# Patient Record
Sex: FEMALE | Race: WHITE | Employment: OTHER | URBAN - METROPOLITAN AREA
[De-identification: names, ages, dates, MRNs, and addresses within clinical notes are randomized per-mention and may not be internally consistent; named-entity substitution may affect disease eponyms.]

---

## 2017-01-02 ENCOUNTER — OFFICE VISIT (OUTPATIENT)
Dept: OPHTHALMOLOGY | Facility: CLINIC | Age: 77
End: 2017-01-02
Payer: COMMERCIAL

## 2017-01-02 DIAGNOSIS — H18.9 KERATOPATHY: ICD-10-CM

## 2017-01-02 DIAGNOSIS — H52.4 PRESBYOPIA: ICD-10-CM

## 2017-01-02 DIAGNOSIS — Z96.1 PSEUDOPHAKIA: ICD-10-CM

## 2017-01-02 DIAGNOSIS — H40.1131 PRIMARY OPEN ANGLE GLAUCOMA OF BOTH EYES, MILD STAGE: ICD-10-CM

## 2017-01-02 DIAGNOSIS — H35.81 RETINAL EDEMA: ICD-10-CM

## 2017-01-02 DIAGNOSIS — Z01.01 ENCOUNTER FOR EXAMINATION OF EYES AND VISION WITH ABNORMAL FINDINGS: Primary | ICD-10-CM

## 2017-01-02 DIAGNOSIS — Z98.890 HISTORY OF PHOTOTHERAPEUTIC KERATECTOMY: ICD-10-CM

## 2017-01-02 DIAGNOSIS — E11.3299 DIABETES MELLITUS WITH BACKGROUND RETINOPATHY (H): ICD-10-CM

## 2017-01-02 DIAGNOSIS — H43.813 POSTERIOR VITREOUS DETACHMENT, BILATERAL: ICD-10-CM

## 2017-01-02 PROCEDURE — 92015 DETERMINE REFRACTIVE STATE: CPT | Performed by: OPHTHALMOLOGY

## 2017-01-02 PROCEDURE — 92014 COMPRE OPH EXAM EST PT 1/>: CPT | Performed by: OPHTHALMOLOGY

## 2017-01-02 RX ORDER — SODIUM CHLORIDE 5 %
OINTMENT (GRAM) OPHTHALMIC (EYE)
Qty: 1 TUBE | Refills: 11 | Status: SHIPPED | OUTPATIENT
Start: 2017-01-02 | End: 2017-03-31

## 2017-01-02 RX ORDER — ERYTHROMYCIN 5 MG/G
0.25 OINTMENT OPHTHALMIC AT BEDTIME
Qty: 3.5 G | Refills: 11 | Status: CANCELLED | OUTPATIENT
Start: 2017-01-02

## 2017-01-02 ASSESSMENT — CUP TO DISC RATIO
OD_RATIO: 0.7
OS_RATIO: 0.6

## 2017-01-02 ASSESSMENT — VISUAL ACUITY
OD_SC: CF @5'
METHOD: SNELLEN - LINEAR
OS_SC: 20/200

## 2017-01-02 ASSESSMENT — TONOMETRY
OS_IOP_MMHG: UA
OD_IOP_MMHG: 13
OS_IOP_MMHG: 10
OD_IOP_MMHG: 20
IOP_METHOD: APPLANATION
IOP_METHOD: ICARE

## 2017-01-02 ASSESSMENT — EXTERNAL EXAM - LEFT EYE: OS_EXAM: 2+ BROW PTOSIS

## 2017-01-02 ASSESSMENT — CONF VISUAL FIELD
OD_NORMAL: 1
OS_NORMAL: 1

## 2017-01-02 ASSESSMENT — SLIT LAMP EXAM - LIDS
COMMENTS: 2+ DERMATOCHALASIS - UPPER LID, 2+ BLEPHARITIS
COMMENTS: 2+ DERMATOCHALASIS - UPPER LID, 2+ BLEPHARITIS

## 2017-01-02 ASSESSMENT — REFRACTION_MANIFEST
OS_SPHERE: -0.50
OD_CYLINDER: SPHERE
OS_AXIS: 175
OD_SPHERE: -1.25
OS_CYLINDER: +0.50

## 2017-01-02 ASSESSMENT — EXTERNAL EXAM - RIGHT EYE: OD_EXAM: 2+ BROW PTOSIS

## 2017-01-02 NOTE — PATIENT INSTRUCTIONS
Continue Cosopt right eye twice a day,   Latanoprost both eyes every evening.  Grace ointment: One squirt to right eye 2-3 times a day. Could put a little in the left eye at bedtime.  Use artificial tears up to 4 times daily  Continue care with Yeni.  Will see if can arrange appt with Dr. Kennedy also for Friday; otherwise schedule with him at your convenience.  Return visit 6 months for an intraocular pressure check.  Abraham Byrd M.D.

## 2017-01-02 NOTE — MR AVS SNAPSHOT
After Visit Summary   1/2/2017    Ruth Rocha    MRN: [id number]           Patient Information     Date Of Birth          1940        Visit Information        Provider Department      1/2/2017 8:00 AM Abraham Byrd MD [facility]        Today's Diagnoses     Encounter for examination of eyes and vision with abnormal findings    -  1     Presbyopia         Myopia, bilateral         Diabetes mellitus with background retinopathy (H)         Primary open angle glaucoma of both eyes, mild stage         Infiltrate of cornea, os         Hx of corneal epithelial defect, left eye         Keratopathy,  ou         Pseudophakia, Yag Caps, ou          Posterior vitreous detachment, bilateral         Diabetic macular edema, hx focal laser ou           Care Instructions    Continue Cosopt right eye twice a day,   Latanoprost both eyes every evening.  Grace ointment: One squirt to right eye 2-3 times a day. Could put a little in the left eye at bedtime.  Use artificial tears up to 4 times daily  Continue care with Yeni.  Will see if can arrange appt with Dr. Knenedy also for Friday; otherwise schedule with him at your convenience.  Return visit 6 months for an intraocular pressure check.  Abraham Byrd M.D.              Follow-ups after your visit        Your next 10 appointments already scheduled     Jan 06, 2017  7:45 AM   RETURN RETINA with Bryanna Jaime MD   Eye Clinic ([facility])    [facility]  [address]  [address]  [facility] [address] [phone]              Who to contact     If you have questions or need follow up information about today's clinic visit or your schedule please contact [facility] directly at [phone].  Normal or non-critical lab and imaging results will be communicated to you by MyChart, letter or phone within 4 business days after the clinic has received the results. If you do not hear from  "us within 7 days, please contact the clinic through [employer] or phone. If you have a critical or abnormal lab result, we will notify you by phone as soon as possible.  Submit refill requests through [employer] or call your pharmacy and they will forward the refill request to us. Please allow 3 business days for your refill to be completed.          Additional Information About Your Visit        [employer] Information     [employer] lets you send messages to your doctor, view your test results, renew your prescriptions, schedule appointments and more. To sign up, go to [email or web address] . Click on \"Log in\" on the left side of the screen, which will take you to the Welcome page. Then click on \"Sign up Now\" on the right side of the page.     You will be asked to enter the access code listed below, as well as some personal information. Please follow the directions to create your username and password.     Your access code is: [email or web address]  Expires: 3/23/2017  9:00 AM     Your access code will  in 90 days. If you need help or a new code, please call your [place] clinic or [phone].         Blood Pressure from Last 3 Encounters:   07/15/16 120/61   16 134/72   06/08/15 116/60    Weight from Last 3 Encounters:   07/15/16 101.606 kg (224 lb)   16 100.472 kg (221 lb 8 oz)   16 101.606 kg (224 lb)              We Performed the Following     EYE EXAM (SIMPLE-NONBILLABLE)     REFRACTIVE STATUS          Today's Medication Changes          These changes are accurate as of: 17  9:07 AM.  If you have any questions, ask your nurse or doctor.               Start taking these medicines.        Dose/Directions    sodium chloride 5 % ophthalmic ointment   Commonly known as:  NATI 128   Used for:  Keratopathy   Started by:  Abraham Byrd MD        One squirt to right eye 2-3 times a day   Quantity:  1 Tube   Refills:  11            Where to get your medicines      These medications were sent to " [id number] - [place], [place] - [address], [facility] [address]     Phone:  [phone]    - sodium chloride 5 % ophthalmic ointment             Primary Care Provider Office Phone # Fax #    Raymon Hernández [phone]       [facility] [address] [facility] [address]        Thank you!     Thank you for choosing [facility]  for your care. Our goal is always to provide you with excellent care. Hearing back from our patients is one way we can continue to improve our services. Please take a few minutes to complete the written survey that you may receive in the mail after your visit with us. Thank you!             Your Updated Medication List - Protect others around you: Learn how to safely use, store and throw away your medicines at www.disposemymeds.org.          This list is accurate as of: 1/2/17  9:07 AM.  Always use your most recent med list.                   Brand Name Dispense Instructions for use    * ACE/ARB NOT PRESCRIBED (INTENTIONAL)      by Other route continuous prn.       aspirin 325 MG EC tablet      Take 325 mg by mouth daily.       blood glucose calibration NORMAL solution     1 Bottle    Use to calibrate blood glucose monitor as needed as directed.       blood glucose monitoring meter device kit     1 kit    Use to test blood sugar 3 times daily or as directed.       blood glucose test strip    ONEYDA BREEZE 2    3 Box    For testing 3x daily, patient on insulin       carvedilol 12.5 MG tablet    COREG    180 tablet    Take 1 tablet (12.5 mg) by mouth 2 times daily (with meals)       dorzolamide-timolol 2-0.5 % ophthalmic solution    COSOPT    10 mL    Place 1 drop into the right eye 2 times daily       erythromycin ophthalmic ointment    ROMYCIN    3.5 g    Place 0.25 inches Into the left eye At Bedtime       ferrous sulfate 325 (65 FE) MG tablet    IRON    180 tablet    Take 1 tablet (325  mg) by mouth 2 times daily       fluticasone 50 MCG/ACT spray    FLONASE    16 g    Spray 1-2 sprays into both nostrils daily       hydrochlorothiazide 25 MG tablet    HYDRODIURIL    90 tablet    Take 1 tablet (25 mg) by mouth daily       hydrocortisone 1 % cream    CORTAID    60 g    Apply sparingly to affected area three times daily as needed to affected areas       insulin glargine 100 UNIT/ML injection    LANTUS SOLOSTAR    3 Month    30 units at bedtime or as directed       insulin pen needle 31G X 8 MM     100 each    Use as directed with Lantus; patient often needs 2 shots daily, so  Please provide enough needles for bid dosing       latanoprost 0.005 % ophthalmic solution    XALATAN    1 Bottle    Place 1 drop into both eyes At Bedtime       metoclopramide 10 MG tablet    REGLAN    120 tablet    Take 1 tablet by mouth. Take 1 tablet by mouth 4 times a day before meals and nightly as needed       MICROLET LANCETS Misc     100 each    1 Device 3 times daily.       MULTIVITAMIN TABS   OR      1 TABLET DAILY       omeprazole 40 MG capsule    priLOSEC    90 capsule    Take 1 capsule (40 mg) by mouth daily       * order for DME      Equipment being ordered: CPAP       order for DME     1 each    Equipment being ordered: CPAP tubing, mask, and all needed equipment       oxybutynin 5 MG tablet    DITROPAN    270 tablet    Take 1 tablet (5 mg) by mouth 3 times daily as needed       potassium chloride 10 MEQ tablet    K-TAB,KLOR-CON    90 tablet    Take 1 tablet (10 mEq) by mouth daily       senna-docusate 8.6-50 MG per tablet    SENNA S    60 tablet    Take 1-4 tablets by mouth 2 times daily as needed constipation       simvastatin 10 MG tablet    ZOCOR    90 tablet    Take 1 tablet (10 mg) by mouth At Bedtime       sodium chloride 5 % ophthalmic ointment    NATI 128    1 Tube    One squirt to right eye 2-3 times a day       vitamin D 1000 UNITS capsule      Take 1 capsule by mouth daily.       * Notice:  This list  has 2 medication(s) that are the same as other medications prescribed for you. Read the directions carefully, and ask your doctor or other care provider to review them with you.

## 2017-01-02 NOTE — PROGRESS NOTES
" Current Eye Medications:  cosopt right eye twice a day, Latanoprost both eyes every evening.  Last drops:  Right eye: 6:30am, left eye: 10:30pm     Subjective:  Comprehensive Eye Exam.  Patient is here for a Diabetic Eye Exam.  Last A1C was:  6.1 in July.  \"My eyes are still as bad as always.\"  Right eye is sore today.   E-mycin did not seem to make any difference, so she discontinued it a couple months ago, when the tube ran out.  Glasses are no longer helping, so she rarely wears them.  She uses a magnifier for close work.  She sees the Retina Doctor next week, last appointment was 4 months ago.       Objective:  See Ophthalmology Exam.       Assessment: Corneal haze and irregularity worsening right eye.  Left eye remains relatively clear, quiet, and painless after PTK.  Stable posterior segment exam.      ICD-10-CM    1. Encounter for examination of eyes and vision with abnormal findings Z01.01 REFRACTIVE STATUS   2. Presbyopia H52.4 REFRACTIVE STATUS   3. Diabetes mellitus with background retinopathy (H) E11.319 EYE EXAM (SIMPLE-NONBILLABLE)   4. Diabetic macular edema, hx focal laser ou H35.81    5. Primary open angle glaucoma of both eyes, mild stage H40.1131    6. Keratopathy,  ou H18.9 sodium chloride (GRACE 128) 5 % ophthalmic ointment   7. Pseudophakia, Yag Caps, ou  Z96.1    8. History of phototherapeutic keratectomy, os Z98.890    9. Posterior vitreous detachment, bilateral H43.813         Plan: Continue Cosopt right eye twice a day,   Latanoprost both eyes every evening.  Grace ointment: One squirt to right eye 2-3 times a day. Could put a little in the left eye at bedtime.  Use artificial tears up to 4 times daily  Continue care with Yeni.  Will see if can arrange appt with Dr. Kennedy also for Friday; otherwise schedule with him at your convenience.  Return visit 6 months for an intraocular pressure check.  Abraham Byrd M.D.           "

## 2017-01-06 ENCOUNTER — OFFICE VISIT (OUTPATIENT)
Dept: OPHTHALMOLOGY | Facility: CLINIC | Age: 77
End: 2017-01-06
Attending: OPHTHALMOLOGY
Payer: COMMERCIAL

## 2017-01-06 DIAGNOSIS — H18.9 KERATOPATHY: Primary | ICD-10-CM

## 2017-01-06 DIAGNOSIS — E11.311: ICD-10-CM

## 2017-01-06 PROCEDURE — 99213 OFFICE O/P EST LOW 20 MIN: CPT | Mod: 25,ZF

## 2017-01-06 PROCEDURE — 40000269 ZZH STATISTIC NO CHARGE FACILITY FEE: Mod: ZF

## 2017-01-06 PROCEDURE — 92134 CPTRZ OPH DX IMG PST SGM RTA: CPT | Mod: ZF | Performed by: OPHTHALMOLOGY

## 2017-01-06 ASSESSMENT — TONOMETRY
OS_IOP_MMHG: 11
OD_IOP_MMHG: 12
IOP_METHOD: TONOPEN
OD_IOP_MMHG: 12
OS_IOP_MMHG: 11
IOP_METHOD: TONOPEN

## 2017-01-06 ASSESSMENT — SLIT LAMP EXAM - LIDS
COMMENTS: 2+ DERMATOCHALASIS - UPPER LID, 2+ BLEPHARITIS

## 2017-01-06 ASSESSMENT — VISUAL ACUITY
CORRECTION_TYPE: GLASSES
OD_PH_CC: 20/250
OD_PH_CC: 20/250
METHOD: SNELLEN - LINEAR
OD_CC: 20/300
OS_CC: 20/400
METHOD: SNELLEN - LINEAR
OD_CC: 20/300
CORRECTION_TYPE: GLASSES
OS_CC: 20/400

## 2017-01-06 ASSESSMENT — REFRACTION_WEARINGRX
OD_CYLINDER: +2.00
SPECS_TYPE: PAL
OS_SPHERE: -1.25
OS_ADD: +3.50
SPECS_TYPE: PAL
OS_AXIS: 062
OS_AXIS: 062
OS_CYLINDER: +1.00
OD_SPHERE: -1.00
OS_CYLINDER: +1.00
OD_AXIS: 015
OS_ADD: +3.50
OD_CYLINDER: +2.00
OD_ADD: +3.50
OD_SPHERE: -1.00
OD_AXIS: 015
OD_ADD: +3.50
OS_SPHERE: -1.25

## 2017-01-06 ASSESSMENT — CUP TO DISC RATIO
OS_RATIO: 0.6
OD_RATIO: 0.7

## 2017-01-06 ASSESSMENT — EXTERNAL EXAM - LEFT EYE
OS_EXAM: 2+ BROW PTOSIS
OS_EXAM: 2+ BROW PTOSIS

## 2017-01-06 ASSESSMENT — EXTERNAL EXAM - RIGHT EYE
OD_EXAM: 2+ BROW PTOSIS
OD_EXAM: 2+ BROW PTOSIS

## 2017-01-06 NOTE — NURSING NOTE
Chief Complaints and History of Present Illnesses   Patient presents with     Diabetic Retinopathy Follow Up     4 month follow up both eyes.     HPI    Affected eye(s):  Both   Symptoms:     No floaters   No flashes   No redness         Do you have eye pain now?:  No      Comments:  Pt states vision in RE appearing more Hazy for the past month. Pt seeing Dr. Kennedy today for corneal Haze RE today.  DMII BS: 128 this Morning.  A1C      6.1   7/15/2016  A1C      6.2   1/4/2016  A1C      6.5   12/12/2014  A1C      6.2   4/30/2014  A1C      6.5   9/18/2013    [facility] January 6, 2017 8:03 AM

## 2017-01-06 NOTE — NURSING NOTE
Chief Complaints and History of Present Illnesses   Patient presents with     Follow Up For     Corneal Haze Right Eye     HPI    Affected eye(s):  Both   Symptoms:     No floaters   No flashes   No redness   No Dryness         Do you have eye pain now?:  No      Comments:  Pt states vision in RE appearing more Hazy for the past month. Pt also seeing Dr. Jaime today.  DMII BS: 128 this Morning.  A1C      6.1   7/15/2016  A1C      6.2   1/4/2016  A1C      6.5   12/12/2014  A1C      6.2   4/30/2014  A1C      6.5   9/18/2013    [facility] January 6, 2017 8:03 AM

## 2017-01-06 NOTE — PROGRESS NOTES
CC -   NPDR and DME    HPI -   Patient  Feels VA in the right eye has been decreasing the past 2 months, left eye stable. Reports very stable blood sugars, typically low 100s. Last A1c 6.1% July 2016.    Ruth Rocha is a  76 year old year-old patient presenting for NPDR and DME OS > OD.  Sees [facility] normally for glaucoma (last visit 1/2/17).  Last A1c 6.1 (7/2016).  VA OS poor longstanding    PAST OCULAR HISTORY  CE/IOL OU  FML OU  Therapeutic PTK OS 8/2015    Retinal Imaging:  OCT   1-6-17  right eye- blurry/grainy image,  2+ Atrophy from FML scars, no CNVM, tr ERM, unable to fully assess for DME but no obvious new intraretinal fluid  left eye -  Grainy image, 3+ atrophy from FML scars, no CNVM, 2+  paracentral DME, CST - 221 -> 299 -> 298 -> 246-->248, stable    FA 6-14-16  Right eye - blurry image, 2-3+ WD from FML scars, 1+ increased NURA, 1 peripheral ischemia, no NV  Left eye - (transit) 2+ increased NURA, large WD in macula from FML scars, 1+ MAs/peripheral atrophy, no NV      ASSESSMENT & PLAN  1.  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 CSME OS   - s/p FML   - paracentral, given extensive FML scars & corneal scarring, unclear visual significance   - has moderate macular ischemia and scarring from FML scars (FA 6/2016)   - has anatomical improvement with monthly  injections (3 total, last 2/2016)  but no clear affect on VA or subjective vision   - VA has varied 20/125 - 20/500, typically close to 20/400,  unclear benefit of injections   - given OAG history and uncertain benefit, reluctant to use steroids     - OCT stable today, VA stable   - d/w patient, anti-VEGF vs observe, wishes to observe for now    3. DME OD   - OCT poor quality today but edema appears mild   -decreased vision mostly likely from corneal changes. observe    4.  PVD OU    5.  K- scarring OU   -prominent corneal haze and edema right eye today   -on Grace 128 ointment OD since Monday   -f/u with Dr Kennedy  today    6.  OAG OU   - sees Agusto - last visit 1/2/17- stable    RTC 4 months, OCT OU, sooner if new vision changes    Fady Aguiar MD  PGY3, Dept of Ophthalmology    ATTESTATION     Attending Physician Attestation:     Complete documentation of historical and exam elements from today's encounter can be found in the full encounter summary report (not redupilcated in this progress note).  I personally obtained the chief complaint(s) and hisotry of present illness., I have confirmed and edited as necessary the CC, HPI, PMH/PSH, Social history, FMH,  ROS, and exam/neuro findings as obtained by the technician or others. I have examined this patient myself.  and I personally viewed the image(s) and studies listed above and the documentation reflects my findings and interpretation.    Bryanna Jaime MD, PhD  , Vitreoretinal Surgery  Department of Ophthalmology  [facility]

## 2017-01-06 NOTE — PROGRESS NOTES
CC -   NPDR and DME    HPI -   Patient  Feels VA in the right eye has been decreasing the past 2 months, left eye stable. Reports very stable blood sugars, typically low 100s. Last A1c 6.1% July 2016.    Ruth Rocha is a  76 year old year-old patient presenting for NPDR and DME OS > OD.  Sees [facility] normally for glaucoma (last visit 1/2/17).  Last A1c 6.1 (7/2016).  VA OS poor longstanding    Eye drops:    Continue Cosopt right eye twice a day,    Latanoprost both eyes every evening.  Grace ointment: One squirt to right eye 2-3 times a day. Could put a little in the left eye at bedtime.  Use artificial tears up to 4 times daily    PAST OCULAR HISTORY  CE/IOL OU  FML OU  Therapeutic PTK OS 8/2015    ASSESSMENT & PLAN    1. Corneal scarring OU s/p PTK OS 8/2015.    2. Moderate NPDR OU and DME OS > OD, followed by Retina.    3. H/o POAG followed by Dr. Liz.    Rec SK, PTK, MMC right eye  Pachmetry 6/16 was 664    RTC post op.      ~~~~~~~~~~~~~~~~~~~~~~~~~~~~~~~~~~~~~~~~~~~~~~~~~~~~~~~~~~~~~~~~    I have personally examined the patient and agree with the assessment and plan as delineated by the resident / fellow.  I have confirmed the contents of the chief complaint, history of present illness, review of systems, and medical / surgical history sections and edited the note as needed.    Robb Kennedy MD, MA  Director, Cornea & Anterior Segment  [facility] Department of Ophthalmology & Visual Neuroscience

## 2017-01-06 NOTE — MR AVS SNAPSHOT
After Visit Summary   1/6/2017    Ruth Rocha    MRN: [id number]           Patient Information     Date Of Birth          1940        Visit Information        Provider Department      1/6/2017 7:45 AM Bryanna Jaime MD [facility]        Today's Diagnoses     Clinically significant macular edema associated with type 2 diabetes (H)            Follow-ups after your visit        Follow-up notes from your care team     Return in about 4 months (around 5/6/2017) for Exam + OCT, DME, OCT OU.      Your next 10 appointments already scheduled     Jan 19, 2017  2:00 PM   Post-Op with Robb Kennedy MD   Eye Clinic ([facility])    [facility] [address]  [address]  [address]  [facility] [address]   [phone]            Jan 27, 2017  8:15 AM   Post-Op with Robb Kennedy MD   Eye Clinic ([facility])    [facility] [address]  [address]  [address]  [facility] [address]   [phone]            May 05, 2017  7:45 AM   RETURN RETINA with Bryanna Jaime MD   Eye Clinic ([facility])    [facility] [address]  [address]  [address]  [facility] [address]   [phone]            Jun 19, 2017  8:15 AM   [facility]    [address] [address]   [phone]              Future tests that were ordered for you today     Open Future Orders        Priority Expected Expires Ordered    OCT Retina Spectralis OU (both eyes) Routine  7/10/2018 1/6/2017            Who to contact     Please call your clinic at [phone] to:    Ask questions about your health    Make or cancel appointments    Discuss your medicines    Learn about your test results    Speak to your doctor   If you have compliments or concerns about an experience at your clinic, or if you wish to file a complaint, please contact [facility]  Physicians Patient Relations at [phone] or email us at [email or web address]         Additional Information About Your Visit        [employer] Information     [employer] is an electronic gateway that provides easy, online access to your medical records. With [employer], you can request a clinic appointment, read your test results, renew a prescription or communicate with your care team.     To sign up for [employer] visit the website at [email or web address]   You will be asked to enter the access code listed below, as well as some personal information. Please follow the directions to create your username and password.     Your access code is: [email or web address]  Expires: 3/23/2017  9:00 AM     Your access code will  in 90 days. If you need help or a new code, please contact your [facility] or call [phone] for assistance.        Care EveryWhere ID     This is your Care EveryWhere ID. This could be used by other organizations to access your [facility] medical records  [id number]         Blood Pressure from Last 3 Encounters:   07/15/16 120/61   16 134/72   06/08/15 116/60    Weight from Last 3 Encounters:   07/15/16 101.606 kg (224 lb)   16 100.472 kg (221 lb 8 oz)   16 101.606 kg (224 lb)              We Performed the Following     OCT Retina Spectralis OU (both eyes)        Primary Care Provider Office Phone # Fax #    Raymon Hernández [phone]       [address] [address]        Thank you!     Thank you for choosing EYE CLINIC  for your care. Our goal is always to provide you with excellent care. Hearing back from our patients is one way we can continue to improve our services. Please take a few minutes to complete the written survey that you may receive in the mail after your visit with us. Thank you!             Your Updated Medication List - Protect others around you: Learn how  to safely use, store and throw away your medicines at www.disposemymeds.org.          This list is accurate as of: 1/6/17 10:48 AM.  Always use your most recent med list.                   Brand Name Dispense Instructions for use    * ACE/ARB NOT PRESCRIBED (INTENTIONAL)      by Other route continuous prn.       aspirin 325 MG EC tablet      Take 325 mg by mouth daily.       blood glucose calibration NORMAL solution     1 Bottle    Use to calibrate blood glucose monitor as needed as directed.       blood glucose monitoring meter device kit     1 kit    Use to test blood sugar 3 times daily or as directed.       blood glucose test strip    [place] Energy[employer]ZE 2    3 Box    For testing 3x daily, patient on insulin       carvedilol 12.5 MG tablet    COREG    180 tablet    Take 1 tablet (12.5 mg) by mouth 2 times daily (with meals)       dorzolamide-timolol 2-0.5 % ophthalmic solution    COSOPT    10 mL    Place 1 drop into the right eye 2 times daily       erythromycin ophthalmic ointment    ROMYCIN    3.5 g    Place 0.25 inches Into the left eye At Bedtime       ferrous sulfate 325 (65 FE) MG tablet    IRON    180 tablet    Take 1 tablet (325 mg) by mouth 2 times daily       fluticasone 50 MCG/ACT spray    FLONASE    16 g    Spray 1-2 sprays into both nostrils daily       hydrochlorothiazide 25 MG tablet    HYDRODIURIL    90 tablet    Take 1 tablet (25 mg) by mouth daily       hydrocortisone 1 % cream    CORTAID    60 g    Apply sparingly to affected area three times daily as needed to affected areas       insulin glargine 100 UNIT/ML injection    LANTUS SOLOSTAR    3 Month    30 units at bedtime or as directed       insulin pen needle 31G X 8 MM     100 each    Use as directed with Lantus; patient often needs 2 shots daily, so  Please provide enough needles for bid dosing       latanoprost 0.005 % ophthalmic solution    XALATAN    1 Bottle    Place 1 drop into both eyes At Bedtime       metoclopramide 10 MG tablet    REGLAN     120 tablet    Take 1 tablet by mouth. Take 1 tablet by mouth 4 times a day before meals and nightly as needed       MICROLET LANCETS Misc     100 each    1 Device 3 times daily.       MULTIVITAMIN TABS   OR      1 TABLET DAILY       omeprazole 40 MG capsule    priLOSEC    90 capsule    Take 1 capsule (40 mg) by mouth daily       * order for DME      Equipment being ordered: CPAP       order for DME     1 each    Equipment being ordered: CPAP tubing, mask, and all needed equipment       oxybutynin 5 MG tablet    DITROPAN    270 tablet    Take 1 tablet (5 mg) by mouth 3 times daily as needed       potassium chloride 10 MEQ tablet    K-TAB,KLOR-CON    90 tablet    Take 1 tablet (10 mEq) by mouth daily       senna-docusate 8.6-50 MG per tablet    SENNA S    60 tablet    Take 1-4 tablets by mouth 2 times daily as needed constipation       simvastatin 10 MG tablet    ZOCOR    90 tablet    Take 1 tablet (10 mg) by mouth At Bedtime       sodium chloride 5 % ophthalmic ointment    NATI 128    1 Tube    One squirt to right eye 2-3 times a day       vitamin D 1000 UNITS capsule      Take 1 capsule by mouth daily.       * Notice:  This list has 2 medication(s) that are the same as other medications prescribed for you. Read the directions carefully, and ask your doctor or other care provider to review them with you.

## 2017-01-17 ENCOUNTER — TELEPHONE (OUTPATIENT)
Dept: FAMILY MEDICINE | Facility: CLINIC | Age: 77
End: 2017-01-17

## 2017-01-17 DIAGNOSIS — Z79.4 TYPE 2 DIABETES MELLITUS WITH DIABETIC NEPHROPATHY, WITH LONG-TERM CURRENT USE OF INSULIN (H): Primary | ICD-10-CM

## 2017-01-17 DIAGNOSIS — E11.21 TYPE 2 DIABETES MELLITUS WITH DIABETIC NEPHROPATHY, WITH LONG-TERM CURRENT USE OF INSULIN (H): Primary | ICD-10-CM

## 2017-01-17 NOTE — TELEPHONE ENCOUNTER
Called pharmacy and they stated that they need a refill of patient's test strips. We have the Mingo Breeze 2 test strips in Epic but pharmacy is wanting us to send a Rx for no brand specified  test strips and they will figure out which one she has.     Routed to provider to send in new Rx for test strips.  Saadia Srivastava RN  [facility]

## 2017-01-18 ENCOUNTER — TRANSFERRED RECORDS (OUTPATIENT)
Dept: HEALTH INFORMATION MANAGEMENT | Facility: CLINIC | Age: 77
End: 2017-01-18

## 2017-01-19 ENCOUNTER — OFFICE VISIT (OUTPATIENT)
Dept: OPHTHALMOLOGY | Facility: CLINIC | Age: 77
End: 2017-01-19
Attending: OPHTHALMOLOGY
Payer: COMMERCIAL

## 2017-01-19 DIAGNOSIS — H17.9 CORNEA SCAR: Primary | ICD-10-CM

## 2017-01-19 PROCEDURE — 99212 OFFICE O/P EST SF 10 MIN: CPT | Mod: ZF

## 2017-01-19 RX ORDER — PREDNISOLONE ACETATE 10 MG/ML
1 SUSPENSION/ DROPS OPHTHALMIC 4 TIMES DAILY
COMMUNITY
End: 2017-03-31

## 2017-01-19 ASSESSMENT — VISUAL ACUITY
OD_SC: 20/400
OS_SC: 20/400
METHOD: SNELLEN - LINEAR
OS_PH_SC: 20/200-2

## 2017-01-19 ASSESSMENT — REFRACTION_WEARINGRX
OD_ADD: +3.50
OD_CYLINDER: +2.00
OS_AXIS: 062
OS_CYLINDER: +1.00
OD_AXIS: 015
OS_ADD: +3.50
SPECS_TYPE: PAL
OS_SPHERE: -1.25
OD_SPHERE: -1.00

## 2017-01-19 ASSESSMENT — TONOMETRY
OS_IOP_MMHG: 02
IOP_METHOD: ICARE
OD_IOP_MMHG: 10

## 2017-01-19 ASSESSMENT — EXTERNAL EXAM - RIGHT EYE: OD_EXAM: 2+ BROW PTOSIS

## 2017-01-19 ASSESSMENT — EXTERNAL EXAM - LEFT EYE: OS_EXAM: 2+ BROW PTOSIS

## 2017-01-19 NOTE — MR AVS SNAPSHOT
After Visit Summary   1/19/2017    Ruth Rocha    MRN: [id number]           Patient Information     Date Of Birth          1940        Visit Information        Provider Department      1/19/2017 2:00 PM Robb Kennedy MD [facility]         Follow-ups after your visit        Your next 10 appointments already scheduled     Jan 27, 2017  8:15 AM   Post-Op with Robb Kennedy MD   Eye Clinic ([facility])    [address]  [address]   [address]  [facility] [address]   [phone]            May 05, 2017  7:45 AM   RETURN RETINA with Bryanna Jaime MD   Eye Clinic ([facility])    [address]  [address]   [address]  [facility] [address]   [phone]            Jun 19, 2017  8:15 AM   Return Visit with Abraham Byrd MD   [facility] ([address] [address] [phone]              Who to contact     Please call your clinic at [phone] to:    Ask questions about your health    Make or cancel appointments    Discuss your medicines    Learn about your test results    Speak to your doctor   If you have compliments or concerns about an experience at your clinic, or if you wish to file a complaint, please contact [facility] Patient Relations at [phone] or email us at [email or web address]         Additional Information About Your Visit        MyChart Information     GleeMaster is an electronic gateway that provides easy, online access to your medical records. With GleeMaster, you can request a clinic appointment, read your test results, renew a prescription or communicate with your care team.     To sign up for GleeMaster visit the website at [email or web address]   You will be asked to enter the access code listed below, as well as some personal information. Please follow the directions to create your  username and password.     Your access code is: [email or web address]  Expires: 3/23/2017  9:00 AM     Your access code will  in 90 days. If you need help or a new code, please contact your [facility] or call [phone] for assistance.        Care EveryWhere ID     This is your Care EveryWhere ID. This could be used by other organizations to access your [place] medical records  [id number]         Blood Pressure from Last 3 Encounters:   07/15/16 120/61   16 134/72   06/08/15 116/60    Weight from Last 3 Encounters:   07/15/16 101.606 kg (224 lb)   16 100.472 kg (221 lb 8 oz)   16 101.606 kg (224 lb)              Today, you had the following     No orders found for display       Primary Care Provider Office Phone # Fax #    Raymon Hernández [phone]       [address] [address]        Thank you!     Thank you for choosing EYE CLINIC  for your care. Our goal is always to provide you with excellent care. Hearing back from our patients is one way we can continue to improve our services. Please take a few minutes to complete the written survey that you may receive in the mail after your visit with us. Thank you!             Your Updated Medication List - Protect others around you: Learn how to safely use, store and throw away your medicines at www.disposemymeds.org.          This list is accurate as of: 17  2:14 PM.  Always use your most recent med list.                   Brand Name Dispense Instructions for use    * ACE/ARB NOT PRESCRIBED (INTENTIONAL)      by Other route continuous prn.       aspirin 325 MG EC tablet      Take 325 mg by mouth daily.       blood glucose calibration NORMAL solution     1 Bottle    Use to calibrate blood glucose monitor as needed as directed.       blood glucose monitoring meter device kit     1 kit    Use to test blood sugar 3 times daily or as directed.        blood glucose monitoring test strip    no brand specified    100 strip    Use to test blood sugars 3 times daily or as directed       carvedilol 12.5 MG tablet    COREG    180 tablet    Take 1 tablet (12.5 mg) by mouth 2 times daily (with meals)       dorzolamide-timolol 2-0.5 % ophthalmic solution    COSOPT    10 mL    Place 1 drop into the right eye 2 times daily       erythromycin ophthalmic ointment    ROMYCIN    3.5 g    Place 0.25 inches Into the left eye At Bedtime       ferrous sulfate 325 (65 FE) MG tablet    IRON    180 tablet    Take 1 tablet (325 mg) by mouth 2 times daily       fluticasone 50 MCG/ACT spray    FLONASE    16 g    Spray 1-2 sprays into both nostrils daily       hydrochlorothiazide 25 MG tablet    HYDRODIURIL    90 tablet    Take 1 tablet (25 mg) by mouth daily       hydrocortisone 1 % cream    CORTAID    60 g    Apply sparingly to affected area three times daily as needed to affected areas       insulin glargine 100 UNIT/ML injection    LANTUS SOLOSTAR    3 Month    30 units at bedtime or as directed       insulin pen needle 31G X 8 MM     100 each    Use as directed with Lantus; patient often needs 2 shots daily, so  Please provide enough needles for bid dosing       latanoprost 0.005 % ophthalmic solution    XALATAN    1 Bottle    Place 1 drop into both eyes At Bedtime       metoclopramide 10 MG tablet    REGLAN    120 tablet    Take 1 tablet by mouth. Take 1 tablet by mouth 4 times a day before meals and nightly as needed       MICROLET LANCETS Misc     100 each    1 Device 3 times daily.       MULTIVITAMIN TABS   OR      1 TABLET DAILY       omeprazole 40 MG capsule    priLOSEC    90 capsule    Take 1 capsule (40 mg) by mouth daily       * order for DME      Equipment being ordered: CPAP       order for DME     1 each    Equipment being ordered: CPAP tubing, mask, and all needed equipment       oxybutynin 5 MG tablet    DITROPAN    270 tablet    Take 1 tablet (5 mg) by mouth 3 times  daily as needed       potassium chloride 10 MEQ tablet    K-TAB,KLOR-CON    90 tablet    Take 1 tablet (10 mEq) by mouth daily       prednisoLONE acetate 1 % ophthalmic susp    PRED FORTE     Place 1 drop into the right eye 4 times daily       senna-docusate 8.6-50 MG per tablet    SENNA S    60 tablet    Take 1-4 tablets by mouth 2 times daily as needed constipation       simvastatin 10 MG tablet    ZOCOR    90 tablet    Take 1 tablet (10 mg) by mouth At Bedtime       sodium chloride 5 % ophthalmic ointment    NATI 128    1 Tube    One squirt to right eye 2-3 times a day       vitamin D 1000 UNITS capsule      Take 1 capsule by mouth daily.       * Notice:  This list has 2 medication(s) that are the same as other medications prescribed for you. Read the directions carefully, and ask your doctor or other care provider to review them with you.

## 2017-01-19 NOTE — NURSING NOTE
Chief Complaints and History of Present Illnesses   Patient presents with     Post Op (Ophthalmology) Right Eye     PTK     HPI    Symptoms:     No tearing   No Dryness         Do you have eye pain now?:  No      Comments:  One day POP for right eye PTK.  The patient states she has no eye pain and she is doing well.   KEIRA Gee 1:46 PM 01/19/2017

## 2017-01-24 NOTE — PROGRESS NOTES
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

## 2017-01-27 ENCOUNTER — OFFICE VISIT (OUTPATIENT)
Dept: OPHTHALMOLOGY | Facility: CLINIC | Age: 77
End: 2017-01-27
Attending: OPHTHALMOLOGY
Payer: COMMERCIAL

## 2017-01-27 DIAGNOSIS — Z98.890 HISTORY OF PHOTOTHERAPEUTIC KERATECTOMY: Primary | ICD-10-CM

## 2017-01-27 PROCEDURE — 99212 OFFICE O/P EST SF 10 MIN: CPT | Mod: ZF

## 2017-01-27 RX ORDER — PREDNISOLONE ACETATE 10 MG/ML
SUSPENSION/ DROPS OPHTHALMIC
Qty: 1 BOTTLE | Refills: 1 | Status: SHIPPED | OUTPATIENT
Start: 2017-01-27 | End: 2017-03-31

## 2017-01-27 ASSESSMENT — TONOMETRY
OD_IOP_MMHG: 11
IOP_METHOD: ICARE
OS_IOP_MMHG: 09

## 2017-01-27 ASSESSMENT — VISUAL ACUITY
METHOD: SNELLEN - LINEAR
OD_PH_SC: 20/125
OS_SC: 20/400
OS_PH_SC: 20/150
OD_SC: 20/150

## 2017-01-27 ASSESSMENT — EXTERNAL EXAM - RIGHT EYE: OD_EXAM: 2+ BROW PTOSIS

## 2017-01-27 ASSESSMENT — EXTERNAL EXAM - LEFT EYE: OS_EXAM: 2+ BROW PTOSIS

## 2017-01-27 NOTE — MR AVS SNAPSHOT
After Visit Summary   1/27/2017    Ruth Rocha    MRN: [id number]           Patient Information     Date Of Birth          1940        Visit Information        Provider Department      1/27/2017 8:15 AM Robb Kennedy MD [facility]        Today's Diagnoses     History of phototherapeutic keratectomy    -  1        Follow-ups after your visit        Follow-up notes from your care team     Return in about 2 months (around 3/27/2017) for MRx .      Your next 10 appointments already scheduled     Mar 31, 2017  8:00 AM   Post-Op with Robb Kennedy MD   Eye Clinic ([facility])    [address]  [address]   [address]  [facility] [address]   [phone]            May 05, 2017  7:45 AM   RETURN RETINA with Bryanna Jaime MD   Eye Clinic ([facility])    [address]  [address]   [address]  [facility] [address]   [phone]            Jun 19, 2017  8:15 AM   Return Visit with Abraham Byrd MD   [facility] ([facility])    [address] [place] [address] [phone]              Who to contact     Please call your clinic at [phone] to:    Ask questions about your health    Make or cancel appointments    Discuss your medicines    Learn about your test results    Speak to your doctor   If you have compliments or concerns about an experience at your clinic, or if you wish to file a complaint, please contact [facility] Patient Relations at [phone] or email us at [email or web address]         Additional Information About Your Visit        ISI Technologyhart Information     TASCET is an electronic gateway that provides easy, online access to your medical records. With TASCET, you can request a clinic appointment, read your test results, renew a prescription or communicate with your care team.     To sign up for TASCET visit the website  at [email or web address]   You will be asked to enter the access code listed below, as well as some personal information. Please follow the directions to create your username and password.     Your access code is: [email or web address]  Expires: 3/23/2017  9:00 AM     Your access code will  in 90 days. If you need help or a new code, please contact your [facility] or call [phone] for assistance.        Care EveryWhere ID     This is your Care EveryWhere ID. This could be used by other organizations to access your [place] medical records  [id number]         Blood Pressure from Last 3 Encounters:   07/15/16 120/61   16 134/72   06/08/15 116/60    Weight from Last 3 Encounters:   07/15/16 101.606 kg (224 lb)   16 100.472 kg (221 lb 8 oz)   16 101.606 kg (224 lb)              Today, you had the following     No orders found for display         Today's Medication Changes          These changes are accurate as of: 17  8:41 AM.  If you have any questions, ask your nurse or doctor.               These medicines have changed or have updated prescriptions.        Dose/Directions    * prednisoLONE acetate 1 % ophthalmic susp   Commonly known as:  PRED FORTE   This may have changed:  Another medication with the same name was added. Make sure you understand how and when to take each.   Changed by:  Robb Kennedy MD        Dose:  1 drop   Place 1 drop into the right eye 4 times daily   Refills:  0       * prednisoLONE acetate 1 % ophthalmic susp   Commonly known as:  PRED FORTE   This may have changed:  You were already taking a medication with the same name, and this prescription was added. Make sure you understand how and when to take each.   Used for:  History of phototherapeutic keratectomy   Changed by:  Robb Kennedy MD        Three times a day for 2 weeks, twice a day for 2 weeks, once a day for two weeks   Quantity:  1 Bottle   Refills:  1        * Notice:  This list has 2 medication(s) that are the same as other medications prescribed for you. Read the directions carefully, and ask your doctor or other care provider to review them with you.         Where to get your medicines      These medications were sent to [id number] - [place], [place] - [address] [facility] [address]     Phone:  [phone]    - prednisoLONE acetate 1 % ophthalmic susp             Primary Care Provider Office Phone # Fax #    Raymon Hernández [phone]       [facility] [address] [facility] [address]        Thank you!     Thank you for choosing [facility]YE CLINIC  for your care. Our goal is always to provide you with excellent care. Hearing back from our patients is one way we can continue to improve our services. Please take a few minutes to complete the written survey that you may receive in the mail after your visit with us. Thank you!             Your Updated Medication List - Protect others around you: Learn how to safely use, store and throw away your medicines at www.disposemymeds.org.          This list is accurate as of: 1/27/17  8:41 AM.  Always use your most recent med list.                   Brand Name Dispense Instructions for use    * ACE/ARB NOT PRESCRIBED (INTENTIONAL)      by Other route continuous prn.       aspirin 325 MG EC tablet      Take 325 mg by mouth daily.       blood glucose calibration NORMAL solution     1 Bottle    Use to calibrate blood glucose monitor as needed as directed.       blood glucose monitoring meter device kit     1 kit    Use to test blood sugar 3 times daily or as directed.       blood glucose monitoring test strip    no brand specified    100 strip    Use to test blood sugars 3 times daily or as directed       carvedilol 12.5 MG tablet    COREG    180 tablet    Take 1 tablet (12.5 mg) by mouth 2 times daily (with meals)        dorzolamide-timolol 2-0.5 % ophthalmic solution    COSOPT    10 mL    Place 1 drop into the right eye 2 times daily       erythromycin ophthalmic ointment    ROMYCIN    3.5 g    Place 0.25 inches Into the left eye At Bedtime       ferrous sulfate 325 (65 FE) MG tablet    IRON    180 tablet    Take 1 tablet (325 mg) by mouth 2 times daily       fluticasone 50 MCG/ACT spray    FLONASE    16 g    Spray 1-2 sprays into both nostrils daily       hydrochlorothiazide 25 MG tablet    HYDRODIURIL    90 tablet    Take 1 tablet (25 mg) by mouth daily       hydrocortisone 1 % cream    CORTAID    60 g    Apply sparingly to affected area three times daily as needed to affected areas       insulin glargine 100 UNIT/ML injection    LANTUS SOLOSTAR    1 mL    30 units at bedtime or as directed       insulin pen needle 31G X 8 MM     100 each    Use as directed with Lantus; patient often needs 2 shots daily, so  Please provide enough needles for bid dosing       latanoprost 0.005 % ophthalmic solution    XALATAN    1 Bottle    Place 1 drop into both eyes At Bedtime       metoclopramide 10 MG tablet    REGLAN    120 tablet    Take 1 tablet by mouth. Take 1 tablet by mouth 4 times a day before meals and nightly as needed       MICROLET LANCETS Misc     100 each    1 Device 3 times daily.       MULTIVITAMIN TABS   OR      1 TABLET DAILY       omeprazole 40 MG capsule    priLOSEC    90 capsule    Take 1 capsule (40 mg) by mouth daily       * order for DME      Equipment being ordered: CPAP       order for DME     1 each    Equipment being ordered: CPAP tubing, mask, and all needed equipment       oxybutynin 5 MG tablet    DITROPAN    270 tablet    Take 1 tablet (5 mg) by mouth 3 times daily as needed       potassium chloride 10 MEQ tablet    K-TAB,KLOR-CON    90 tablet    Take 1 tablet (10 mEq) by mouth daily       * prednisoLONE acetate 1 % ophthalmic susp    PRED FORTE     Place 1 drop into the right eye 4 times daily       *  prednisoLONE acetate 1 % ophthalmic susp    PRED FORTE    1 Bottle    Three times a day for 2 weeks, twice a day for 2 weeks, once a day for two weeks       senna-docusate 8.6-50 MG per tablet    SENNA S    60 tablet    Take 1-4 tablets by mouth 2 times daily as needed constipation       simvastatin 10 MG tablet    ZOCOR    90 tablet    Take 1 tablet (10 mg) by mouth At Bedtime       sodium chloride 5 % ophthalmic ointment    NATI 128    1 Tube    One squirt to right eye 2-3 times a day       vitamin D 1000 UNITS capsule      Take 1 capsule by mouth daily.       * Notice:  This list has 4 medication(s) that are the same as other medications prescribed for you. Read the directions carefully, and ask your doctor or other care provider to review them with you.

## 2017-01-27 NOTE — NURSING NOTE
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 No discomfort. Drops as directed  Zoe CROCKETT January 27, 2017 8:16 AM

## 2017-01-27 NOTE — PROGRESS NOTES
1 week s/p Phototherapeutic keratectomy (PTK) right eye  ucva improved    bandage contact lens removed  Epi healed, mild haze    Discontinue oflox  Taper Predforte  Over 6 weeks  Resume glaucoma regimen    Return to clinic 2 mo , manifest refraction      Robb Kennedy MD, MA  Director, Cornea & Anterior Segment  [facility] Department of Ophthalmology & Visual Neuroscience

## 2017-02-07 DIAGNOSIS — I10 HYPERTENSION GOAL BP (BLOOD PRESSURE) < 140/90: Primary | ICD-10-CM

## 2017-02-07 NOTE — TELEPHONE ENCOUNTER
hydrochlorothiazide (HYDRODIURIL) 25 MG tablet      Last Written Prescription Date: 1/4/16  Last Fill Quantity: 90, # refills: 3  Last Office Visit with [facility], [facility] or [facility] prescribing provider: 7/15/16       POTASSIUM   Date Value Ref Range Status   07/15/2016 3.8 3.4 - 5.3 mmol/L Final     CREATININE   Date Value Ref Range Status   07/15/2016 1.44* 0.52 - 1.04 mg/dL Final     BP Readings from Last 3 Encounters:   07/15/16 120/61   01/04/16 134/72   06/08/15 116/60

## 2017-02-08 DIAGNOSIS — I10 HYPERTENSION GOAL BP (BLOOD PRESSURE) < 140/90: ICD-10-CM

## 2017-02-08 DIAGNOSIS — E87.6 HYPOKALEMIA: Primary | ICD-10-CM

## 2017-02-08 RX ORDER — HYDROCHLOROTHIAZIDE 25 MG/1
25 TABLET ORAL DAILY
Qty: 90 TABLET | Refills: 1 | Status: SHIPPED | OUTPATIENT
Start: 2017-02-08 | End: 2017-04-14

## 2017-02-08 NOTE — TELEPHONE ENCOUNTER
potassium chloride (K-DUR) 10 MEQ tablet      Last Written Prescription Date: 1-4-16  Last Fill Quantity: 90, # refills: 3  Last Office Visit with [facility], [facility] or [facility] prescribing provider: 7-15-16       POTASSIUM   Date Value Ref Range Status   07/15/2016 3.8 3.4 - 5.3 mmol/L Final     CREATININE   Date Value Ref Range Status   07/15/2016 1.44* 0.52 - 1.04 mg/dL Final     BP Readings from Last 3 Encounters:   07/15/16 120/61   01/04/16 134/72   06/08/15 116/60     carvedilol (COREG) 12.5 MG tablet      Last Written Prescription Date: 1-4-16  Last Fill Quantity: 180, # refills: 3    Last Office Visit with [facility], [facility] or [facility] prescribing provider:  7-15-16   Future Office Visit:        BP Readings from Last 3 Encounters:   07/15/16 120/61   01/04/16 134/72   06/08/15 116/60

## 2017-02-08 NOTE — TELEPHONE ENCOUNTER
Routing refill request to provider for review/approval because:  Labs out of range  Sofia Sanderson, [facility] Triage.

## 2017-02-10 RX ORDER — POTASSIUM CHLORIDE 750 MG/1
10 TABLET, EXTENDED RELEASE ORAL DAILY
Qty: 90 TABLET | Refills: 1 | Status: SHIPPED | OUTPATIENT
Start: 2017-02-10 | End: 2017-04-14

## 2017-02-10 RX ORDER — CARVEDILOL 12.5 MG/1
12.5 TABLET ORAL 2 TIMES DAILY WITH MEALS
Qty: 180 TABLET | Refills: 1 | Status: SHIPPED | OUTPATIENT
Start: 2017-02-10 | End: 2017-04-14

## 2017-02-24 DIAGNOSIS — R10.13 DYSPEPSIA: ICD-10-CM

## 2017-02-24 RX ORDER — METOCLOPRAMIDE 10 MG/1
TABLET ORAL
Qty: 360 TABLET | Refills: 1 | Status: SHIPPED | OUTPATIENT
Start: 2017-02-24 | End: 2017-04-14

## 2017-02-24 NOTE — TELEPHONE ENCOUNTER
Routed to provider, patient is requesting 90 day supply, cued up.  Deepali Srinivasan RN  [facility]

## 2017-02-24 NOTE — TELEPHONE ENCOUNTER
90 days supply request  metoclopramide (REGLAN) 10 MG tablet      Last Written Prescription Date: 7/15/16  Last Fill Quantity: 120,  # refills: 2   Last Office Visit with [facility], [facility] or [facility] prescribing provider: 7/15/16

## 2017-03-11 DIAGNOSIS — E78.5 HYPERLIPIDEMIA LDL GOAL <100: ICD-10-CM

## 2017-03-13 RX ORDER — SIMVASTATIN 10 MG
10 TABLET ORAL AT BEDTIME
Qty: 30 TABLET | Refills: 0 | Status: SHIPPED | OUTPATIENT
Start: 2017-03-13 | End: 2017-04-05

## 2017-03-13 NOTE — TELEPHONE ENCOUNTER
simvastatin (ZOCOR) 10 MG tablet     Last Written Prescription Date: 1/4/16  Last Fill Quantity: 90, # refills: 3  Last Office Visit with [facility] or [facility] prescribing provider: 7/15/16       Lab Results   Component Value Date    CHOL 126 03/11/2016     Lab Results   Component Value Date    HDL 46 03/11/2016     Lab Results   Component Value Date    LDL 57 03/11/2016     Lab Results   Component Value Date    TRIG 113 03/11/2016     Lab Results   Component Value Date    CHOLHDLRATIO 2.9 12/12/2014

## 2017-03-13 NOTE — TELEPHONE ENCOUNTER
Medication is being filled for 1 time refill only due to:  Patient needs to be seen because she is due for a diabetes and cholesterol visit.     Anabel James RN   March 13, 2017 1:19 PM  [facility] Triage   [phone]

## 2017-03-31 ENCOUNTER — OFFICE VISIT (OUTPATIENT)
Dept: OPHTHALMOLOGY | Facility: CLINIC | Age: 77
End: 2017-03-31
Attending: OPHTHALMOLOGY
Payer: COMMERCIAL

## 2017-03-31 DIAGNOSIS — H18.9 KERATOPATHY: ICD-10-CM

## 2017-03-31 DIAGNOSIS — Z98.890 HISTORY OF PHOTOTHERAPEUTIC KERATECTOMY: Primary | ICD-10-CM

## 2017-03-31 DIAGNOSIS — R09.81 NASAL CONGESTION: ICD-10-CM

## 2017-03-31 DIAGNOSIS — H17.9 CORNEA SCAR: ICD-10-CM

## 2017-03-31 PROCEDURE — 99214 OFFICE O/P EST MOD 30 MIN: CPT | Mod: ZF

## 2017-03-31 PROCEDURE — 92015 DETERMINE REFRACTIVE STATE: CPT | Mod: ZF

## 2017-03-31 RX ORDER — FLUTICASONE PROPIONATE 50 MCG
SPRAY, SUSPENSION (ML) NASAL
Qty: 16 ML | Refills: 2 | Status: SHIPPED | OUTPATIENT
Start: 2017-03-31 | End: 2017-04-14

## 2017-03-31 ASSESSMENT — TONOMETRY
IOP_METHOD: APPLANATION
OD_IOP_MMHG: 15
OS_IOP_MMHG: 11

## 2017-03-31 ASSESSMENT — REFRACTION_MANIFEST
OD_AXIS: 170
OD_SPHERE: +1.25
OD_CYLINDER: +1.50
OD_ADD: +3.25
OS_CYLINDER: SPHERE
OS_SPHERE: +4.00
OS_ADD: +3.25

## 2017-03-31 ASSESSMENT — VISUAL ACUITY
OD_SC+: +1
METHOD: SNELLEN - LINEAR
OS_PH_SC: 20/100
OD_SC: 20/150
OS_SC: 20/500
OD_PH_SC: 20/60

## 2017-03-31 ASSESSMENT — CONF VISUAL FIELD
OD_NORMAL: 1
OS_NORMAL: 1

## 2017-03-31 NOTE — MR AVS SNAPSHOT
After Visit Summary   3/31/2017    Ruth Rocha    MRN: [id number]           Patient Information     Date Of Birth          1940        Visit Information        Provider Department      3/31/2017 8:00 AM Robb Kennedy MD [facility]        Today's Diagnoses     History of phototherapeutic keratectomy    -  1    Cornea scar        Keratopathy,  ou           Follow-ups after your visit        Your next 10 appointments already scheduled     May 05, 2017  7:45 AM CDT   RETURN RETINA with Bryanna Jaime MD   Eye Clinic ([facility])    [address]  [address]  [address]  [facility] [address]   [phone]            Jun 19, 2017  8:15 AM CDT   [facility] Visit with Abraham Byrd MD   [facility]    [address] [id number] [phone]              Who to contact     Please call your clinic at [phone] to:    Ask questions about your health    Make or cancel appointments    Discuss your medicines    Learn about your test results    Speak to your doctor   If you have compliments or concerns about an experience at your clinic, or if you wish to file a complaint, please contact [facility] Patient Relations at [phone] or email us at [email or web address]         Additional Information About Your Visit        MyChart Information     [employer] is an electronic gateway that provides easy, online access to your medical records. With [employer], you can request a clinic appointment, read your test results, renew a prescription or communicate with your care team.     To sign up for [employer] visit the website at [email or web address]   You will be asked to enter the access code listed below, as well as some personal information. Please follow the directions to create your username and password.     Your access code is: [email or web address]  Expires: 7/3/2017  2:20  PM     Your access code will  in 90 days. If you need help or a new code, please contact your [facility] or call [phone] for assistance.        Care EveryWhere ID     This is your Care EveryWhere ID. This could be used by other organizations to access your [facility] medical records  [id number]         Blood Pressure from Last 3 Encounters:   07/15/16 120/61   16 134/72   06/08/15 116/60    Weight from Last 3 Encounters:   07/15/16 101.6 kg (224 lb)   16 100.5 kg (221 lb 8 oz)   16 101.6 kg (224 lb)              Today, you had the following     No orders found for display         Today's Medication Changes          These changes are accurate as of: 3/31/17 11:59 PM.  If you have any questions, ask your nurse or doctor.               These medicines have changed or have updated prescriptions.        Dose/Directions    fluticasone 50 MCG/ACT spray   Commonly known as:  FLONASE   This may have changed:  See the new instructions.   Used for:  Nasal congestion   Changed by:  Raymon Hernández MD        SPRAY 1-2 SPRAYS INTO BOTH NOSTRILS DAILY   Quantity:  16 mL   Refills:  2         Stop taking these medicines if you haven't already. Please contact your care team if you have questions.     erythromycin ophthalmic ointment   Commonly known as:  ROMYCIN   Stopped by:  Robb Kennedy MD           prednisoLONE acetate 1 % ophthalmic susp   Commonly known as:  PRED FORTE   Stopped by:  Robb Kennedy MD           sodium chloride 5 % ophthalmic ointment   Commonly known as:  NATI 128   Stopped by:  Robb Kennedy MD                Where to get your medicines      These medications were sent to [facility]/pharmacy #[id number] - [place], [place] - [address]  [address] [place] [address]     Phone:  [phone]     fluticasone 50 MCG/ACT spray                Primary Care Provider Office Phone # Fax #    Raymon Hernández MD  [phone]       [facility] [address] [facility] [address]        Thank you!     Thank you for choosing EYE CLINIC  for your care. Our goal is always to provide you with excellent care. Hearing back from our patients is one way we can continue to improve our services. Please take a few minutes to complete the written survey that you may receive in the mail after your visit with us. Thank you!             Your Updated Medication List - Protect others around you: Learn how to safely use, store and throw away your medicines at www.disposemymeds.org.          This list is accurate as of: 3/31/17 11:59 PM.  Always use your most recent med list.                   Brand Name Dispense Instructions for use    * ACE/ARB NOT PRESCRIBED (INTENTIONAL)      by Other route continuous prn.       aspirin 325 MG EC tablet      Take 325 mg by mouth daily.       blood glucose calibration NORMAL solution     1 Bottle    Use to calibrate blood glucose monitor as needed as directed.       blood glucose monitoring meter device kit     1 kit    Use to test blood sugar 3 times daily or as directed.       blood glucose monitoring test strip    no brand specified    100 strip    Use to test blood sugars 3 times daily or as directed       carvedilol 12.5 MG tablet    COREG    180 tablet    Take 1 tablet (12.5 mg) by mouth 2 times daily (with meals)       dorzolamide-timolol 2-0.5 % ophthalmic solution    COSOPT    10 mL    Place 1 drop into the right eye 2 times daily       ferrous sulfate 325 (65 FE) MG tablet    IRON    180 tablet    Take 1 tablet (325 mg) by mouth 2 times daily       fluticasone 50 MCG/ACT spray    FLONASE    16 mL    SPRAY 1-2 SPRAYS INTO BOTH NOSTRILS DAILY       hydrochlorothiazide 25 MG tablet    HYDRODIURIL    90 tablet    Take 1 tablet (25 mg) by mouth daily       hydrocortisone 1 % cream    CORTAID    60 g    Apply sparingly to affected area three times daily as needed to  affected areas       insulin glargine 100 UNIT/ML injection    LANTUS SOLOSTAR    1 mL    30 units at bedtime or as directed       insulin pen needle 31G X 8 MM     100 each    Use as directed with Lantus; patient often needs 2 shots daily, so  Please provide enough needles for bid dosing       metoclopramide 10 MG tablet    REGLAN    360 tablet    Take 1 tablet by mouth. Take 1 tablet by mouth 4 times a day before meals and nightly as needed       MICROLET LANCETS Misc     100 each    1 Device 3 times daily.       MULTIVITAMIN TABS   OR      1 TABLET DAILY       omeprazole 40 MG capsule    priLOSEC    90 capsule    Take 1 capsule (40 mg) by mouth daily       * order for DME      Equipment being ordered: CPAP       order for DME     1 each    Equipment being ordered: CPAP tubing, mask, and all needed equipment       oxybutynin 5 MG tablet    DITROPAN    270 tablet    Take 1 tablet (5 mg) by mouth 3 times daily as needed       potassium chloride 10 MEQ tablet    K-TAB,KLOR-CON    90 tablet    Take 1 tablet (10 mEq) by mouth daily       senna-docusate 8.6-50 MG per tablet    SENNA S    60 tablet    Take 1-4 tablets by mouth 2 times daily as needed constipation       simvastatin 10 MG tablet    ZOCOR    30 tablet    Take 1 tablet (10 mg) by mouth At Bedtime **Due for office visit for further refills**       vitamin D 1000 UNITS capsule      Take 1 capsule by mouth daily.       * Notice:  This list has 2 medication(s) that are the same as other medications prescribed for you. Read the directions carefully, and ask your doctor or other care provider to review them with you.

## 2017-03-31 NOTE — TELEPHONE ENCOUNTER
Routing refill request to provider for review/approval because:  Not on problem list.  Sofia Sanderson, RN [facility] Triage.

## 2017-03-31 NOTE — NURSING NOTE
Chief Complaints and History of Present Illnesses   Patient presents with     Post Op (Ophthalmology) Right Eye     10 weeks s/p PTK of RE     HPI    Affected eye(s):  Right   Symptoms:     Blurred vision   No redness   No foreign body sensation   Dryness         Do you have eye pain now?:  No      Comments:  Follow up for PTK of RE.  Cosopt BID to RE / LD @ 6:15 am today  Xalatan QHS to BE / LD @ 10:30 pm yest  States needs refills.    Ania LOOMIS 8:17 AM 03/31/2017

## 2017-03-31 NOTE — PROGRESS NOTES
3 months s/ps/p Phototherapeutic keratectomy (PTK) right eye  Refracts to 20/50 range today    glaucoma regimen as before    manifest refraction given  Return to clinic with Dr. Byrd, here as needed         Robb Kennedy MD, MA  Director, Cornea & Anterior Segment  [facility] Department of Ophthalmology & Visual Neuroscience

## 2017-03-31 NOTE — TELEPHONE ENCOUNTER
fluticasone (FLONASE) 50 MCG/ACT nasal spray      Last Written Prescription Date: 1/4/16  Last Fill Quantity: 16,  # refills: 11   Last Office Visit with [facility], [facility] or [facility] prescribing provider: 7/15/16                                         Next 5 appointments (look out 90 days)     Jun 19, 2017  8:15 AM CDT   [facility] with [facility]   [facility]    [address]  [place] [address] [phone]

## 2017-04-03 DIAGNOSIS — H40.1131 PRIMARY OPEN ANGLE GLAUCOMA OF BOTH EYES, MILD STAGE: Primary | ICD-10-CM

## 2017-04-03 RX ORDER — LATANOPROST 50 UG/ML
1 SOLUTION/ DROPS OPHTHALMIC AT BEDTIME
Qty: 1 BOTTLE | Refills: 11 | Status: SHIPPED | OUTPATIENT
Start: 2017-04-03 | End: 2018-04-27

## 2017-04-05 ENCOUNTER — TELEPHONE (OUTPATIENT)
Dept: FAMILY MEDICINE | Facility: CLINIC | Age: 77
End: 2017-04-05

## 2017-04-05 DIAGNOSIS — H40.1131 PRIMARY OPEN ANGLE GLAUCOMA OF BOTH EYES, MILD STAGE: Primary | ICD-10-CM

## 2017-04-05 DIAGNOSIS — E78.5 HYPERLIPIDEMIA LDL GOAL <100: ICD-10-CM

## 2017-04-05 DIAGNOSIS — N32.81 OVERACTIVE BLADDER: ICD-10-CM

## 2017-04-05 RX ORDER — DORZOLAMIDE HYDROCHLORIDE AND TIMOLOL MALEATE 20; 5 MG/ML; MG/ML
1 SOLUTION/ DROPS OPHTHALMIC 2 TIMES DAILY
Qty: 10 ML | Refills: 12 | Status: CANCELLED | OUTPATIENT
Start: 2017-04-05

## 2017-04-05 RX ORDER — DORZOLAMIDE HYDROCHLORIDE AND TIMOLOL MALEATE 20; 5 MG/ML; MG/ML
1 SOLUTION/ DROPS OPHTHALMIC 2 TIMES DAILY
Qty: 10 ML | Refills: 12 | Status: SHIPPED | OUTPATIENT
Start: 2017-04-05 | End: 2018-04-27

## 2017-04-05 NOTE — TELEPHONE ENCOUNTER
simvastatin (ZOCOR) 10 MG tablet     Last Written Prescription Date: 3/13/17  Last Fill Quantity: 30, # refills: 0  Last Office Visit with [facility] or [facility] prescribing provider: 7/15/16  Next 5 appointments (look out 90 days)     Jun 19, 2017  8:15 AM CDT   Return Visit with Abraham Byrd MD   [facility] ([facility])    [address]  [place] [address]   [phone]                   Lab Results   Component Value Date    CHOL 126 03/11/2016     Lab Results   Component Value Date    HDL 46 03/11/2016     Lab Results   Component Value Date    LDL 57 03/11/2016     Lab Results   Component Value Date    TRIG 113 03/11/2016     Lab Results   Component Value Date    CHOLHDLRATIO 2.9 12/12/2014     oxybutynin (DITROPAN) 5 MG tablet      Last Written Prescription Date: 7/15  Last Fill Quantity: 16,  # refills: 270   Last Office Visit with [facility], [facility] or [facility] prescribing provider: 17/15/16                                         Next 5 appointments (look out 90 days)     Jun 19, 2017  8:15 AM CDT   Return Visit with Abraham Byrd MD   [facility] ([facility])    [address] [place] [address] [phone]

## 2017-04-07 NOTE — TELEPHONE ENCOUNTER
Due for diabetes check and cholesterol labs. Team please call her to schedule.     Anabel James RN

## 2017-04-07 NOTE — TELEPHONE ENCOUNTER
Rosio period had already been given to patient. Called patient at [phone] (home) to schedule appointment for DM check and fasting labs.  Can patient get a refill of her Simvastatin and Ditropan as she will be coming in next Friday?    Routed to provider to advise.  Saadia Srivastava RN  [facility]

## 2017-04-09 RX ORDER — SIMVASTATIN 10 MG
TABLET ORAL
Qty: 30 TABLET | Refills: 0 | Status: SHIPPED | OUTPATIENT
Start: 2017-04-09 | End: 2017-04-14

## 2017-04-09 RX ORDER — OXYBUTYNIN CHLORIDE 5 MG/1
TABLET ORAL
Qty: 270 TABLET | Refills: 0 | Status: SHIPPED | OUTPATIENT
Start: 2017-04-09 | End: 2017-04-14

## 2017-04-14 ENCOUNTER — OFFICE VISIT (OUTPATIENT)
Dept: FAMILY MEDICINE | Facility: CLINIC | Age: 77
End: 2017-04-14
Payer: COMMERCIAL

## 2017-04-14 VITALS
DIASTOLIC BLOOD PRESSURE: 66 MMHG | BODY MASS INDEX: 38.93 KG/M2 | OXYGEN SATURATION: 99 % | HEART RATE: 73 BPM | WEIGHT: 228 LBS | SYSTOLIC BLOOD PRESSURE: 132 MMHG | HEIGHT: 64 IN | TEMPERATURE: 98.7 F

## 2017-04-14 DIAGNOSIS — R53.83 FATIGUE, UNSPECIFIED TYPE: ICD-10-CM

## 2017-04-14 DIAGNOSIS — E87.6 HYPOKALEMIA: ICD-10-CM

## 2017-04-14 DIAGNOSIS — K59.09 CHRONIC CONSTIPATION: ICD-10-CM

## 2017-04-14 DIAGNOSIS — N32.81 OVERACTIVE BLADDER: ICD-10-CM

## 2017-04-14 DIAGNOSIS — E11.3299 DIABETES MELLITUS WITH BACKGROUND RETINOPATHY (H): ICD-10-CM

## 2017-04-14 DIAGNOSIS — E11.21 TYPE 2 DIABETES MELLITUS WITH DIABETIC NEPHROPATHY, WITH LONG-TERM CURRENT USE OF INSULIN (H): Primary | ICD-10-CM

## 2017-04-14 DIAGNOSIS — E78.5 HYPERLIPIDEMIA LDL GOAL <100: ICD-10-CM

## 2017-04-14 DIAGNOSIS — K21.9 GASTROESOPHAGEAL REFLUX DISEASE, ESOPHAGITIS PRESENCE NOT SPECIFIED: ICD-10-CM

## 2017-04-14 DIAGNOSIS — Z79.4 TYPE 2 DIABETES MELLITUS WITH DIABETIC NEPHROPATHY, WITH LONG-TERM CURRENT USE OF INSULIN (H): Primary | ICD-10-CM

## 2017-04-14 DIAGNOSIS — D64.9 ANEMIA, UNSPECIFIED TYPE: ICD-10-CM

## 2017-04-14 DIAGNOSIS — I10 HYPERTENSION GOAL BP (BLOOD PRESSURE) < 140/90: ICD-10-CM

## 2017-04-14 DIAGNOSIS — R10.13 DYSPEPSIA: ICD-10-CM

## 2017-04-14 DIAGNOSIS — R09.81 NASAL CONGESTION: ICD-10-CM

## 2017-04-14 LAB
ALBUMIN SERPL-MCNC: 3 G/DL (ref 3.4–5)
ALP SERPL-CCNC: 159 U/L (ref 40–150)
ALT SERPL W P-5'-P-CCNC: 30 U/L (ref 0–50)
ANION GAP SERPL CALCULATED.3IONS-SCNC: 12 MMOL/L (ref 3–14)
AST SERPL W P-5'-P-CCNC: 22 U/L (ref 0–45)
BASOPHILS # BLD AUTO: 0 10E9/L (ref 0–0.2)
BASOPHILS NFR BLD AUTO: 0.3 %
BILIRUB SERPL-MCNC: 0.3 MG/DL (ref 0.2–1.3)
BUN SERPL-MCNC: 38 MG/DL (ref 7–30)
CALCIUM SERPL-MCNC: 8.9 MG/DL (ref 8.5–10.1)
CHLORIDE SERPL-SCNC: 104 MMOL/L (ref 94–109)
CHOLEST SERPL-MCNC: 137 MG/DL
CO2 SERPL-SCNC: 27 MMOL/L (ref 20–32)
CREAT SERPL-MCNC: 1.57 MG/DL (ref 0.52–1.04)
CREAT UR-MCNC: 123 MG/DL
DIFFERENTIAL METHOD BLD: ABNORMAL
EOSINOPHIL # BLD AUTO: 0.2 10E9/L (ref 0–0.7)
EOSINOPHIL NFR BLD AUTO: 2.1 %
ERYTHROCYTE [DISTWIDTH] IN BLOOD BY AUTOMATED COUNT: 12.7 % (ref 10–15)
GFR SERPL CREATININE-BSD FRML MDRD: 32 ML/MIN/1.7M2
GLUCOSE SERPL-MCNC: 131 MG/DL (ref 70–99)
HBA1C MFR BLD: 5.9 % (ref 4.3–6)
HCT VFR BLD AUTO: 35.5 % (ref 35–47)
HDLC SERPL-MCNC: 52 MG/DL
HGB BLD-MCNC: 11.4 G/DL (ref 11.7–15.7)
LDLC SERPL CALC-MCNC: 65 MG/DL
LYMPHOCYTES # BLD AUTO: 1.6 10E9/L (ref 0.8–5.3)
LYMPHOCYTES NFR BLD AUTO: 18.1 %
MCH RBC QN AUTO: 30.3 PG (ref 26.5–33)
MCHC RBC AUTO-ENTMCNC: 32.1 G/DL (ref 31.5–36.5)
MCV RBC AUTO: 94 FL (ref 78–100)
MICROALBUMIN UR-MCNC: 23 MG/L
MICROALBUMIN/CREAT UR: 18.62 MG/G CR (ref 0–25)
MONOCYTES # BLD AUTO: 0.6 10E9/L (ref 0–1.3)
MONOCYTES NFR BLD AUTO: 6.2 %
NEUTROPHILS # BLD AUTO: 6.6 10E9/L (ref 1.6–8.3)
NEUTROPHILS NFR BLD AUTO: 73.3 %
NONHDLC SERPL-MCNC: 85 MG/DL
PLATELET # BLD AUTO: 261 10E9/L (ref 150–450)
POTASSIUM SERPL-SCNC: 4.1 MMOL/L (ref 3.4–5.3)
PROT SERPL-MCNC: 6.9 G/DL (ref 6.8–8.8)
RBC # BLD AUTO: 3.76 10E12/L (ref 3.8–5.2)
SODIUM SERPL-SCNC: 143 MMOL/L (ref 133–144)
T4 FREE SERPL-MCNC: 0.93 NG/DL (ref 0.76–1.46)
TRIGL SERPL-MCNC: 99 MG/DL
TSH SERPL DL<=0.005 MIU/L-ACNC: 5.25 MU/L (ref 0.4–4)
WBC # BLD AUTO: 9 10E9/L (ref 4–11)

## 2017-04-14 PROCEDURE — 85025 COMPLETE CBC W/AUTO DIFF WBC: CPT | Performed by: FAMILY MEDICINE

## 2017-04-14 PROCEDURE — 36415 COLL VENOUS BLD VENIPUNCTURE: CPT | Performed by: FAMILY MEDICINE

## 2017-04-14 PROCEDURE — 80061 LIPID PANEL: CPT | Performed by: FAMILY MEDICINE

## 2017-04-14 PROCEDURE — 82043 UR ALBUMIN QUANTITATIVE: CPT | Performed by: FAMILY MEDICINE

## 2017-04-14 PROCEDURE — 84443 ASSAY THYROID STIM HORMONE: CPT | Performed by: FAMILY MEDICINE

## 2017-04-14 PROCEDURE — 99214 OFFICE O/P EST MOD 30 MIN: CPT | Performed by: FAMILY MEDICINE

## 2017-04-14 PROCEDURE — 83036 HEMOGLOBIN GLYCOSYLATED A1C: CPT | Performed by: FAMILY MEDICINE

## 2017-04-14 PROCEDURE — 84439 ASSAY OF FREE THYROXINE: CPT | Performed by: FAMILY MEDICINE

## 2017-04-14 PROCEDURE — 80053 COMPREHEN METABOLIC PANEL: CPT | Performed by: FAMILY MEDICINE

## 2017-04-14 PROCEDURE — 99207 C FOOT EXAM  NO CHARGE: CPT | Mod: 25 | Performed by: FAMILY MEDICINE

## 2017-04-14 RX ORDER — HYDROCHLOROTHIAZIDE 25 MG/1
25 TABLET ORAL DAILY
Qty: 90 TABLET | Refills: 3 | Status: SHIPPED | OUTPATIENT
Start: 2017-04-14 | End: 2018-05-12

## 2017-04-14 RX ORDER — POTASSIUM CHLORIDE 750 MG/1
10 TABLET, EXTENDED RELEASE ORAL DAILY
Qty: 90 TABLET | Refills: 3 | Status: SHIPPED | OUTPATIENT
Start: 2017-04-14 | End: 2018-06-03

## 2017-04-14 RX ORDER — OXYBUTYNIN CHLORIDE 5 MG/1
TABLET ORAL
Qty: 270 TABLET | Refills: 3 | Status: SHIPPED | OUTPATIENT
Start: 2017-04-14 | End: 2018-07-20

## 2017-04-14 RX ORDER — FLUTICASONE PROPIONATE 50 MCG
SPRAY, SUSPENSION (ML) NASAL
Qty: 16 ML | Refills: 11 | Status: SHIPPED | OUTPATIENT
Start: 2017-04-14 | End: 2018-09-06

## 2017-04-14 RX ORDER — CARVEDILOL 12.5 MG/1
12.5 TABLET ORAL 2 TIMES DAILY WITH MEALS
Qty: 180 TABLET | Refills: 3 | Status: SHIPPED | OUTPATIENT
Start: 2017-04-14 | End: 2017-08-09

## 2017-04-14 RX ORDER — FERROUS SULFATE 325(65) MG
1 TABLET ORAL 2 TIMES DAILY
Qty: 180 TABLET | Refills: 1 | Status: SHIPPED | OUTPATIENT
Start: 2017-04-14 | End: 2020-01-01

## 2017-04-14 RX ORDER — METOCLOPRAMIDE 10 MG/1
TABLET ORAL
Qty: 360 TABLET | Refills: 1 | Status: SHIPPED | OUTPATIENT
Start: 2017-04-14 | End: 2018-06-21

## 2017-04-14 RX ORDER — AMOXICILLIN 250 MG
1-4 CAPSULE ORAL 2 TIMES DAILY PRN
Qty: 60 TABLET | Refills: 5 | Status: SHIPPED | OUTPATIENT
Start: 2017-04-14 | End: 2017-12-04

## 2017-04-14 RX ORDER — SIMVASTATIN 10 MG
10 TABLET ORAL AT BEDTIME
Qty: 90 TABLET | Refills: 3 | Status: SHIPPED | OUTPATIENT
Start: 2017-04-14 | End: 2018-07-08

## 2017-04-14 RX ORDER — OMEPRAZOLE 40 MG/1
40 CAPSULE, DELAYED RELEASE ORAL DAILY
Qty: 90 CAPSULE | Refills: 3 | Status: SHIPPED | OUTPATIENT
Start: 2017-04-14 | End: 2018-05-27

## 2017-04-14 ASSESSMENT — PAIN SCALES - GENERAL: PAINLEVEL: NO PAIN (0)

## 2017-04-14 NOTE — LETTER
[facility]   [address] [place]  [address]  [phone]                                   April 17, 2017    Ruth Rocha  [address]  [address]  [place] [address]        Dear Ruth,    Your labs are all stable.      Stay on same medications.    Results for orders placed or performed in visit on 04/14/17   Albumin Random Urine Quantitative   Result Value Ref Range    Creatinine Urine 123 mg/dL    Albumin Urine mg/L 23 mg/L    Albumin Urine mg/g Cr 18.62 0 - 25 mg/g Cr   TSH with free T4 reflex   Result Value Ref Range    TSH 5.25 (H) 0.40 - 4.00 mU/L   Comprehensive metabolic panel   Result Value Ref Range    Sodium 143 133 - 144 mmol/L    Potassium 4.1 3.4 - 5.3 mmol/L    Chloride 104 94 - 109 mmol/L    Carbon Dioxide 27 20 - 32 mmol/L    Anion Gap 12 3 - 14 mmol/L    Glucose 131 (H) 70 - 99 mg/dL    Urea Nitrogen 38 (H) 7 - 30 mg/dL    Creatinine 1.57 (H) 0.52 - 1.04 mg/dL    GFR Estimate 32 (L) >60 mL/min/1.7m2    GFR Estimate If Black 39 (L) >60 mL/min/1.7m2    Calcium 8.9 8.5 - 10.1 mg/dL    Bilirubin Total 0.3 0.2 - 1.3 mg/dL    Albumin 3.0 (L) 3.4 - 5.0 g/dL    Protein Total 6.9 6.8 - 8.8 g/dL    Alkaline Phosphatase 159 (H) 40 - 150 U/L    ALT 30 0 - 50 U/L    AST 22 0 - 45 U/L   Hemoglobin A1c   Result Value Ref Range    Hemoglobin A1C 5.9 4.3 - 6.0 %   Lipid panel reflex to direct LDL   Result Value Ref Range    Cholesterol 137 <200 mg/dL    Triglycerides 99 <150 mg/dL    HDL Cholesterol 52 >49 mg/dL    LDL Cholesterol Calculated 65 <100 mg/dL    Non HDL Cholesterol 85 <130 mg/dL   CBC with platelets differential   Result Value Ref Range    WBC 9.0 4.0 - 11.0 10e9/L    RBC Count 3.76 (L) 3.8 - 5.2 10e12/L    Hemoglobin 11.4 (L) 11.7 - 15.7 g/dL    Hematocrit 35.5 35.0 - 47.0 %    MCV 94 78 - 100 fl    MCH 30.3 26.5 - 33.0 pg    MCHC 32.1 31.5 - 36.5 g/dL    RDW 12.7 10.0 - 15.0 %    Platelet Count 261 150 - 450 10e9/L    Diff Method  Automated Method     % Neutrophils 73.3 %    % Lymphocytes 18.1 %    % Monocytes 6.2 %    % Eosinophils 2.1 %    % Basophils 0.3 %    Absolute Neutrophil 6.6 1.6 - 8.3 10e9/L    Absolute Lymphocytes 1.6 0.8 - 5.3 10e9/L    Absolute Monocytes 0.6 0.0 - 1.3 10e9/L    Absolute Eosinophils 0.2 0.0 - 0.7 10e9/L    Absolute Basophils 0.0 0.0 - 0.2 10e9/L   T4 free   Result Value Ref Range    T4 Free 0.93 0.76 - 1.46 ng/dL       If you have any questions please call the clinic at [phone]    Sincerely,    Raymon Hernández MD  bmd

## 2017-04-14 NOTE — PROGRESS NOTES
SUBJECTIVE:                                                    Ruth Rocha is a 77 year old female who presents to clinic today for the following health issues:       Diabetes Follow-up    Patient is checking blood sugars: three times daily.   Blood sugar testing frequency justification: very labile sugars  Results are as follows:         am - 70's         lunchtime - 160's         bedtime - 180's    Diabetic concerns: None     Symptoms of hypoglycemia (low blood sugar): none     Paresthesias (numbness or burning in feet) or sores: Yes numbness     Date of last diabetic eye exam: 01/06/2017       Amount of exercise or physical activity: walks some    Problems taking medications regularly: No    Medication side effects: none    Diet: low salt and low fat/cholesterol      none    Problem list and histories reviewed & adjusted, as indicated.  Additional history: as documented         Reviewed and updated as needed this visit by clinical staff  Tobacco  Allergies  Meds  Med Hx  Surg Hx  Fam Hx  Soc Hx      Reviewed and updated as needed this visit by Provider       Lowest recently 60s         urinating okay; some dry mouth from med    Sees eye doctor regularly    Last time just 2 weeks ago, some diabetes changes in eyes    Usually one senna daily; also prune juice and benefiber    Uses walker when going out    Takes the zyrtec daily    Physical Exam   Constitutional: She is oriented to person, place, and time and well-developed, well-nourished, and in no distress. No distress.   HENT:   Head: Normocephalic and atraumatic.   Neck: Carotid bruit is not present.   Cardiovascular: Normal rate, regular rhythm, normal heart sounds and intact distal pulses.  Exam reveals no gallop and no friction rub.    No murmur heard.  Pulmonary/Chest: Effort normal and breath sounds normal.   Musculoskeletal: She exhibits edema (mild).   Neurological: She is alert and oriented to person, place, and time.   Skin: She is not  diaphoretic.   Psychiatric: Mood and affect normal.       Foot exam done.  Some mild decreased sensation on plantar aspect.  Skin okay except dry.     ASSESSMENT / PLAN:  (E11.21,  Z79.4) Type 2 diabetes mellitus with diabetic nephropathy, with long-term current use of insulin (H)  (primary encounter diagnosis)  Comment: recheck labs  Plan: DIABETES EDUCATOR REFERRAL, FOOT EXAM, Albumin         Random Urine Quantitative, Hemoglobin A1c             (R53.83) Fatigue, unspecified type  Comment: check labs   Plan: TSH with free T4 reflex, CBC with platelets         differential             (E78.5) Hyperlipidemia LDL goal <100  Comment: fasting today   Plan: Comprehensive metabolic panel, Lipid panel         reflex to direct LDL, simvastatin (ZOCOR) 10 MG        tablet             (N32.81) Overactive bladder  Comment: refill med, works well for patient   Plan: oxybutynin (DITROPAN) 5 MG tablet             (R09.81) Nasal congestion  Comment: takes this and also over the counter zyrtec on regular basis  Plan: fluticasone (FLONASE) 50 MCG/ACT spray             (R10.13) Dyspepsia  Comment: takes 2-3 x per day usually   Plan: metoclopramide (REGLAN) 10 MG tablet             (E87.6) Hypokalemia  Comment: needs refill   Plan: potassium chloride (K-TAB,KLOR-CON) 10 MEQ         tablet             (I10) Hypertension goal BP (blood pressure) < 140/90  Comment: at goal  Plan: carvedilol (COREG) 12.5 MG tablet,         hydrochlorothiazide (HYDRODIURIL) 25 MG tablet             (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  Comment: refill med  Plan: insulin glargine (LANTUS SOLOSTAR) 100 UNIT/ML         injection             (K21.9) Gastroesophageal reflux disease, esophagitis presence not specified  Comment: refill   Plan: omeprazole (PRILOSEC) 40 MG capsule             (K59.09) Chronic constipation  Comment: uses as needed   Plan: senna-docusate (SENNA S) 8.6-50 MG per tablet             (D64.9) Anemia, unspecified  type  Comment: needs refill   Plan: ferrous sulfate (IRON) 325 (65 FE) MG tablet               I reviewed the patient's medications, allergies, medical history, family history, and social history.    Raymon Hernández MD    3

## 2017-04-14 NOTE — NURSING NOTE
"Chief Complaint   Patient presents with     Diabetes     Health Maintenance       Initial /66 (BP Location: Right arm, Patient Position: Chair, Cuff Size: Adult Large)  Pulse 73  Temp 98.7  F (37.1  C) (Oral)  Ht 5' 3.5\" (1.613 m)  Wt 228 lb (103.4 kg)  SpO2 99%  Breastfeeding? No  BMI 39.75 kg/m2 Estimated body mass index is 39.75 kg/(m^2) as calculated from the following:    Height as of this encounter: 5' 3.5\" (1.613 m).    Weight as of this encounter: 228 lb (103.4 kg).  Medication Reconciliation: complete   Bonny Mejia CMA      "

## 2017-04-14 NOTE — PATIENT INSTRUCTIONS
Keep working on healthy diet/exercise and wt loss    We will send you lab results    Medications refilled

## 2017-04-14 NOTE — MR AVS SNAPSHOT
After Visit Summary   4/14/2017    Ruth Rocha    MRN: [id number]           Patient Information     Date Of Birth          1940        Visit Information        Provider Department      4/14/2017 7:20 AM Raymon Hernández MD [facility]        Today's Diagnoses     Type 2 diabetes mellitus with diabetic nephropathy, with long-term current use of insulin (H)    -  1    Fatigue, unspecified type        Hyperlipidemia LDL goal <100        Overactive bladder        Nasal congestion        Dyspepsia        Hypokalemia        Hypertension goal BP (blood pressure) < 140/90        Diabetes mellitus with background retinopathy        Gastroesophageal reflux disease, esophagitis presence not specified        Chronic constipation        Anemia, unspecified type          Care Instructions    Keep working on healthy diet/exercise and wt loss    We will send you lab results    Medications refilled            Follow-ups after your visit        Additional Services     DIABETES EDUCATOR REFERRAL       DIABETES SELF MANAGEMENT TRAINING (DSMT)      Your provider has referred you to Diabetes Education: FMG: Diabetes Education - All [facility] [phone]   [email or web address]    Type of training and number of hours: Previous Diagnosis: Follow-up DSMT - 2 hours.    Medicare covers: 10 hours of initial DSMT in 12 month period from the time of first visit, plus 2 hours of follow-up DSMT annually, and additional hours as requested for insulin training.    Diabetes Type: Type 2 - On Insulin             Diabetes Co-Morbidities: neuropathy               A1C Goal:  <8.0       A1C is: Lab Results       Component                Value               Date                       A1C                      6.1                 07/15/2016              If an urgent visit is needed or A1C is above 12, Care Team to call the Diabetes Education Team at [phone] or send an In Basket message to the Diabetes Education Pool (P DIAB ED-PATIENT CARE).    Diabetes Education Topics: Comprehensive Knowledge Assessment and Instruction    Special Educational Needs Requiring Individual DSMT: None       MEDICAL NUTRITION THERAPY (MNT) for Diabetes    Medical Nutrition Therapy with a Registered Dietitian can be provided in coordination with Diabetes Self-Management Training to assist in achieving optimal diabetes management.    MNT Type and Hours: Previous diagnosis: Annual follow-up MNT - 2 hours                       Medicare will cover: 3 hours initial MNT in 12 month period after first visit, plus 2 hours of follow-up MNT annually    Please be aware that coverage of these services is subject to the terms and limitations of your health insurance plan.  Call member services at your health plan to determine Diabetes Self-Management Training benefits and ask which blood glucose monitor brands are covered by your plan.      Please bring the following with you to your appointment:    (1)  List of current medications   (2)  List of Blood Glucose Monitor brands that are covered by your insurance plan  (3)  Blood Glucose Monitor and log book  (4)   Food records for the 3 days prior to your visit    The Certified Diabetes Educator may make diabetes medication adjustments per the CDE Protocol and Collaborative Practice Agreement.                  Your next 10 appointments already scheduled     May 05, 2017  7:45 AM CDT   RETURN RETINA with Bryanna Jaime MD   Eye Clinic ([facility])    [address]  [address]  [facility] [address]   [phone]            Jun 19, 2017  8:15 AM CDT   Return Visit with Abraham [facility]rd MD   [facility]    [address]  [place] [id number] [phone]              Who to contact     If you have questions or need follow up information about  "today's clinic visit or your schedule please contact [facility] directly at [phone].  Normal or non-critical lab and imaging results will be communicated to you by MyChart, letter or phone within 4 business days after the clinic has received the results. If you do not hear from us within 7 days, please contact the clinic through [employer] or phone. If you have a critical or abnormal lab result, we will notify you by phone as soon as possible.  Submit refill requests through [employer] or call your pharmacy and they will forward the refill request to us. Please allow 3 business days for your refill to be completed.          Additional Information About Your Visit        [employer]gen[employer] Information     [employer] lets you send messages to your doctor, view your test results, renew your prescriptions, schedule appointments and more. To sign up, go to [email or web address] . Click on \"Log in\" on the left side of the screen, which will take you to the Welcome page. Then click on \"Sign up Now\" on the right side of the page.     You will be asked to enter the access code listed below, as well as some personal information. Please follow the directions to create your username and password.     Your access code is: [email or web address]  Expires: 7/3/2017  2:20 PM     Your access code will  in 90 days. If you need help or a new code, please call your [place] clinic or [phone].        Care EveryWhere ID     This is your Care EveryWhere ID. This could be used by other organizations to access your [place] medical records  [id number]        Your Vitals Were     Pulse Temperature Height Pulse Oximetry Breastfeeding? BMI (Body Mass Index)    73 98.7  F (37.1  C) (Oral) 5' 3.5\" (1.613 m) 99% No 39.75 kg/m2       Blood Pressure from Last 3 Encounters:   17 132/66   07/15/16 120/61   16 134/72    Weight from Last 3 Encounters:   17 228 lb (103.4 kg)   07/15/16 224 lb (101.6 kg)   16 221 lb 8 " oz (100.5 kg)              We Performed the Following     Albumin Random Urine Quantitative     CBC with platelets differential     Comprehensive metabolic panel     DIABETES EDUCATOR REFERRAL     FOOT EXAM     Hemoglobin A1c     Lipid panel reflex to direct LDL     TSH with free T4 reflex          Today's Medication Changes          These changes are accurate as of: 4/14/17  7:46 AM.  If you have any questions, ask your nurse or doctor.               These medicines have changed or have updated prescriptions.        Dose/Directions    fluticasone 50 MCG/ACT spray   Commonly known as:  FLONASE   This may have changed:  See the new instructions.   Used for:  Nasal congestion   Changed by:  Raymon Hernández MD        SPRAY 1-2 SPRAYS INTO BOTH NOSTRILS DAILY   Quantity:  16 mL   Refills:  11       oxybutynin 5 MG tablet   Commonly known as:  DITROPAN   This may have changed:  See the new instructions.   Used for:  Overactive bladder   Changed by:  Raymon Hernández MD        TAKE 1 TABLET (5 MG) BY MOUTH 3 TIMES DAILY AS NEEDED   Quantity:  270 tablet   Refills:  3       simvastatin 10 MG tablet   Commonly known as:  ZOCOR   This may have changed:  See the new instructions.   Used for:  Hyperlipidemia LDL goal <100   Changed by:  Raymon Hernández MD        Dose:  10 mg   Take 1 tablet (10 mg) by mouth At Bedtime   Quantity:  90 tablet   Refills:  3         Stop taking these medicines if you haven't already. Please contact your care team if you have questions.     blood glucose calibration NORMAL solution   Stopped by:  Raymon Hernández MD           blood glucose monitoring meter device kit   Stopped by:  Raymon Hernández MD                Where to get your medicines      These medications were sent to [facility]pharmacy #[id number] [facility] [address] OF [address] [place] [id number]     Phone:  [phone]     carvedilol 12.5 MG tablet    fluticasone 50 MCG/ACT spray     hydrochlorothiazide 25 MG tablet    insulin glargine 100 UNIT/ML injection    metoclopramide 10 MG tablet    omeprazole 40 MG capsule    oxybutynin 5 MG tablet    potassium chloride 10 MEQ tablet    senna-docusate 8.6-50 MG per tablet    simvastatin 10 MG tablet         Some of these will need a paper prescription and others can be bought over the counter.  Ask your nurse if you have questions.     Bring a paper prescription for each of these medications     ferrous sulfate 325 (65 FE) MG tablet                Primary Care Provider Office Phone # Fax #    Raymon Hernández [phone]       [facility] [address]  [facility] [address]        Thank you!     Thank you for choosing [facility]  for your care. Our goal is always to provide you with excellent care. Hearing back from our patients is one way we can continue to improve our services. Please take a few minutes to complete the written survey that you may receive in the mail after your visit with us. Thank you!             Your Updated Medication List - Protect others around you: Learn how to safely use, store and throw away your medicines at www.disposemymeds.org.          This list is accurate as of: 4/14/17  7:46 AM.  Always use your most recent med list.                   Brand Name Dispense Instructions for use    * ACE/ARB NOT PRESCRIBED (INTENTIONAL)      by Other route continuous prn.       aspirin 325 MG EC tablet      Take 325 mg by mouth daily.       blood glucose monitoring test strip    no brand specified    100 strip    Use to test blood sugars 3 times daily or as directed       carvedilol 12.5 MG tablet    COREG    180 tablet    Take 1 tablet (12.5 mg) by mouth 2 times daily (with meals)       dorzolamide-timolol 2-0.5 % ophthalmic solution    COSOPT    10 mL    Place 1 drop into the right eye 2 times daily       ferrous sulfate 325 (65 FE) MG tablet    IRON    180 tablet    Take 1  tablet (325 mg) by mouth 2 times daily       fluticasone 50 MCG/ACT spray    FLONASE    16 mL    SPRAY 1-2 SPRAYS INTO BOTH NOSTRILS DAILY       hydrochlorothiazide 25 MG tablet    HYDRODIURIL    90 tablet    Take 1 tablet (25 mg) by mouth daily       hydrocortisone 1 % cream    CORTAID    60 g    Apply sparingly to affected area three times daily as needed to affected areas       insulin glargine 100 UNIT/ML injection    LANTUS SOLOSTAR    15 mL    30 units at bedtime or as directed       insulin pen needle 31G X 8 MM     100 each    Use as directed with Lantus; patient often needs 2 shots daily, so  Please provide enough needles for bid dosing       latanoprost 0.005 % ophthalmic solution    XALATAN    1 Bottle    Place 1 drop into both eyes At Bedtime       metoclopramide 10 MG tablet    REGLAN    360 tablet    Take 1 tablet by mouth. Take 1 tablet by mouth 4 times a day before meals and nightly as needed       MICROLET LANCETS Misc     100 each    1 Device 3 times daily.       MULTIVITAMIN TABS   OR      1 TABLET DAILY       omeprazole 40 MG capsule    priLOSEC    90 capsule    Take 1 capsule (40 mg) by mouth daily       * order for DME      Equipment being ordered: CPAP       order for DME     1 each    Equipment being ordered: CPAP tubing, mask, and all needed equipment       oxybutynin 5 MG tablet    DITROPAN    270 tablet    TAKE 1 TABLET (5 MG) BY MOUTH 3 TIMES DAILY AS NEEDED       potassium chloride 10 MEQ tablet    K-TAB,KLOR-CON    90 tablet    Take 1 tablet (10 mEq) by mouth daily       senna-docusate 8.6-50 MG per tablet    SENNA S    60 tablet    Take 1-4 tablets by mouth 2 times daily as needed constipation       simvastatin 10 MG tablet    ZOCOR    90 tablet    Take 1 tablet (10 mg) by mouth At Bedtime       vitamin D 1000 UNITS capsule      Take 1 capsule by mouth daily.       * Notice:  This list has 2 medication(s) that are the same as other medications prescribed for you. Read the directions  carefully, and ask your doctor or other care provider to review them with you.

## 2017-05-03 DIAGNOSIS — E83.52 HYPERCALCEMIA: ICD-10-CM

## 2017-05-03 DIAGNOSIS — N17.9 ACUTE KIDNEY FAILURE (H): ICD-10-CM

## 2017-05-03 DIAGNOSIS — E87.5 HYPERPOTASSEMIA: ICD-10-CM

## 2017-05-03 DIAGNOSIS — N18.30 CKD (CHRONIC KIDNEY DISEASE) STAGE 3, GFR 30-59 ML/MIN (H): ICD-10-CM

## 2017-05-03 DIAGNOSIS — E66.9 OBESITY, UNSPECIFIED OBESITY SEVERITY, UNSPECIFIED OBESITY TYPE: ICD-10-CM

## 2017-05-03 DIAGNOSIS — I95.1 ORTHOSTATIC HYPOTENSION: ICD-10-CM

## 2017-05-03 DIAGNOSIS — R80.9 PROTEINURIA: ICD-10-CM

## 2017-05-03 DIAGNOSIS — D64.9 ANEMIA, UNSPECIFIED: ICD-10-CM

## 2017-05-03 DIAGNOSIS — I10 HYPERTENSION GOAL BP (BLOOD PRESSURE) < 140/90: ICD-10-CM

## 2017-05-03 DIAGNOSIS — E11.21 TYPE 2 DIABETES MELLITUS WITH DIABETIC NEPHROPATHY, WITH LONG-TERM CURRENT USE OF INSULIN (H): Primary | ICD-10-CM

## 2017-05-03 DIAGNOSIS — E21.3 HYPERPARATHYROIDISM (H): ICD-10-CM

## 2017-05-03 DIAGNOSIS — Z79.4 TYPE 2 DIABETES MELLITUS WITH DIABETIC NEPHROPATHY, WITH LONG-TERM CURRENT USE OF INSULIN (H): Primary | ICD-10-CM

## 2017-05-03 DIAGNOSIS — E55.9 VITAMIN D DEFICIENCY: ICD-10-CM

## 2017-05-03 DIAGNOSIS — E87.20 ACIDOSIS: ICD-10-CM

## 2017-05-05 ENCOUNTER — ALLIED HEALTH/NURSE VISIT (OUTPATIENT)
Dept: EDUCATION SERVICES | Facility: CLINIC | Age: 77
End: 2017-05-05
Payer: COMMERCIAL

## 2017-05-05 ENCOUNTER — OFFICE VISIT (OUTPATIENT)
Dept: OPHTHALMOLOGY | Facility: CLINIC | Age: 77
End: 2017-05-05
Attending: OPHTHALMOLOGY
Payer: COMMERCIAL

## 2017-05-05 VITALS — BODY MASS INDEX: 39.75 KG/M2 | WEIGHT: 228 LBS

## 2017-05-05 DIAGNOSIS — Z79.4 TYPE 2 DIABETES MELLITUS WITH DIABETIC NEPHROPATHY, WITH LONG-TERM CURRENT USE OF INSULIN (H): Primary | ICD-10-CM

## 2017-05-05 DIAGNOSIS — H40.1131 PRIMARY OPEN ANGLE GLAUCOMA OF BOTH EYES, MILD STAGE: ICD-10-CM

## 2017-05-05 DIAGNOSIS — E11.21 TYPE 2 DIABETES MELLITUS WITH DIABETIC NEPHROPATHY, WITH LONG-TERM CURRENT USE OF INSULIN (H): Primary | ICD-10-CM

## 2017-05-05 DIAGNOSIS — H18.9 KERATOPATHY: Primary | ICD-10-CM

## 2017-05-05 DIAGNOSIS — E11.311: ICD-10-CM

## 2017-05-05 PROCEDURE — G0108 DIAB MANAGE TRN  PER INDIV: HCPCS

## 2017-05-05 PROCEDURE — 99212 OFFICE O/P EST SF 10 MIN: CPT | Mod: ZF

## 2017-05-05 PROCEDURE — 92134 CPTRZ OPH DX IMG PST SGM RTA: CPT | Mod: ZF | Performed by: OPHTHALMOLOGY

## 2017-05-05 ASSESSMENT — TONOMETRY
IOP_METHOD: TONOPEN
OS_IOP_MMHG: 10
OD_IOP_MMHG: 17

## 2017-05-05 ASSESSMENT — EXTERNAL EXAM - LEFT EYE: OS_EXAM: 2+ BROW PTOSIS

## 2017-05-05 ASSESSMENT — CONF VISUAL FIELD
OD_NORMAL: 1
OS_NORMAL: 1

## 2017-05-05 ASSESSMENT — CUP TO DISC RATIO
OD_RATIO: 0.7
OS_RATIO: 0.6

## 2017-05-05 ASSESSMENT — VISUAL ACUITY
OD_SC: 20/150
METHOD: SNELLEN - LINEAR
OD_PH_SC: 20/100
OD_SC+: -1
OS_SC: 20/400

## 2017-05-05 ASSESSMENT — EXTERNAL EXAM - RIGHT EYE: OD_EXAM: 2+ BROW PTOSIS

## 2017-05-05 NOTE — MR AVS SNAPSHOT
After Visit Summary   5/5/2017    Ruth Rocha    MRN: [id number]           Patient Information     Date Of Birth          1940        Visit Information        Provider Department      5/5/2017 3:00 PM CP DIABETIC ED RESOURCE [facility]        Care Instructions    Recommend to take your Lantus insulin dose daily before your supper meal.  If you can be active during the afternoon that would be a good time of the day to do so.  Recommend 2-3 servings of carbohydrate at each meal and 0-1 for a snack.  Recommend blood glucose g  Goal of  mg/dl.  Call if you have questions.        Follow-ups after your visit        Your next 10 appointments already scheduled     Jun 19, 2017  8:15 AM CDT   [facility] Abraham [facility]rd MD   [facility]    [address] [facility] [address]   [phone]            Nov 06, 2017  7:45 AM CST   RETURN RETINA with Bryanna Jaime MD   Eye Clinic ([facility])    [address]  [address] [facility]  [address] [address]  [facility] [address]   [phone]              Future tests that were ordered for you today     Open Future Orders        Priority Expected Expires Ordered    OCT Retina Spectralis OU (both eyes) Routine  11/6/2018 5/5/2017    OCT Retina Spectralis OU (both eyes) Routine  11/6/2018 5/5/2017            Who to contact     If you have questions or need follow up information about today's clinic visit or your schedule please contact [facility] directly at [phone].  Normal or non-critical lab and imaging results will be communicated to you by MyChart, letter or phone within 4 business days after the clinic has received the results. If you do not hear from us within 7 days, please contact the clinic through MyChart or phone. If you have a critical or abnormal lab result, we will notify you by phone as soon as possible.  Submit  "refill requests through [employer] or call your pharmacy and they will forward the refill request to us. Please allow 3 business days for your refill to be completed.          Additional Information About Your Visit        [employer] Information     [employer] lets you send messages to your doctor, view your test results, renew your prescriptions, schedule appointments and more. To sign up, go to [email or web address] . Click on \"Log in\" on the left side of the screen, which will take you to the Welcome page. Then click on \"Sign up Now\" on the right side of the page.     You will be asked to enter the access code listed below, as well as some personal information. Please follow the directions to create your username and password.     Your access code is: [email or web address]  Expires: 7/3/2017  2:20 PM     Your access code will  in 90 days. If you need help or a new code, please call your [place] clinic or [phone].        Care EveryWhere ID     This is your Care EveryWhere ID. This could be used by other organizations to access your [place] medical records  [id number]        Your Vitals Were     BMI (Body Mass Index)                   39.75 kg/m2            Blood Pressure from Last 3 Encounters:   17 132/66   07/15/16 120/61   16 134/72    Weight from Last 3 Encounters:   17 103.4 kg (228 lb)   17 103.4 kg (228 lb)   07/15/16 101.6 kg (224 lb)              Today, you had the following     No orders found for display       Primary Care Provider Office Phone # Fax #    Raymon Hernández [phone]       [facility] [address] [facility] [address]        Thank you!     Thank you for choosing [facility]  for your care. Our goal is always to provide you with excellent care. Hearing back from our patients is one way we can continue to improve our services. Please take a few minutes to complete the written survey that you may " receive in the mail after your visit with us. Thank you!             Your Updated Medication List - Protect others around you: Learn how to safely use, store and throw away your medicines at www.disposemymeds.org.          This list is accurate as of: 5/5/17  4:05 PM.  Always use your most recent med list.                   Brand Name Dispense Instructions for use    * ACE/ARB NOT PRESCRIBED (INTENTIONAL)      by Other route continuous prn Reported on 5/5/2017       aspirin 325 MG EC tablet      Take 325 mg by mouth daily.       blood glucose monitoring test strip    no brand specified    100 strip    Use to test blood sugars 3 times daily or as directed       carvedilol 12.5 MG tablet    COREG    180 tablet    Take 1 tablet (12.5 mg) by mouth 2 times daily (with meals)       dorzolamide-timolol 2-0.5 % ophthalmic solution    COSOPT    10 mL    Place 1 drop into the right eye 2 times daily       ferrous sulfate 325 (65 FE) MG tablet    IRON    180 tablet    Take 1 tablet (325 mg) by mouth 2 times daily       fluticasone 50 MCG/ACT spray    FLONASE    16 mL    SPRAY 1-2 SPRAYS INTO BOTH NOSTRILS DAILY       hydrochlorothiazide 25 MG tablet    HYDRODIURIL    90 tablet    Take 1 tablet (25 mg) by mouth daily       hydrocortisone 1 % cream    CORTAID    60 g    Apply sparingly to affected area three times daily as needed to affected areas       insulin glargine 100 UNIT/ML injection    LANTUS SOLOSTAR    15 mL    30 units at bedtime or as directed       insulin pen needle 31G X 8 MM     100 each    Use as directed with Lantus; patient often needs 2 shots daily, so  Please provide enough needles for bid dosing       latanoprost 0.005 % ophthalmic solution    XALATAN    1 Bottle    Place 1 drop into both eyes At Bedtime       metoclopramide 10 MG tablet    REGLAN    360 tablet    Take 1 tablet by mouth. Take 1 tablet by mouth 4 times a day before meals and nightly as needed       MICROLET LANCETS Misc     100 each    1  Device 3 times daily.       MULTIVITAMIN TABS   OR      1 TABLET DAILY       omeprazole 40 MG capsule    priLOSEC    90 capsule    Take 1 capsule (40 mg) by mouth daily       * order for DME      Equipment being ordered: CPAP       order for DME     1 each    Equipment being ordered: CPAP tubing, mask, and all needed equipment       oxybutynin 5 MG tablet    DITROPAN    270 tablet    TAKE 1 TABLET (5 MG) BY MOUTH 3 TIMES DAILY AS NEEDED       potassium chloride 10 MEQ tablet    K-TAB,KLOR-CON    90 tablet    Take 1 tablet (10 mEq) by mouth daily       senna-docusate 8.6-50 MG per tablet    SENNA S    60 tablet    Take 1-4 tablets by mouth 2 times daily as needed constipation       simvastatin 10 MG tablet    ZOCOR    90 tablet    Take 1 tablet (10 mg) by mouth At Bedtime       vitamin D 1000 UNITS capsule      Take 1 capsule by mouth daily.       * Notice:  This list has 2 medication(s) that are the same as other medications prescribed for you. Read the directions carefully, and ask your doctor or other care provider to review them with you.

## 2017-05-05 NOTE — NURSING NOTE
Chief Complaints and History of Present Illnesses   Patient presents with     Follow Up For     s/p macular edema associated with type 2 ...     HPI    Affected eye(s):  Both   Symptoms:     Blurred vision   No decreased vision   No distorted vision   Floaters   No flashes   Dryness      Duration:  4 months   Frequency:  Constant       Do you have eye pain now?:  No      Comments:  Pt states right eye vision has improved over the last 4 months.  Drops going good.  AT's 2-3 X daily                        A1C                      5.9                 04/14/2017                 A1C                      6.1                 07/15/2016                 A1C                      6.2                 01/04/2016                 A1C                      6.5                 12/12/2014                 A1C                      6.2                 04/30/2014            BS: 93 this morning  Sae Burns  7:46 AM May 5, 2017

## 2017-05-05 NOTE — PATIENT INSTRUCTIONS
Recommend to take your Lantus insulin dose daily before your supper meal.  If you can be active during the afternoon that would be a good time of the day to do so.  Recommend 2-3 servings of carbohydrate at each meal and 0-1 for a snack.  Recommend blood glucose goal of  mg/dl before meals.  Call if you have questions.

## 2017-05-05 NOTE — PROGRESS NOTES
CC -   NPDR and DME    INTERVAL HISTORY: Patient states right vision seems improved over last several moths.. Last A1c 5.9% April 2017.    HPI -   Urth HANSA Rocha is a  77 year old year-old patient presenting for NPDR and DME OS > OD.  Sees Jordan pollard for glaucoma (last visit 1/2/17).  Last A1c 6.1 (7/2016).  VA OS poor longstanding    PAST OCULAR HISTORY  CE/IOL OU  FML OU  Therapeutic PTK OS 8/2015    RETINAL IMAGING:  OCT   5/5/2017   right eye- blurry/grainy image,  2+ Atrophy from FML scars, no CNVM, tr ERM, ?trace  IRF, stable  left eye -  Grainy image, 3+ atrophy from FML scars, no CNVM, 2+  paracentral DME, CST -248 -> 244, stable    FA 6-14-16  Right eye - blurry image, 2-3+ WD from FML scars, 1+ increased NURA, 1 peripheral ischemia, no NV  Left eye - (transit) 2+ increased NURA, large WD in macula from FML scars, 1+ MAs/peripheral atrophy, no NV      ASSESSMENT & PLAN  1.  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 CSME OS   - s/p FML   - paracentral, given extensive FML scars & corneal scarring, unclear visual significance   - has moderate macular ischemia and scarring from FML scars (FA 6/2016)   - has anatomical improvement with monthly  injections (3 total, last 2/2016)  but no clear affect on VA or subjective vision   - VA has varied 20/125 - 20/500, typically close to 20/400,  unclear benefit of injections   - given OAG history and uncertain benefit, reluctant to use steroids     - OCT stable today, VA stable   - d/w patient, anti-VEGF vs observe, wishes to observe for now     - given stability recheck 4-6 months    3. DME OD   - OCT poor quality today but edema appears mild and stable   - VA stable   - decreased vision mostly likely from corneal changes.         4.  PVD OU    5.  K- scarring OU   -prominent corneal haze and edema right eye today   -follows with Dr. Kennedy     6.  OAG OU   - on cosopt OD and xalatan OU   - seemario alberto Liz - last visit 1/2/17- stable    RTC  4-6 months, OCT OU, sooner if new vision changes    Aly Sparks MD MPH   Ophthalmology Resident PGY-3      ATTESTATION     Attending Physician Attestation:      Complete documentation of historical and exam elements from today's encounter can be found in the full encounter summary report (not reduplicated in this progress note).  I personally obtained the chief complaint(s) and history of present illness.  I confirmed and edited as necessary the review of systems, past medical/surgical history, family history, social history, and examination findings as documented by others; and I examined the patient myself.  I personally reviewed the relevant tests, images, and reports as documented above.  I formulated and edited as necessary the assessment and plan and discussed the findings and management plan with the patient and family        Bryanna Jaime MD, PhD  , Vitreoretinal Surgery  Department of Ophthalmology  [facility]

## 2017-05-05 NOTE — NURSING NOTE
Diabetes Self Management Training: Individual Review Visit    Ruth Rocha presents today for education and evaluation of glucose control related to Type 2 diabetes.    She is accompanied by self    Patient's diabetes management related comments/concerns: no special questions today.    Patient's emotional response to diabetes: expresses readiness to learn    Patient would like this visit to be focused around the following diabetes-related behaviors and goals: updating diabetes education.    ASSESSMENT:  Patient Problem List and Family Medical History reviewed for relevant medical history, current medical status, and diabetes risk factors.  Patient's roommate counts CHO for patient and does most of the meal prep.  If patient's fasting blood glucose level is 160 mg/dl or above, she will take a her full dose of Lantus then and not take it before supper that day.  This helps her blood glucose levels thru that day but causes her before supper reading the following day to be over 200 mg/dl before she is taking her insulin again. Patient can benefit from taking her Lantus consistently before her evening meal.  Patient is able to see the dial on her insulin pen since she has had surgery on her right eye.  No longer needing to count the clicks on the dosing dial.  Patient brought her log book for review and 3 days of food records.    Patient blood glucose levels are lowest in the morning and highest before supper.  There is one episode of low BG recorded when fasting. Pt carries gummy candies in case of hypoglycemia which she can feel.  Her average blood glucose for the last 2 weeks is 148 mg/dl which does not match with her A1C of 5.9%. Perhaps related to lower red blood cell count.  Recommend blood glucose goal of  mg/dl pre meal.  Patient can benefit from being more active after her lunch and before supper.    Current Diabetes Management per Patient:  Taking diabetes medications?   yes:     Diabetes  Medication(s)     Insulin Sig    insulin glargine (LANTUS SOLOSTAR) 100 UNIT/ML injection 30 units at bedtime or as directed      Usually taking her Lantus before her evening meal.    *Abbreviated insulin dose documentation key: Insulin Trade Name (bjijzrgqb-rcpfv-qxvspz-bedtime) - i.e. Humalog 5-5-5-0 (Humalog 5 units at breakfast, 5 units at lunch, and 5 units at dinner).    Past Diabetes Education: Yes    Patient glucose self monitoring as follows: three times daily.   BG meter: Contour Next  meter  BG results: fasting glucose- 77, 126, 111, 199, 93, pre-supper glucose- 262, 130, 187, 187 and bedtime- 146, 190, 132, 148 mg/dl for the last 5 days.     BG values are: 56% in goal range of  mg/dl.  Patient's most recent   Lab Results   Component Value Date    A1C 5.9 04/14/2017    is meeting goal of <7.0    Nutrition:  Patient eats 3 meals per day    Breakfast - 8-8:30 am- banana or 1/2 Arabic and a banana with water  Lunch - noon- peanut butter sandwich or Greek yogurt with occasional fruit, water or milk  4 oz  Dinner - 6 pm- meat loaf  2 slices with mixed vegetables peas and carrots 1/2 cup,  1 very small potato,water   Snacks - 10 am- Embers 1/2 order of biscuits and gravy, 1/2 order of hash browns, 2 eggs, water- occasionally.    Beverages: water or diet soda    Cultural/Sabianist diet restrictions: not assessed     Biggest Challenge to Healthy Eating: not assessed    Physical Activity:    Patient walks with a walker and does not see very well currently as she is in the process of getting new glasses. This limits her ability to walk outside.    Diabetes Complications:  Acute Complications: At risk for hypoglycemia? yes  Symptoms of low blood sugar? Light headed and numb lips  Frequency of hypoglycemia:  Once in the last 4 weeks.  Patient carries a carbohydrate source with them regularly: Yes   Type of carbohydrate: candy    Vitals:  Wt 103.4 kg (228 lb)  BMI 39.75 kg/m2  Estimated body mass index is 39.75  "kg/(m^2) as calculated from the following:    Height as of 4/14/17: 1.613 m (5' 3.5\").    Weight as of 4/14/17: 103.4 kg (228 lb).   Last 3 BP:   BP Readings from Last 3 Encounters:   04/14/17 132/66   07/15/16 120/61   01/04/16 134/72       History   Smoking Status     Never Smoker   Smokeless Tobacco     Never Used       Labs:  Lab Results   Component Value Date    A1C 5.9 04/14/2017     Lab Results   Component Value Date     04/14/2017     Lab Results   Component Value Date    LDL 65 04/14/2017     HDL Cholesterol   Date Value Ref Range Status   04/14/2017 52 >49 mg/dL Final   ]  GFR Estimate   Date Value Ref Range Status   04/14/2017 32 (L) >60 mL/min/1.7m2 Final     Comment:     Non  GFR Calc     GFR Estimate If Black   Date Value Ref Range Status   04/14/2017 39 (L) >60 mL/min/1.7m2 Final     Comment:      GFR Calc     Lab Results   Component Value Date    CR 1.57 04/14/2017     No results found for: MICROALBUMIN    Socio/Economic Considerations:    Support system: room mate    Health Beliefs and Attitudes:   Patient Activation Measure Survey Score:  SHAHRIAR Score (Last Two) 2/23/2012   SHAHRIAR Raw Score 36   Activation Score 47.4   SHAHRIAR Level 2       Stage of Change: ACTION (Actively working towards change)      Diabetes knowledge and skills assessment:     Patient is knowledgeable in diabetes management concepts related to: Monitoring, Taking Medication and Problem Solving    Patient needs further education on the following diabetes management concepts: Healthy Eating, Being Active and Taking Medication    Barriers to Learning Assessment: No Barriers identified    Based on learning assessment above, most appropriate setting for further diabetes education would be: Individual setting.    INTERVENTION:   Education provided today on:  AADE Self-Care Behaviors:  Healthy Eating: consistency in amount, composition, and timing of food intake and foods that are CHO  Taking Medication: " action of prescribed medication and when to take medications    Opportunities for ongoing education and support in diabetes-self management were discussed.    Pt verbalized understanding of concepts discussed and recommendations provided today.       Education Materials Provided:  Carbohydrate Counting    PLAN:  See Patient Instructions for co-developed, patient-stated behavior change goals.  AVS printed and provided to patient today.    FOLLOW-UP:  Follow-up in 1 year or as needed.  Patient is not interested to see RD as she states she cannot keep that information straight.  Chart routed to referring provider.    Ongoing plan for education and support: Follow-up with primary care provider    Falguni Solorzano RN  BSN CDE    Time Spent: 60 minutes  Encounter Type: Individual    Any diabetes medication dose changes were made via the CDE Protocol and Collaborative Practice Agreement with the patient's referring provider. A copy of this encounter was shared with the provider.

## 2017-05-05 NOTE — MR AVS SNAPSHOT
After Visit Summary   5/5/2017    Ruth Rocha    MRN: [id number]           Patient Information     Date Of Birth          1940        Visit Information        Provider Department      5/5/2017 7:45 AM [facility]ime MD [facility]        Today's Diagnoses     Keratopathy,  ou    -  1    Clinically significant macular edema associated with type 2 diabetes (H)        Primary open angle glaucoma of both eyes, mild stage           Follow-ups after your visit        Follow-up notes from your care team     Return in 6 months (on 11/5/2017) for OCT OU.      Your next 10 appointments already scheduled     May 05, 2017  3:00 PM CDT   Diabetic Education with  DIABETIC ED RESOURCE   [facility]    [address] [facility] [address]   [phone]            Jun 19, 2017  8:15 AM CDT   [facility] with [facility]rd [facility]   [facility]    [address] [place] [address]   [phone]            Nov 06, 2017  7:45 AM CST   RETURN RETINA with Bryanna Jaime MD   Eye Clinic ([facility])    [address]  [address]  [address] [address]  [facility] [address] [phone]              Future tests that were ordered for you today     Open Future Orders        Priority Expected Expires Ordered    OCT Retina Spectralis OU (both eyes) Routine  11/6/2018 5/5/2017    OCT Retina Spectralis OU (both eyes) Routine  11/6/2018 5/5/2017            Who to contact     Please call your clinic at [phone] to:    Ask questions about your health    Make or cancel appointments    Discuss your medicines    Learn about your test results    Speak to your doctor   If you have compliments or concerns about an experience at your clinic, or if you wish to file a complaint, please contact [facility] Patient Relations at  [phone] or email us at [email or web address]         Additional Information About Your Visit        [employer] Information     [employer] is an electronic gateway that provides easy, online access to your medical records. With [employer], you can request a clinic appointment, read your test results, renew a prescription or communicate with your care team.     To sign up for [employer] visit the website at [email or web address]   You will be asked to enter the access code listed below, as well as some personal information. Please follow the directions to create your username and password.     Your access code is: [email or web address]  Expires: 7/3/2017  2:20 PM     Your access code will  in 90 days. If you need help or a new code, please contact your [facility] or call [phone] for assistance.        Care EveryWhere ID     This is your Care EveryWhere ID. This could be used by other organizations to access your [facility] medical records  [id number]         Blood Pressure from Last 3 Encounters:   17 132/66   07/15/16 120/61   16 134/72    Weight from Last 3 Encounters:   17 103.4 kg (228 lb)   07/15/16 101.6 kg (224 lb)   16 100.5 kg (221 lb 8 oz)              We Performed the Following     OCT Retina Spectralis OU (both eyes)        Primary Care Provider Office Phone # Fax #    Raymon Hernández [phone]       [address] [address]        Thank you!     Thank you for choosing [facility]  for your care. Our goal is always to provide you with excellent care. Hearing back from our patients is one way we can continue to improve our services. Please take a few minutes to complete the written survey that you may receive in the mail after your visit with us. Thank you!             Your Updated Medication List - Protect others around you: Learn how to safely use, store and throw away  your medicines at www.disposemymeds.org.          This list is accurate as of: 5/5/17  9:09 AM.  Always use your most recent med list.                   Brand Name Dispense Instructions for use    * ACE/ARB NOT PRESCRIBED (INTENTIONAL)      by Other route continuous prn.       aspirin 325 MG EC tablet      Take 325 mg by mouth daily.       blood glucose monitoring test strip    no brand specified    100 strip    Use to test blood sugars 3 times daily or as directed       carvedilol 12.5 MG tablet    COREG    180 tablet    Take 1 tablet (12.5 mg) by mouth 2 times daily (with meals)       dorzolamide-timolol 2-0.5 % ophthalmic solution    COSOPT    10 mL    Place 1 drop into the right eye 2 times daily       ferrous sulfate 325 (65 FE) MG tablet    IRON    180 tablet    Take 1 tablet (325 mg) by mouth 2 times daily       fluticasone 50 MCG/ACT spray    FLONASE    16 mL    SPRAY 1-2 SPRAYS INTO BOTH NOSTRILS DAILY       hydrochlorothiazide 25 MG tablet    HYDRODIURIL    90 tablet    Take 1 tablet (25 mg) by mouth daily       hydrocortisone 1 % cream    CORTAID    60 g    Apply sparingly to affected area three times daily as needed to affected areas       insulin glargine 100 UNIT/ML injection    LANTUS SOLOSTAR    15 mL    30 units at bedtime or as directed       insulin pen needle 31G X 8 MM     100 each    Use as directed with Lantus; patient often needs 2 shots daily, so  Please provide enough needles for bid dosing       latanoprost 0.005 % ophthalmic solution    XALATAN    1 Bottle    Place 1 drop into both eyes At Bedtime       metoclopramide 10 MG tablet    REGLAN    360 tablet    Take 1 tablet by mouth. Take 1 tablet by mouth 4 times a day before meals and nightly as needed       MICROLET LANCETS Misc     100 each    1 Device 3 times daily.       MULTIVITAMIN TABS   OR      1 TABLET DAILY       omeprazole 40 MG capsule    priLOSEC    90 capsule    Take 1 capsule (40 mg) by mouth daily       * order for DME       Equipment being ordered: CPAP       order for DME     1 each    Equipment being ordered: CPAP tubing, mask, and all needed equipment       oxybutynin 5 MG tablet    DITROPAN    270 tablet    TAKE 1 TABLET (5 MG) BY MOUTH 3 TIMES DAILY AS NEEDED       potassium chloride 10 MEQ tablet    K-TAB,KLOR-CON    90 tablet    Take 1 tablet (10 mEq) by mouth daily       senna-docusate 8.6-50 MG per tablet    SENNA S    60 tablet    Take 1-4 tablets by mouth 2 times daily as needed constipation       simvastatin 10 MG tablet    ZOCOR    90 tablet    Take 1 tablet (10 mg) by mouth At Bedtime       vitamin D 1000 UNITS capsule      Take 1 capsule by mouth daily.       * Notice:  This list has 2 medication(s) that are the same as other medications prescribed for you. Read the directions carefully, and ask your doctor or other care provider to review them with you.

## 2017-05-17 DIAGNOSIS — K59.09 CHRONIC CONSTIPATION: ICD-10-CM

## 2017-05-17 RX ORDER — DOCUSATE SODIUM 50MG AND SENNOSIDES 8.6MG 8.6; 5 MG/1; MG/1
TABLET, FILM COATED ORAL
Qty: 60 TABLET | Refills: 4 | Status: SHIPPED | OUTPATIENT
Start: 2017-05-17 | End: 2018-07-20

## 2017-05-17 NOTE — TELEPHONE ENCOUNTER
Prescription approved per [facility].    Lindsey De Dios RN  [facility]

## 2017-05-17 NOTE — TELEPHONE ENCOUNTER
senna-docusate (SENNA S) 8.6-50 MG per tablet      Last Written Prescription Date: 4-14-17  Last Fill Quantity: 60,  # refills: 5   Last Office Visit with [facility], P or [facility] prescribing provider: 4-14-17                                         Next 5 appointments (look out 90 days)     Jun 19, 2017  8:15 AM CDT   [facility]   [facility]    [address]  [place] [address] [phone]

## 2017-05-22 DIAGNOSIS — R80.9 PROTEINURIA: ICD-10-CM

## 2017-05-22 DIAGNOSIS — E83.52 HYPERCALCEMIA: ICD-10-CM

## 2017-05-22 DIAGNOSIS — N18.30 CKD (CHRONIC KIDNEY DISEASE) STAGE 3, GFR 30-59 ML/MIN (H): ICD-10-CM

## 2017-05-22 DIAGNOSIS — Z79.4 TYPE 2 DIABETES MELLITUS WITH DIABETIC NEPHROPATHY, WITH LONG-TERM CURRENT USE OF INSULIN (H): ICD-10-CM

## 2017-05-22 DIAGNOSIS — E87.5 HYPERPOTASSEMIA: ICD-10-CM

## 2017-05-22 DIAGNOSIS — D64.9 ANEMIA, UNSPECIFIED: ICD-10-CM

## 2017-05-22 DIAGNOSIS — E11.21 TYPE 2 DIABETES MELLITUS WITH DIABETIC NEPHROPATHY, WITH LONG-TERM CURRENT USE OF INSULIN (H): ICD-10-CM

## 2017-05-22 DIAGNOSIS — I10 HYPERTENSION GOAL BP (BLOOD PRESSURE) < 140/90: ICD-10-CM

## 2017-05-22 DIAGNOSIS — E21.3 HYPERPARATHYROIDISM (H): ICD-10-CM

## 2017-05-22 DIAGNOSIS — E55.9 VITAMIN D DEFICIENCY: ICD-10-CM

## 2017-05-22 DIAGNOSIS — I95.1 ORTHOSTATIC HYPOTENSION: ICD-10-CM

## 2017-05-22 DIAGNOSIS — E66.9 OBESITY, UNSPECIFIED OBESITY SEVERITY, UNSPECIFIED OBESITY TYPE: ICD-10-CM

## 2017-05-22 DIAGNOSIS — N17.9 ACUTE KIDNEY FAILURE (H): ICD-10-CM

## 2017-05-22 DIAGNOSIS — E87.20 ACIDOSIS: ICD-10-CM

## 2017-05-22 LAB
ALBUMIN SERPL-MCNC: 3.2 G/DL (ref 3.4–5)
ANION GAP SERPL CALCULATED.3IONS-SCNC: 8 MMOL/L (ref 3–14)
BUN SERPL-MCNC: 37 MG/DL (ref 7–30)
CALCIUM SERPL-MCNC: 8.9 MG/DL (ref 8.5–10.1)
CHLORIDE SERPL-SCNC: 107 MMOL/L (ref 94–109)
CO2 SERPL-SCNC: 29 MMOL/L (ref 20–32)
CREAT SERPL-MCNC: 1.64 MG/DL (ref 0.52–1.04)
CREAT UR-MCNC: 97 MG/DL
GFR SERPL CREATININE-BSD FRML MDRD: 31 ML/MIN/1.7M2
GLUCOSE SERPL-MCNC: 73 MG/DL (ref 70–99)
HGB BLD-MCNC: 11.4 G/DL (ref 11.7–15.7)
PHOSPHATE SERPL-MCNC: 3.3 MG/DL (ref 2.5–4.5)
POTASSIUM SERPL-SCNC: 4.7 MMOL/L (ref 3.4–5.3)
PROT UR-MCNC: 0.12 G/L
PROT/CREAT 24H UR: 0.12 G/G CR (ref 0–0.2)
PTH-INTACT SERPL-MCNC: 209 PG/ML (ref 12–72)
SODIUM SERPL-SCNC: 144 MMOL/L (ref 133–144)

## 2017-05-22 PROCEDURE — 84156 ASSAY OF PROTEIN URINE: CPT | Performed by: INTERNAL MEDICINE

## 2017-05-22 PROCEDURE — 85018 HEMOGLOBIN: CPT | Performed by: INTERNAL MEDICINE

## 2017-05-22 PROCEDURE — 36415 COLL VENOUS BLD VENIPUNCTURE: CPT | Performed by: INTERNAL MEDICINE

## 2017-05-22 PROCEDURE — 83970 ASSAY OF PARATHORMONE: CPT | Performed by: INTERNAL MEDICINE

## 2017-05-22 PROCEDURE — 80069 RENAL FUNCTION PANEL: CPT | Performed by: INTERNAL MEDICINE

## 2017-05-27 ENCOUNTER — MEDICAL CORRESPONDENCE (OUTPATIENT)
Dept: HEALTH INFORMATION MANAGEMENT | Facility: CLINIC | Age: 77
End: 2017-05-27

## 2017-06-02 ENCOUNTER — TRANSFERRED RECORDS (OUTPATIENT)
Dept: HEALTH INFORMATION MANAGEMENT | Facility: CLINIC | Age: 77
End: 2017-06-02

## 2017-06-19 ENCOUNTER — OFFICE VISIT (OUTPATIENT)
Dept: OPHTHALMOLOGY | Facility: CLINIC | Age: 77
End: 2017-06-19
Payer: COMMERCIAL

## 2017-06-19 DIAGNOSIS — H18.9 KERATOPATHY: Primary | ICD-10-CM

## 2017-06-19 DIAGNOSIS — H35.81 RETINAL EDEMA: ICD-10-CM

## 2017-06-19 DIAGNOSIS — Z96.1 PSEUDOPHAKIA: ICD-10-CM

## 2017-06-19 DIAGNOSIS — H40.1131 PRIMARY OPEN ANGLE GLAUCOMA OF BOTH EYES, MILD STAGE: ICD-10-CM

## 2017-06-19 PROCEDURE — 92012 INTRM OPH EXAM EST PATIENT: CPT | Performed by: OPHTHALMOLOGY

## 2017-06-19 ASSESSMENT — TONOMETRY
IOP_METHOD: APPLANATION
OD_IOP_MMHG: 14
OS_IOP_MMHG: 18

## 2017-06-19 ASSESSMENT — VISUAL ACUITY
METHOD: SNELLEN - LINEAR
CORRECTION_TYPE: GLASSES
OS_PH_CC: 20/200
OS_CC: 20/300+
OD_PH_CC: 20/80-1
OD_CC: 20/200-

## 2017-06-19 ASSESSMENT — EXTERNAL EXAM - RIGHT EYE: OD_EXAM: 2+ BROW PTOSIS

## 2017-06-19 ASSESSMENT — EXTERNAL EXAM - LEFT EYE: OS_EXAM: 2+ BROW PTOSIS

## 2017-06-19 NOTE — PROGRESS NOTES
Current Eye Medications:  Latanoprost every evening both eyes (10:30pm), CoSopt bid both eyes (7am)     Subjective:  No changes in vision noticed.  Got new glasses. They are working well.     Objective:  See Ophthalmology Exam.       Assessment:  Stable intraocular pressure and vision in patient with hx of diabetic macular edema and recent PTK right eye.      Plan: Continue Cosopt twice daily both eyes and Latanoprost at bedtime both eyes   Use artificial tears up to 4 times daily both eyes. (Refresh Tears or Systane Ultra/Balance)   Could try Refresh PM at bedtime both eyes.  Continue care with Dr. Jaime.  Call in September 2017 for an appointment in January 2018 for Complete Exam    Dr. Byrd [phone]

## 2017-06-19 NOTE — MR AVS SNAPSHOT
"              After Visit Summary   6/19/2017    Ruth Rocha    MRN: [id number]           Patient Information     Date Of Birth          1940        Visit Information        Provider Department      6/19/2017 8:15 AM Abraham Byrd MD [facility]        Care Instructions    Continue Cosopt twice daily both eyes and Latanoprost at bedtime both eyes   Use artificial tears up to 4 times daily both eyes. (Refresh Tears or Systane Ultra/Balance)   Could try Refresh PM at bedtime both eyes  Call in September 2017 for an appointment in January 2018 for Complete Exam    Dr. Byrd [phone]          Follow-ups after your visit        Your next 10 appointments already scheduled     Nov 06, 2017  7:45 AM CST   RETURN RETINA with Bryanna Jaime MD   Eye Clinic ([facility])    [address]  [address]  [address] [address] [facility] [address]  [facility] [address] [phone]              Who to contact     If you have questions or need follow up information about today's clinic visit or your schedule please contact [facility] directly at [phone].  Normal or non-critical lab and imaging results will be communicated to you by MyChart, letter or phone within 4 business days after the clinic has received the results. If you do not hear from us within 7 days, please contact the clinic through MyChart or phone. If you have a critical or abnormal lab result, we will notify you by phone as soon as possible.  Submit refill requests through [employer] or call your pharmacy and they will forward the refill request to us. Please allow 3 business days for your refill to be completed.          Additional Information About Your Visit        MyChart Information     [employer] lets you send messages to your doctor, view your test results, renew your prescriptions, schedule appointments and more. To sign up, go to [email or web address] . Click on \"Log in\" on the left side of the " "screen, which will take you to the Welcome page. Then click on \"Sign up Now\" on the right side of the page.     You will be asked to enter the access code listed below, as well as some personal information. Please follow the directions to create your username and password.     Your access code is: [email or web address]  Expires: 7/3/2017  2:20 PM     Your access code will  in 90 days. If you need help or a new code, please call your [place] clinic or [phone].        Care EveryWhere ID     This is your Care EveryWhere ID. This could be used by other organizations to access your [place] medical records  [id number]         Blood Pressure from Last 3 Encounters:   17 132/66   07/15/16 120/61   16 134/72    Weight from Last 3 Encounters:   17 103.4 kg (228 lb)   17 103.4 kg (228 lb)   07/15/16 101.6 kg (224 lb)              Today, you had the following     No orders found for display       Primary Care Provider Office Phone # Fax #    Raymon Hernández [phone]       [address] [address]        Thank you!     Thank you for choosing [facility]  for your care. Our goal is always to provide you with excellent care. Hearing back from our patients is one way we can continue to improve our services. Please take a few minutes to complete the written survey that you may receive in the mail after your visit with us. Thank you!             Your Updated Medication List - Protect others around you: Learn how to safely use, store and throw away your medicines at www.disposemymeds.org.          This list is accurate as of: 17  8:15 AM.  Always use your most recent med list.                   Brand Name Dispense Instructions for use    * ACE/ARB NOT PRESCRIBED (INTENTIONAL)      by Other route continuous prn Reported on 2017       aspirin 325 MG EC tablet      Take 325 mg by mouth daily.       blood glucose " monitoring test strip    no brand specified    100 strip    Use to test blood sugars 3 times daily or as directed       carvedilol 12.5 MG tablet    COREG    180 tablet    Take 1 tablet (12.5 mg) by mouth 2 times daily (with meals)       dorzolamide-timolol 2-0.5 % ophthalmic solution    COSOPT    10 mL    Place 1 drop into the right eye 2 times daily       ferrous sulfate 325 (65 FE) MG tablet    IRON    180 tablet    Take 1 tablet (325 mg) by mouth 2 times daily       fluticasone 50 MCG/ACT spray    FLONASE    16 mL    SPRAY 1-2 SPRAYS INTO BOTH NOSTRILS DAILY       hydrochlorothiazide 25 MG tablet    HYDRODIURIL    90 tablet    Take 1 tablet (25 mg) by mouth daily       hydrocortisone 1 % cream    CORTAID    60 g    Apply sparingly to affected area three times daily as needed to affected areas       insulin glargine 100 UNIT/ML injection    LANTUS SOLOSTAR    15 mL    30 units at bedtime or as directed       insulin pen needle 31G X 8 MM     100 each    Use as directed with Lantus; patient often needs 2 shots daily, so  Please provide enough needles for bid dosing       latanoprost 0.005 % ophthalmic solution    XALATAN    1 Bottle    Place 1 drop into both eyes At Bedtime       metoclopramide 10 MG tablet    REGLAN    360 tablet    Take 1 tablet by mouth. Take 1 tablet by mouth 4 times a day before meals and nightly as needed       MICROLET LANCETS Misc     100 each    1 Device 3 times daily.       MULTIVITAMIN TABS   OR      1 TABLET DAILY       omeprazole 40 MG capsule    priLOSEC    90 capsule    Take 1 capsule (40 mg) by mouth daily       * order for DME      Equipment being ordered: CPAP       order for DME     1 each    Equipment being ordered: CPAP tubing, mask, and all needed equipment       oxybutynin 5 MG tablet    DITROPAN    270 tablet    TAKE 1 TABLET (5 MG) BY MOUTH 3 TIMES DAILY AS NEEDED       potassium chloride 10 MEQ tablet    K-TAB,KLOR-CON    90 tablet    Take 1 tablet (10 mEq) by mouth daily        * senna-docusate 8.6-50 MG per tablet    SENNA S    60 tablet    Take 1-4 tablets by mouth 2 times daily as needed constipation       * SENEXON-S 8.6-50 MG per tablet   Generic drug:  senna-docusate     60 tablet    TAKE 1-4 TABLETS BY MOUTH 2 TIMES DAILY AS NEEDED CONSTIPATION       simvastatin 10 MG tablet    ZOCOR    90 tablet    Take 1 tablet (10 mg) by mouth At Bedtime       vitamin D 1000 UNITS capsule      Take 1 capsule by mouth daily.       * Notice:  This list has 4 medication(s) that are the same as other medications prescribed for you. Read the directions carefully, and ask your doctor or other care provider to review them with you.

## 2017-06-19 NOTE — PATIENT INSTRUCTIONS
Continue Cosopt twice daily both eyes and Latanoprost at bedtime both eyes   Use artificial tears up to 4 times daily both eyes. (Refresh Tears or Systane Ultra/Balance)   Could try Refresh PM at bedtime both eyes  Call in September 2017 for an appointment in January 2018 for Complete Exam    Dr. Byrd [phone]

## 2017-06-20 ENCOUNTER — TELEPHONE (OUTPATIENT)
Dept: FAMILY MEDICINE | Facility: CLINIC | Age: 77
End: 2017-06-20

## 2017-06-20 NOTE — TELEPHONE ENCOUNTER
Forms received from: [facility]   Phone number listed: [phone]   Fax listed: [phone]  Date received: 6-20-17  Form description: CPap supply  Once forms are completed, please return to [facility] via fax.  Is patient requesting to be contacted when forms are completed: n/a  Form placed: provider desk  Julia Love

## 2017-07-13 ENCOUNTER — TRANSFERRED RECORDS (OUTPATIENT)
Dept: HEALTH INFORMATION MANAGEMENT | Facility: CLINIC | Age: 77
End: 2017-07-13

## 2017-08-02 ENCOUNTER — RADIANT APPOINTMENT (OUTPATIENT)
Dept: MAMMOGRAPHY | Facility: CLINIC | Age: 77
End: 2017-08-02
Attending: FAMILY MEDICINE
Payer: COMMERCIAL

## 2017-08-02 ENCOUNTER — TELEPHONE (OUTPATIENT)
Dept: FAMILY MEDICINE | Facility: CLINIC | Age: 77
End: 2017-08-02

## 2017-08-02 DIAGNOSIS — Z12.31 VISIT FOR SCREENING MAMMOGRAM: ICD-10-CM

## 2017-08-02 PROCEDURE — G0202 SCR MAMMO BI INCL CAD: HCPCS | Mod: TC

## 2017-08-02 NOTE — TELEPHONE ENCOUNTER
Given these symptoms, advise that patient stop taking carvedilol and then see us in clinic in the next 1-2 weeks.    Please inform patient.    Be seen sooner if needed based on symptoms.    Raymon Hernández MD

## 2017-08-02 NOTE — TELEPHONE ENCOUNTER
Reason for call:  Patient reporting a symptom    Symptom or request: Blood pressure issue with dizziness    Additional comments: RN Jessica triaging patient.      Call taken on 8/2/2017 at 9:50 AM by Annabel Puentes

## 2017-08-02 NOTE — TELEPHONE ENCOUNTER
Ruth Rocha is a 77 year old female who calls with dizziness.    NURSING ASSESSMENT:  Description:  Ruth calls and states that she has been having difficulties with feeling dizzy when she stands up. Her BP when sitting down is 106/60 and as long as she is sitting she feels okay. This is a normal BP for her on medications.This happened 3-4x/week for the past several weeks. No changes in medication. She is not able to get a BP when she is standing. No sudden weakness in body, no difficulty speaking or walking, HR in the 60, no headache or change of vision. No bleeding, vomiting, or diarrhea and is hydrated. Informed Ruth that she should change positions slowly and to sit with head down on lap if feels very dizzy. Routing to PCP for additional recommendations.     Allergies:   Allergies   Allergen Reactions     Morphine      Filer Oil [Fish Oil]        MEDICATIONS:   Taking medication(s) as prescribed? Yes  Taking over the counter medication(s?) Yes  Any medication side effects? dizzy        NURSING PLAN: Routed to provider dr. Hernández    RECOMMENDED DISPOSITION:  Home care advice - Routing to Dr. Hernández if he has additional recommendations  Will comply with recommendation: Yes  If further questions/concerns or if symptoms do not improve, worsen or new symptoms develop, call your PCP or [facility] as soon as possible.      Guideline used:  Telephone Triage Protocols for Nurses, Fifth Edition, Khushi Mcmillan RN

## 2017-08-02 NOTE — TELEPHONE ENCOUNTER
Called patient - relayed below information - scheduled appointment for Wednesday August 9. Patient states understands and will stop taking the Carvedilol today. Will call if symptoms worsen.    Adelina Verdugo RN  [facility]

## 2017-08-09 ENCOUNTER — OFFICE VISIT (OUTPATIENT)
Dept: FAMILY MEDICINE | Facility: CLINIC | Age: 77
End: 2017-08-09
Payer: COMMERCIAL

## 2017-08-09 VITALS
BODY MASS INDEX: 38.6 KG/M2 | WEIGHT: 221.38 LBS | OXYGEN SATURATION: 99 % | DIASTOLIC BLOOD PRESSURE: 82 MMHG | TEMPERATURE: 98.6 F | SYSTOLIC BLOOD PRESSURE: 126 MMHG | HEART RATE: 90 BPM

## 2017-08-09 DIAGNOSIS — L85.3 XEROSIS CUTIS: ICD-10-CM

## 2017-08-09 DIAGNOSIS — Z79.4 TYPE 2 DIABETES MELLITUS WITH DIABETIC NEPHROPATHY, WITH LONG-TERM CURRENT USE OF INSULIN (H): Primary | ICD-10-CM

## 2017-08-09 DIAGNOSIS — E11.21 TYPE 2 DIABETES MELLITUS WITH DIABETIC NEPHROPATHY, WITH LONG-TERM CURRENT USE OF INSULIN (H): Primary | ICD-10-CM

## 2017-08-09 DIAGNOSIS — I10 HYPERTENSION GOAL BP (BLOOD PRESSURE) < 140/90: ICD-10-CM

## 2017-08-09 DIAGNOSIS — E21.3 HYPERPARATHYROIDISM (H): ICD-10-CM

## 2017-08-09 DIAGNOSIS — E55.9 VITAMIN D DEFICIENCY DISEASE: ICD-10-CM

## 2017-08-09 LAB
DEPRECATED CALCIDIOL+CALCIFEROL SERPL-MC: 53 UG/L (ref 20–75)
HBA1C MFR BLD: 5.8 % (ref 4.3–6)
PTH-INTACT SERPL-MCNC: 135 PG/ML (ref 12–72)

## 2017-08-09 PROCEDURE — 82306 VITAMIN D 25 HYDROXY: CPT | Performed by: FAMILY MEDICINE

## 2017-08-09 PROCEDURE — 36415 COLL VENOUS BLD VENIPUNCTURE: CPT | Performed by: FAMILY MEDICINE

## 2017-08-09 PROCEDURE — 83036 HEMOGLOBIN GLYCOSYLATED A1C: CPT | Performed by: FAMILY MEDICINE

## 2017-08-09 PROCEDURE — 99214 OFFICE O/P EST MOD 30 MIN: CPT | Performed by: FAMILY MEDICINE

## 2017-08-09 PROCEDURE — 83970 ASSAY OF PARATHORMONE: CPT | Performed by: FAMILY MEDICINE

## 2017-08-09 RX ORDER — CARVEDILOL 25 MG/1
25 TABLET ORAL 2 TIMES DAILY WITH MEALS
Qty: 60 TABLET | Refills: 1 | COMMUNITY
Start: 2017-08-09 | End: 2018-01-18

## 2017-08-09 RX ORDER — CALCITRIOL 0.5 UG/1
0.5 CAPSULE, LIQUID FILLED ORAL DAILY
Qty: 30 CAPSULE | Refills: 1 | COMMUNITY
Start: 2017-08-09 | End: 2018-01-18

## 2017-08-09 ASSESSMENT — PAIN SCALES - GENERAL: PAINLEVEL: NO PAIN (0)

## 2017-08-09 NOTE — MR AVS SNAPSHOT
After Visit Summary   8/9/2017    Ruth Rocha    MRN: [id number]           Patient Information     Date Of Birth          1940        Visit Information        Provider Department      8/9/2017 8:00 AM Raymon Hernández MD [facility]        Today's Diagnoses     Type 2 diabetes mellitus with diabetic nephropathy, with long-term current use of insulin (H)    -  1    Hyperparathyroidism (H)        Vitamin D deficiency disease          Care Instructions    Keep working on healthy diet/exercise and weight loss    We will send you lab results and send results to nephrology           Follow-ups after your visit        Additional Services     DIABETES EDUCATOR REFERRAL       DIABETES SELF MANAGEMENT TRAINING (DSMT)      Your provider has referred you to Diabetes Education: [facility]: Diabetes Education - [facility] [phone]   [email or web address]    Type of training and number of hours: Previous Diagnosis: Follow-up DSMT - 2 hours.    Medicare covers: 10 hours of initial DSMT in 12 month period from the time of first visit, plus 2 hours of follow-up DSMT annually, and additional hours as requested for insulin training.    Diabetes Type: Type 2 - On Insulin             Diabetes Co-Morbidities: none               A1C Goal:  <8.0       A1C is: Lab Results       Component                Value               Date                       A1C                      5.9                 04/14/2017              If an urgent visit is needed or A1C is above 12, Care Team to call the Diabetes Education Team at [phone] or send an In Basket message to the Diabetes Education Pool (P DIAB ED-PATIENT CARE).    Diabetes Education Topics: Comprehensive Knowledge Assessment and Instruction    Special Educational Needs Requiring Individual DSMT: None       MEDICAL NUTRITION THERAPY (MNT) for Diabetes    Medical Nutrition Therapy with a  Registered Dietitian can be provided in coordination with Diabetes Self-Management Training to assist in achieving optimal diabetes management.    MNT Type and Hours: Previous diagnosis: Annual follow-up MNT - 2 hours                       Medicare will cover: 3 hours initial MNT in 12 month period after first visit, plus 2 hours of follow-up MNT annually    Please be aware that coverage of these services is subject to the terms and limitations of your health insurance plan.  Call member services at your health plan to determine Diabetes Self-Management Training benefits and ask which blood glucose monitor brands are covered by your plan.      Please bring the following with you to your appointment:    (1)  List of current medications   (2)  List of Blood Glucose Monitor brands that are covered by your insurance plan  (3)  Blood Glucose Monitor and log book  (4)   Food records for the 3 days prior to your visit    The Certified Diabetes Educator may make diabetes medication adjustments per the CDE Protocol and Collaborative Practice Agreement.                  Your next 10 appointments already scheduled     Nov 27, 2017 10:15 AM CST   RETURN RETINA with Bryanna Jaime MD   Eye Clinic ([facility])    [address]  [address]  [place] [address]   [phone]            Jan 05, 2018  1:00 PM CST   New Visit with Abraham Byrd MD   [facility] ([facility]    [address] [address] [phone]              Who to contact     If you have questions or need follow up information about today's clinic visit or your schedule please contact [facility] directly at [phone].  Normal or non-critical lab and imaging results will be communicated to you by MyChart, letter or phone within 4 business days after the clinic has received the results. If you do not hear from us within 7 days, please  "contact the clinic through [employer] or phone. If you have a critical or abnormal lab result, we will notify you by phone as soon as possible.  Submit refill requests through [employer] or call your pharmacy and they will forward the refill request to us. Please allow 3 business days for your refill to be completed.          Additional Information About Your Visit        [employer] Information     [employer] lets you send messages to your doctor, view your test results, renew your prescriptions, schedule appointments and more. To sign up, go to [email or web address] . Click on \"Log in\" on the left side of the screen, which will take you to the Welcome page. Then click on \"Sign up Now\" on the right side of the page.     You will be asked to enter the access code listed below, as well as some personal information. Please follow the directions to create your username and password.     Your access code is: [email or web address]  Expires: 2017  8:22 AM     Your access code will  in 90 days. If you need help or a new code, please call your [facility] clinic or [phone].        Care EveryWhere ID     This is your Care EveryWhere ID. This could be used by other organizations to access your [facility] medical records  [id number]        Your Vitals Were     Pulse Temperature Pulse Oximetry Breastfeeding? BMI (Body Mass Index)       90 98.6  F (37  C) (Oral) 99% No 38.6 kg/m2        Blood Pressure from Last 3 Encounters:   17 126/82   17 132/66   07/15/16 120/61    Weight from Last 3 Encounters:   17 221 lb 6 oz (100.4 kg)   17 228 lb (103.4 kg)   17 228 lb (103.4 kg)              We Performed the Following     DIABETES EDUCATOR REFERRAL     Hemoglobin A1c     Parathyroid Hormone Intact     Vitamin D Deficiency          Today's Medication Changes          These changes are accurate as of: 17  8:22 AM.  If you have any questions, ask your nurse or doctor.               These medicines have changed " or have updated prescriptions.        Dose/Directions    carvedilol 25 MG tablet   Commonly known as:  COREG   This may have changed:  Another medication with the same name was removed. Continue taking this medication, and follow the directions you see here.   Changed by:  Raymon Hernández MD        Dose:  25 mg   Take 1 tablet (25 mg) by mouth 2 times daily (with meals)   Quantity:  60 tablet   Refills:  1                Primary Care Provider Office Phone # Fax #    Raymon Hernández [phone]       [address] [facility] [address]        Equal Access to Services     [facility]: Hadii aad ku hadasho Soomaali, waaxda luqadaha, qaybta kaalmada adeegyada, waxchloe perry . So [facility] [phone].    ATENCIÓN: Si habla español, tiene a becker disposición servicios gratuitos de asistencia lingüística. [facility] [phone].    We comply with applicable federal civil rights laws and [place] laws. We do not discriminate on the basis of race, color, national origin, age, disability sex, sexual orientation or gender identity.            Thank you!     Thank you for choosing [facility]  for your care. Our goal is always to provide you with excellent care. Hearing back from our patients is one way we can continue to improve our services. Please take a few minutes to complete the written survey that you may receive in the mail after your visit with us. Thank you!             Your Updated Medication List - Protect others around you: Learn how to safely use, store and throw away your medicines at www.disposemymeds.org.          This list is accurate as of: 8/9/17  8:22 AM.  Always use your most recent med list.                   Brand Name Dispense Instructions for use Diagnosis    * ACE/ARB NOT PRESCRIBED (INTENTIONAL)      by Other route continuous prn Reported on 5/5/2017    Type 2 diabetes, HbA1C goal < 8% (H)       aspirin 325 MG EC tablet       Take 325 mg by mouth daily.        blood glucose monitoring test strip    no brand specified    100 strip    Use to test blood sugars 3 times daily or as directed    Type 2 diabetes mellitus with diabetic nephropathy, with long-term current use of insulin (H)       calcitRIOL 0.5 MCG capsule    ROCALTROL    30 capsule    Take 1 capsule (0.5 mcg) by mouth daily        carvedilol 25 MG tablet    COREG    60 tablet    Take 1 tablet (25 mg) by mouth 2 times daily (with meals)        dorzolamide-timolol 2-0.5 % ophthalmic solution    COSOPT    10 mL    Place 1 drop into the right eye 2 times daily    Primary open angle glaucoma of both eyes, mild stage       ferrous sulfate 325 (65 FE) MG tablet    IRON    180 tablet    Take 1 tablet (325 mg) by mouth 2 times daily    Anemia, unspecified type       fluticasone 50 MCG/ACT spray    FLONASE    16 mL    SPRAY 1-2 SPRAYS INTO BOTH NOSTRILS DAILY    Nasal congestion       hydrochlorothiazide 25 MG tablet    HYDRODIURIL    90 tablet    Take 1 tablet (25 mg) by mouth daily    Hypertension goal BP (blood pressure) < 140/90       hydrocortisone 1 % cream    CORTAID    60 g    Apply sparingly to affected area three times daily as needed to affected areas    Dermatitis       insulin glargine 100 UNIT/ML injection    LANTUS SOLOSTAR    15 mL    30 units at bedtime or as directed    Type 2 diabetes mellitus with diabetic nephropathy, with long-term current use of insulin (H)       insulin pen needle 31G X 8 MM     100 each    Use as directed with Lantus; patient often needs 2 shots daily, so  Please provide enough needles for bid dosing    Diabetes mellitus with background retinopathy (H)       latanoprost 0.005 % ophthalmic solution    XALATAN    1 Bottle    Place 1 drop into both eyes At Bedtime    Primary open angle glaucoma of both eyes, mild stage       metoclopramide 10 MG tablet    REGLAN    360 tablet    Take 1 tablet by mouth. Take 1 tablet by mouth 4 times a day before meals  and nightly as needed    Dyspepsia       MICROLET LANCETS Misc     100 each    1 Device 3 times daily.    Type 2 diabetes mellitus with diabetic nephropathy, with long-term current use of insulin (H)       MULTIVITAMIN TABS   OR      1 TABLET DAILY        omeprazole 40 MG capsule    priLOSEC    90 capsule    Take 1 capsule (40 mg) by mouth daily    Gastroesophageal reflux disease, esophagitis presence not specified       * order for DME      Equipment being ordered: CPAP        order for DME     1 each    Equipment being ordered: CPAP tubing, mask, and all needed equipment    FLOR (obstructive sleep apnea)       oxybutynin 5 MG tablet    DITROPAN    270 tablet    TAKE 1 TABLET (5 MG) BY MOUTH 3 TIMES DAILY AS NEEDED    Overactive bladder       potassium chloride 10 MEQ tablet    K-TAB,KLOR-CON    90 tablet    Take 1 tablet (10 mEq) by mouth daily    Hypokalemia       * senna-docusate 8.6-50 MG per tablet    SENNA S    60 tablet    Take 1-4 tablets by mouth 2 times daily as needed constipation    Chronic constipation       * SENEXON-S 8.6-50 MG per tablet   Generic drug:  senna-docusate     60 tablet    TAKE 1-4 TABLETS BY MOUTH 2 TIMES DAILY AS NEEDED CONSTIPATION    Chronic constipation       simvastatin 10 MG tablet    ZOCOR    90 tablet    Take 1 tablet (10 mg) by mouth At Bedtime    Hyperlipidemia LDL goal <100       vitamin D 1000 UNITS capsule      Take 1 capsule by mouth daily.        * Notice:  This list has 4 medication(s) that are the same as other medications prescribed for you. Read the directions carefully, and ask your doctor or other care provider to review them with you.

## 2017-08-09 NOTE — LETTER
[facility]  [address] [place]  [address]  [phone]        August 11, 2017    Ruth Rocha  [address]  [address]  [place] [address]        Dear Ruth,    The parathyroid hormone level is still high, but better than previously.  Stay on the calcitriol.  We will fax results to nephrology.     Diabetes test ( hemoglobin a1c ) is excellent.  Vitamin D normal.     Results for orders placed or performed in visit on 08/09/17   Parathyroid Hormone Intact   Result Value Ref Range    Parathyroid Hormone Intact 135 (H) 12 - 72 pg/mL   Vitamin D Deficiency   Result Value Ref Range    Vitamin D Deficiency screening 53 20 - 75 ug/L   Hemoglobin A1c   Result Value Ref Range    Hemoglobin A1C 5.8 4.3 - 6.0 %       If you have any questions please call the clinic at [phone].    Sincerely,    Raymon PETTITL

## 2017-08-09 NOTE — NURSING NOTE
"Chief Complaint   Patient presents with     Dizziness     Health Maintenance       Initial Pulse 90  Temp 98.6  F (37  C) (Oral)  Wt 221 lb 6 oz (100.4 kg)  SpO2 99%  Breastfeeding? No  BMI 38.6 kg/m2 Estimated body mass index is 38.6 kg/(m^2) as calculated from the following:    Height as of 4/14/17: 5' 3.5\" (1.613 m).    Weight as of this encounter: 221 lb 6 oz (100.4 kg).  Medication Reconciliation: complete   Bonny Mejia CMA      "

## 2017-08-09 NOTE — PATIENT INSTRUCTIONS
Keep working on healthy diet/exercise and weight loss    We will send you lab results and send results to nephrology

## 2017-08-09 NOTE — PROGRESS NOTES
SUBJECTIVE:                                                    Ruth Rocha is a 77 year old female who presents to clinic today for the following health issues:       Hypertension Follow-up      Outpatient blood pressures are being checked at store.  Results are patient is unsure what the numbers are.  Close to normal?    Low Salt Diet: no added salt        Amount of exercise or physical activity: walks some    Problems taking medications regularly: No    Medication side effects: none  Diet: low salt and low fat/cholesterol      Dizziness off and on    Problem list and histories reviewed & adjusted, as indicated.  Additional history: as documented         Reviewed and updated as needed this visit by clinical staff     Reviewed and updated as needed this visit by Provider          sometimes gets shaky; checks sugars some      Lately sugars good 120ish.    Some in 200s    Max 303 after peanut butter parfait    Lowest 79, about a week ago    Some walking around the yard            Physical Exam   Constitutional: She is oriented to person, place, and time and well-developed, well-nourished, and in no distress. No distress.   HENT:   Head: Normocephalic and atraumatic.   Neck: Carotid bruit is not present.   Cardiovascular: Normal rate, regular rhythm, normal heart sounds and intact distal pulses.  Exam reveals no gallop and no friction rub.    No murmur heard.  Pulmonary/Chest: Effort normal and breath sounds normal.   Musculoskeletal: She exhibits edema (slight).   Neurological: She is alert and oriented to person, place, and time.   Skin: Skin is dry. She is not diaphoretic.   Psychiatric: Mood and affect normal.       ASSESSMENT / PLAN:  (E11.21,  Z79.4) Type 2 diabetes mellitus with diabetic nephropathy, with long-term current use of insulin (H)  (primary encounter diagnosis)  Comment: last time hemoglobin a1c 5.9; recheck today  Plan: DIABETES EDUCATOR REFERRAL, Hemoglobin A1c             (E21.3)  Hyperparathyroidism (H)  Comment: patient on calcitriol from nephrology.  They wanted the pth rechecked in 3 months.  Has been a little over 2 so patient and I thought prudent to just check while she was here  Plan: Parathyroid Hormone Intact             (E55.9) Vitamin D deficiency disease  Comment: patient on supplement; she could not remember the dose  Plan: Vitamin D Deficiency             (L85.3) Xerosis cutis  Comment: discussed  Plan: increase lotion usage    (I10) Hypertension goal BP (blood pressure) < 140/90  Comment: blood pressure okay here, tolerating the higher dose of carvedilol okay   Plan: no change          I reviewed the patient's medications, allergies, medical history, family history, and social history.    Raymon Hernández MD

## 2017-08-10 NOTE — PROGRESS NOTES
The parathyroid hormone level is still high, but better than previously.  Stay on the calcitriol.  We will fax results to nephrology.    Diabetes test ( hemoglobin a1c ) is excellent.  Vitamin D normal.    Raymon Gamezhanie- please fax results to [facility]  Thanks  Raymon Hernández MD

## 2017-09-10 ENCOUNTER — TRANSFERRED RECORDS (OUTPATIENT)
Dept: HEALTH INFORMATION MANAGEMENT | Facility: CLINIC | Age: 77
End: 2017-09-10

## 2017-09-27 ENCOUNTER — RECORDS - HEALTHEAST (OUTPATIENT)
Dept: LAB | Facility: CLINIC | Age: 77
End: 2017-09-27

## 2017-09-28 LAB — HBA1C MFR BLD: 6.3 % (ref 4.2–6.1)

## 2017-11-02 ENCOUNTER — TRANSFERRED RECORDS (OUTPATIENT)
Dept: HEALTH INFORMATION MANAGEMENT | Facility: CLINIC | Age: 77
End: 2017-11-02

## 2017-11-13 ENCOUNTER — TELEPHONE (OUTPATIENT)
Dept: FAMILY MEDICINE | Facility: CLINIC | Age: 77
End: 2017-11-13

## 2017-11-13 NOTE — TELEPHONE ENCOUNTER
Reason for Call:  Other call back    Detailed comments: Patient is calling to schedule lab work from the orders that her kidney specialist is faxing over. Please call patient to schedule labs once the orders are received.    Phone Number Patient can be reached at: Home number on file [phone] (home)    Best Time: anytime    Can we leave a detailed message on this number? YES    Call taken on 11/13/2017 at 3:43 PM by Ian Rodriguez

## 2017-11-15 DIAGNOSIS — E11.9 DIABETES MELLITUS (H): ICD-10-CM

## 2017-11-15 DIAGNOSIS — E83.52 HYPERCALCEMIA: ICD-10-CM

## 2017-11-15 DIAGNOSIS — D64.9 ABSOLUTE ANEMIA: ICD-10-CM

## 2017-11-15 DIAGNOSIS — I13.10 BENIGN HYPERTENSIVE HEART AND RENAL DISEASE: ICD-10-CM

## 2017-11-15 DIAGNOSIS — I95.1 ORTHOSTATIC HYPOTENSION: ICD-10-CM

## 2017-11-15 DIAGNOSIS — N17.9 ACUTE RENAL FAILURE SYNDROME (H): ICD-10-CM

## 2017-11-15 DIAGNOSIS — E87.20 ACIDOSIS: ICD-10-CM

## 2017-11-15 DIAGNOSIS — E55.9 AVITAMINOSIS D: ICD-10-CM

## 2017-11-15 DIAGNOSIS — E21.3 HYPERPARATHYROIDISM (H): ICD-10-CM

## 2017-11-15 DIAGNOSIS — I10 ESSENTIAL HYPERTENSION, MALIGNANT: ICD-10-CM

## 2017-11-15 DIAGNOSIS — N18.30 CHRONIC KIDNEY DISEASE, STAGE III (MODERATE) (H): ICD-10-CM

## 2017-11-15 DIAGNOSIS — E11.21 DIABETIC GLOMERULOPATHY (H): Primary | ICD-10-CM

## 2017-11-15 DIAGNOSIS — E87.5 HYPERPOTASSEMIA: ICD-10-CM

## 2017-11-15 DIAGNOSIS — R80.9 PROTEINURIA: ICD-10-CM

## 2017-11-15 DIAGNOSIS — E66.9 LIFELONG OBESITY: ICD-10-CM

## 2017-11-15 NOTE — TELEPHONE ENCOUNTER
Spoke with Chago in lab, confirmed receipt of orders.  Left detailed message, informing patient to call back to schedule lab appointment.

## 2017-11-17 ENCOUNTER — TELEPHONE (OUTPATIENT)
Dept: FAMILY MEDICINE | Facility: CLINIC | Age: 77
End: 2017-11-17

## 2017-11-17 DIAGNOSIS — E83.52 HYPERCALCEMIA: ICD-10-CM

## 2017-11-17 DIAGNOSIS — E11.9 DIABETES MELLITUS (H): ICD-10-CM

## 2017-11-17 DIAGNOSIS — E11.21 DIABETIC GLOMERULOPATHY (H): ICD-10-CM

## 2017-11-17 DIAGNOSIS — I95.1 ORTHOSTATIC HYPOTENSION: ICD-10-CM

## 2017-11-17 DIAGNOSIS — N18.30 CHRONIC KIDNEY DISEASE, STAGE III (MODERATE) (H): ICD-10-CM

## 2017-11-17 DIAGNOSIS — I10 ESSENTIAL HYPERTENSION, MALIGNANT: ICD-10-CM

## 2017-11-17 DIAGNOSIS — E87.20 ACIDOSIS: ICD-10-CM

## 2017-11-17 DIAGNOSIS — R80.9 PROTEINURIA: ICD-10-CM

## 2017-11-17 DIAGNOSIS — I13.10 BENIGN HYPERTENSIVE HEART AND RENAL DISEASE: ICD-10-CM

## 2017-11-17 DIAGNOSIS — D64.9 ABSOLUTE ANEMIA: ICD-10-CM

## 2017-11-17 DIAGNOSIS — E55.9 AVITAMINOSIS D: ICD-10-CM

## 2017-11-17 DIAGNOSIS — E66.9 LIFELONG OBESITY: ICD-10-CM

## 2017-11-17 DIAGNOSIS — E21.3 HYPERPARATHYROIDISM (H): ICD-10-CM

## 2017-11-17 DIAGNOSIS — E87.5 HYPERPOTASSEMIA: ICD-10-CM

## 2017-11-17 DIAGNOSIS — N17.9 ACUTE RENAL FAILURE SYNDROME (H): ICD-10-CM

## 2017-11-17 LAB
ALBUMIN SERPL-MCNC: 3.3 G/DL (ref 3.4–5)
ANION GAP SERPL CALCULATED.3IONS-SCNC: 13 MMOL/L (ref 3–14)
BUN SERPL-MCNC: 50 MG/DL (ref 7–30)
CALCIUM SERPL-MCNC: 9.7 MG/DL (ref 8.5–10.1)
CHLORIDE SERPL-SCNC: 103 MMOL/L (ref 94–109)
CO2 SERPL-SCNC: 22 MMOL/L (ref 20–32)
CREAT SERPL-MCNC: 1.89 MG/DL (ref 0.52–1.04)
GFR SERPL CREATININE-BSD FRML MDRD: 26 ML/MIN/1.7M2
GLUCOSE SERPL-MCNC: 36 MG/DL (ref 70–99)
PHOSPHATE SERPL-MCNC: 4.7 MG/DL (ref 2.5–4.5)
POTASSIUM SERPL-SCNC: 4.6 MMOL/L (ref 3.4–5.3)
PTH-INTACT SERPL-MCNC: 28 PG/ML (ref 12–72)
SODIUM SERPL-SCNC: 138 MMOL/L (ref 133–144)

## 2017-11-17 PROCEDURE — 83970 ASSAY OF PARATHORMONE: CPT | Performed by: INTERNAL MEDICINE

## 2017-11-17 PROCEDURE — 36415 COLL VENOUS BLD VENIPUNCTURE: CPT | Performed by: INTERNAL MEDICINE

## 2017-11-17 PROCEDURE — 80069 RENAL FUNCTION PANEL: CPT | Performed by: INTERNAL MEDICINE

## 2017-11-17 PROCEDURE — 82306 VITAMIN D 25 HYDROXY: CPT | Performed by: INTERNAL MEDICINE

## 2017-11-18 NOTE — TELEPHONE ENCOUNTER
Call from lab for critical glucose from this morning--36 mg%.    Call placed to the patient's home--appears that this patient is in a rehab facility and was noted to have hypoglycemia after returning to the facility. Appears that the hypoglycemia was addressed and apparently resolved throughout the day with oral intake.    Follow up due on 11/20/2017 according to the schedule.    No further action taken tonight.    Jimmie Yee MD

## 2017-11-20 ENCOUNTER — OFFICE VISIT (OUTPATIENT)
Dept: FAMILY MEDICINE | Facility: CLINIC | Age: 77
End: 2017-11-20
Payer: COMMERCIAL

## 2017-11-20 VITALS
DIASTOLIC BLOOD PRESSURE: 71 MMHG | SYSTOLIC BLOOD PRESSURE: 134 MMHG | WEIGHT: 242 LBS | OXYGEN SATURATION: 99 % | TEMPERATURE: 97.2 F | BODY MASS INDEX: 42.2 KG/M2 | HEART RATE: 74 BPM

## 2017-11-20 DIAGNOSIS — I50.33 ACUTE ON CHRONIC DIASTOLIC CONGESTIVE HEART FAILURE (H): Primary | ICD-10-CM

## 2017-11-20 LAB — DEPRECATED CALCIDIOL+CALCIFEROL SERPL-MC: 45 UG/L (ref 20–75)

## 2017-11-20 PROCEDURE — 99213 OFFICE O/P EST LOW 20 MIN: CPT | Performed by: FAMILY MEDICINE

## 2017-11-20 RX ORDER — FUROSEMIDE 20 MG
20 TABLET ORAL 2 TIMES DAILY
Qty: 10 TABLET | Refills: 0 | Status: SHIPPED | OUTPATIENT
Start: 2017-11-20 | End: 2017-12-04

## 2017-11-20 RX ORDER — FUROSEMIDE 40 MG
40 TABLET ORAL 2 TIMES DAILY
Qty: 10 TABLET | Refills: 0 | Status: SHIPPED | OUTPATIENT
Start: 2017-11-20 | End: 2017-11-20 | Stop reason: DRUGHIGH

## 2017-11-20 ASSESSMENT — PAIN SCALES - GENERAL: PAINLEVEL: NO PAIN (0)

## 2017-11-20 NOTE — PROGRESS NOTES
SUBJECTIVE:   Ruth Rocha is a 77 year old female who presents to clinic today for the following health issues:    Swelling of the legs      Duration: Ongoing for years, worse over the past few days    Description (location/character/radiation): left and right leg    Intensity:  moderate    Accompanying signs and symptoms: SOB    History (similar episodes/previous evaluation): Ongoing    Precipitating or alleviating factors: None    Therapies tried and outcome: Lasix, Roland stockings     Presents to discuss acute on chronic LE edema, hx of diastolic HF with preserved EF. Also has hx of CKD.     Currently in rehab unit after L ankle injury.     Problem list and histories reviewed & adjusted, as indicated.  Additional history: as documented    Patient Active Problem List   Diagnosis     Overactive bladder     GERD (gastroesophageal reflux disease)     HYPERLIPIDEMIA LDL GOAL <100     Type 2 diabetes, HbA1C goal < 8% (H)     Diabetes mellitus with background retinopathy (H)     Diabetic macular edema, hx focal laser ou     Advanced directives, counseling/discussion     Hypertension goal BP (blood pressure) < 140/90     CHF (congestive heart failure) (H)     Pseudophakia, Yag Caps, ou      CKD (chronic kidney disease) stage 3, GFR 30-59 ml/min     Hx of keratopathy - hx of PTK, ou (MP)     Hx of corneal epithelial defect, left eye     Gout     Type 2 diabetes mellitus with diabetic nephropathy (H)     Obesity, unspecified obesity severity, unspecified obesity type     FLOR (obstructive sleep apnea)     Primary open angle glaucoma of both eyes, mild stage     Posterior vitreous detachment, bilateral     History of phototherapeutic keratectomy, os     Type 2 diabetes mellitus with diabetic nephropathy, with long-term current use of insulin (H)     Past Surgical History:   Procedure Laterality Date     CATARACT IOL, RT/LT       focal laser for diabetic macular edema      both eyes     HC REMOVAL GALLBLADDER  10-5-3      LASER SELECTIVE TRABECULOPLASTY  7/2003    right eye, 360     ORTHOPEDIC SURGERY      left ankle surgery     PHACOEMULSIFICATION WITH STANDARD INTRAOCULAR LENS IMPLANT  11/1996; 3/1999    right eye; left eye     PHOTOTHERAPEUTIC KERACTECTOMY Right 01/18/2017     PHOTOTHERAPEUTIC KERACTECTOMY Left 08/2015     TONSILLECTOMY  1948       Social History   Substance Use Topics     Smoking status: Never Smoker     Smokeless tobacco: Never Used     Alcohol use No     Family History   Problem Relation Age of Onset     C.A.D. Mother      Glaucoma Mother      Hypertension Mother      C.A.D. Father      Glaucoma Father      Hypertension Father      DIABETES No family hx of      except dad's sister     CANCER No family hx of      Macular Degeneration No family hx of      Amblyopia No family hx of      Retinal detachment No family hx of              Reviewed and updated as needed this visit by clinical staffTobacco  Allergies  Meds  Med Hx  Surg Hx  Fam Hx  Soc Hx      Reviewed and updated as needed this visit by Provider         ROS:  Constitutional, HEENT, cardiovascular, pulmonary, gi and gu systems are negative, except as otherwise noted.      OBJECTIVE:   /71 (BP Location: Left arm, Patient Position: Chair, Cuff Size: Adult Large)  Pulse 74  Temp 97.2  F (36.2  C) (Oral)  Wt 242 lb (109.8 kg)  SpO2 99%  BMI 42.2 kg/m2  Body mass index is 42.2 kg/(m^2).  GENERAL: alert, no distress, obese and elderly  RESP: no rales , no rhonchi, no wheezes and prolonged expiratory phase  CV: regular rates and rhythm, no murmur, click or rub, 2+ bilateral lower extremity pitting edema to knees, no bruits heard in carotids and no JVD  SKIN: no suspicious lesions or rashes  PSYCH: mentation appears normal, affect normal/bright    Diagnostic Test Results:  none     ASSESSMENT/PLAN:       ICD-10-CM    1. Acute on chronic diastolic congestive heart failure (H) I50.33 furosemide (LASIX) 20 MG tablet     DISCONTINUED: furosemide  (LASIX) 40 MG tablet     Has LE edema but no other signs of fluid overload.   Will do 5 day course of furosemide 20mg BID and then recheck - careful with diuresis due to kidney disease.   Fluid restriction and daily weights, low salt as well.  ED precautions reviewed.     See Patient Instructions    Lauren A. Engelmann, MD  [facility]

## 2017-11-20 NOTE — MR AVS SNAPSHOT
After Visit Summary   11/20/2017    Ruth Rocha    MRN: [id number]           Patient Information     Date Of Birth          1940        Visit Information        Provider Department      11/20/2017 2:40 PM Engelmann, Lauren Anneliese, MD [facility]        Today's Diagnoses     Acute on chronic diastolic congestive heart failure (H)    -  1    Type 2 diabetes mellitus with diabetic nephropathy, with long-term current use of insulin (H)          Care Instructions    I will see you again on Friday.   1.5 liter fluid restriction and 1g salt per day.  Daily weights for 5 days.     Call if you aren't feeling better.           Follow-ups after your visit        Your next 10 appointments already scheduled     Nov 27, 2017 10:15 AM CST   RETURN RETINA with Bryanna Jaime MD   Eye Clinic ([facility])    [address]  [address]  [address] [address]  [facility] [address]   [phone]            Jan 05, 2018  1:00 PM CST   New Visit with Abraham Byrd MD   [facility] ([address] [id number] [phone]              Who to contact     If you have questions or need follow up information about today's clinic visit or your schedule please contact [facility] directly at [phone].  Normal or non-critical lab and imaging results will be communicated to you by MyChart, letter or phone within 4 business days after the clinic has received the results. If you do not hear from us within 7 days, please contact the clinic through MyChart or phone. If you have a critical or abnormal lab result, we will notify you by phone as soon as possible.  Submit refill requests through [employer] or call your pharmacy and they will forward the refill request to us. Please allow 3 business days for your refill to be completed.          Additional Information About Your Visit       "  MyChart Information     [employer] lets you send messages to your doctor, view your test results, renew your prescriptions, schedule appointments and more. To sign up, go to [email or web address] . Click on \"Log in\" on the left side of the screen, which will take you to the Welcome page. Then click on \"Sign up Now\" on the right side of the page.     You will be asked to enter the access code listed below, as well as some personal information. Please follow the directions to create your username and password.     Your access code is: [email or web address]  Expires: 2018  6:31 AM     Your access code will  in 90 days. If you need help or a new code, please call your [facility] clinic or [phone].        Care EveryWhere ID     This is your Care EveryWhere ID. This could be used by other organizations to access your [facility] medical records  [id number]        Your Vitals Were     Pulse Temperature Pulse Oximetry BMI (Body Mass Index)          74 97.2  F (36.2  C) (Oral) 99% 42.2 kg/m2         Blood Pressure from Last 3 Encounters:   17 134/71   17 126/82   17 132/66    Weight from Last 3 Encounters:   17 242 lb (109.8 kg)   17 221 lb 6 oz (100.4 kg)   17 228 lb (103.4 kg)              Today, you had the following     No orders found for display       Primary Care Provider Office Phone # Fax #    Raymon Hernández [phone]       [address] [facility] [address]        Equal Access to Services     [facility] : Hadii jennie Schulz, osvaldo santana, qabernabe trimble. So [facility] [phone].    ATENCIÓN: Si habla español, tiene a becker disposición servicios gratuitos de asistencia lingüística. Fallon al [phone].    We comply with applicable federal civil rights laws and [place] laws. We do not discriminate on the basis of race, color, national origin, age, disability, sex, sexual " orientation, or gender identity.            Thank you!     Thank you for choosing [facility]  for your care. Our goal is always to provide you with excellent care. Hearing back from our patients is one way we can continue to improve our services. Please take a few minutes to complete the written survey that you may receive in the mail after your visit with us. Thank you!             Your Updated Medication List - Protect others around you: Learn how to safely use, store and throw away your medicines at www.disposemymeds.org.          This list is accurate as of: 11/20/17  3:20 PM.  Always use your most recent med list.                   Brand Name Dispense Instructions for use Diagnosis    * ACE/ARB/ARNI NOT PRESCRIBED (INTENTIONAL)      by Other route continuous prn Reported on 5/5/2017    Type 2 diabetes, HbA1C goal < 8% (H)       ACETAMINOPHEN PO      Take 500 mg by mouth        aspirin 325 MG EC tablet      Take 325 mg by mouth daily.        blood glucose monitoring test strip    no brand specified    100 strip    Use to test blood sugars 3 times daily or as directed    Type 2 diabetes mellitus with diabetic nephropathy, with long-term current use of insulin (H)       calcitRIOL 0.5 MCG capsule    ROCALTROL    30 capsule    Take 1 capsule (0.5 mcg) by mouth daily        carvedilol 25 MG tablet    COREG    60 tablet    Take 1 tablet (25 mg) by mouth 2 times daily (with meals)        dorzolamide-timolol 2-0.5 % ophthalmic solution    COSOPT    10 mL    Place 1 drop into the right eye 2 times daily    Primary open angle glaucoma of both eyes, mild stage       ferrous sulfate 325 (65 FE) MG tablet    IRON    180 tablet    Take 1 tablet (325 mg) by mouth 2 times daily    Anemia, unspecified type       fluticasone 50 MCG/ACT spray    FLONASE    16 mL    SPRAY 1-2 SPRAYS INTO BOTH NOSTRILS DAILY    Nasal congestion       hydrochlorothiazide 25 MG tablet    HYDRODIURIL    90 tablet    Take 1  tablet (25 mg) by mouth daily    Hypertension goal BP (blood pressure) < 140/90       hydrocortisone 1 % cream    CORTAID    60 g    Apply sparingly to affected area three times daily as needed to affected areas    Dermatitis       insulin aspart 100 UNIT/ML injection    NovoLOG PEN          insulin glargine 100 UNIT/ML injection    LANTUS SOLOSTAR    15 mL    30 units at bedtime or as directed    Type 2 diabetes mellitus with diabetic nephropathy, with long-term current use of insulin (H)       insulin pen needle 31G X 8 MM     100 each    Use as directed with Lantus; patient often needs 2 shots daily, so  Please provide enough needles for bid dosing    Diabetes mellitus with background retinopathy (H)       latanoprost 0.005 % ophthalmic solution    XALATAN    1 Bottle    Place 1 drop into both eyes At Bedtime    Primary open angle glaucoma of both eyes, mild stage       metoclopramide 10 MG tablet    REGLAN    360 tablet    Take 1 tablet by mouth. Take 1 tablet by mouth 4 times a day before meals and nightly as needed    Dyspepsia       MICROLET LANCETS Misc     100 each    1 Device 3 times daily.    Type 2 diabetes mellitus with diabetic nephropathy, with long-term current use of insulin (H)       MULTIVITAMIN TABS   OR      1 TABLET DAILY        omeprazole 40 MG capsule    priLOSEC    90 capsule    Take 1 capsule (40 mg) by mouth daily    Gastroesophageal reflux disease, esophagitis presence not specified       * order for DME      Equipment being ordered: CPAP        order for DME     1 each    Equipment being ordered: CPAP tubing, mask, and all needed equipment    FLOR (obstructive sleep apnea)       oxybutynin 5 MG tablet    DITROPAN    270 tablet    TAKE 1 TABLET (5 MG) BY MOUTH 3 TIMES DAILY AS NEEDED    Overactive bladder       potassium chloride 10 MEQ tablet    K-TAB,KLOR-CON    90 tablet    Take 1 tablet (10 mEq) by mouth daily    Hypokalemia       * senna-docusate 8.6-50 MG per tablet    SENNA S    60  tablet    Take 1-4 tablets by mouth 2 times daily as needed constipation    Chronic constipation       * SENEXON-S 8.6-50 MG per tablet   Generic drug:  senna-docusate     60 tablet    TAKE 1-4 TABLETS BY MOUTH 2 TIMES DAILY AS NEEDED CONSTIPATION    Chronic constipation       simvastatin 10 MG tablet    ZOCOR    90 tablet    Take 1 tablet (10 mg) by mouth At Bedtime    Hyperlipidemia LDL goal <100       vitamin D 1000 UNITS capsule      Take 1 capsule by mouth daily.        * Notice:  This list has 4 medication(s) that are the same as other medications prescribed for you. Read the directions carefully, and ask your doctor or other care provider to review them with you.

## 2017-11-20 NOTE — NURSING NOTE
"Chief Complaint   Patient presents with     Leg Swelling     Shortness of Breath       Initial /71 (BP Location: Left arm, Patient Position: Chair, Cuff Size: Adult Large)  Pulse 74  Temp 97.2  F (36.2  C) (Oral)  Wt 242 lb (109.8 kg)  SpO2 99%  BMI 42.2 kg/m2 Estimated body mass index is 42.2 kg/(m^2) as calculated from the following:    Height as of 4/14/17: 5' 3.5\" (1.613 m).    Weight as of this encounter: 242 lb (109.8 kg).  Medication Reconciliation: complete    Eri Paz MA    "

## 2017-11-20 NOTE — PATIENT INSTRUCTIONS
I will see you again on Friday.   1.5 liter fluid restriction and 1g salt per day.  Daily weights for 5 days.     Call if you aren't feeling better.

## 2017-11-27 ENCOUNTER — OFFICE VISIT (OUTPATIENT)
Dept: OPHTHALMOLOGY | Facility: CLINIC | Age: 77
End: 2017-11-27
Attending: OPHTHALMOLOGY
Payer: COMMERCIAL

## 2017-11-27 ENCOUNTER — OFFICE VISIT (OUTPATIENT)
Dept: FAMILY MEDICINE | Facility: CLINIC | Age: 77
End: 2017-11-27
Payer: COMMERCIAL

## 2017-11-27 VITALS
SYSTOLIC BLOOD PRESSURE: 124 MMHG | HEART RATE: 71 BPM | TEMPERATURE: 97.7 F | BODY MASS INDEX: 40.98 KG/M2 | DIASTOLIC BLOOD PRESSURE: 73 MMHG | WEIGHT: 235 LBS | OXYGEN SATURATION: 99 %

## 2017-11-27 DIAGNOSIS — R22.40 LOCALIZED SWELLING OF LOWER LEG: ICD-10-CM

## 2017-11-27 DIAGNOSIS — E11.311: ICD-10-CM

## 2017-11-27 DIAGNOSIS — L03.032 CELLULITIS OF TOE OF LEFT FOOT: ICD-10-CM

## 2017-11-27 DIAGNOSIS — J06.9 VIRAL URI WITH COUGH: Primary | ICD-10-CM

## 2017-11-27 DIAGNOSIS — I50.9 CONGESTIVE HEART FAILURE, UNSPECIFIED CONGESTIVE HEART FAILURE CHRONICITY, UNSPECIFIED CONGESTIVE HEART FAILURE TYPE: ICD-10-CM

## 2017-11-27 PROCEDURE — 92134 CPTRZ OPH DX IMG PST SGM RTA: CPT | Mod: ZF | Performed by: OPHTHALMOLOGY

## 2017-11-27 PROCEDURE — 99214 OFFICE O/P EST MOD 30 MIN: CPT | Performed by: FAMILY MEDICINE

## 2017-11-27 RX ORDER — CEPHALEXIN 500 MG/1
500 CAPSULE ORAL 2 TIMES DAILY
Qty: 14 CAPSULE | Refills: 0 | Status: SHIPPED | OUTPATIENT
Start: 2017-11-27 | End: 2017-12-04

## 2017-11-27 ASSESSMENT — VISUAL ACUITY
METHOD: SNELLEN - LINEAR
OS_SC: 20/200
OD_SC: 20/100

## 2017-11-27 ASSESSMENT — CONF VISUAL FIELD
OD_NORMAL: 1
METHOD: COUNTING FINGERS
OS_NORMAL: 1

## 2017-11-27 ASSESSMENT — TONOMETRY
OS_IOP_MMHG: 14
IOP_METHOD: ICARE
OD_IOP_MMHG: 16

## 2017-11-27 ASSESSMENT — CUP TO DISC RATIO
OD_RATIO: 0.7
OS_RATIO: 0.6

## 2017-11-27 ASSESSMENT — EXTERNAL EXAM - RIGHT EYE: OD_EXAM: 2+ BROW PTOSIS

## 2017-11-27 ASSESSMENT — EXTERNAL EXAM - LEFT EYE: OS_EXAM: 2+ BROW PTOSIS

## 2017-11-27 NOTE — NURSING NOTE
"Chief Complaint   Patient presents with     RECHECK     L leg swelling,feeling better      URI     cough ,sneezing        Initial /73 (BP Location: Right arm, Patient Position: Sitting, Cuff Size: Adult Large)  Pulse 71  Temp 97.7  F (36.5  C) (Oral)  Wt 235 lb (106.6 kg)  SpO2 99%  BMI 40.98 kg/m2 Estimated body mass index is 40.98 kg/(m^2) as calculated from the following:    Height as of 4/14/17: 5' 3.5\" (1.613 m).    Weight as of this encounter: 235 lb (106.6 kg).  Medication Reconciliation: complete  Dian Green MA    "

## 2017-11-27 NOTE — PROGRESS NOTES
CC -   NPDR and DME    INTERVAL HISTORY: [facility] stable, fell and broke ankle since [facility] now in nursing home. . Last A1c 5.9% April 2017.    HPI -   Ruth HANSA Rocha is a  77 year old year-old patient presenting for NPDR and DME OS > OD.  H/o K-scars and poor vision OS Sees Jordan normally for glaucoma (last visit 1/2/17).    VA OS poor longstanding    PAST OCULAR SURGERY  CE/IOL OU  FML OU  Therapeutic PTK OS 8/2015    RETINAL IMAGING:  OCT   11-27-17   right eye- mod focal atrophy from FML scars, no CNVM, tr ERM, ?trace  IRF, stable  left eye -  Mod focal atrophy from FML scars, no CNVM, 1+  paracentral DME, CST -248 -> 244 -> 220, stable    FA 6-14-16  Right eye - blurry image, 2-3+ WD from FML scars, 1+ increased NURA, 1 peripheral ischemia, no NV  Left eye - (transit) 2+ increased NURA, large WD in macula from FML scars, 1+ MAs/peripheral atrophy, no NV      ASSESSMENT & PLAN  1.  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 DME OS   - s/p FML   - paracentral, mild   - VA varied 20/125-20/500, typically 20/400   - has moderate macular ischemia     - doubt vis sig given extensive FML scars and K-scarring   - doubt benefit to Tx   - has improved compared to past   - observe for now         3. DME OD   - very mild   - vision limited by cornea mostly         4.  PVD OU    5.  K- scarring OU   -s/p PTK OS   - sees Marcia   - most likely cause of vision subnormal OD       6.  OAG OU   - on cosopt OD and xalatan OU   - sees Agusto - last visit 1/2/17- stable    RTC 6 months, OCT OU, sooner if new vision changes    ATTESTATION     Attending Physician Attestation:      Complete documentation of historical and exam elements from today's encounter can be found in the full encounter summary report (not reduplicated in this progress note).  I personally obtained the chief complaint(s) and history of present illness.  I confirmed and edited as necessary the review of systems, past medical/surgical history,  family history, social history, and examination findings as documented by others; and I examined the patient myself.  I personally reviewed the relevant tests, images, and reports as documented above.  I formulated and edited as necessary the assessment and plan and discussed the findings and management plan with the patient and family    Bryanna Jaime MD, PhD  , Vitreoretinal Surgery  Department of Ophthalmology  [facility]

## 2017-11-27 NOTE — PROGRESS NOTES
SUBJECTIVE:   Ruth Rocha is a 77 year old female who presents to clinic today for the following health issues:    ENT Symptoms             Symptoms: cc Present Absent Comment   Fever/Chills   x    Fatigue  x     Muscle Aches  x     Eye Irritation  x     Sneezing  x     Nasal Shai/Drg  x     Sinus Pressure/Pain   x    Loss of smell   x    Dental pain   x    Sore Throat   x    Swollen Glands   x    Ear Pain/Fullness   x    Cough  x     Wheeze  x     Chest Pain   x    Shortness of breath  x  Sometimes    Rash       Other         Symptom duration:  4 days    Symptom severity:  moderate   Treatments tried:  cough syrup    Contacts:  none      # started coughing, sneezing, runny nose started on Friday- 3 days ago. She took allergy pill for couple of times and that did not help.   Little bit wheezing. No fever.     # L leg swelling follow up-feels better.   She has bad swelling on her left lower leg for last 2 weeks. She was on Lasix for 5 days. Took her last pill last Friday. Her leg swelling is better today. She wears TEDS socks, knee length.   She sees Dr. Hernández usually. She does not see Cardiologist for her chronic heart failure ( today she says, they were not sure if it was heart failure).   No pain in her leg.    Her great toe nail fell off 2 days ago. No pain. Area looks red around it.       Problem list and histories reviewed & adjusted, as indicated.  Additional history: as documented    Patient Active Problem List   Diagnosis     Overactive bladder     GERD (gastroesophageal reflux disease)     HYPERLIPIDEMIA LDL GOAL <100     Type 2 diabetes, HbA1C goal < 8% (H)     Diabetes mellitus with background retinopathy (H)     Diabetic macular edema, hx focal laser ou     Advanced directives, counseling/discussion     Hypertension goal BP (blood pressure) < 140/90     CHF (congestive heart failure) (H)     Pseudophakia, Yag Caps, ou      CKD (chronic kidney disease) stage 3, GFR 30-59 ml/min     Hx of keratopathy  - hx of PTK, ou (MP)     Hx of corneal epithelial defect, left eye     Gout     Type 2 diabetes mellitus with diabetic nephropathy (H)     Obesity, unspecified obesity severity, unspecified obesity type     FLOR (obstructive sleep apnea)     Primary open angle glaucoma of both eyes, mild stage     Posterior vitreous detachment, bilateral     History of phototherapeutic keratectomy, os     Type 2 diabetes mellitus with diabetic nephropathy, with long-term current use of insulin (H)     Past Surgical History:   Procedure Laterality Date     CATARACT IOL, RT/LT       focal laser for diabetic macular edema      both eyes     HC REMOVAL GALLBLADDER  10-5-3     LASER SELECTIVE TRABECULOPLASTY  7/2003    right eye, 360     ORTHOPEDIC SURGERY      left ankle surgery     PHACOEMULSIFICATION WITH STANDARD INTRAOCULAR LENS IMPLANT  11/1996; 3/1999    right eye; left eye     PHOTOTHERAPEUTIC KERACTECTOMY Right 01/18/2017     PHOTOTHERAPEUTIC KERACTECTOMY Left 08/2015     TONSILLECTOMY  1948       Social History   Substance Use Topics     Smoking status: Never Smoker     Smokeless tobacco: Never Used     Alcohol use No     Family History   Problem Relation Age of Onset     C.A.D. Mother      Glaucoma Mother      Hypertension Mother      C.A.D. Father      Glaucoma Father      Hypertension Father      DIABETES No family hx of      except dad's sister     CANCER No family hx of      Macular Degeneration No family hx of      Amblyopia No family hx of      Retinal detachment No family hx of          Current Outpatient Prescriptions   Medication Sig Dispense Refill     ACETAMINOPHEN PO Take 500 mg by mouth       insulin aspart (INSULIN ASPART) 100 UNIT/ML injection        carvedilol (COREG) 25 MG tablet Take 1 tablet (25 mg) by mouth 2 times daily (with meals) 60 tablet 1     calcitRIOL (ROCALTROL) 0.5 MCG capsule Take 1 capsule (0.5 mcg) by mouth daily 30 capsule 1     SENEXON-S 8.6-50 MG per tablet TAKE 1-4 TABLETS BY MOUTH 2 TIMES  DAILY AS NEEDED CONSTIPATION 60 tablet 4     oxybutynin (DITROPAN) 5 MG tablet TAKE 1 TABLET (5 MG) BY MOUTH 3 TIMES DAILY AS NEEDED 270 tablet 3     simvastatin (ZOCOR) 10 MG tablet Take 1 tablet (10 mg) by mouth At Bedtime 90 tablet 3     fluticasone (FLONASE) 50 MCG/ACT spray SPRAY 1-2 SPRAYS INTO BOTH NOSTRILS DAILY 16 mL 11     metoclopramide (REGLAN) 10 MG tablet Take 1 tablet by mouth. Take 1 tablet by mouth 4 times a day before meals and nightly as needed 360 tablet 1     potassium chloride (K-TAB,KLOR-CON) 10 MEQ tablet Take 1 tablet (10 mEq) by mouth daily 90 tablet 3     hydrochlorothiazide (HYDRODIURIL) 25 MG tablet Take 1 tablet (25 mg) by mouth daily 90 tablet 3     insulin glargine (LANTUS SOLOSTAR) 100 UNIT/ML injection 30 units at bedtime or as directed 15 mL 3     omeprazole (PRILOSEC) 40 MG capsule Take 1 capsule (40 mg) by mouth daily 90 capsule 3     senna-docusate (SENNA S) 8.6-50 MG per tablet Take 1-4 tablets by mouth 2 times daily as needed constipation 60 tablet 5     ferrous sulfate (IRON) 325 (65 FE) MG tablet Take 1 tablet (325 mg) by mouth 2 times daily 180 tablet 1     dorzolamide-timolol (COSOPT) 2-0.5 % ophthalmic solution Place 1 drop into the right eye 2 times daily 10 mL 12     latanoprost (XALATAN) 0.005 % ophthalmic solution Place 1 drop into both eyes At Bedtime 1 Bottle 11     blood glucose monitoring (NO BRAND SPECIFIED) test strip Use to test blood sugars 3 times daily or as directed 100 strip 11     MICROLET LANCETS MISC 1 Device 3 times daily. 100 each 11     insulin pen needle 31G X 8 MM Use as directed with Lantus; patient often needs 2 shots daily, so  Please provide enough needles for bid dosing 100 each 2     order for DME Equipment being ordered: CPAP tubing, mask, and all needed equipment 1 each 0     hydrocortisone (CORTAID) 1 % cream Apply sparingly to affected area three times daily as needed to affected areas 60 g 1     Cholecalciferol (VITAMIN D) 1000 UNITS  capsule Take 1 capsule by mouth daily.       ORDER FOR DME Equipment being ordered: CPAP       aspirin 325 MG EC tablet Take 325 mg by mouth daily.       ACE/ARB NOT PRESCRIBED, INTENTIONAL, by Other route continuous prn Reported on 5/5/2017       MULTIVITAMIN TABS   OR 1 TABLET DAILY       furosemide (LASIX) 20 MG tablet Take 1 tablet (20 mg) by mouth 2 times daily for 5 days With breakfast and at 3pm 10 tablet 0     BP Readings from Last 3 Encounters:   11/27/17 124/73   11/20/17 134/71   08/09/17 126/82    Wt Readings from Last 3 Encounters:   11/27/17 235 lb (106.6 kg)   11/20/17 242 lb (109.8 kg)   08/09/17 221 lb 6 oz (100.4 kg)                  Labs reviewed in EPIC        Reviewed and updated as needed this visit by clinical staffTobacco  Allergies  Med Hx  Surg Hx  Fam Hx  Soc Hx      Reviewed and updated as needed this visit by Provider         ROS:  Constitutional, HEENT, cardiovascular, pulmonary, gi and gu systems are negative, except as otherwise noted.      OBJECTIVE:   /73 (BP Location: Right arm, Patient Position: Sitting, Cuff Size: Adult Large)  Pulse 71  Temp 97.7  F (36.5  C) (Oral)  Wt 235 lb (106.6 kg)  SpO2 99%  BMI 40.98 kg/m2  Body mass index is 40.98 kg/(m^2).  GENERAL: healthy, alert and no distress  NECK: no adenopathy  ENT: clear nasal discharge otherwise normal.   SINUSES: non tender.   RESP: lungs clear to auscultation - no rales, rhonchi or wheezes  CV: regular rate and rhythm, normal S1 S2, no S3 or S4  Left lower extremity: examined when she took her TEDS off.   Left great toe: no toe nail. Underlying area looks erythematous, nontender.   Around nail base and skin around it has erythema and mild tenderness. There is minimal amount of yellowish discharge at the border of nail bed.   It seems like her bilateral lower legs are thick/ big. There was no pitting edema on palpation on the left lower leg. No calf muscle tenderness.     ASSESSMENT/PLAN:       ICD-10-CM    1.  Viral URI with cough J06.9 GuaiFENesin 200 MG/5ML LIQD    B97.89    2. Cellulitis of toe of left foot L03.032 cephALEXin (KEFLEX) 500 MG capsule   3. Localized swelling of lower leg R22.40    4. Congestive heart failure, unspecified congestive heart failure chronicity, unspecified congestive heart failure type (H) I50.9      As above. Continue wearing TEDS ,  knee length.   F/u if toe symptoms do not resolve with above.     - warm salt water gargles 4 times daily.   - tylenol/ibuprofen for pain and fever as needed.   - steam inhalation.   - follow up as needed. .      Bushra Rosado MD  [facility]

## 2017-11-27 NOTE — PATIENT INSTRUCTIONS
- warm salt water gargles 4 times daily.   - tylenol/ibuprofen for pain and fever as needed.   - steam inhalation.   - follow up as needed.

## 2017-11-27 NOTE — MR AVS SNAPSHOT
After Visit Summary   11/27/2017    Ruth Rocha    MRN: [id number]           Patient Information     Date Of Birth          1940        Visit Information        Provider Department      11/27/2017 1:20 PM Bushra Rosado MD [facility]        Today's Diagnoses     Viral URI with cough    -  1    Cellulitis of toe of left foot        Need for prophylactic vaccination and inoculation against influenza          Care Instructions    - warm salt water gargles 4 times daily.   - tylenol/ibuprofen for pain and fever as needed.   - steam inhalation.   - follow up as needed.             Follow-ups after your visit        Your next 10 appointments already scheduled     Dec 04, 2017  2:40 PM CST   SHORT with Raymon Hernández MD   [facility] ([facility])    [address] [facility] [address] [phone]            Jan 05, 2018  1:00 PM CST   New Visit with Abraham Byrd MD   [facility]    [address] [place] [address] [phone]            Jun 18, 2018  8:30 AM CDT   RETURN RETINA with Bryanna Jaime MD   [facility] ([facility])    [address]  [address] [facility]  9th [address] Clin [address]  [facility] [address] [phone]              Future tests that were ordered for you today     Open Future Orders        Priority Expected Expires Ordered    OCT Retina Spectralis OU (both eyes) Routine  5/31/2019 11/27/2017            Who to contact     If you have questions or need follow up information about today's clinic visit or your schedule please contact [facility] directly at [phone].  Normal or non-critical lab and imaging results will be communicated to you by MyChart, letter or phone within 4 business days after the clinic has received the results. If you do not hear from us within 7 days,  "please contact the clinic through [employer] or phone. If you have a critical or abnormal lab result, we will notify you by phone as soon as possible.  Submit refill requests through [employer] or call your pharmacy and they will forward the refill request to us. Please allow 3 business days for your refill to be completed.          Additional Information About Your Visit        [employer] Information     [employer] lets you send messages to your doctor, view your test results, renew your prescriptions, schedule appointments and more. To sign up, go to [email or web address] . Click on \"Log in\" on the left side of the screen, which will take you to the Welcome page. Then click on \"Sign up Now\" on the right side of the page.     You will be asked to enter the access code listed below, as well as some personal information. Please follow the directions to create your username and password.     Your access code is: [email or web address]  Expires: 2018  6:31 AM     Your access code will  in 90 days. If you need help or a new code, please call your [facility] clinic or [phone].        Care EveryWhere ID     This is your Care EveryWhere ID. This could be used by other organizations to access your [facility] medical records  [id number]        Your Vitals Were     Pulse Temperature Pulse Oximetry BMI (Body Mass Index)          71 97.7  F (36.5  C) (Oral) 99% 40.98 kg/m2         Blood Pressure from Last 3 Encounters:   17 124/73   17 134/71   17 126/82    Weight from Last 3 Encounters:   17 235 lb (106.6 kg)   17 242 lb (109.8 kg)   17 221 lb 6 oz (100.4 kg)              Today, you had the following     No orders found for display         Today's Medication Changes          These changes are accurate as of: 17  2:08 PM.  If you have any questions, ask your nurse or doctor.               Start taking these medicines.        Dose/Directions    cephALEXin 500 MG capsule   Commonly known as:  " KEFLEX   Used for:  Cellulitis of toe of left foot   Started by:  Bushra Rosado MD        Dose:  500 mg   Take 1 capsule (500 mg) by mouth 2 times daily for 7 days   Quantity:  14 capsule   Refills:  0       GuaiFENesin 200 MG/5ML Liqd   Used for:  Viral URI with cough   Started by:  Bushra Rosado MD        Dose:  5 mL   Take 5 mLs by mouth 2 times daily as needed   Quantity:  1 Bottle   Refills:  0            Where to get your medicines      These medications were sent to [facility]pharmacy #[id number] - [facility] [address]  [address] [facility] [address]     Phone:  [phone]     cephALEXin 500 MG capsule    GuaiFENesin 200 MG/5ML Liqd                Primary Care Provider Office Phone # Fax #    Raymon Hernández [phone] [address] [facility] [address]        Equal Access to Services     [facility] AH: Hadii jennie ku hadasho Soomaali, waaxda luqadaha, qaybta kaalmada adeegyada, waxay idiin hayradhan ifrah perry . So [facility] [phone].    ATENCIÓN: Si habla español, tiene a becker disposición servicios gratuitos de asistencia lingüística. Llame al [phone].    We comply with applicable federal civil rights laws and [place] laws. We do not discriminate on the basis of race, color, national origin, age, disability, sex, sexual orientation, or gender identity.            Thank you!     Thank you for choosing [facility]  for your care. Our goal is always to provide you with excellent care. Hearing back from our patients is one way we can continue to improve our services. Please take a few minutes to complete the written survey that you may receive in the mail after your visit with us. Thank you!             Your Updated Medication List - Protect others around you: Learn how to safely use, store and throw away your medicines at www.disposemymeds.org.          This list is accurate as of: 11/27/17   2:08 PM.  Always use your most recent med list.                   Brand Name Dispense Instructions for use Diagnosis    * ACE/ARB/ARNI NOT PRESCRIBED (INTENTIONAL)      by Other route continuous prn Reported on 5/5/2017    Type 2 diabetes, HbA1C goal < 8% (H)       ACETAMINOPHEN PO      Take 500 mg by mouth        aspirin 325 MG EC tablet      Take 325 mg by mouth daily.        blood glucose monitoring test strip    no brand specified    100 strip    Use to test blood sugars 3 times daily or as directed    Type 2 diabetes mellitus with diabetic nephropathy, with long-term current use of insulin (H)       calcitRIOL 0.5 MCG capsule    ROCALTROL    30 capsule    Take 1 capsule (0.5 mcg) by mouth daily        carvedilol 25 MG tablet    COREG    60 tablet    Take 1 tablet (25 mg) by mouth 2 times daily (with meals)        cephALEXin 500 MG capsule    KEFLEX    14 capsule    Take 1 capsule (500 mg) by mouth 2 times daily for 7 days    Cellulitis of toe of left foot       dorzolamide-timolol 2-0.5 % ophthalmic solution    COSOPT    10 mL    Place 1 drop into the right eye 2 times daily    Primary open angle glaucoma of both eyes, mild stage       ferrous sulfate 325 (65 FE) MG tablet    IRON    180 tablet    Take 1 tablet (325 mg) by mouth 2 times daily    Anemia, unspecified type       fluticasone 50 MCG/ACT spray    FLONASE    16 mL    SPRAY 1-2 SPRAYS INTO BOTH NOSTRILS DAILY    Nasal congestion       furosemide 20 MG tablet    LASIX    10 tablet    Take 1 tablet (20 mg) by mouth 2 times daily for 5 days With breakfast and at 3pm    Acute on chronic diastolic congestive heart failure (H)       GuaiFENesin 200 MG/5ML Liqd     1 Bottle    Take 5 mLs by mouth 2 times daily as needed    Viral URI with cough       hydrochlorothiazide 25 MG tablet    HYDRODIURIL    90 tablet    Take 1 tablet (25 mg) by mouth daily    Hypertension goal BP (blood pressure) < 140/90       hydrocortisone 1 % cream    CORTAID    60 g    Apply  sparingly to affected area three times daily as needed to affected areas    Dermatitis       insulin aspart 100 UNIT/ML injection    NovoLOG PEN          insulin glargine 100 UNIT/ML injection    LANTUS SOLOSTAR    15 mL    30 units at bedtime or as directed    Type 2 diabetes mellitus with diabetic nephropathy, with long-term current use of insulin (H)       insulin pen needle 31G X 8 MM     100 each    Use as directed with Lantus; patient often needs 2 shots daily, so  Please provide enough needles for bid dosing    Diabetes mellitus with background retinopathy (H)       latanoprost 0.005 % ophthalmic solution    XALATAN    1 Bottle    Place 1 drop into both eyes At Bedtime    Primary open angle glaucoma of both eyes, mild stage       metoclopramide 10 MG tablet    REGLAN    360 tablet    Take 1 tablet by mouth. Take 1 tablet by mouth 4 times a day before meals and nightly as needed    Dyspepsia       MICROLET LANCETS Misc     100 each    1 Device 3 times daily.    Type 2 diabetes mellitus with diabetic nephropathy, with long-term current use of insulin (H)       MULTIVITAMIN TABS   OR      1 TABLET DAILY        omeprazole 40 MG capsule    priLOSEC    90 capsule    Take 1 capsule (40 mg) by mouth daily    Gastroesophageal reflux disease, esophagitis presence not specified       * order for DME      Equipment being ordered: CPAP        order for DME     1 each    Equipment being ordered: CPAP tubing, mask, and all needed equipment    FLOR (obstructive sleep apnea)       oxybutynin 5 MG tablet    DITROPAN    270 tablet    TAKE 1 TABLET (5 MG) BY MOUTH 3 TIMES DAILY AS NEEDED    Overactive bladder       potassium chloride 10 MEQ tablet    K-TAB,KLOR-CON    90 tablet    Take 1 tablet (10 mEq) by mouth daily    Hypokalemia       * senna-docusate 8.6-50 MG per tablet    SENNA S    60 tablet    Take 1-4 tablets by mouth 2 times daily as needed constipation    Chronic constipation       * SENEXON-S 8.6-50 MG per tablet    Generic drug:  senna-docusate     60 tablet    TAKE 1-4 TABLETS BY MOUTH 2 TIMES DAILY AS NEEDED CONSTIPATION    Chronic constipation       simvastatin 10 MG tablet    ZOCOR    90 tablet    Take 1 tablet (10 mg) by mouth At Bedtime    Hyperlipidemia LDL goal <100       vitamin D 1000 UNITS capsule      Take 1 capsule by mouth daily.        * Notice:  This list has 4 medication(s) that are the same as other medications prescribed for you. Read the directions carefully, and ask your doctor or other care provider to review them with you.

## 2017-11-27 NOTE — MR AVS SNAPSHOT
After Visit Summary   11/27/2017    Ruth Rocha    MRN: [id number]           Patient Information     Date Of Birth          1940        Visit Information        Provider Department      11/27/2017 10:15 AM [facility] MD [facility]        Today's Diagnoses     Clinically significant macular edema associated with type 2 diabetes (H)           Follow-ups after your visit        Follow-up notes from your care team     Return in about 6 months (around 5/27/2018) for OCT OU.      Your next 10 appointments already scheduled     Nov 27, 2017  1:20 PM CST   SHORT with Bushra Rosado MD   [facility] ([facility]    [address] [facility] [address]   [phone]            Jan 05, 2018  1:00 PM CST   New Visit with Abraham Byrd MD   [facility] ([facility])    [address] [place] [address]   [phone]            Jun 18, 2018  8:30 AM CDT   RETURN RETINA with Bryanna Jaime MD   [facility] ([facility])    [address]  [address] [facility]  9th Fl Clin 9a  [facility] [id number] [phone]              Future tests that were ordered for you today     Open Future Orders        Priority Expected Expires Ordered    OCT Retina Spectralis OU (both eyes) Routine  5/31/2019 11/27/2017            Who to contact     Please call your clinic at [phone] to:    Ask questions about your health    Make or cancel appointments    Discuss your medicines    Learn about your test results    Speak to your doctor   If you have compliments or concerns about an experience at your clinic, or if you wish to file a complaint, please contact [facility] Patient Relations at [phone] or email us at [email or web address]         Additional Information About Your Visit        MyChart Information     [employer] is an  electronic gateway that provides easy, online access to your medical records. With [employer], you can request a clinic appointment, read your test results, renew a prescription or communicate with your care team.     To sign up for [employer] visit the website at [email or web address]   You will be asked to enter the access code listed below, as well as some personal information. Please follow the directions to create your username and password.     Your access code is: [email or web address]  Expires: 2018  6:31 AM     Your access code will  in 90 days. If you need help or a new code, please contact your [facility] or call [phone] for assistance.        Care EveryWhere ID     This is your Care EveryWhere ID. This could be used by other organizations to access your [facility] medical records  [id number]         Blood Pressure from Last 3 Encounters:   17 134/71   17 126/82   17 132/66    Weight from Last 3 Encounters:   17 109.8 kg (242 lb)   17 100.4 kg (221 lb 6 oz)   17 103.4 kg (228 lb)              We Performed the Following     OCT Retina Spectralis OU (both eyes)        Primary Care Provider Office Phone # Fax #    Raymon Hernández [phone]       [address] [facility] [address]        Equal Access to Services     [facility] : Hadii aad ku hadasho Soomaali, waaxda luqadaha, qaybta kaalmada adeegyada, bernabe escamilla hayjacinto perry . So [facility] [phone].    ATENCIÓN: Si habla español, tiene a becker disposición servicios gratuitos de asistencia lingüística. Llame al [phone].    We comply with applicable federal civil rights laws and [place] laws. We do not discriminate on the basis of race, color, national origin, age, disability, sex, sexual orientation, or gender identity.            Thank you!     Thank you for choosing [facility]  for your care. Our goal is always to provide you with  excellent care. Hearing back from our patients is one way we can continue to improve our services. Please take a few minutes to complete the written survey that you may receive in the mail after your visit with us. Thank you!             Your Updated Medication List - Protect others around you: Learn how to safely use, store and throw away your medicines at www.disposemymeds.org.          This list is accurate as of: 11/27/17 12:08 PM.  Always use your most recent med list.                   Brand Name Dispense Instructions for use Diagnosis    * ACE/ARB/ARNI NOT PRESCRIBED (INTENTIONAL)      by Other route continuous prn Reported on 5/5/2017    Type 2 diabetes, HbA1C goal < 8% (H)       ACETAMINOPHEN PO      Take 500 mg by mouth        aspirin 325 MG EC tablet      Take 325 mg by mouth daily.        blood glucose monitoring test strip    no brand specified    100 strip    Use to test blood sugars 3 times daily or as directed    Type 2 diabetes mellitus with diabetic nephropathy, with long-term current use of insulin (H)       calcitRIOL 0.5 MCG capsule    ROCALTROL    30 capsule    Take 1 capsule (0.5 mcg) by mouth daily        carvedilol 25 MG tablet    COREG    60 tablet    Take 1 tablet (25 mg) by mouth 2 times daily (with meals)        dorzolamide-timolol 2-0.5 % ophthalmic solution    COSOPT    10 mL    Place 1 drop into the right eye 2 times daily    Primary open angle glaucoma of both eyes, mild stage       ferrous sulfate 325 (65 FE) MG tablet    IRON    180 tablet    Take 1 tablet (325 mg) by mouth 2 times daily    Anemia, unspecified type       fluticasone 50 MCG/ACT spray    FLONASE    16 mL    SPRAY 1-2 SPRAYS INTO BOTH NOSTRILS DAILY    Nasal congestion       furosemide 20 MG tablet    LASIX    10 tablet    Take 1 tablet (20 mg) by mouth 2 times daily for 5 days With breakfast and at 3pm    Acute on chronic diastolic congestive heart failure (H)       hydrochlorothiazide 25 MG tablet    HYDRODIURIL     90 tablet    Take 1 tablet (25 mg) by mouth daily    Hypertension goal BP (blood pressure) < 140/90       hydrocortisone 1 % cream    CORTAID    60 g    Apply sparingly to affected area three times daily as needed to affected areas    Dermatitis       insulin aspart 100 UNIT/ML injection    NovoLOG PEN          insulin glargine 100 UNIT/ML injection    LANTUS SOLOSTAR    15 mL    30 units at bedtime or as directed    Type 2 diabetes mellitus with diabetic nephropathy, with long-term current use of insulin (H)       insulin pen needle 31G X 8 MM     100 each    Use as directed with Lantus; patient often needs 2 shots daily, so  Please provide enough needles for bid dosing    Diabetes mellitus with background retinopathy (H)       latanoprost 0.005 % ophthalmic solution    XALATAN    1 Bottle    Place 1 drop into both eyes At Bedtime    Primary open angle glaucoma of both eyes, mild stage       metoclopramide 10 MG tablet    REGLAN    360 tablet    Take 1 tablet by mouth. Take 1 tablet by mouth 4 times a day before meals and nightly as needed    Dyspepsia       MICROLET LANCETS Misc     100 each    1 Device 3 times daily.    Type 2 diabetes mellitus with diabetic nephropathy, with long-term current use of insulin (H)       MULTIVITAMIN TABS   OR      1 TABLET DAILY        omeprazole 40 MG capsule    priLOSEC    90 capsule    Take 1 capsule (40 mg) by mouth daily    Gastroesophageal reflux disease, esophagitis presence not specified       * order for DME      Equipment being ordered: CPAP        order for DME     1 each    Equipment being ordered: CPAP tubing, mask, and all needed equipment    FLOR (obstructive sleep apnea)       oxybutynin 5 MG tablet    DITROPAN    270 tablet    TAKE 1 TABLET (5 MG) BY MOUTH 3 TIMES DAILY AS NEEDED    Overactive bladder       potassium chloride 10 MEQ tablet    K-TAB,KLOR-CON    90 tablet    Take 1 tablet (10 mEq) by mouth daily    Hypokalemia       * senna-docusate 8.6-50 MG per  tablet    SENNA S    60 tablet    Take 1-4 tablets by mouth 2 times daily as needed constipation    Chronic constipation       * SENEXON-S 8.6-50 MG per tablet   Generic drug:  senna-docusate     60 tablet    TAKE 1-4 TABLETS BY MOUTH 2 TIMES DAILY AS NEEDED CONSTIPATION    Chronic constipation       simvastatin 10 MG tablet    ZOCOR    90 tablet    Take 1 tablet (10 mg) by mouth At Bedtime    Hyperlipidemia LDL goal <100       vitamin D 1000 UNITS capsule      Take 1 capsule by mouth daily.        * Notice:  This list has 4 medication(s) that are the same as other medications prescribed for you. Read the directions carefully, and ask your doctor or other care provider to review them with you.

## 2017-11-28 ENCOUNTER — MEDICAL CORRESPONDENCE (OUTPATIENT)
Dept: HEALTH INFORMATION MANAGEMENT | Facility: CLINIC | Age: 77
End: 2017-11-28

## 2017-11-28 ENCOUNTER — TELEPHONE (OUTPATIENT)
Dept: FAMILY MEDICINE | Facility: CLINIC | Age: 77
End: 2017-11-28

## 2017-11-28 NOTE — TELEPHONE ENCOUNTER
Reason for Call:  Home Health Care    Cass with [facility] called regarding (reason for call): orders    Orders are needed for this patient. All     PT: PT    OT: OT    Skilled Nursing: Skilled    Pt Provider: Dr. Hernández    Phone Number Homecare Nurse can be reached at: [phone]    Can we leave a detailed message on this number? YES    Phone number patient can be reached at: Other phone number:  N/A    Best Time: Any time    Thank you!  Beth GRAHAM  Patient Representative  [facility]      Call taken on 11/28/2017 at 3:31 PM by Beth Alegria

## 2017-11-29 ENCOUNTER — OFFICE VISIT (OUTPATIENT)
Dept: FAMILY MEDICINE | Facility: CLINIC | Age: 77
End: 2017-11-29
Payer: COMMERCIAL

## 2017-11-29 VITALS
SYSTOLIC BLOOD PRESSURE: 137 MMHG | HEART RATE: 68 BPM | BODY MASS INDEX: 40.8 KG/M2 | TEMPERATURE: 97.4 F | DIASTOLIC BLOOD PRESSURE: 64 MMHG | WEIGHT: 234 LBS | OXYGEN SATURATION: 98 %

## 2017-11-29 DIAGNOSIS — I10 HYPERTENSION GOAL BP (BLOOD PRESSURE) < 140/90: ICD-10-CM

## 2017-11-29 DIAGNOSIS — E11.21 TYPE 2 DIABETES MELLITUS WITH DIABETIC NEPHROPATHY, WITH LONG-TERM CURRENT USE OF INSULIN (H): ICD-10-CM

## 2017-11-29 DIAGNOSIS — L03.032 CELLULITIS OF TOE OF LEFT FOOT: ICD-10-CM

## 2017-11-29 DIAGNOSIS — J06.9 VIRAL URI WITH COUGH: Primary | ICD-10-CM

## 2017-11-29 DIAGNOSIS — G47.33 OSA (OBSTRUCTIVE SLEEP APNEA): ICD-10-CM

## 2017-11-29 DIAGNOSIS — Z79.4 TYPE 2 DIABETES MELLITUS WITH DIABETIC NEPHROPATHY, WITH LONG-TERM CURRENT USE OF INSULIN (H): ICD-10-CM

## 2017-11-29 PROCEDURE — 99214 OFFICE O/P EST MOD 30 MIN: CPT | Performed by: FAMILY MEDICINE

## 2017-11-29 NOTE — PROGRESS NOTES
SUBJECTIVE:   Ruth Rocha is a 77 year old female who presents to clinic today for the following health issues:      Acute Illness   Acute illness concerns: Cough  Onset: 6 days    Fever: no    Chills/Sweats: no    Headache (location?): no    Sinus Pressure:no    Conjunctivitis:  no    Ear Pain: no    Rhinorrhea: YES    Congestion: YES    Sore Throat: no     Cough: YES-productive of clear sputum    Wheeze: YES    Decreased Appetite: YES    Nausea: YES    Vomiting: no    Diarrhea:  YES    Dysuria/Freq.: no    Fatigue/Achiness: YES    Sick/Strep Exposure: YES- Was in a nursing home     Therapies Tried and outcome: OTC cough syrup      She was seen 3 days ago for these symptoms. She was felt to have a viral URI. Symptomatic treatment was recommended. She is still having ongoing URI symptoms as noted. No known fever. She is a nonsmoker, but is a diabetic. She has a history of CHF as well.  She is sleeping okay at night, despite the cough. She does have sleep apnea and uses CPAP at baseline.    She was prescribed cephalexin for a left great toe infection. She's not sure if that has gotten better or not. She has having home care now. She spent a couple of months in an extended care facility because of a left lower leg fracture.    Problem list and histories reviewed & adjusted, as indicated.  Additional history: as documented    Patient Active Problem List   Diagnosis     Overactive bladder     GERD (gastroesophageal reflux disease)     HYPERLIPIDEMIA LDL GOAL <100     Type 2 diabetes, HbA1C goal < 8% (H)     Diabetes mellitus with background retinopathy (H)     Diabetic macular edema, hx focal laser ou     Advanced directives, counseling/discussion     Hypertension goal BP (blood pressure) < 140/90     CHF (congestive heart failure) (H)     Pseudophakia, Yag Caps, ou      CKD (chronic kidney disease) stage 3, GFR 30-59 ml/min     Hx of keratopathy - hx of PTK, ou (MP)     Hx of corneal epithelial defect, left eye      Gout     Type 2 diabetes mellitus with diabetic nephropathy (H)     Obesity, unspecified obesity severity, unspecified obesity type     FLOR (obstructive sleep apnea)     Primary open angle glaucoma of both eyes, mild stage     Posterior vitreous detachment, bilateral     History of phototherapeutic keratectomy, os     Type 2 diabetes mellitus with diabetic nephropathy, with long-term current use of insulin (H)     Past Surgical History:   Procedure Laterality Date     CATARACT IOL, RT/LT       focal laser for diabetic macular edema      both eyes     HC REMOVAL GALLBLADDER  10-5-3     LASER SELECTIVE TRABECULOPLASTY  7/2003    right eye, 360     ORTHOPEDIC SURGERY      left ankle surgery     PHACOEMULSIFICATION WITH STANDARD INTRAOCULAR LENS IMPLANT  11/1996; 3/1999    right eye; left eye     PHOTOTHERAPEUTIC KERACTECTOMY Right 01/18/2017     PHOTOTHERAPEUTIC KERACTECTOMY Left 08/2015     TONSILLECTOMY  1948       Social History   Substance Use Topics     Smoking status: Never Smoker     Smokeless tobacco: Never Used     Alcohol use No     Family History   Problem Relation Age of Onset     C.A.D. Mother      Glaucoma Mother      Hypertension Mother      C.A.D. Father      Glaucoma Father      Hypertension Father      DIABETES No family hx of      except dad's sister     CANCER No family hx of      Macular Degeneration No family hx of      Amblyopia No family hx of      Retinal detachment No family hx of          Current Outpatient Prescriptions   Medication Sig Dispense Refill     cephALEXin (KEFLEX) 500 MG capsule Take 1 capsule (500 mg) by mouth 2 times daily for 7 days 14 capsule 0     GuaiFENesin 200 MG/5ML LIQD Take 5 mLs by mouth 2 times daily as needed 1 Bottle 0     ACETAMINOPHEN PO Take 500 mg by mouth       insulin aspart (INSULIN ASPART) 100 UNIT/ML injection        carvedilol (COREG) 25 MG tablet Take 1 tablet (25 mg) by mouth 2 times daily (with meals) 60 tablet 1     calcitRIOL (ROCALTROL) 0.5 MCG  capsule Take 1 capsule (0.5 mcg) by mouth daily 30 capsule 1     SENEXON-S 8.6-50 MG per tablet TAKE 1-4 TABLETS BY MOUTH 2 TIMES DAILY AS NEEDED CONSTIPATION 60 tablet 4     oxybutynin (DITROPAN) 5 MG tablet TAKE 1 TABLET (5 MG) BY MOUTH 3 TIMES DAILY AS NEEDED 270 tablet 3     simvastatin (ZOCOR) 10 MG tablet Take 1 tablet (10 mg) by mouth At Bedtime 90 tablet 3     fluticasone (FLONASE) 50 MCG/ACT spray SPRAY 1-2 SPRAYS INTO BOTH NOSTRILS DAILY 16 mL 11     metoclopramide (REGLAN) 10 MG tablet Take 1 tablet by mouth. Take 1 tablet by mouth 4 times a day before meals and nightly as needed 360 tablet 1     potassium chloride (K-TAB,KLOR-CON) 10 MEQ tablet Take 1 tablet (10 mEq) by mouth daily 90 tablet 3     hydrochlorothiazide (HYDRODIURIL) 25 MG tablet Take 1 tablet (25 mg) by mouth daily 90 tablet 3     insulin glargine (LANTUS SOLOSTAR) 100 UNIT/ML injection 30 units at bedtime or as directed 15 mL 3     omeprazole (PRILOSEC) 40 MG capsule Take 1 capsule (40 mg) by mouth daily 90 capsule 3     senna-docusate (SENNA S) 8.6-50 MG per tablet Take 1-4 tablets by mouth 2 times daily as needed constipation 60 tablet 5     ferrous sulfate (IRON) 325 (65 FE) MG tablet Take 1 tablet (325 mg) by mouth 2 times daily 180 tablet 1     dorzolamide-timolol (COSOPT) 2-0.5 % ophthalmic solution Place 1 drop into the right eye 2 times daily 10 mL 12     latanoprost (XALATAN) 0.005 % ophthalmic solution Place 1 drop into both eyes At Bedtime 1 Bottle 11     blood glucose monitoring (NO BRAND SPECIFIED) test strip Use to test blood sugars 3 times daily or as directed 100 strip 11     MICROLET LANCETS MISC 1 Device 3 times daily. 100 each 11     insulin pen needle 31G X 8 MM Use as directed with Lantus; patient often needs 2 shots daily, so  Please provide enough needles for bid dosing 100 each 2     order for DME Equipment being ordered: CPAP tubing, mask, and all needed equipment 1 each 0     hydrocortisone (CORTAID) 1 % cream  Apply sparingly to affected area three times daily as needed to affected areas 60 g 1     Cholecalciferol (VITAMIN D) 1000 UNITS capsule Take 1 capsule by mouth daily.       ORDER FOR DME Equipment being ordered: CPAP       aspirin 325 MG EC tablet Take 325 mg by mouth daily.       ACE/ARB NOT PRESCRIBED, INTENTIONAL, by Other route continuous prn Reported on 5/5/2017       MULTIVITAMIN TABS   OR 1 TABLET DAILY       furosemide (LASIX) 20 MG tablet Take 1 tablet (20 mg) by mouth 2 times daily for 5 days With breakfast and at 3pm 10 tablet 0     Allergies   Allergen Reactions     Morphine      [place] Oil [Fish Oil]          Reviewed and updated as needed this visit by clinical staffTobacco  Allergies  Meds  Med Hx  Surg Hx  Fam Hx  Soc Hx      Reviewed and updated as needed this visit by Provider         ROS:  Noncontributory except as above    OBJECTIVE:     /64 (BP Location: Right arm, Patient Position: Chair, Cuff Size: Adult Large)  Pulse 68  Temp 97.4  F (36.3  C) (Oral)  Wt 234 lb (106.1 kg)  SpO2 98%  BMI 40.8 kg/m2  Body mass index is 40.8 kg/(m^2).  GENERAL: alert, no distress, obese and elderly  EYES: Eyes grossly normal to inspection, PERRL and conjunctivae and sclerae normal  HENT: ear canals and TM's normal, nose and mouth without ulcers or lesions. Nares are mildly congested.  NECK: no adenopathy, no asymmetry, masses, or scars and thyroid normal to palpation  RESP: lungs clear to auscultation - no rales, rhonchi or wheezes  MS: She still has some mild erythema of the left great toe    Diagnostic Test Results:  none     ASSESSMENT/PLAN:       ICD-10-CM    1. Viral URI with cough J06.9     B97.89    2. Cellulitis of toe of left foot L03.032    3. Type 2 diabetes mellitus with diabetic nephropathy, with long-term current use of insulin (H) E11.21     Z79.4    4. FLOR (obstructive sleep apnea) G47.33    5. Hypertension goal BP (blood pressure) < 140/90 I10      I reassured her about the  viral URI  I also discussed that she is on cephalexin for the sake of her left great toe, but it could also help address any bacterial component of her URI and may help to prevent her from getting into a bacterial pneumonia or other complication from her cold  She was advised to continue her same baseline medications  Her blood pressure was mildly elevated when she first came in, but then was down by the end of the visit  She does have an appointment to see her PCP on Monday, in 4 days from now  Plan a follow-up at that time as scheduled for ongoing routine care as well as recheck of the above conditions    Jovani Newell MD  [facility]

## 2017-11-29 NOTE — TELEPHONE ENCOUNTER
Called Home Health Care, spoke with Pamela MAY and informed of okay for orders as below.    Lindsey De Dios RN  [facility]

## 2017-11-29 NOTE — MR AVS SNAPSHOT
After Visit Summary   11/29/2017    Ruth Rocha    MRN: [id number]           Patient Information     Date Of Birth          1940        Visit Information        Provider Department      11/29/2017 4:40 PM Jovani Newell MD [facility]        Today's Diagnoses     Viral URI with cough    -  1    Cellulitis of toe of left foot        Type 2 diabetes mellitus with diabetic nephropathy, with long-term current use of insulin (H)        FLOR (obstructive sleep apnea)        Hypertension goal BP (blood pressure) < 140/90           Follow-ups after your visit        Follow-up notes from your care team     Return if symptoms worsen or fail to improve.      Your next 10 appointments already scheduled     Dec 04, 2017  2:40 PM CST   SHORT with Raymon Hernández MD   [facility]    [address] [address] [phone]            Jan 05, 2018  1:00 PM CST   New Visit with Abraham Byrd MD   [facility] ([address] [address] [phone]            Jun 18, 2018  8:30 AM CDT   RETURN RETINA with Bryanna Jaime MD   [facility] ([facility])    [address]  [address]  [address]  [place] [address] [phone]              Who to contact     If you have questions or need follow up information about today's clinic visit or your schedule please contact [facility] directly at [phone].  Normal or non-critical lab and imaging results will be communicated to you by MyChart, letter or phone within 4 business days after the clinic has received the results. If you do not hear from us within 7 days, please contact the clinic through MyChart or phone. If you have a critical or abnormal lab result, we will notify you by phone as soon as possible.  Submit  "refill requests through [facility] or call your pharmacy and they will forward the refill request to us. Please allow 3 business days for your refill to be completed.          Additional Information About Your Visit        [employer] Information     [facility] lets you send messages to your doctor, view your test results, renew your prescriptions, schedule appointments and more. To sign up, go to [email or web address] . Click on \"Log in\" on the left side of the screen, which will take you to the Welcome page. Then click on \"Sign up Now\" on the right side of the page.     You will be asked to enter the access code listed below, as well as some personal information. Please follow the directions to create your username and password.     Your access code is: [email or web address]  Expires: 2018  6:31 AM     Your access code will  in 90 days. If you need help or a new code, please call your [facility] clinic or [phone].        Care EveryWhere ID     This is your Care EveryWhere ID. This could be used by other organizations to access your [facility] medical records  [id number]        Your Vitals Were     Pulse Temperature Pulse Oximetry BMI (Body Mass Index)          68 97.4  F (36.3  C) (Oral) 98% 40.8 kg/m2         Blood Pressure from Last 3 Encounters:   17 151/73   17 124/73   17 134/71    Weight from Last 3 Encounters:   17 234 lb (106.1 kg)   17 235 lb (106.6 kg)   17 242 lb (109.8 kg)              Today, you had the following     No orders found for display       Primary Care Provider Office Phone # Fax #    Raymon Hernández [phone]       [address] [address]        Equal Access to Services     [facility] JACK : Shamir Schulz, waálvaro santana, qaybta kimmy grewal, bernabe hernandez. So [facility] [phone].    ATENCIÓN: Si habla español, tiene a becker disposición servicios gratuitos de asistencia " lingüística. [place] al [phone].    We comply with applicable federal civil rights laws and [place] laws. We do not discriminate on the basis of race, color, national origin, age, disability, sex, sexual orientation, or gender identity.            Thank you!     Thank you for choosing [facility]  for your care. Our goal is always to provide you with excellent care. Hearing back from our patients is one way we can continue to improve our services. Please take a few minutes to complete the written survey that you may receive in the mail after your visit with us. Thank you!             Your Updated Medication List - Protect others around you: Learn how to safely use, store and throw away your medicines at www.disposemymeds.org.          This list is accurate as of: 11/29/17  5:02 PM.  Always use your most recent med list.                   Brand Name Dispense Instructions for use Diagnosis    * ACE/ARB/ARNI NOT PRESCRIBED (INTENTIONAL)      by Other route continuous prn Reported on 5/5/2017    Type 2 diabetes, HbA1C goal < 8% (H)       ACETAMINOPHEN PO      Take 500 mg by mouth        aspirin 325 MG EC tablet      Take 325 mg by mouth daily.        blood glucose monitoring test strip    no brand specified    100 strip    Use to test blood sugars 3 times daily or as directed    Type 2 diabetes mellitus with diabetic nephropathy, with long-term current use of insulin (H)       calcitRIOL 0.5 MCG capsule    ROCALTROL    30 capsule    Take 1 capsule (0.5 mcg) by mouth daily        carvedilol 25 MG tablet    COREG    60 tablet    Take 1 tablet (25 mg) by mouth 2 times daily (with meals)        cephALEXin 500 MG capsule    KEFLEX    14 capsule    Take 1 capsule (500 mg) by mouth 2 times daily for 7 days    Cellulitis of toe of left foot       dorzolamide-timolol 2-0.5 % ophthalmic solution    COSOPT    10 mL    Place 1 drop into the right eye 2 times daily    Primary open angle glaucoma of both  eyes, mild stage       ferrous sulfate 325 (65 FE) MG tablet    IRON    180 tablet    Take 1 tablet (325 mg) by mouth 2 times daily    Anemia, unspecified type       fluticasone 50 MCG/ACT spray    FLONASE    16 mL    SPRAY 1-2 SPRAYS INTO BOTH NOSTRILS DAILY    Nasal congestion       furosemide 20 MG tablet    LASIX    10 tablet    Take 1 tablet (20 mg) by mouth 2 times daily for 5 days With breakfast and at 3pm    Acute on chronic diastolic congestive heart failure (H)       GuaiFENesin 200 MG/5ML Liqd     1 Bottle    Take 5 mLs by mouth 2 times daily as needed    Viral URI with cough       hydrochlorothiazide 25 MG tablet    HYDRODIURIL    90 tablet    Take 1 tablet (25 mg) by mouth daily    Hypertension goal BP (blood pressure) < 140/90       hydrocortisone 1 % cream    CORTAID    60 g    Apply sparingly to affected area three times daily as needed to affected areas    Dermatitis       insulin aspart 100 UNIT/ML injection    NovoLOG PEN          insulin glargine 100 UNIT/ML injection    LANTUS SOLOSTAR    15 mL    30 units at bedtime or as directed    Type 2 diabetes mellitus with diabetic nephropathy, with long-term current use of insulin (H)       insulin pen needle 31G X 8 MM     100 each    Use as directed with Lantus; patient often needs 2 shots daily, so  Please provide enough needles for bid dosing    Diabetes mellitus with background retinopathy (H)       latanoprost 0.005 % ophthalmic solution    XALATAN    1 Bottle    Place 1 drop into both eyes At Bedtime    Primary open angle glaucoma of both eyes, mild stage       metoclopramide 10 MG tablet    REGLAN    360 tablet    Take 1 tablet by mouth. Take 1 tablet by mouth 4 times a day before meals and nightly as needed    Dyspepsia       MICROLET LANCETS Misc     100 each    1 Device 3 times daily.    Type 2 diabetes mellitus with diabetic nephropathy, with long-term current use of insulin (H)       MULTIVITAMIN TABS   OR      1 TABLET DAILY         omeprazole 40 MG capsule    priLOSEC    90 capsule    Take 1 capsule (40 mg) by mouth daily    Gastroesophageal reflux disease, esophagitis presence not specified       * order for DME      Equipment being ordered: CPAP        order for DME     1 each    Equipment being ordered: CPAP tubing, mask, and all needed equipment    FLOR (obstructive sleep apnea)       oxybutynin 5 MG tablet    DITROPAN    270 tablet    TAKE 1 TABLET (5 MG) BY MOUTH 3 TIMES DAILY AS NEEDED    Overactive bladder       potassium chloride 10 MEQ tablet    K-TAB,KLOR-CON    90 tablet    Take 1 tablet (10 mEq) by mouth daily    Hypokalemia       * senna-docusate 8.6-50 MG per tablet    SENNA S    60 tablet    Take 1-4 tablets by mouth 2 times daily as needed constipation    Chronic constipation       * SENEXON-S 8.6-50 MG per tablet   Generic drug:  senna-docusate     60 tablet    TAKE 1-4 TABLETS BY MOUTH 2 TIMES DAILY AS NEEDED CONSTIPATION    Chronic constipation       simvastatin 10 MG tablet    ZOCOR    90 tablet    Take 1 tablet (10 mg) by mouth At Bedtime    Hyperlipidemia LDL goal <100       vitamin D 1000 UNITS capsule      Take 1 capsule by mouth daily.        * Notice:  This list has 4 medication(s) that are the same as other medications prescribed for you. Read the directions carefully, and ask your doctor or other care provider to review them with you.

## 2017-11-29 NOTE — NURSING NOTE
"Chief Complaint   Patient presents with     RECHECK     Cough, seen 11/27/17. Is getting worse. Home health nurse said she heard something in her chest.       Initial /73 (BP Location: Right arm, Patient Position: Chair, Cuff Size: Adult Large)  Pulse 68  Temp 97.4  F (36.3  C) (Oral)  Wt 234 lb (106.1 kg)  SpO2 98%  BMI 40.8 kg/m2 Estimated body mass index is 40.8 kg/(m^2) as calculated from the following:    Height as of 4/14/17: 5' 3.5\" (1.613 m).    Weight as of this encounter: 234 lb (106.1 kg).  Medication Reconciliation: complete   Violette Baird CMA       "

## 2017-11-30 ENCOUNTER — TELEPHONE (OUTPATIENT)
Dept: FAMILY MEDICINE | Facility: CLINIC | Age: 77
End: 2017-11-30

## 2017-11-30 ENCOUNTER — TRANSFERRED RECORDS (OUTPATIENT)
Dept: HEALTH INFORMATION MANAGEMENT | Facility: CLINIC | Age: 77
End: 2017-11-30

## 2017-11-30 NOTE — TELEPHONE ENCOUNTER
Called [facility] at [phone] no answer - left message to call nurse line.     Adelina Verdugo RN  [facility]

## 2017-11-30 NOTE — TELEPHONE ENCOUNTER
Reason for Call:  Other     Detailed comments: Nurse would like home care and pt two times a week for three weeks for gate and transfer training . Balanced home exercise program    Phone Number Patient can be reached at: Other phone number:    [facility]  [phone]         Best Time:     Can we leave a detailed message on this number? YES    Call taken on 11/30/2017 at 12:40 PM by Jania Diop

## 2017-11-30 NOTE — TELEPHONE ENCOUNTER
Khushi from [facility] returned call relayed below orders - verbalizes understanding.    Adelina Verdugo RN  [facility]

## 2017-11-30 NOTE — TELEPHONE ENCOUNTER
Routing to PCP to review and advise.    Request for SN/PT orders.    Adelina Verdugo RN  [facility]

## 2017-12-04 ENCOUNTER — TELEPHONE (OUTPATIENT)
Dept: FAMILY MEDICINE | Facility: CLINIC | Age: 77
End: 2017-12-04

## 2017-12-04 ENCOUNTER — MEDICAL CORRESPONDENCE (OUTPATIENT)
Dept: HEALTH INFORMATION MANAGEMENT | Facility: CLINIC | Age: 77
End: 2017-12-04

## 2017-12-04 ENCOUNTER — OFFICE VISIT (OUTPATIENT)
Dept: FAMILY MEDICINE | Facility: CLINIC | Age: 77
End: 2017-12-04
Payer: COMMERCIAL

## 2017-12-04 VITALS
SYSTOLIC BLOOD PRESSURE: 120 MMHG | HEART RATE: 65 BPM | BODY MASS INDEX: 40.45 KG/M2 | TEMPERATURE: 98.7 F | WEIGHT: 232 LBS | DIASTOLIC BLOOD PRESSURE: 58 MMHG | OXYGEN SATURATION: 99 %

## 2017-12-04 DIAGNOSIS — Z79.4 TYPE 2 DIABETES MELLITUS WITH DIABETIC NEPHROPATHY, WITH LONG-TERM CURRENT USE OF INSULIN (H): Primary | ICD-10-CM

## 2017-12-04 DIAGNOSIS — E11.21 TYPE 2 DIABETES MELLITUS WITH DIABETIC NEPHROPATHY, WITH LONG-TERM CURRENT USE OF INSULIN (H): Primary | ICD-10-CM

## 2017-12-04 DIAGNOSIS — Z98.890 STATUS POST ORIF OF FRACTURE OF ANKLE: ICD-10-CM

## 2017-12-04 DIAGNOSIS — R53.83 FATIGUE, UNSPECIFIED TYPE: ICD-10-CM

## 2017-12-04 DIAGNOSIS — D64.9 ANEMIA, UNSPECIFIED TYPE: ICD-10-CM

## 2017-12-04 DIAGNOSIS — G47.33 OSA (OBSTRUCTIVE SLEEP APNEA): ICD-10-CM

## 2017-12-04 DIAGNOSIS — E66.01 MORBID OBESITY (H): ICD-10-CM

## 2017-12-04 DIAGNOSIS — Z87.81 STATUS POST ORIF OF FRACTURE OF ANKLE: ICD-10-CM

## 2017-12-04 PROBLEM — E66.9 OBESITY, UNSPECIFIED CLASSIFICATION, UNSPECIFIED OBESITY TYPE, UNSPECIFIED WHETHER SERIOUS COMORBIDITY PRESENT: Status: ACTIVE | Noted: 2017-12-04

## 2017-12-04 LAB
ALBUMIN SERPL-MCNC: 3.2 G/DL (ref 3.4–5)
ALP SERPL-CCNC: 171 U/L (ref 40–150)
ALT SERPL W P-5'-P-CCNC: 27 U/L (ref 0–50)
ANION GAP SERPL CALCULATED.3IONS-SCNC: 13 MMOL/L (ref 3–14)
AST SERPL W P-5'-P-CCNC: 22 U/L (ref 0–45)
BASOPHILS # BLD AUTO: 0 10E9/L (ref 0–0.2)
BASOPHILS NFR BLD AUTO: 0.3 %
BILIRUB SERPL-MCNC: 0.3 MG/DL (ref 0.2–1.3)
BUN SERPL-MCNC: 47 MG/DL (ref 7–30)
CALCIUM SERPL-MCNC: 9.2 MG/DL (ref 8.5–10.1)
CHLORIDE SERPL-SCNC: 101 MMOL/L (ref 94–109)
CO2 SERPL-SCNC: 24 MMOL/L (ref 20–32)
CREAT SERPL-MCNC: 1.93 MG/DL (ref 0.52–1.04)
DIFFERENTIAL METHOD BLD: ABNORMAL
EOSINOPHIL # BLD AUTO: 0.2 10E9/L (ref 0–0.7)
EOSINOPHIL NFR BLD AUTO: 1.5 %
ERYTHROCYTE [DISTWIDTH] IN BLOOD BY AUTOMATED COUNT: 12.3 % (ref 10–15)
FERRITIN SERPL-MCNC: 1714 NG/ML (ref 8–252)
FOLATE SERPL-MCNC: 65.8 NG/ML
GFR SERPL CREATININE-BSD FRML MDRD: 25 ML/MIN/1.7M2
GLUCOSE SERPL-MCNC: 109 MG/DL (ref 70–99)
HBA1C MFR BLD: 5.8 % (ref 4.3–6)
HCT VFR BLD AUTO: 31.1 % (ref 35–47)
HGB BLD-MCNC: 9.9 G/DL (ref 11.7–15.7)
LYMPHOCYTES # BLD AUTO: 1.6 10E9/L (ref 0.8–5.3)
LYMPHOCYTES NFR BLD AUTO: 11.3 %
MAGNESIUM SERPL-MCNC: 1.9 MG/DL (ref 1.6–2.3)
MCH RBC QN AUTO: 30.3 PG (ref 26.5–33)
MCHC RBC AUTO-ENTMCNC: 31.8 G/DL (ref 31.5–36.5)
MCV RBC AUTO: 95 FL (ref 78–100)
MONOCYTES # BLD AUTO: 1 10E9/L (ref 0–1.3)
MONOCYTES NFR BLD AUTO: 7.2 %
NEUTROPHILS # BLD AUTO: 11.2 10E9/L (ref 1.6–8.3)
NEUTROPHILS NFR BLD AUTO: 79.7 %
PLATELET # BLD AUTO: 318 10E9/L (ref 150–450)
POTASSIUM SERPL-SCNC: 4.1 MMOL/L (ref 3.4–5.3)
PROT SERPL-MCNC: 7.4 G/DL (ref 6.8–8.8)
RBC # BLD AUTO: 3.27 10E12/L (ref 3.8–5.2)
SODIUM SERPL-SCNC: 138 MMOL/L (ref 133–144)
TSH SERPL DL<=0.005 MIU/L-ACNC: 2.15 MU/L (ref 0.4–4)
VIT B12 SERPL-MCNC: 1227 PG/ML (ref 193–986)
WBC # BLD AUTO: 14 10E9/L (ref 4–11)

## 2017-12-04 PROCEDURE — 80050 GENERAL HEALTH PANEL: CPT | Performed by: FAMILY MEDICINE

## 2017-12-04 PROCEDURE — 82746 ASSAY OF FOLIC ACID SERUM: CPT | Performed by: FAMILY MEDICINE

## 2017-12-04 PROCEDURE — 83735 ASSAY OF MAGNESIUM: CPT | Performed by: FAMILY MEDICINE

## 2017-12-04 PROCEDURE — 36415 COLL VENOUS BLD VENIPUNCTURE: CPT | Performed by: FAMILY MEDICINE

## 2017-12-04 PROCEDURE — 99214 OFFICE O/P EST MOD 30 MIN: CPT | Performed by: FAMILY MEDICINE

## 2017-12-04 PROCEDURE — 83036 HEMOGLOBIN GLYCOSYLATED A1C: CPT | Performed by: FAMILY MEDICINE

## 2017-12-04 PROCEDURE — 82728 ASSAY OF FERRITIN: CPT | Performed by: FAMILY MEDICINE

## 2017-12-04 PROCEDURE — 82607 VITAMIN B-12: CPT | Performed by: FAMILY MEDICINE

## 2017-12-04 ASSESSMENT — PAIN SCALES - GENERAL: PAINLEVEL: NO PAIN (0)

## 2017-12-04 NOTE — LETTER
[facility]  [address] [place]  [address]  [phone]        December 7, 2017    Ruth Rocha  [address]  [address]  [place] [address]        Dear Ruth,    Here are the lab results I called you about.     Main concern is the low hemoglobin ( red blood count ) and the high creatinine ( kidney test ).     Please call to schedule an appointment with the kidney specialist.     Please take a copy of these results to that appointment.     Diabetes test ( hemoglobin a1c ) is fine.     Results for orders placed or performed in visit on 12/04/17   Hemoglobin A1c   Result Value Ref Range    Hemoglobin A1C 5.8 4.3 - 6.0 %   Comprehensive metabolic panel   Result Value Ref Range    Sodium 138 133 - 144 mmol/L    Potassium 4.1 3.4 - 5.3 mmol/L    Chloride 101 94 - 109 mmol/L    Carbon Dioxide 24 20 - 32 mmol/L    Anion Gap 13 3 - 14 mmol/L    Glucose 109 (H) 70 - 99 mg/dL    Urea Nitrogen 47 (H) 7 - 30 mg/dL    Creatinine 1.93 (H) 0.52 - 1.04 mg/dL    GFR Estimate 25 (L) >60 mL/min/1.7m2    GFR Estimate If Black 31 (L) >60 mL/min/1.7m2    Calcium 9.2 8.5 - 10.1 mg/dL    Bilirubin Total 0.3 0.2 - 1.3 mg/dL    Albumin 3.2 (L) 3.4 - 5.0 g/dL    Protein Total 7.4 6.8 - 8.8 g/dL    Alkaline Phosphatase 171 (H) 40 - 150 U/L    ALT 27 0 - 50 U/L    AST 22 0 - 45 U/L   CBC with platelets differential   Result Value Ref Range    WBC 14.0 (H) 4.0 - 11.0 10e9/L    RBC Count 3.27 (L) 3.8 - 5.2 10e12/L    Hemoglobin 9.9 (L) 11.7 - 15.7 g/dL    Hematocrit 31.1 (L) 35.0 - 47.0 %    MCV 95 78 - 100 fl    MCH 30.3 26.5 - 33.0 pg    MCHC 31.8 31.5 - 36.5 g/dL    RDW 12.3 10.0 - 15.0 %    Platelet Count 318 150 - 450 10e9/L    Diff Method Automated Method     % Neutrophils 79.7 %    % Lymphocytes 11.3 %    % Monocytes 7.2 %    % Eosinophils 1.5 %    % Basophils 0.3 %    Absolute Neutrophil 11.2 (H) 1.6 - 8.3 10e9/L    Absolute Lymphocytes 1.6 0.8 - 5.3 10e9/L    Absolute  Monocytes 1.0 0.0 - 1.3 10e9/L    Absolute Eosinophils 0.2 0.0 - 0.7 10e9/L    Absolute Basophils 0.0 0.0 - 0.2 10e9/L   Magnesium   Result Value Ref Range    Magnesium 1.9 1.6 - 2.3 mg/dL   TSH with free T4 reflex   Result Value Ref Range    TSH 2.15 0.40 - 4.00 mU/L   Vitamin B12   Result Value Ref Range    Vitamin B12 1227 (H) 193 - 986 pg/mL   Folate   Result Value Ref Range    Folate 65.8 >5.4 ng/mL   Ferritin   Result Value Ref Range    Ferritin 1714 (H) 8 - 252 ng/mL       If you have any questions please call the clinic at [phone].    Sincerely,    Raymon PETTITL

## 2017-12-04 NOTE — PROGRESS NOTES
SUBJECTIVE:   Ruth Rocha is a 77 year old female who presents to clinic today for the following health issues:       Follow up from transitional care    none    Problem list and histories reviewed & adjusted, as indicated.  Additional history: as documented         Reviewed and updated as needed this visit by clinical staff  Allergies  Meds       Reviewed and updated as needed this visit by Provider          in transitional care for about 2 1/2 months    Got out Nov 28    Home since then    Going okay    Fell twice since home    Some shortness of breath    No injuries from falls, able to get up on own    1st time sat on recliner arm instead of chair    Ankle fracture Sept 10, 2 nights at [facility] then to transitional care    Surgery Sept 17    Cellulitis of left great toe    25 units of lantus insulin    Sugar down to 78    To 50s in nursing home twice    Still using cpap    Lives in own house    One level, no stairs    Mobile home    Has home health care     Not a productive cough      Physical Exam   Constitutional: She is oriented to person, place, and time and well-developed, well-nourished, and in no distress. No distress.   HENT:   Head: Normocephalic and atraumatic.   Right Ear: Tympanic membrane, external ear and ear canal normal.   Left Ear: Tympanic membrane, external ear and ear canal normal.   Nose: Nose normal.   Mouth/Throat: Oropharynx is clear and moist.   No sinus/ submandib tenderness     Neck: Neck supple. Carotid bruit is not present.   Cardiovascular: Normal rate, regular rhythm, normal heart sounds and intact distal pulses.  Exam reveals no gallop and no friction rub.    No murmur heard.  Pulmonary/Chest: Effort normal and breath sounds normal.   Musculoskeletal: She exhibits edema (stable per pt.  has compression stockings on ).   Lymphadenopathy:     She has no cervical adenopathy.   Neurological: She is alert and oriented to person, place, and time.   Skin: She is not diaphoretic.    Psychiatric: Mood and affect normal.   on right great toe, mild redness but not warm.  No discharge.  Lost the main nail, new one growing in.     Patient can move left ankle okay.  Had surgery October    ASSESSMENT / PLAN:  (E11.21,  Z79.4) Type 2 diabetes mellitus with diabetic nephropathy, with long-term current use of insulin (H)  (primary encounter diagnosis)  Comment: check hemoglobin a1c.  Patient has been having some low sugars.  Plan: Hemoglobin A1c        Encouraged her to decrease/ adjust the insulin doses as able     (E66.01) Morbid obesity (H)  Comment: chronic   Plan: keep working on healthy diet/exercise and wt loss     (D64.9) Anemia, unspecified type  Comment: check labs   Plan: CBC with platelets differential, Vitamin B12,         Folate, Ferritin             (R53.83) Fatigue, unspecified type  Comment: check   Plan: Comprehensive metabolic panel, Magnesium, TSH         with free T4 reflex             (Z96.7,  Z87.81) Status post ORIF of fracture of ankle  Comment: healing well.  Needs to be more active   Plan: as above     (G47.33) FLOR (obstructive sleep apnea)  Comment: has her machine   Plan: continue use of this       I reviewed the patient's medications, allergies, medical history, family history, and social history.    Raymon Hernández MD

## 2017-12-04 NOTE — NURSING NOTE
"Chief Complaint   Patient presents with     Annual Comprehensive Nursing Home     Health Maintenance       Initial /67 (BP Location: Left arm, Patient Position: Chair, Cuff Size: Adult Large)  Pulse 65  Temp 98.7  F (37.1  C) (Oral)  Wt 232 lb (105.2 kg)  SpO2 99%  Breastfeeding? No  BMI 40.45 kg/m2 Estimated body mass index is 40.45 kg/(m^2) as calculated from the following:    Height as of 4/14/17: 5' 3.5\" (1.613 m).    Weight as of this encounter: 232 lb (105.2 kg).  Medication Reconciliation: complete   Bonny Mejia CMA      "

## 2017-12-04 NOTE — PATIENT INSTRUCTIONS
Monitor sugars. Okay to decrease insulin dose as needed due to low sugars    We will send you lab results    Increase walking/ activity as able

## 2017-12-04 NOTE — TELEPHONE ENCOUNTER
Forms received from: [employer]   Phone number listed: [phone]   Fax listed: [phone]  Date received: 12-4-17  Form description: PT/OT/SN  Once forms are completed, please return to [employer] via fax.  Is patient requesting to be contacted when forms are completed: n/a  Form placed: provider desk  Julia Love

## 2017-12-04 NOTE — MR AVS SNAPSHOT
After Visit Summary   12/4/2017    Ruth Rocha    MRN: [id number]           Patient Information     Date Of Birth          1940        Visit Information        Provider Department      12/4/2017 2:40 PM Raymon Hernández MD [facility]        Today's Diagnoses     Type 2 diabetes mellitus with diabetic nephropathy, with long-term current use of insulin (H)    -  1    Anemia, unspecified type        Fatigue, unspecified type          Care Instructions    Monitor sugars. Okay to decrease insulin dose as needed due to low sugars    We will send you lab results    Increase walking/ activity as able              Follow-ups after your visit        Your next 10 appointments already scheduled     Jan 05, 2018  1:00 PM CST   [facility]    [address] [facility] [id number] [phone]            Jun 18, 2018  8:30 AM CDT   RETURN RETINA with Bryanna Jaime MD   [facility] ([facility])    [address]  [address]  [facility] [address] [phone]              Who to contact     If you have questions or need follow up information about today's clinic visit or your schedule please contact [facility] directly at [phone].  Normal or non-critical lab and imaging results will be communicated to you by MyChart, letter or phone within 4 business days after the clinic has received the results. If you do not hear from us within 7 days, please contact the clinic through MyChart or phone. If you have a critical or abnormal lab result, we will notify you by phone as soon as possible.  Submit refill requests through [employer] or call your pharmacy and they will forward the refill request to us. Please allow 3 business days for your refill to be completed.          Additional Information About Your Visit        [facility]  "Information     [employer] lets you send messages to your doctor, view your test results, renew your prescriptions, schedule appointments and more. To sign up, go to [email or web address] . Click on \"Log in\" on the left side of the screen, which will take you to the Welcome page. Then click on \"Sign up Now\" on the right side of the page.     You will be asked to enter the access code listed below, as well as some personal information. Please follow the directions to create your username and password.     Your access code is: [email or web address]  Expires: 2018  6:31 AM     Your access code will  in 90 days. If you need help or a new code, please call your [place] clinic or [phone].        Care EveryWhere ID     This is your Care EveryWhere ID. This could be used by other organizations to access your [place] medical records  [id number]        Your Vitals Were     Pulse Temperature Pulse Oximetry Breastfeeding? BMI (Body Mass Index)       65 98.7  F (37.1  C) (Oral) 99% No 40.45 kg/m2        Blood Pressure from Last 3 Encounters:   17 120/58   17 137/64   17 124/73    Weight from Last 3 Encounters:   17 232 lb (105.2 kg)   17 234 lb (106.1 kg)   17 235 lb (106.6 kg)              We Performed the Following     CBC with platelets differential     Comprehensive metabolic panel     Ferritin     Folate     Hemoglobin A1c     Magnesium     TSH with free T4 reflex     Vitamin B12          Today's Medication Changes          These changes are accurate as of: 17  3:36 PM.  If you have any questions, ask your nurse or doctor.               These medicines have changed or have updated prescriptions.        Dose/Directions    ACE/ARB/ARNI NOT PRESCRIBED (INTENTIONAL)   This may have changed:  Another medication with the same name was removed. Continue taking this medication, and follow the directions you see here.   Used for:  Type 2 diabetes, HbA1C goal < 8% (H)   Changed by:  " Raymon Hernández MD        by Other route continuous prn Reported on 5/5/2017   Refills:  0       SENEXON-S 8.6-50 MG per tablet   This may have changed:  Another medication with the same name was removed. Continue taking this medication, and follow the directions you see here.   Used for:  Chronic constipation   Generic drug:  senna-docusate   Changed by:  Raymon Hernández MD        TAKE 1-4 TABLETS BY MOUTH 2 TIMES DAILY AS NEEDED CONSTIPATION   Quantity:  60 tablet   Refills:  4         Stop taking these medicines if you haven't already. Please contact your care team if you have questions.     furosemide 20 MG tablet   Commonly known as:  LASIX   Stopped by:  Raymon Hernández MD           insulin aspart 100 UNIT/ML injection   Commonly known as:  NovoLOG PEN   Stopped by:  Raymon Hernández MD                    Primary Care Provider Office Phone # Fax #    Raymon Hernández [phone]       [address] [facility] [address]        Equal Access to Services     [facility]: Hadii jennie ku hadasho Soomaali, waaxda luqadaha, qaybta kaalmada adeegyada, waxay idiin hayaan ifrah perry . So [facility] [phone].    ATENCIÓN: Si habla español, tiene a becker disposición servicios gratuitos de asistencia lingüística. Llame al [phone].    We comply with applicable federal civil rights laws and [place] laws. We do not discriminate on the basis of race, color, national origin, age, disability, sex, sexual orientation, or gender identity.            Thank you!     Thank you for choosing [facility]  for your care. Our goal is always to provide you with excellent care. Hearing back from our patients is one way we can continue to improve our services. Please take a few minutes to complete the written survey that you may receive in the mail after your visit with us. Thank you!             Your Updated Medication List - Protect others around you: Learn how to safely  use, store and throw away your medicines at www.disposemymeds.org.          This list is accurate as of: 12/4/17  3:36 PM.  Always use your most recent med list.                   Brand Name Dispense Instructions for use Diagnosis    ACE/ARB/ARNI NOT PRESCRIBED (INTENTIONAL)      by Other route continuous prn Reported on 5/5/2017    Type 2 diabetes, HbA1C goal < 8% (H)       ACETAMINOPHEN PO      Take 500 mg by mouth        aspirin 325 MG EC tablet      Take 325 mg by mouth daily.        blood glucose monitoring test strip    no brand specified    100 strip    Use to test blood sugars 3 times daily or as directed    Type 2 diabetes mellitus with diabetic nephropathy, with long-term current use of insulin (H)       calcitRIOL 0.5 MCG capsule    ROCALTROL    30 capsule    Take 1 capsule (0.5 mcg) by mouth daily        carvedilol 25 MG tablet    COREG    60 tablet    Take 1 tablet (25 mg) by mouth 2 times daily (with meals)        cephALEXin 500 MG capsule    KEFLEX    14 capsule    Take 1 capsule (500 mg) by mouth 2 times daily for 7 days    Cellulitis of toe of left foot       dorzolamide-timolol 2-0.5 % ophthalmic solution    COSOPT    10 mL    Place 1 drop into the right eye 2 times daily    Primary open angle glaucoma of both eyes, mild stage       ferrous sulfate 325 (65 FE) MG tablet    IRON    180 tablet    Take 1 tablet (325 mg) by mouth 2 times daily    Anemia, unspecified type       fluticasone 50 MCG/ACT spray    FLONASE    16 mL    SPRAY 1-2 SPRAYS INTO BOTH NOSTRILS DAILY    Nasal congestion       GuaiFENesin 200 MG/5ML Liqd     1 Bottle    Take 5 mLs by mouth 2 times daily as needed    Viral URI with cough       hydrochlorothiazide 25 MG tablet    HYDRODIURIL    90 tablet    Take 1 tablet (25 mg) by mouth daily    Hypertension goal BP (blood pressure) < 140/90       hydrocortisone 1 % cream    CORTAID    60 g    Apply sparingly to affected area three times daily as needed to affected areas    Dermatitis        insulin glargine 100 UNIT/ML injection    LANTUS SOLOSTAR    15 mL    30 units at bedtime or as directed    Type 2 diabetes mellitus with diabetic nephropathy, with long-term current use of insulin (H)       insulin pen needle 31G X 8 MM     100 each    Use as directed with Lantus; patient often needs 2 shots daily, so  Please provide enough needles for bid dosing    Diabetes mellitus with background retinopathy (H)       latanoprost 0.005 % ophthalmic solution    XALATAN    1 Bottle    Place 1 drop into both eyes At Bedtime    Primary open angle glaucoma of both eyes, mild stage       metoclopramide 10 MG tablet    REGLAN    360 tablet    Take 1 tablet by mouth. Take 1 tablet by mouth 4 times a day before meals and nightly as needed    Dyspepsia       MICROLET LANCETS Misc     100 each    1 Device 3 times daily.    Type 2 diabetes mellitus with diabetic nephropathy, with long-term current use of insulin (H)       MULTIVITAMIN TABS   OR      1 TABLET DAILY        omeprazole 40 MG capsule    priLOSEC    90 capsule    Take 1 capsule (40 mg) by mouth daily    Gastroesophageal reflux disease, esophagitis presence not specified       order for DME     1 each    Equipment being ordered: CPAP tubing, mask, and all needed equipment    FLOR (obstructive sleep apnea)       oxybutynin 5 MG tablet    DITROPAN    270 tablet    TAKE 1 TABLET (5 MG) BY MOUTH 3 TIMES DAILY AS NEEDED    Overactive bladder       potassium chloride 10 MEQ tablet    K-TAB,KLOR-CON    90 tablet    Take 1 tablet (10 mEq) by mouth daily    Hypokalemia       SENEXON-S 8.6-50 MG per tablet   Generic drug:  senna-docusate     60 tablet    TAKE 1-4 TABLETS BY MOUTH 2 TIMES DAILY AS NEEDED CONSTIPATION    Chronic constipation       simvastatin 10 MG tablet    ZOCOR    90 tablet    Take 1 tablet (10 mg) by mouth At Bedtime    Hyperlipidemia LDL goal <100       vitamin D 1000 UNITS capsule      Take 1 capsule by mouth daily.

## 2017-12-05 ENCOUNTER — MEDICAL CORRESPONDENCE (OUTPATIENT)
Dept: HEALTH INFORMATION MANAGEMENT | Facility: CLINIC | Age: 77
End: 2017-12-05

## 2017-12-05 ENCOUNTER — TELEPHONE (OUTPATIENT)
Dept: FAMILY MEDICINE | Facility: CLINIC | Age: 77
End: 2017-12-05

## 2017-12-05 NOTE — TELEPHONE ENCOUNTER
Forms received from: [facility]   Phone number listed: [phone]   Fax listed: [phone]  Date received: 12/05/2017  Form description: CMN and Prescription for Therapeutic Footwear  Once forms are completed, please return to [facility] via fax.  Is patient requesting to be contacted when forms are completed: NA    Form placed: In provider's basket  Phuong Rodrigues

## 2017-12-06 ENCOUNTER — TELEPHONE (OUTPATIENT)
Dept: FAMILY MEDICINE | Facility: CLINIC | Age: 77
End: 2017-12-06

## 2017-12-06 DIAGNOSIS — N18.4 CKD (CHRONIC KIDNEY DISEASE) STAGE 4, GFR 15-29 ML/MIN (H): ICD-10-CM

## 2017-12-06 DIAGNOSIS — B37.0 THRUSH: Primary | ICD-10-CM

## 2017-12-06 DIAGNOSIS — D64.9 ANEMIA, UNSPECIFIED TYPE: ICD-10-CM

## 2017-12-06 RX ORDER — NYSTATIN 100000/ML
500000 SUSPENSION, ORAL (FINAL DOSE FORM) ORAL 4 TIMES DAILY
Qty: 140 ML | Refills: 1 | Status: SHIPPED | OUTPATIENT
Start: 2017-12-06 | End: 2017-12-13

## 2017-12-06 NOTE — TELEPHONE ENCOUNTER
Attempt # 1    Was call answered?  No, left detailed message on confidential voice mail for  Radha: [phone]  Will hold TE open for questions.     Adelina Verdugo RN  [facility]

## 2017-12-06 NOTE — TELEPHONE ENCOUNTER
Reason for Call:  Other call back    Detailed comments: Danyell from [facility] called in. They have a few questions about her hemoglobin.     Phone Number Patient can be reached at: [phone] option # 3    Best Time:     Can we leave a detailed message on this number? YES    Call taken on 12/6/2017 at 2:36 PM by Jazmyn Rubio

## 2017-12-06 NOTE — PROGRESS NOTES
Here are the lab results I called you about.    Main concern is the low hemoglobin ( red blood count ) and the high creatinine ( kidney test ).    Please call to schedule an appointment with the kidney specialist.    Please take a copy of these results to that appointment.    Diabetes test ( hemoglobin a1c ) is fine.    Raymon Hernández MD

## 2017-12-06 NOTE — TELEPHONE ENCOUNTER
Worsening anemia with no other obvious cause.  This is main reason for seeing nephrology again.      Please call    Raymon Hernández MD

## 2017-12-06 NOTE — TELEPHONE ENCOUNTER
Reason for call:  Patient reporting a symptom    Symptom or request: Thrush    Duration (how long have symptoms been present): Unknown    Have you been treated for this before? No    Additional comments: Radha from [facility] called and stated she is with patient now. She stated patient has thrush and needs to get magic mouthwash prescribed. Please advise and call Radha back to discuss.    Phone Number patient can be reached at:  Radha: [phone]    Best Time:  Anytime    Can we leave a detailed message on this number:  YES    Call taken on 12/6/2017 at 1:46 PM by Connie Sherwood

## 2017-12-06 NOTE — TELEPHONE ENCOUNTER
Attempt # 1  Called patient at home number.  Was call answered?  Yes, Danyell is wondering why provider wants patient to schedule appointment with the kidney specialist? Danyell states the Cr is not much different from last test in November.       Adelina Verdugo RN  [facility]

## 2017-12-06 NOTE — TELEPHONE ENCOUNTER
I called patient regarding lab results.  Hemoglobin is down, creat up some.  Patient has seen Dr. Nunez of nephrology but not for 4-5 months she states.  I advised she call and get an appointment soon with nephrology.  She agreed.    Raymon Hernández MD

## 2017-12-06 NOTE — TELEPHONE ENCOUNTER
"Patient was seen 12/4/17 by Dr. Hernández, no referral in Epic, apparently patient has ongoing relationship with outside kidney specialist.    I see 12/4/17 hgb 9.9.  Was 11.4 on 5/22/17.    I called the number provided for Danyell at \"[facility]\".    On hold, started in phone cue as caller #2.    Call picked up at about 5 minutes.     Transferred to the nurse, I advised nurse of Dr. Hernández's explanation for why patient needs to be seen.    She states will let them know.    Deepali Srinivasan RN  [facility]      "

## 2017-12-06 NOTE — TELEPHONE ENCOUNTER
There are various different formulations of magic mouthwash.  It has multiple ingredients usually.    If thrush is the problem, we can treat with just the nystatin liquid.  I sent in prescription.    Please call.    Raymon Hernández MD

## 2017-12-07 ENCOUNTER — TELEPHONE (OUTPATIENT)
Dept: FAMILY MEDICINE | Facility: CLINIC | Age: 77
End: 2017-12-07

## 2017-12-07 PROBLEM — N18.4 CKD (CHRONIC KIDNEY DISEASE) STAGE 4, GFR 15-29 ML/MIN (H): Status: ACTIVE | Noted: 2017-12-07

## 2017-12-07 NOTE — TELEPHONE ENCOUNTER
Attempt # 1  Called patient at home number.  Was call answered?  Yes, spoke to Radha - relayed referral information - no questions - verbalizes understanding.     Adelina Verdugo RN  [facility]

## 2017-12-07 NOTE — TELEPHONE ENCOUNTER
Reason for Call:  Other Lab Results    Detailed comments: Kathie from [facility] is calling to request that we fax over patient's lab reports re: Dr. Hernández's referral.   Please fax them to [phone].    Phone Number Patient can be reached at: Other phone number:  [phone]    Best Time: anytime    Can we leave a detailed message on this number? YES    Call taken on 12/7/2017 at 3:54 PM by Yolanda Canales

## 2017-12-07 NOTE — TELEPHONE ENCOUNTER
Radha returned call to triage.    She needs specific directions on the nystatin also has questions for scheduling patient with a kidney specialist.    Sofia Sanderson RN [facility] Triage.

## 2017-12-07 NOTE — TELEPHONE ENCOUNTER
Called Radha - left detailed message with instructions for Nystatin on confidential voice mail.    Routing to PCP to review and advise.    Requesting referral to kidney specialist.    Adelina Verdugo RN  [facility]

## 2017-12-07 NOTE — TELEPHONE ENCOUNTER
I did referral for [facility], where she has been seen in past    Ramyon Hernández MD

## 2017-12-08 ENCOUNTER — TELEPHONE (OUTPATIENT)
Dept: FAMILY MEDICINE | Facility: CLINIC | Age: 77
End: 2017-12-08

## 2017-12-08 DIAGNOSIS — E11.3299 DIABETES MELLITUS WITH BACKGROUND RETINOPATHY (H): ICD-10-CM

## 2017-12-08 DIAGNOSIS — J20.9 ACUTE BRONCHITIS, UNSPECIFIED ORGANISM: Primary | ICD-10-CM

## 2017-12-08 RX ORDER — AZITHROMYCIN 250 MG/1
TABLET, FILM COATED ORAL
Qty: 6 TABLET | Refills: 1 | Status: SHIPPED | OUTPATIENT
Start: 2017-12-08 | End: 2018-07-20

## 2017-12-08 NOTE — TELEPHONE ENCOUNTER
Called Caren - relayed below orders, read back correctly, no questions.    Adelina Verdugo RN  [facility]

## 2017-12-08 NOTE — TELEPHONE ENCOUNTER
Reason for Call: Request for an order or referral:    Order or referral being requested: Caren is calling from [facility]/ saw patient today and she was recently discharged from Transitional Care Unit on 11/28/17/ Caren needs to clarify if patient should be taking 20mg or 40mg of omeprazole/ prior to hospitalization patient was taking 40mg.  Caren saw patient on Wednesday and she noticed she had an upper respiratory infection today she noticed some Left lung rubs and upper right rubs and lower right was very diminshed.  Does Dr Hernández want to do anything? Stopped Keflex on 12/3/17 but that was for Cellulitis.    Date needed: as soon as possible    Has the patient been seen by the PCP for this problem? YES    Additional comments:     Phone number Patient can be reached at:  Other phone number:  Caren/ [facility]/ [phone]    Best Time:  any    Can we leave a detailed message on this number?  YES    Call taken on 12/8/2017 at 9:04 AM by Bailey Garcia

## 2017-12-08 NOTE — TELEPHONE ENCOUNTER
Stay with the 40 mg omeprazole dose.    Given her overall health status, prudent to cover for bronchitis given these lung findings.  I send in prescription for azithromycin to her pharmacy.    Please call.    Raymon Hernández MD

## 2017-12-08 NOTE — TELEPHONE ENCOUNTER
Routing to PCP to review and advise.    See below message    Adelina Verdugo RN  [facility]

## 2017-12-11 RX ORDER — PEN NEEDLE, DIABETIC 31 GX5/16"
NEEDLE, DISPOSABLE MISCELLANEOUS
Qty: 100 EACH | Refills: 2 | Status: SHIPPED | OUTPATIENT
Start: 2017-12-11

## 2017-12-11 NOTE — TELEPHONE ENCOUNTER
Insulin pen needles      Last Written Prescription Date: 10/4/16  Last Fill Quantity: 100,  # refills: 2   Last Office Visit with [facility], [facility] or [facility] prescribing provider: 12/4/17    Requested Prescriptions   Pending Prescriptions Disp Refills     B-D U/F 31G X 8 MM insulin pen needle [Pharmacy Med Name: BD ULTRA-FINE PEN NDL [id number]]  2     Sig: USE AS DIRECTED - 2 DOSES PER DAY    Diabetic Supplies Protocol Passed    12/8/2017  9:31 AM       Passed - Patient is 18 years of age or older       Passed - Patient has had appt within past 6 mos    IV to PO - Antibiotics     None                    Prescription approved per [facility] Refill Protocol.  Deepali Srinivasan RN  [facility]

## 2017-12-22 ENCOUNTER — TRANSFERRED RECORDS (OUTPATIENT)
Dept: HEALTH INFORMATION MANAGEMENT | Facility: CLINIC | Age: 77
End: 2017-12-22

## 2017-12-27 ENCOUNTER — MEDICAL CORRESPONDENCE (OUTPATIENT)
Dept: HEALTH INFORMATION MANAGEMENT | Facility: CLINIC | Age: 77
End: 2017-12-27

## 2017-12-27 ENCOUNTER — TELEPHONE (OUTPATIENT)
Dept: FAMILY MEDICINE | Facility: CLINIC | Age: 77
End: 2017-12-27

## 2017-12-27 NOTE — TELEPHONE ENCOUNTER
Forms received from: [employer]   Phone number listed: [phone]   Fax listed: [phone]  Date received: 12-27-17  Form description: provider orders  Once forms are completed, please return to [facility] via fax.  Is patient requesting to be contacted when forms are completed: n/a  Form placed: provider desk  Julia Love

## 2018-01-04 DIAGNOSIS — Z79.4 TYPE 2 DIABETES MELLITUS WITH DIABETIC NEPHROPATHY, WITH LONG-TERM CURRENT USE OF INSULIN (H): ICD-10-CM

## 2018-01-04 DIAGNOSIS — E11.21 TYPE 2 DIABETES MELLITUS WITH DIABETIC NEPHROPATHY, WITH LONG-TERM CURRENT USE OF INSULIN (H): ICD-10-CM

## 2018-01-04 NOTE — TELEPHONE ENCOUNTER
.  Requested Prescriptions   Pending Prescriptions Disp Refills     blood glucose monitoring (ONEYDA MICROLET) lancets [Pharmacy Med Name: MICROLET LANCETS] 100 each 3    Last Written Prescription Date:  11/10/16  Last Fill Quantity: 100,  # refills: 11   Last Office Visit with [facility], P or [facility] prescribing provider:  12/4/17   Future Office Visit:      Sig: USE AS DIRECTED 3 TIMES A DAY    Diabetic Supplies Protocol Passed    1/4/2018  1:07 PM       Passed - Patient is 18 years of age or older       Passed - Patient has had appt within past 6 mos    IV to PO - Antibiotics     None

## 2018-01-04 NOTE — TELEPHONE ENCOUNTER
Prescription approved per [facility] Refill Protocol.  Sofia Sanderson, RN [facility] Triage.

## 2018-01-05 ENCOUNTER — OFFICE VISIT (OUTPATIENT)
Dept: OPHTHALMOLOGY | Facility: CLINIC | Age: 78
End: 2018-01-05
Payer: COMMERCIAL

## 2018-01-05 DIAGNOSIS — H18.30 CORNEAL EPITHELIAL DEFECT: ICD-10-CM

## 2018-01-05 DIAGNOSIS — E11.3299 DIABETES MELLITUS WITH BACKGROUND RETINOPATHY (H): Primary | ICD-10-CM

## 2018-01-05 DIAGNOSIS — Z96.1 PSEUDOPHAKIA: ICD-10-CM

## 2018-01-05 DIAGNOSIS — H18.9 KERATOPATHY: ICD-10-CM

## 2018-01-05 DIAGNOSIS — H52.4 PRESBYOPIA: ICD-10-CM

## 2018-01-05 DIAGNOSIS — H43.813 POSTERIOR VITREOUS DETACHMENT, BILATERAL: ICD-10-CM

## 2018-01-05 DIAGNOSIS — H40.1131 PRIMARY OPEN ANGLE GLAUCOMA OF BOTH EYES, MILD STAGE: ICD-10-CM

## 2018-01-05 PROCEDURE — 92015 DETERMINE REFRACTIVE STATE: CPT | Performed by: OPHTHALMOLOGY

## 2018-01-05 PROCEDURE — 92014 COMPRE OPH EXAM EST PT 1/>: CPT | Performed by: OPHTHALMOLOGY

## 2018-01-05 ASSESSMENT — REFRACTION_MANIFEST
OS_CYLINDER: SPHERE
OS_ADD: +3.50
OD_AXIS: 007
OD_SPHERE: PLANO
OD_ADD: +3.50
OS_SPHERE: +3.75
OD_CYLINDER: +1.00

## 2018-01-05 ASSESSMENT — VISUAL ACUITY
OS_CC: 20/400
OS_CC: 16-
CORRECTION_TYPE: GLASSES
METHOD: SNELLEN - LINEAR
OD_CC: 7
OD_CC: 20/200

## 2018-01-05 ASSESSMENT — REFRACTION_WEARINGRX
SPECS_TYPE: PAL
OD_CYLINDER: +2.50
OS_AXIS: 034
OS_CYLINDER: +0.75
OS_ADD: +3.25
OS_SPHERE: +4.25
OD_AXIS: 157
OD_ADD: +3.25
OD_SPHERE: +1.75

## 2018-01-05 ASSESSMENT — CUP TO DISC RATIO
OD_RATIO: 0.7
OS_RATIO: 0.6

## 2018-01-05 ASSESSMENT — EXTERNAL EXAM - LEFT EYE: OS_EXAM: 2+ BROW PTOSIS

## 2018-01-05 ASSESSMENT — TONOMETRY
IOP_METHOD: APPLANATION
OD_IOP_MMHG: 13
OS_IOP_MMHG: 11

## 2018-01-05 ASSESSMENT — CONF VISUAL FIELD
OD_NORMAL: 1
OS_NORMAL: 1

## 2018-01-05 ASSESSMENT — EXTERNAL EXAM - RIGHT EYE: OD_EXAM: 2+ BROW PTOSIS

## 2018-01-05 NOTE — MR AVS SNAPSHOT
After Visit Summary   1/5/2018    Ruth Rocha    MRN: [id number]           Patient Information     Date Of Birth          1940        Visit Information        Provider Department      1/5/2018 1:00 PM Abraham Byrd MD [facility]        Today's Diagnoses     Diabetes mellitus with background retinopathy (H)    -  1    Primary open angle glaucoma of both eyes, mild stage        Posterior vitreous detachment, bilateral        Hx of corneal epithelial defect, left eye        Hx of keratopathy - hx of PTK, ou (MP)        Pseudophakia, Yag Caps, ou         History of phototherapeutic keratectomy, os        Presbyopia          Care Instructions    Continue same medication  Continue same glasses   Return visit 6 months for intraocular pressure check, glaucoma OCT and Sarkar Visual Field.    Abraham Byrd M.D.    Diabetes weakens the blood vessels all over the body, including the eyes. Damage to the blood vessels in the eyes can cause swelling or bleeding into part of the eye (called the retina). This is called diabetic retinopathy (SHAMEKA-tin--pu-thee). If not treated, this disease can cause vision loss or blindness.   Symptoms may include blurred or distorted vision, but many people have no symptoms. It's important to see your eye doctor regularly to check for problems.   Early treatment and good control can help protect your vision. Here are the things you can do to help prevent vision loss:      1. Keep your blood sugar levels under tight control.      2. Bring high blood pressure under control.      3. No smoking.      4. Have yearly dilated eye exams.           Follow-ups after your visit        Your next 10 appointments already scheduled     Jun 18, 2018  8:30 AM CDT   RETURN RETINA with Bryanna Jaime MD   Eye Clinic ([facility])    [address]  [address]  [address]  [place] [address]   [phone]              Who to  "contact     If you have questions or need follow up information about today's clinic visit or your schedule please contact [facility] directly at [phone].  Normal or non-critical lab and imaging results will be communicated to you by MyChart, letter or phone within 4 business days after the clinic has received the results. If you do not hear from us within 7 days, please contact the clinic through MyChart or phone. If you have a critical or abnormal lab result, we will notify you by phone as soon as possible.  Submit refill requests through [employer] or call your pharmacy and they will forward the refill request to us. Please allow 3 business days for your refill to be completed.          Additional Information About Your Visit        [employer] Information     [employer] lets you send messages to your doctor, view your test results, renew your prescriptions, schedule appointments and more. To sign up, go to [email or web address] . Click on \"Log in\" on the left side of the screen, which will take you to the Welcome page. Then click on \"Sign up Now\" on the right side of the page.     You will be asked to enter the access code listed below, as well as some personal information. Please follow the directions to create your username and password.     Your access code is: [email or web address]  Expires: 2018  6:31 AM     Your access code will  in 90 days. If you need help or a new code, please call your [facility] clinic or [phone].        Care EveryWhere ID     This is your Care EveryWhere ID. This could be used by other organizations to access your [facility] medical records  [id number]         Blood Pressure from Last 3 Encounters:   17 120/58   17 137/64   17 124/73    Weight from Last 3 Encounters:   17 105.2 kg (232 lb)   17 106.1 kg (234 lb)   17 106.6 kg (235 lb)              We Performed the Following     EYE EXAM (SIMPLE-NONBILLABLE)     REFRACTIVE STATUS     "    Primary Care Provider Office Phone # Fax #    Raymon Hernández [phone]       [address] [facility] [address]        Equal Access to Services     RODOLFO JACK : Shamir jennie bernal basil Schulz, watoryda luqadaha, qaybta kadeonda carmina, bernabe hanesnmirella david. So [facility] [phone].    ATENCIÓN: Si habla español, tiene a becker disposición servicios gratuitos de asistencia lingüística. Llame al [phone].    We comply with applicable federal civil rights laws and [place] laws. We do not discriminate on the basis of race, color, national origin, age, disability, sex, sexual orientation, or gender identity.            Thank you!     Thank you for choosing [facility]  for your care. Our goal is always to provide you with excellent care. Hearing back from our patients is one way we can continue to improve our services. Please take a few minutes to complete the written survey that you may receive in the mail after your visit with us. Thank you!             Your Updated Medication List - Protect others around you: Learn how to safely use, store and throw away your medicines at www.disposemymeds.org.          This list is accurate as of: 1/5/18  2:13 PM.  Always use your most recent med list.                   Brand Name Dispense Instructions for use Diagnosis    ACE/ARB/ARNI NOT PRESCRIBED (INTENTIONAL)      by Other route continuous prn Reported on 5/5/2017    Type 2 diabetes, HbA1C goal < 8% (H)       ACETAMINOPHEN PO      Take 500 mg by mouth        aspirin 325 MG EC tablet      Take 325 mg by mouth daily.        azithromycin 250 MG tablet    ZITHROMAX    6 tablet    2 po daily x one day then 1 po daily x 4 days    Acute bronchitis, unspecified organism       B-D U/F 31G X 8 MM   Generic drug:  insulin pen needle     100 each    USE AS DIRECTED - 2 DOSES PER DAY    Diabetes mellitus with background retinopathy (H)       blood glucose monitoring test  strip    no brand specified    100 strip    Use to test blood sugars 3 times daily or as directed    Type 2 diabetes mellitus with diabetic nephropathy, with long-term current use of insulin (H)       calcitRIOL 0.5 MCG capsule    ROCALTROL    30 capsule    Take 1 capsule (0.5 mcg) by mouth daily        carvedilol 25 MG tablet    COREG    60 tablet    Take 1 tablet (25 mg) by mouth 2 times daily (with meals)        dorzolamide-timolol 2-0.5 % ophthalmic solution    COSOPT    10 mL    Place 1 drop into the right eye 2 times daily    Primary open angle glaucoma of both eyes, mild stage       ferrous sulfate 325 (65 FE) MG tablet    IRON    180 tablet    Take 1 tablet (325 mg) by mouth 2 times daily    Anemia, unspecified type       fluticasone 50 MCG/ACT spray    FLONASE    16 mL    SPRAY 1-2 SPRAYS INTO BOTH NOSTRILS DAILY    Nasal congestion       GuaiFENesin 200 MG/5ML Liqd     1 Bottle    Take 5 mLs by mouth 2 times daily as needed    Viral URI with cough       hydrochlorothiazide 25 MG tablet    HYDRODIURIL    90 tablet    Take 1 tablet (25 mg) by mouth daily    Hypertension goal BP (blood pressure) < 140/90       hydrocortisone 1 % cream    CORTAID    60 g    Apply sparingly to affected area three times daily as needed to affected areas    Dermatitis       insulin glargine 100 UNIT/ML injection    LANTUS SOLOSTAR    15 mL    30 units at bedtime or as directed    Type 2 diabetes mellitus with diabetic nephropathy, with long-term current use of insulin (H)       latanoprost 0.005 % ophthalmic solution    XALATAN    1 Bottle    Place 1 drop into both eyes At Bedtime    Primary open angle glaucoma of both eyes, mild stage       metoclopramide 10 MG tablet    REGLAN    360 tablet    Take 1 tablet by mouth. Take 1 tablet by mouth 4 times a day before meals and nightly as needed    Dyspepsia       * MICROLET LANCETS Misc     100 each    1 Device 3 times daily.    Type 2 diabetes mellitus with diabetic nephropathy, with  long-term current use of insulin (H)       * blood glucose monitoring lancets     100 each    USE AS DIRECTED 3 TIMES A DAY    Type 2 diabetes mellitus with diabetic nephropathy, with long-term current use of insulin (H)       MULTIVITAMIN TABS   OR      1 TABLET DAILY        omeprazole 40 MG capsule    priLOSEC    90 capsule    Take 1 capsule (40 mg) by mouth daily    Gastroesophageal reflux disease, esophagitis presence not specified       order for DME     1 each    Equipment being ordered: CPAP tubing, mask, and all needed equipment    FLOR (obstructive sleep apnea)       oxybutynin 5 MG tablet    DITROPAN    270 tablet    TAKE 1 TABLET (5 MG) BY MOUTH 3 TIMES DAILY AS NEEDED    Overactive bladder       potassium chloride 10 MEQ tablet    K-TAB,KLOR-CON    90 tablet    Take 1 tablet (10 mEq) by mouth daily    Hypokalemia       SENEXON-S 8.6-50 MG per tablet   Generic drug:  senna-docusate     60 tablet    TAKE 1-4 TABLETS BY MOUTH 2 TIMES DAILY AS NEEDED CONSTIPATION    Chronic constipation       simvastatin 10 MG tablet    ZOCOR    90 tablet    Take 1 tablet (10 mg) by mouth At Bedtime    Hyperlipidemia LDL goal <100       vitamin D 1000 UNITS capsule      Take 1 capsule by mouth daily.        * Notice:  This list has 2 medication(s) that are the same as other medications prescribed for you. Read the directions carefully, and ask your doctor or other care provider to review them with you.

## 2018-01-05 NOTE — PATIENT INSTRUCTIONS
Continue same medication  Continue same glasses   Return visit 6 months for intraocular pressure check, glaucoma OCT and Sarkar Visual Field.    Abraham Byrd M.D.    Diabetes weakens the blood vessels all over the body, including the eyes. Damage to the blood vessels in the eyes can cause swelling or bleeding into part of the eye (called the retina). This is called diabetic retinopathy (SHAMEKA-tin-AH-puh-thee). If not treated, this disease can cause vision loss or blindness.   Symptoms may include blurred or distorted vision, but many people have no symptoms. It's important to see your eye doctor regularly to check for problems.   Early treatment and good control can help protect your vision. Here are the things you can do to help prevent vision loss:      1. Keep your blood sugar levels under tight control.      2. Bring high blood pressure under control.      3. No smoking.      4. Have yearly dilated eye exams.

## 2018-01-07 PROBLEM — Z98.890: Status: RESOLVED | Noted: 2017-01-02 | Resolved: 2018-01-07

## 2018-01-08 DIAGNOSIS — E11.21 TYPE 2 DIABETES MELLITUS WITH DIABETIC NEPHROPATHY, WITH LONG-TERM CURRENT USE OF INSULIN (H): ICD-10-CM

## 2018-01-08 DIAGNOSIS — Z79.4 TYPE 2 DIABETES MELLITUS WITH DIABETIC NEPHROPATHY, WITH LONG-TERM CURRENT USE OF INSULIN (H): ICD-10-CM

## 2018-01-08 NOTE — TELEPHONE ENCOUNTER
Prescription approved per [facility] [place].  Sofia Sanderson, RN [facility] Triage.

## 2018-01-08 NOTE — TELEPHONE ENCOUNTER
Requested Prescriptions   Pending Prescriptions Disp Refills     blood glucose monitoring (ONEYDA MICROLET) lancets [Pharmacy Med Name: MICROLET LANCETS] 100 each 3    Last Written Prescription Date:  1-4-18  Last Fill Quantity: 100,  # refills: 3   Last Office Visit with G, P or [facility] prescribing provider:  12-4-17   Future Office Visit:      Sig: USE AS DIRECTED 3 TIMES A DAY    Diabetic Supplies Protocol Passed    1/8/2018  3:06 PM       Passed - Patient is 18 years of age or older       Passed - Patient has had appt within past 6 mos    IV to PO - Antibiotics     None

## 2018-01-15 ENCOUNTER — TRANSFERRED RECORDS (OUTPATIENT)
Dept: HEALTH INFORMATION MANAGEMENT | Facility: CLINIC | Age: 78
End: 2018-01-15

## 2018-01-17 ENCOUNTER — TELEPHONE (OUTPATIENT)
Dept: FAMILY MEDICINE | Facility: CLINIC | Age: 78
End: 2018-01-17
Payer: COMMERCIAL

## 2018-01-17 DIAGNOSIS — L03.032 CELLULITIS OF LEFT TOE: Primary | ICD-10-CM

## 2018-01-17 DIAGNOSIS — E11.21 TYPE 2 DIABETES MELLITUS WITH DIABETIC NEPHROPATHY, WITH LONG-TERM CURRENT USE OF INSULIN (H): ICD-10-CM

## 2018-01-17 DIAGNOSIS — Z79.4 TYPE 2 DIABETES MELLITUS WITH DIABETIC NEPHROPATHY, WITH LONG-TERM CURRENT USE OF INSULIN (H): ICD-10-CM

## 2018-01-17 NOTE — TELEPHONE ENCOUNTER
Forms received from: [place]   Phone number listed: [phone]   Fax listed: [phone]  Date received: 1-17-18  Form description: [facility]  Once forms are completed, please return to [facility] via fax.  Is patient requesting to be contacted when forms are completed: n/a  Form placed: RN folder  Julia Love    Form given to RN to review med list.  Order pended for provider to sign.

## 2018-01-18 DIAGNOSIS — Z53.9 DIAGNOSIS NOT YET DEFINED: Primary | ICD-10-CM

## 2018-01-18 PROCEDURE — G0180 MD CERTIFICATION HHA PATIENT: HCPCS | Performed by: FAMILY MEDICINE

## 2018-01-18 RX ORDER — CARVEDILOL 25 MG/1
12.5 TABLET ORAL 2 TIMES DAILY WITH MEALS
Qty: 60 TABLET | Refills: 1 | COMMUNITY
Start: 2018-01-18 | End: 2019-01-21 | Stop reason: DRUGHIGH

## 2018-01-18 RX ORDER — CALCITRIOL 0.25 UG/1
0.25 CAPSULE, LIQUID FILLED ORAL
COMMUNITY
Start: 2018-01-18

## 2018-01-18 RX ORDER — ACETAMINOPHEN 500 MG
1000 TABLET ORAL 3 TIMES DAILY PRN
COMMUNITY
Start: 2018-01-18

## 2018-01-18 RX ORDER — NYSTATIN 100000 [USP'U]/G
POWDER TOPICAL
COMMUNITY
Start: 2018-01-18 | End: 2018-12-17

## 2018-01-18 NOTE — TELEPHONE ENCOUNTER
Medications reconciled on [facility] form, changes noted on form.  Form to PCP for signature.    Adelina Verdugo RN  [facility]

## 2018-01-29 DIAGNOSIS — Z79.4 TYPE 2 DIABETES MELLITUS WITH DIABETIC NEPHROPATHY, WITH LONG-TERM CURRENT USE OF INSULIN (H): ICD-10-CM

## 2018-01-29 DIAGNOSIS — E11.21 TYPE 2 DIABETES MELLITUS WITH DIABETIC NEPHROPATHY, WITH LONG-TERM CURRENT USE OF INSULIN (H): ICD-10-CM

## 2018-01-29 NOTE — TELEPHONE ENCOUNTER
"Requested Prescriptions   Pending Prescriptions Disp Refills     LANTUS SOLOSTAR 100 UNIT/ML soln [Pharmacy Med Name: LANTUS SOLOSTAR 100 UNIT/ML]  3    Last Written Prescription Date:  18  Last Fill Quantity: 15ml,  # refills: 3   Last Office Visit with [facility] provider:  17   Future Office Visit:      Si UNITS AT BEDTIME OR AS DIRECTED    Long Acting Insulin Protocol Passed    2018 10:03 AM       Passed - Blood pressure less than 140/90 in past 6 months    BP Readings from Last 3 Encounters:   17 120/58   17 137/64   17 124/73                Passed - LDL on file in past 12 months    Recent Labs   Lab Test  17   0800   LDL  65            Passed - Microalbumin on file in past 12 months    Recent Labs   Lab Test  17   0807   MICROL  23   UMALCR  18.62            Passed - Serum creatinine on file in past 12 months    Recent Labs   Lab Test  17   1545   CR  1.93*            Passed - HgbA1C in past 3 or 6 months    Recent Labs   Lab Test  17   1545   A1C  5.8            Passed - Patient is age 18 or older       Passed - Recent visit with authorizing provider's specialty in past 6 months    Patient had office visit in the last 6 months or has a visit in the next 30 days with authorizing provider.  See \"Patient Info\" tab in inbasket, or \"Choose Columns\" in Meds & Orders section of the refill encounter.              "

## 2018-01-31 RX ORDER — INSULIN GLARGINE 100 [IU]/ML
INJECTION, SOLUTION SUBCUTANEOUS
Qty: 20 ML | Refills: 3 | Status: SHIPPED | OUTPATIENT
Start: 2018-01-31 | End: 2019-03-02

## 2018-01-31 NOTE — TELEPHONE ENCOUNTER
Patient said that pharmacy stated she needs an office visit and she is unclear of the reason for the visit. [phone]

## 2018-01-31 NOTE — TELEPHONE ENCOUNTER
Patient was seen 12/4/17 for diabetes.  No follow up plan for diabetes noted, patient was advised to see her kidney specialist.    Last insulin Rx in Epic is historical.    I called [facility], they have no notes indicating they advised patient she needs to be seen.  I called and spoke to Ruth, she is looking for refill on lantus, states she takes about 25 units nightly.   She had labs but will call to follow up with nephrology.   I advised her Dr. Hernández should be able to refill the lantus, we'll call if he has concerns.    Routed to Dr. Hernández to address insulin refill.    Routing refill request to provider for review/approval because:  Medication is reported/historical    Deepali Srinivasan RN  [facility]

## 2018-02-01 DIAGNOSIS — E11.21 TYPE 2 DIABETES MELLITUS WITH DIABETIC NEPHROPATHY, WITH LONG-TERM CURRENT USE OF INSULIN (H): ICD-10-CM

## 2018-02-01 DIAGNOSIS — Z79.4 TYPE 2 DIABETES MELLITUS WITH DIABETIC NEPHROPATHY, WITH LONG-TERM CURRENT USE OF INSULIN (H): ICD-10-CM

## 2018-02-01 NOTE — TELEPHONE ENCOUNTER
Requested Prescriptions   Pending Prescriptions Disp Refills     ONEYDA CONTOUR NEXT test strip [Pharmacy Med Name: CONTOUR NEXT STRIPS] 100 strip 9    Last Written Prescription Date:  1/8/18  Last Fill Quantity: 100,  # refills: 11   Last Office Visit with [facility] provider:  12/4/17   Future Office Visit:      Sig: USE TO TEST BLOOD SUGARS 3 TIMES DAILY OR AS DIRECTED    Diabetic Supplies Protocol Passed    2/1/2018 10:50 AM       Passed - Patient is 18 years of age or older       Passed - Patient has had appt within past 6 mos    IV to PO - Antibiotics     None

## 2018-03-19 ENCOUNTER — TRANSFERRED RECORDS (OUTPATIENT)
Dept: HEALTH INFORMATION MANAGEMENT | Facility: CLINIC | Age: 78
End: 2018-03-19

## 2018-03-21 ENCOUNTER — OFFICE VISIT (OUTPATIENT)
Dept: FAMILY MEDICINE | Facility: CLINIC | Age: 78
End: 2018-03-21
Payer: COMMERCIAL

## 2018-03-21 ENCOUNTER — RADIANT APPOINTMENT (OUTPATIENT)
Dept: GENERAL RADIOLOGY | Facility: CLINIC | Age: 78
End: 2018-03-21
Attending: FAMILY MEDICINE
Payer: COMMERCIAL

## 2018-03-21 VITALS
BODY MASS INDEX: 35.26 KG/M2 | WEIGHT: 206.5 LBS | HEIGHT: 64 IN | OXYGEN SATURATION: 99 % | TEMPERATURE: 97.1 F | HEART RATE: 73 BPM

## 2018-03-21 DIAGNOSIS — M54.2 NECK PAIN: ICD-10-CM

## 2018-03-21 DIAGNOSIS — Z79.4 TYPE 2 DIABETES MELLITUS WITH DIABETIC NEPHROPATHY, WITH LONG-TERM CURRENT USE OF INSULIN (H): ICD-10-CM

## 2018-03-21 DIAGNOSIS — E11.21 TYPE 2 DIABETES MELLITUS WITH DIABETIC NEPHROPATHY, WITH LONG-TERM CURRENT USE OF INSULIN (H): ICD-10-CM

## 2018-03-21 DIAGNOSIS — M54.2 NECK PAIN: Primary | ICD-10-CM

## 2018-03-21 PROCEDURE — 72040 X-RAY EXAM NECK SPINE 2-3 VW: CPT | Mod: FY

## 2018-03-21 PROCEDURE — 99213 OFFICE O/P EST LOW 20 MIN: CPT | Performed by: FAMILY MEDICINE

## 2018-03-21 RX ORDER — LIDOCAINE 50 MG/G
PATCH TOPICAL
COMMUNITY
Start: 2018-03-19 | End: 2018-12-17

## 2018-03-21 RX ORDER — TRAMADOL HYDROCHLORIDE 50 MG/1
50 TABLET ORAL EVERY 6 HOURS PRN
Qty: 20 TABLET | Refills: 0 | Status: SHIPPED | OUTPATIENT
Start: 2018-03-21 | End: 2018-12-17

## 2018-03-21 ASSESSMENT — PAIN SCALES - GENERAL: PAINLEVEL: NO PAIN (0)

## 2018-03-21 NOTE — MR AVS SNAPSHOT
After Visit Summary   3/21/2018    Ruth Rocha    MRN: [id number]           Patient Information     Date Of Birth          1940        Visit Information        Provider Department      3/21/2018 10:00 AM Raymon Hernández MD [facility]        Today's Diagnoses     Neck pain    -  1      Care Instructions    Continue tylenol as needed      Due to kidneys, avoid ibuprofen type meds    Use the prescription tramadol for more severe pain    See physical therapy    Follow up as needed based on symptoms           Follow-ups after your visit        Additional Services     [facility] PT, HAND, AND CHIROPRACTIC REFERRAL       **This order will print in the [facility] Scheduling Office**    Physical Therapy, Hand Therapy and Chiropractic Care are available through:    *[facility]  *[facility]  *[facility]    Call one number to schedule at any of the above locations: [phone].    Your provider has referred you to: Physical Therapy at [facility] or [facility]    Indication/Reason for Referral: neck pain    Onset of Illness: about 1 1/2 weeks ago   Therapy Orders: Evaluate and Treat  Special Programs: None  Special Request: None    Yomi Celaya      Additional Comments for the Therapist or Chiropractor: needs to work on range of motion; basically muscular pain.  Went to emergency room once.    Please be aware that coverage of these services is subject to the terms and limitations of your health insurance plan.  Call member services at your health plan with any benefit or coverage questions.      Please bring the following to your appointment:    *Your personal calendar for scheduling future appointments  *Comfortable clothing                  Your next 10 appointments already scheduled     Jun 18, 2018  8:30 AM CDT   RETURN RETINA with Bryanna Jaime MD   Eye Clinic ([facility])    [place] [address]  " [address]  [facility] [address]   [phone]            2018  1:15 PM CDT   Return Visit with Abraham Byrd MD   [facility]    [address] [facility] [address] [facility]  [place] [address] [phone]              Who to contact     If you have questions or need follow up information about today's clinic visit or your schedule please contact [facility] directly at [phone].  Normal or non-critical lab and imaging results will be communicated to you by [employer], letter or phone within 4 business days after the clinic has received the results. If you do not hear from us within 7 days, please contact the clinic through [employer] or phone. If you have a critical or abnormal lab result, we will notify you by phone as soon as possible.  Submit refill requests through [employer] or call your pharmacy and they will forward the refill request to us. Please allow 3 business days for your refill to be completed.          Additional Information About Your Visit        [employer] Information     [employer] lets you send messages to your doctor, view your test results, renew your prescriptions, schedule appointments and more. To sign up, go to [email or web address] . Click on \"Log in\" on the left side of the screen, which will take you to the Welcome page. Then click on \"Sign up Now\" on the right side of the page.     You will be asked to enter the access code listed below, as well as some personal information. Please follow the directions to create your username and password.     Your access code is: [email or web address]  Expires: 2018 10:48 AM     Your access code will  in 90 days. If you need help or a new code, please call your [facility] or [phone].        Care EveryWhere ID     This is your Care EveryWhere ID. This could be used by other organizations to access your [facility] medical records  [id number]        Your Vitals Were     Pulse " "Temperature Height Pulse Oximetry Breastfeeding? BMI (Body Mass Index)    73 97.1  F (36.2  C) (Oral) 5' 3.5\" (1.613 m) 99% No 36.01 kg/m2       Blood Pressure from Last 3 Encounters:   12/04/17 120/58   11/29/17 137/64   11/27/17 124/73    Weight from Last 3 Encounters:   03/21/18 206 lb 8 oz (93.7 kg)   12/04/17 232 lb (105.2 kg)   11/29/17 234 lb (106.1 kg)              We Performed the Following     [facility] PT, HAND, AND CHIROPRACTIC REFERRAL          Today's Medication Changes          These changes are accurate as of 3/21/18 10:48 AM.  If you have any questions, ask your nurse or doctor.               Start taking these medicines.        Dose/Directions    traMADol 50 MG tablet   Commonly known as:  ULTRAM   Used for:  Neck pain   Started by:  Raymon Hernández MD        Dose:  50 mg   Take 1 tablet (50 mg) by mouth every 6 hours as needed for severe pain   Quantity:  20 tablet   Refills:  0            Where to get your medicines      Some of these will need a paper prescription and others can be bought over the counter.  Ask your nurse if you have questions.     Bring a paper prescription for each of these medications     traMADol 50 MG tablet                Primary Care Provider Office Phone # Fax #    Raymon Hernández [phone]       [address] [facility] [address]        Equal Access to Services     [facility]: Hadii jennie mendozao Soreji, waaxda luqadaha, qaybta kaalmada adetriciayada, bernabe perry . So [facility] [phone].    ATENCIÓN: Si habla español, tiene a becker disposición servicios gratuitos de asistencia lingüística. Llame al [phone].    We comply with applicable federal civil rights laws and [place] laws. We do not discriminate on the basis of race, color, national origin, age, disability, sex, sexual orientation, or gender identity.            Thank you!     Thank you for choosing [facility]  for your care. Our " goal is always to provide you with excellent care. Hearing back from our patients is one way we can continue to improve our services. Please take a few minutes to complete the written survey that you may receive in the mail after your visit with us. Thank you!             Your Updated Medication List - Protect others around you: Learn how to safely use, store and throw away your medicines at www.disposemymeds.org.          This list is accurate as of 3/21/18 10:48 AM.  Always use your most recent med list.                   Brand Name Dispense Instructions for use Diagnosis    ACE/ARB/ARNI NOT PRESCRIBED (INTENTIONAL)      by Other route continuous prn Reported on 5/5/2017    Type 2 diabetes, HbA1C goal < 8% (H)       acetaminophen 500 MG tablet    TYLENOL     Take 2 tablets (1,000 mg) by mouth 3 times daily as needed        aspirin 325 MG EC tablet      Take 325 mg by mouth daily.        azithromycin 250 MG tablet    ZITHROMAX    6 tablet    2 po daily x one day then 1 po daily x 4 days    Acute bronchitis, unspecified organism       B-D U/F 31G X 8 MM   Generic drug:  insulin pen needle     100 each    USE AS DIRECTED - 2 DOSES PER DAY    Diabetes mellitus with background retinopathy (H)       blood glucose monitoring test strip    ONEYDA CONTOUR NEXT    100 strip    1 strip by In Vitro route 3 times daily Use to test blood sugar 3 times daily    Type 2 diabetes mellitus with diabetic nephropathy, with long-term current use of insulin (H)       calcitRIOL 0.25 MCG capsule    ROCALTROL     Take 1 capsule (0.25 mcg) by mouth Every Mon, Wed, Fri Morning        carvedilol 25 MG tablet    COREG    60 tablet    Take 0.5 tablets (12.5 mg) by mouth 2 times daily (with meals)        dorzolamide-timolol 2-0.5 % ophthalmic solution    COSOPT    10 mL    Place 1 drop into the right eye 2 times daily    Primary open angle glaucoma of both eyes, mild stage       ferrous sulfate 325 (65 FE) MG tablet    IRON    180 tablet    Take  1 tablet (325 mg) by mouth 2 times daily    Anemia, unspecified type       fluticasone 50 MCG/ACT spray    FLONASE    16 mL    SPRAY 1-2 SPRAYS INTO BOTH NOSTRILS DAILY    Nasal congestion       GuaiFENesin 200 MG/5ML Liqd     1 Bottle    Take 5 mLs by mouth 2 times daily as needed    Viral URI with cough       hydrochlorothiazide 25 MG tablet    HYDRODIURIL    90 tablet    Take 1 tablet (25 mg) by mouth daily    Hypertension goal BP (blood pressure) < 140/90       hydrocortisone 1 % cream    CORTAID    60 g    Apply sparingly to affected area three times daily as needed to affected areas    Dermatitis       * LANTUS SOLOSTAR 100 UNIT/ML injection   Generic drug:  insulin glargine     15 mL    30 units at bedtime or as directed Taking 25 units at HS    Type 2 diabetes mellitus with diabetic nephropathy, with long-term current use of insulin (H)       * LANTUS SOLOSTAR 100 UNIT/ML injection   Generic drug:  insulin glargine     20 mL    30 UNITS AT BEDTIME OR AS DIRECTED    Type 2 diabetes mellitus with diabetic nephropathy, with long-term current use of insulin (H)       latanoprost 0.005 % ophthalmic solution    XALATAN    1 Bottle    Place 1 drop into both eyes At Bedtime    Primary open angle glaucoma of both eyes, mild stage       lidocaine 5 % Patch    LIDODERM     Apply 1 patch to painful area of skin for up to 12 hours within a 24-hour period.        metoclopramide 10 MG tablet    REGLAN    360 tablet    Take 1 tablet by mouth. Take 1 tablet by mouth 4 times a day before meals and nightly as needed    Dyspepsia       * MICROLET LANCETS Misc     100 each    1 Device 3 times daily.    Type 2 diabetes mellitus with diabetic nephropathy, with long-term current use of insulin (H)       * blood glucose monitoring lancets     100 each    USE AS DIRECTED 3 TIMES A DAY    Type 2 diabetes mellitus with diabetic nephropathy, with long-term current use of insulin (H)       MULTIVITAMIN TABS   OR      1 TABLET DAILY         NovoLOG VIAL 100 UNITS/ML injection   Generic drug:  insulin aspart      Inject Subcutaneous daily TAKE 5 UNITS PLUS SLIDING SCALE, <150=0,  VERY LOW INSULIN RESISTANCE DOSING  For BG <150 give 0 units For  - 200 give 1 unit. For  - 250 give 2 units. For  - 300 give 3 units. For  - 350 give 4 units. For  - 1000 give 6 units        nystatin 885754 UNIT/GM Powd    MYCOSTATIN     Apply under breasts 2 times per day        omeprazole 40 MG capsule    priLOSEC    90 capsule    Take 1 capsule (40 mg) by mouth daily    Gastroesophageal reflux disease, esophagitis presence not specified       order for DME     1 each    Equipment being ordered: CPAP tubing, mask, and all needed equipment    FLOR (obstructive sleep apnea)       oxybutynin 5 MG tablet    DITROPAN    270 tablet    TAKE 1 TABLET (5 MG) BY MOUTH 3 TIMES DAILY AS NEEDED    Overactive bladder       potassium chloride 10 MEQ tablet    K-TAB,KLOR-CON    90 tablet    Take 1 tablet (10 mEq) by mouth daily    Hypokalemia       SENEXON-S 8.6-50 MG per tablet   Generic drug:  senna-docusate     60 tablet    TAKE 1-4 TABLETS BY MOUTH 2 TIMES DAILY AS NEEDED CONSTIPATION    Chronic constipation       simvastatin 10 MG tablet    ZOCOR    90 tablet    Take 1 tablet (10 mg) by mouth At Bedtime    Hyperlipidemia LDL goal <100       traMADol 50 MG tablet    ULTRAM    20 tablet    Take 1 tablet (50 mg) by mouth every 6 hours as needed for severe pain    Neck pain       vitamin D 1000 UNITS capsule      Take 1 capsule by mouth daily.        * Notice:  This list has 4 medication(s) that are the same as other medications prescribed for you. Read the directions carefully, and ask your doctor or other care provider to review them with you.

## 2018-03-21 NOTE — PATIENT INSTRUCTIONS
Continue tylenol as needed      Due to kidneys, avoid ibuprofen type meds    Use the prescription tramadol for more severe pain    See physical therapy    Follow up as needed based on symptoms

## 2018-03-21 NOTE — PROGRESS NOTES
SUBJECTIVE:   Ruth Rocha is a 78 year old female who presents to clinic today for the following health issues:       ED/UC Followup:    Facility:  [facility]  Date of visit: 03/19/2018  Reason for visit: neck injury  Current Status: stable but still having pain. Has been using the lidocaine patches         none    Problem list and histories reviewed & adjusted, as indicated.  Additional history: as documented         Reviewed and updated as needed this visit by clinical staff  Tobacco  Allergies  Meds  Med Hx  Surg Hx  Fam Hx  Soc Hx      Reviewed and updated as needed this visit by Provider          2 days ago to [place]    Now  Has had symptoms for about 1 1/2 weeks    Didn't sleep on it funny  Last two nights had to sleep on back, with 3 pillows    Used cool pack    Using some tylenol    Took 7 pills yesterday 500mg          Was sitting in car and twisted neck to side to get out of car    Pain actually worse since going to emergency room    Patch helps a bit    Hurts when riding in car and it hits a bumps    Twisting neck painful    No visual / hearing changes    Full physical not done     Mentation and affect are fine    No tremor of speech or extremity    Patient has limited range of motion of neck especially twisting head right and left    Flexion/ extension a little better but not quite normal    Same with bending head to right and left     No radiculopathy    Good sensation and strength in both distal upper extremities    Radial pulses okay    No pain on palpation over neck spine, or anywhere over back    Some soreness right side of neck    No swelling present  She has the pain patches on the neck, back and right side    Minimal tenderness over scalp    Xray cspine nonworrisome.  Typical degenerative changes    ASSESSMENT / PLAN:  (M54.2) Neck pain  (primary encounter diagnosis)  Comment: likely muscular.  Discussed in detail.  Prudent to work on range of motion exercises.  See physical therapy.   Tramadol prn severe pain.  No history of seizures.  Plan: XR Cervical Spine 2/3 Views, traMADol (ULTRAM)         50 MG tablet, [facility] PT, HAND, AND CHIROPRACTIC         REFERRAL           Follow up prn symptom.  If worsening, be seen promptly.      (E11.21,  Z79.4) Type 2 diabetes mellitus with diabetic nephropathy, with long-term current use of insulin (H)  Comment: last hemoglobin a1c excellent  Plan: no change in meds       I reviewed the patient's medications, allergies, medical history, family history, and social history.    Raymon Hernández MD

## 2018-03-28 ENCOUNTER — THERAPY VISIT (OUTPATIENT)
Dept: PHYSICAL THERAPY | Facility: CLINIC | Age: 78
End: 2018-03-28
Payer: MEDICARE

## 2018-03-28 DIAGNOSIS — M54.2 NECK PAIN: Primary | ICD-10-CM

## 2018-03-28 PROCEDURE — G8979 MOBILITY GOAL STATUS: HCPCS | Mod: GP | Performed by: PHYSICAL THERAPIST

## 2018-03-28 PROCEDURE — 97161 PT EVAL LOW COMPLEX 20 MIN: CPT | Mod: GP | Performed by: PHYSICAL THERAPIST

## 2018-03-28 PROCEDURE — 97110 THERAPEUTIC EXERCISES: CPT | Mod: GP | Performed by: PHYSICAL THERAPIST

## 2018-03-28 PROCEDURE — G8978 MOBILITY CURRENT STATUS: HCPCS | Mod: GP | Performed by: PHYSICAL THERAPIST

## 2018-03-28 NOTE — PROGRESS NOTES
[facility] Initial Evaluation  Subjective:  Patient is a 78 year old female presenting with rehab cervical spine hpi. The history is provided by the patient.   Ruth Rocha is a 78 year old female with a cervical spine condition.  Condition occurred with:  Degenerative joint disease.  Condition occurred: at home.  This is a new condition  Pt reports she sprained her neck when she was getting out of a car 2/15/18. DOI.  She describes Rt sided neck pain w/o radiating pain.  .    Patient reports pain:  Cervical right side.    Pain is described as sharp and is intermittent and reported as 8/10.  Associated symptoms:  Loss of motion/stiffness. Pain is the same all the time.  Symptoms are exacerbated by rotating head, looking up or down and certain positions and relieved by rest.  Since onset symptoms are unchanged.        General health as reported by patient is fair.  Pertinent medical history includes:  Kidney disease, anemia, sleep disorder/apnea, high blood pressure, heart problems, osteoarthritis, osteoporosis, history of fractures, diabetes and overweight.    Other surgeries include:  Orthopedic surgery, other and cancer surgery (ankle ).  Current medications:  Cardiac medication, pain medication and high blood pressure medication.  Current occupation is Retired .        Barriers include:  None as reported by the patient.    Red flags:  None as reported by the patient.                        Objective:  System              Cervical/Thoracic Evaluation    AROM:  AROM Cervical:    Flexion:          ++ 50% loss   Extension:       Retxns 50% loss   Rotation:         Left: 50% loss      Right: 50% loss   Side Bend:      Left:     Right:         Cervical Myotomes:  not assessed                  DTR's:  not assessed          Cervical Dermatomes:  not assessed                    Cervical Palpation:      Tenderness present at Right:    Upper Trap and Erector Spinae      Spinal Segmental Conclusions:     Level:  Hypo at T2, T1, C7, C6 and C5      Cord Sign:  not assessed                                            General     ROS    Assessment/Plan:    Patient is a 78 year old female with cervical complaints.    Patient has the following significant findings with corresponding treatment plan.                Diagnosis 1:  Neck pain   Pain -  hot/cold therapy, manual therapy, self management and home program  Decreased ROM/flexibility - manual therapy, therapeutic exercise and home program  Decreased joint mobility - manual therapy and therapeutic exercise  Decreased proprioception - neuro re-education and therapeutic activities  Impaired muscle performance - neuro re-education  Decreased function - therapeutic activities  Impaired posture - neuro re-education    Therapy Evaluation Codes:   1) History comprised of:   Personal factors that impact the plan of care:      Age, Coping style, Overall behavior pattern and Past/current experiences.    Comorbidity factors that impact the plan of care are:      Diabetes, Heart problems, High blood pressure, Menopausal, Osteoarthritis, Overweight and kidney dis., anemia .     Medications impacting care: Cardiac, High blood pressure and Pain.  2) Examination of Body Systems comprised of:   Body structures and functions that impact the plan of care:      Cervical spine.   Activity limitations that impact the plan of care are:      Cooking, Reading/Computer work and Sleeping.  3) Clinical presentation characteristics are:   Stable/Uncomplicated.  4) Decision-Making    Low complexity using standardized patient assessment instrument and/or measureable assessment of functional outcome.  Cumulative Therapy Evaluation is: Low complexity.    Previous and current functional limitations:  (See Goal Flow Sheet for this information)    Short term and Long term goals: (See Goal Flow Sheet for this information)     Communication ability:  Patient appears to be able to clearly communicate and  understand verbal and written communication and follow directions correctly.  Treatment Explanation - The following has been discussed with the patient:   RX ordered/plan of care  Anticipated outcomes  Possible risks and side effects  This patient would benefit from PT intervention to resume normal activities.   Rehab potential is good.    Frequency:  1 X week, once daily  Duration:  for 6 weeks  Discharge Plan:  Achieve all LTG.  Independent in home treatment program.  Reach maximal therapeutic benefit.    Please refer to the daily flowsheet for treatment today, total treatment time and time spent performing 1:1 timed codes.

## 2018-03-28 NOTE — MR AVS SNAPSHOT
After Visit Summary   3/28/2018    Ruth Rocha    MRN: [id number]           Patient Information     Date Of Birth          1940        Visit Information        Provider Department      3/28/2018 9:30 AM Ryan Izquierdo, PT [facility]        Today's Diagnoses     Neck pain    -  1       Follow-ups after your visit        Your next 10 appointments already scheduled     Apr 11, 2018 12:50 PM CDT   LEONOR Extremity with Charles Leon PT   [facility] ([facility]    [address] [facility] [address]   [phone]            Jun 18, 2018  8:30 AM CDT   RETURN RETINA with Bryanna Jaime MD   [facility] ([facility])    [address]  [facility] [address]   [phone]            Jul 06, 2018  1:15 PM CDT   Return Visit with Abraham Byrd MD   [facility]    [address] [facility] [address]   [phone]              Who to contact     If you have questions or need follow up information about today's clinic visit or your schedule please contact [facility] directly at [phone].  Normal or non-critical lab and imaging results will be communicated to you by Apalyahart, letter or phone within 4 business days after the clinic has received the results. If you do not hear from us within 7 days, please contact the clinic through Apalyahart or phone. If you have a critical or abnormal lab result, we will notify you by phone as soon as possible.  Submit refill requests through Absio or call your pharmacy and they will forward the refill request to us. Please allow 3 business days for your refill to be completed.          Additional Information About Your Visit        [employer] Information     Absio lets you send messages to your doctor,  "view your test results, renew your prescriptions, schedule appointments and more. To sign up, go to [email or web address] . Click on \"Log in\" on the left side of the screen, which will take you to the Welcome page. Then click on \"Sign up Now\" on the right side of the page.     You will be asked to enter the access code listed below, as well as some personal information. Please follow the directions to create your username and password.     Your access code is: [email or web address]  Expires: 2018 10:48 AM     Your access code will  in 90 days. If you need help or a new code, please call your [place] clinic or [phone].        Care EveryWhere ID     This is your Care EveryWhere ID. This could be used by other organizations to access your [place] medical records  [id number]         Blood Pressure from Last 3 Encounters:   17 120/58   17 137/64   17 124/73    Weight from Last 3 Encounters:   18 93.7 kg (206 lb 8 oz)   17 105.2 kg (232 lb)   17 106.1 kg (234 lb)              We Performed the Following     HC PT EVAL, LOW COMPLEXITY     LEONOR CERT REPORT     THERAPEUTIC EXERCISES        Primary Care Provider Office Phone # Fax #    Raymon Hernández [phone]       [address] [facility] [address]        Equal Access to Services     RODOLFO SANTIAGO AH: Hadii aad ku hadasho Soreji, waaxda luqadaha, qaybta kaalmada adeegyada, bernabe perry . So [facility] [phone].    ATENCIÓN: Si habla español, tiene a becker disposición servicios gratuitos de asistencia lingüística. Lldeborah al [phone].    We comply with applicable federal civil rights laws and [place] laws. We do not discriminate on the basis of race, color, national origin, age, disability, sex, sexual orientation, or gender identity.            Thank you!     Thank you for choosing [facility]  for your care. Our goal is always to " provide you with excellent care. Hearing back from our patients is one way we can continue to improve our services. Please take a few minutes to complete the written survey that you may receive in the mail after your visit with us. Thank you!             Your Updated Medication List - Protect others around you: Learn how to safely use, store and throw away your medicines at www.disposemymeds.org.          This list is accurate as of 3/28/18  3:28 PM.  Always use your most recent med list.                   Brand Name Dispense Instructions for use Diagnosis    ACE/ARB/ARNI NOT PRESCRIBED (INTENTIONAL)      by Other route continuous prn Reported on 5/5/2017    Type 2 diabetes, HbA1C goal < 8% (H)       acetaminophen 500 MG tablet    TYLENOL     Take 2 tablets (1,000 mg) by mouth 3 times daily as needed        aspirin 325 MG EC tablet      Take 325 mg by mouth daily.        azithromycin 250 MG tablet    ZITHROMAX    6 tablet    2 po daily x one day then 1 po daily x 4 days    Acute bronchitis, unspecified organism       B-D U/F 31G X 8 MM   Generic drug:  insulin pen needle     100 each    USE AS DIRECTED - 2 DOSES PER DAY    Diabetes mellitus with background retinopathy (H)       blood glucose monitoring test strip    ONEYDA CONTOUR NEXT    100 strip    1 strip by In Vitro route 3 times daily Use to test blood sugar 3 times daily    Type 2 diabetes mellitus with diabetic nephropathy, with long-term current use of insulin (H)       calcitRIOL 0.25 MCG capsule    ROCALTROL     Take 1 capsule (0.25 mcg) by mouth Every Mon, Wed, Fri Morning        carvedilol 25 MG tablet    COREG    60 tablet    Take 0.5 tablets (12.5 mg) by mouth 2 times daily (with meals)        dorzolamide-timolol 2-0.5 % ophthalmic solution    COSOPT    10 mL    Place 1 drop into the right eye 2 times daily    Primary open angle glaucoma of both eyes, mild stage       ferrous sulfate 325 (65 FE) MG tablet    IRON    180 tablet    Take 1 tablet (325 mg)  by mouth 2 times daily    Anemia, unspecified type       fluticasone 50 MCG/ACT spray    FLONASE    16 mL    SPRAY 1-2 SPRAYS INTO BOTH NOSTRILS DAILY    Nasal congestion       GuaiFENesin 200 MG/5ML Liqd     1 Bottle    Take 5 mLs by mouth 2 times daily as needed    Viral URI with cough       hydrochlorothiazide 25 MG tablet    HYDRODIURIL    90 tablet    Take 1 tablet (25 mg) by mouth daily    Hypertension goal BP (blood pressure) < 140/90       hydrocortisone 1 % cream    CORTAID    60 g    Apply sparingly to affected area three times daily as needed to affected areas    Dermatitis       * LANTUS SOLOSTAR 100 UNIT/ML injection   Generic drug:  insulin glargine     15 mL    30 units at bedtime or as directed Taking 25 units at HS    Type 2 diabetes mellitus with diabetic nephropathy, with long-term current use of insulin (H)       * LANTUS SOLOSTAR 100 UNIT/ML injection   Generic drug:  insulin glargine     20 mL    30 UNITS AT BEDTIME OR AS DIRECTED    Type 2 diabetes mellitus with diabetic nephropathy, with long-term current use of insulin (H)       latanoprost 0.005 % ophthalmic solution    XALATAN    1 Bottle    Place 1 drop into both eyes At Bedtime    Primary open angle glaucoma of both eyes, mild stage       lidocaine 5 % Patch    LIDODERM     Apply 1 patch to painful area of skin for up to 12 hours within a 24-hour period.        metoclopramide 10 MG tablet    REGLAN    360 tablet    Take 1 tablet by mouth. Take 1 tablet by mouth 4 times a day before meals and nightly as needed    Dyspepsia       * MICROLET LANCETS Misc     100 each    1 Device 3 times daily.    Type 2 diabetes mellitus with diabetic nephropathy, with long-term current use of insulin (H)       * blood glucose monitoring lancets     100 each    USE AS DIRECTED 3 TIMES A DAY    Type 2 diabetes mellitus with diabetic nephropathy, with long-term current use of insulin (H)       MULTIVITAMIN TABS   OR      1 TABLET DAILY        NovoLOG VIAL 100  UNITS/ML injection   Generic drug:  insulin aspart      Inject Subcutaneous daily TAKE 5 UNITS PLUS SLIDING SCALE, <150=0,  VERY LOW INSULIN RESISTANCE DOSING  For BG <150 give 0 units For  - 200 give 1 unit. For  - 250 give 2 units. For  - 300 give 3 units. For  - 350 give 4 units. For  - 1000 give 6 units        nystatin 353026 UNIT/GM Powd    MYCOSTATIN     Apply under breasts 2 times per day        omeprazole 40 MG capsule    priLOSEC    90 capsule    Take 1 capsule (40 mg) by mouth daily    Gastroesophageal reflux disease, esophagitis presence not specified       order for DME     1 each    Equipment being ordered: CPAP tubing, mask, and all needed equipment    FLOR (obstructive sleep apnea)       oxybutynin 5 MG tablet    DITROPAN    270 tablet    TAKE 1 TABLET (5 MG) BY MOUTH 3 TIMES DAILY AS NEEDED    Overactive bladder       potassium chloride 10 MEQ tablet    K-TAB,KLOR-CON    90 tablet    Take 1 tablet (10 mEq) by mouth daily    Hypokalemia       SENEXON-S 8.6-50 MG per tablet   Generic drug:  senna-docusate     60 tablet    TAKE 1-4 TABLETS BY MOUTH 2 TIMES DAILY AS NEEDED CONSTIPATION    Chronic constipation       simvastatin 10 MG tablet    ZOCOR    90 tablet    Take 1 tablet (10 mg) by mouth At Bedtime    Hyperlipidemia LDL goal <100       traMADol 50 MG tablet    ULTRAM    20 tablet    Take 1 tablet (50 mg) by mouth every 6 hours as needed for severe pain    Neck pain       vitamin D 1000 UNITS capsule      Take 1 capsule by mouth daily.        * Notice:  This list has 4 medication(s) that are the same as other medications prescribed for you. Read the directions carefully, and ask your doctor or other care provider to review them with you.

## 2018-03-28 NOTE — LETTER
DEPARTMENT OF HEALTH AND HUMAN SERVICES  CENTERS FOR MEDICARE & MEDICAID SERVICES    PLAN/UPDATED PLAN OF PROGRESS FOR OUTPATIENT REHABILITATION    PATIENTS NAME:  Ruth Rocha   : 1940  PROVIDER NUMBER:    [id number]  [id number]  PROVIDER NAME: [employer] [facility]  MEDICAL RECORD NUMBER: [id number]   START OF CARE DATE:  SOC Date: 18   TYPE:  PT  PRIMARY/TREATMENT DIAGNOSIS: (Pertinent Medical Diagnosis)  Neck pain  VISITS FROM START OF CARE: 1 Rxs Used: 1   [place] [facility] Initial Evaluation  Subjective:  Patient is a 78 year old female presenting with rehab cervical spine hpi. The history is provided by the patient.   Ruth Rocha is a 78 year old female with a cervical spine condition.  Condition occurred with:  Degenerative joint disease.  Condition occurred: at home.  This is a new condition  Pt reports she sprained her neck when she was getting out of a car 2/15/18. DOI.  She describes Rt sided neck pain w/o radiating pain.  .    Patient reports pain:  Cervical right side.    Pain is described as sharp and is intermittent and reported as 8/10.  Associated symptoms:  Loss of motion/stiffness. Pain is the same all the time.  Symptoms are exacerbated by rotating head, looking up or down and certain positions and relieved by rest.  Since onset symptoms are unchanged.        General health as reported by patient is fair.  Pertinent medical history includes:  Kidney disease, anemia, sleep disorder/apnea, high blood pressure, heart problems, osteoarthritis, osteoporosis, history of fractures, diabetes and overweight.    Other surgeries include:  Orthopedic surgery, other and cancer surgery (ankle ).  Current medications:  Cardiac medication, pain medication and high blood pressure medication.  Current occupation is Retired .      Barriers include:  None as reported by the patient.  Red flags:  None as reported by the  patient.  Objective:  System     Cervical/Thoracic Evaluation  AROM:  AROM Cervical:  Flexion:          ++ 50% loss   Extension:       Retxns 50% loss   Rotation:         Left: 50% loss      Right: 50% loss   Side Bend:      Left:     Right:   Cervical Myotomes:  not assessed  DTR's:  not assessed  Cervical Dermatomes:  not assessed  Cervical Palpation:    Tenderness present at Right:    Upper Trap and Erector Spinae  Spinal Segmental Conclusions:    Level:  Hypo at T2, T1, C7, C6 and C5  Cord Sign:  not assessed         PATIENTS NAME:  Ruth Rocha   : 1940    General   ROS  Assessment/Plan:    Patient is a 78 year old female with cervical complaints.    Patient has the following significant findings with corresponding treatment plan.                Diagnosis 1:  Neck pain   Pain -  hot/cold therapy, manual therapy, self management and home program  Decreased ROM/flexibility - manual therapy, therapeutic exercise and home program  Decreased joint mobility - manual therapy and therapeutic exercise  Decreased proprioception - neuro re-education and therapeutic activities  Impaired muscle performance - neuro re-education  Decreased function - therapeutic activities  Impaired posture - neuro re-education  Therapy Evaluation Codes:   1) History comprised of:   Personal factors that impact the plan of care:      Age, Coping style, Overall behavior pattern and Past/current experiences.    Comorbidity factors that impact the plan of care are:      Diabetes, Heart problems, High blood pressure, Menopausal, Osteoarthritis, Overweight and kidney dis., anemia .     Medications impacting care: Cardiac, High blood pressure and Pain.  2) Examination of Body Systems comprised of:   Body structures and functions that impact the plan of care:      Cervical spine.   Activity limitations that impact the plan of care are:      Cooking, Reading/Computer work and Sleeping.  3) Clinical presentation characteristics  "are:   Stable/Uncomplicated.  4) Decision-Making    Low complexity using standardized patient assessment instrument and/or measureable assessment of functional outcome.  Cumulative Therapy Evaluation is: Low complexity.  Previous and current functional limitations:  (See Goal Flow Sheet for this information)    Short term and Long term goals: (See Goal Flow Sheet for this information)   Communication ability:  Patient appears to be able to clearly communicate and understand verbal and written communication and follow directions correctly.  Treatment Explanation - The following has been discussed with the patient:   RX ordered/plan of care  Anticipated outcomes  Possible risks and side effects  This patient would benefit from PT intervention to resume normal activities.   Rehab potential is good.  Frequency:  1 X week, once daily  Duration:  for 6 weeks  Discharge Plan:  Achieve all LTG.  Independent in home treatment program.  Reach maximal therapeutic benefit.          PATIENTS NAME:  Ruth Rocha   :           Caregiver Signature/Credentials _____________________________ Date ________       Treating Provider: Patsy Izquierdo PT    I have reviewed and certified the need for these services and plan of treatment while under my care.        PHYSICIAN'S SIGNATURE:   _________________________________________  Date___________   Raymon Hernández MD    Certification period:  Beginning of Cert date period: 18 to  End of Cert period date: 18     Functional Level Progress Report: Please see attached \"Goal Flow sheet for Functional level.\"    ____X____ Continue Services or       ________ DC Services                Service dates: From  SOC Date: 18 date to present                         "

## 2018-04-11 ENCOUNTER — THERAPY VISIT (OUTPATIENT)
Dept: PHYSICAL THERAPY | Facility: CLINIC | Age: 78
End: 2018-04-11
Payer: MEDICARE

## 2018-04-11 DIAGNOSIS — M54.2 NECK PAIN: ICD-10-CM

## 2018-04-11 PROCEDURE — 97110 THERAPEUTIC EXERCISES: CPT | Mod: GP | Performed by: PHYSICAL THERAPIST

## 2018-04-11 PROCEDURE — 97140 MANUAL THERAPY 1/> REGIONS: CPT | Mod: GP | Performed by: PHYSICAL THERAPIST

## 2018-04-12 ENCOUNTER — TRANSFERRED RECORDS (OUTPATIENT)
Dept: HEALTH INFORMATION MANAGEMENT | Facility: CLINIC | Age: 78
End: 2018-04-12

## 2018-04-25 ENCOUNTER — THERAPY VISIT (OUTPATIENT)
Dept: PHYSICAL THERAPY | Facility: CLINIC | Age: 78
End: 2018-04-25
Payer: MEDICARE

## 2018-04-25 DIAGNOSIS — M54.2 NECK PAIN: ICD-10-CM

## 2018-04-25 PROCEDURE — G8979 MOBILITY GOAL STATUS: HCPCS | Mod: GP | Performed by: PHYSICAL THERAPIST

## 2018-04-25 PROCEDURE — 97140 MANUAL THERAPY 1/> REGIONS: CPT | Mod: GP | Performed by: PHYSICAL THERAPIST

## 2018-04-25 PROCEDURE — 97110 THERAPEUTIC EXERCISES: CPT | Mod: GP | Performed by: PHYSICAL THERAPIST

## 2018-04-25 PROCEDURE — G8980 MOBILITY D/C STATUS: HCPCS | Mod: GP | Performed by: PHYSICAL THERAPIST

## 2018-04-25 PROCEDURE — 97112 NEUROMUSCULAR REEDUCATION: CPT | Mod: GP | Performed by: PHYSICAL THERAPIST

## 2018-04-25 NOTE — MR AVS SNAPSHOT
"              After Visit Summary   4/25/2018    Ruth Rocha    MRN: [id number]           Patient Information     Date Of Birth          1940        Visit Information        Provider Department      4/25/2018 2:10 PM Charles Leon, PT [facility]        Today's Diagnoses     Neck pain           Follow-ups after your visit        Your next 10 appointments already scheduled     Jun 18, 2018  8:30 AM CDT   RETURN RETINA with Bryanna Jaime MD   Eye Clinic ([facility])    [address]  [facility] [address]   [phone]            Jul 06, 2018  1:15 PM CDT   Return Visit with Abraham Byrd MD   [facility] ([place] [id number] [facility] [id number] [phone]              Who to contact     If you have questions or need follow up information about today's clinic visit or your schedule please contact [facility] directly at [phone].  Normal or non-critical lab and imaging results will be communicated to you by CloudMedxhart, letter or phone within 4 business days after the clinic has received the results. If you do not hear from us within 7 days, please contact the clinic through CloudMedxhart or phone. If you have a critical or abnormal lab result, we will notify you by phone as soon as possible.  Submit refill requests through Storypanda or call your pharmacy and they will forward the refill request to us. Please allow 3 business days for your refill to be completed.          Additional Information About Your Visit        CloudMedxharJobConvo Information     Storypanda lets you send messages to your doctor, view your test results, renew your prescriptions, schedule appointments and more. To sign up, go to [email or web address] . Click on \"Log in\" on the left side of the screen, which will take you to the Welcome page. Then click " "on \"Sign up Now\" on the right side of the page.     You will be asked to enter the access code listed below, as well as some personal information. Please follow the directions to create your username and password.     Your access code is: [email or web address]  Expires: 2018 10:48 AM     Your access code will  in 90 days. If you need help or a new code, please call your [place] clinic or [phone].        Care EveryWhere ID     This is your Care EveryWhere ID. This could be used by other organizations to access your [place] medical records  [id number]         Blood Pressure from Last 3 Encounters:   17 120/58   17 137/64   17 124/73    Weight from Last 3 Encounters:   18 93.7 kg (206 lb 8 oz)   17 105.2 kg (232 lb)   17 106.1 kg (234 lb)              We Performed the Following     LEONOR PROGRESS NOTES REPORT     MANUAL THER TECH,1+REGIONS,EA 15 MIN     NEUROMUSCULAR RE-EDUCATION     THERAPEUTIC EXERCISES        Primary Care Provider Office Phone # Fax #    Raymon Hernández [phone]       [address] [facility] [address]        Equal Access to Services     RODOLFO SANTIAGO : Hadii aad ku hadasho Soomaali, waaxda luqadaha, qaybta kaalmada adeegyada, waxay idiin hayaan ifrah parker la'jacinto . So [facility] [phone].    ATENCIÓN: Si habla español, tiene a becker disposición servicios gratuitos de asistencia lingüística. Llame al [phone].    We comply with applicable federal civil rights laws and [place] laws. We do not discriminate on the basis of race, color, national origin, age, disability, sex, sexual orientation, or gender identity.            Thank you!     Thank you for choosing [facility]  for your care. Our goal is always to provide you with excellent care. Hearing back from our patients is one way we can continue to improve our services. Please take a few minutes to complete the written survey that " you may receive in the mail after your visit with us. Thank you!             Your Updated Medication List - Protect others around you: Learn how to safely use, store and throw away your medicines at www.disposemymeds.org.          This list is accurate as of 4/25/18  2:45 PM.  Always use your most recent med list.                   Brand Name Dispense Instructions for use Diagnosis    ACE/ARB/ARNI NOT PRESCRIBED (INTENTIONAL)      by Other route continuous prn Reported on 5/5/2017    Type 2 diabetes, HbA1C goal < 8% (H)       acetaminophen 500 MG tablet    TYLENOL     Take 2 tablets (1,000 mg) by mouth 3 times daily as needed        aspirin 325 MG EC tablet      Take 325 mg by mouth daily.        azithromycin 250 MG tablet    ZITHROMAX    6 tablet    2 po daily x one day then 1 po daily x 4 days    Acute bronchitis, unspecified organism       B-D U/F 31G X 8 MM   Generic drug:  insulin pen needle     100 each    USE AS DIRECTED - 2 DOSES PER DAY    Diabetes mellitus with background retinopathy (H)       blood glucose monitoring test strip    ONEYDA CONTOUR NEXT    100 strip    1 strip by In Vitro route 3 times daily Use to test blood sugar 3 times daily    Type 2 diabetes mellitus with diabetic nephropathy, with long-term current use of insulin (H)       calcitRIOL 0.25 MCG capsule    ROCALTROL     Take 1 capsule (0.25 mcg) by mouth Every Mon, Wed, Fri Morning        carvedilol 25 MG tablet    COREG    60 tablet    Take 0.5 tablets (12.5 mg) by mouth 2 times daily (with meals)        dorzolamide-timolol 2-0.5 % ophthalmic solution    COSOPT    10 mL    Place 1 drop into the right eye 2 times daily    Primary open angle glaucoma of both eyes, mild stage       ferrous sulfate 325 (65 Fe) MG tablet    IRON    180 tablet    Take 1 tablet (325 mg) by mouth 2 times daily    Anemia, unspecified type       fluticasone 50 MCG/ACT spray    FLONASE    16 mL    SPRAY 1-2 SPRAYS INTO BOTH NOSTRILS DAILY    Nasal congestion        GuaiFENesin 200 MG/5ML Liqd     1 Bottle    Take 5 mLs by mouth 2 times daily as needed    Viral URI with cough       hydrochlorothiazide 25 MG tablet    HYDRODIURIL    90 tablet    Take 1 tablet (25 mg) by mouth daily    Hypertension goal BP (blood pressure) < 140/90       hydrocortisone 1 % cream    CORTAID    60 g    Apply sparingly to affected area three times daily as needed to affected areas    Dermatitis       * LANTUS SOLOSTAR 100 UNIT/ML injection   Generic drug:  insulin glargine     15 mL    30 units at bedtime or as directed Taking 25 units at HS    Type 2 diabetes mellitus with diabetic nephropathy, with long-term current use of insulin (H)       * LANTUS SOLOSTAR 100 UNIT/ML injection   Generic drug:  insulin glargine     20 mL    30 UNITS AT BEDTIME OR AS DIRECTED    Type 2 diabetes mellitus with diabetic nephropathy, with long-term current use of insulin (H)       latanoprost 0.005 % ophthalmic solution    XALATAN    1 Bottle    Place 1 drop into both eyes At Bedtime    Primary open angle glaucoma of both eyes, mild stage       lidocaine 5 % Patch    LIDODERM     Apply 1 patch to painful area of skin for up to 12 hours within a 24-hour period.        metoclopramide 10 MG tablet    REGLAN    360 tablet    Take 1 tablet by mouth. Take 1 tablet by mouth 4 times a day before meals and nightly as needed    Dyspepsia       * MICROLET LANCETS Misc     100 each    1 Device 3 times daily.    Type 2 diabetes mellitus with diabetic nephropathy, with long-term current use of insulin (H)       * blood glucose monitoring lancets     100 each    USE AS DIRECTED 3 TIMES A DAY    Type 2 diabetes mellitus with diabetic nephropathy, with long-term current use of insulin (H)       MULTIVITAMIN TABS   OR      1 TABLET DAILY        NovoLOG VIAL 100 UNITS/ML injection   Generic drug:  insulin aspart      Inject Subcutaneous daily TAKE 5 UNITS PLUS SLIDING SCALE, <150=0,  VERY LOW INSULIN RESISTANCE DOSING  For BG <150 give  0 units For  - 200 give 1 unit. For  - 250 give 2 units. For  - 300 give 3 units. For  - 350 give 4 units. For  - 1000 give 6 units        nystatin 358596 UNIT/GM Powd    MYCOSTATIN     Apply under breasts 2 times per day        omeprazole 40 MG capsule    priLOSEC    90 capsule    Take 1 capsule (40 mg) by mouth daily    Gastroesophageal reflux disease, esophagitis presence not specified       order for DME     1 each    Equipment being ordered: CPAP tubing, mask, and all needed equipment    FLOR (obstructive sleep apnea)       oxybutynin 5 MG tablet    DITROPAN    270 tablet    TAKE 1 TABLET (5 MG) BY MOUTH 3 TIMES DAILY AS NEEDED    Overactive bladder       potassium chloride 10 MEQ tablet    K-TAB,KLOR-CON    90 tablet    Take 1 tablet (10 mEq) by mouth daily    Hypokalemia       SENEXON-S 8.6-50 MG per tablet   Generic drug:  senna-docusate     60 tablet    TAKE 1-4 TABLETS BY MOUTH 2 TIMES DAILY AS NEEDED CONSTIPATION    Chronic constipation       simvastatin 10 MG tablet    ZOCOR    90 tablet    Take 1 tablet (10 mg) by mouth At Bedtime    Hyperlipidemia LDL goal <100       traMADol 50 MG tablet    ULTRAM    20 tablet    Take 1 tablet (50 mg) by mouth every 6 hours as needed for severe pain    Neck pain       vitamin D 1000 units capsule      Take 1 capsule by mouth daily.        * Notice:  This list has 4 medication(s) that are the same as other medications prescribed for you. Read the directions carefully, and ask your doctor or other care provider to review them with you.

## 2018-04-25 NOTE — PROGRESS NOTES
Subjective:  HPI                    Objective:  System    Physical Exam    General     ROS    Assessment/Plan:    DISCHARGE REPORT    Progress reporting period is from 3/28/2018 to 4/25/2018.       SUBJECTIVE  Subjective changes noted by patient:   Improved.  Minimal pain remains    Current pain level is : 1/10.     Previous pain level was  : 6/10.   Changes in function:  Yes (See Goal flowsheet attached for changes in current functional level)  Adverse reaction to treatment or activity: None    OBJECTIVE  Objective: Correctly demo exercises.  Good motion     ASSESSMENT/PLAN  Updated problem list and treatment plan: Diagnosis 1:  Neck pain   Pain -  hot/cold therapy, manual therapy, self management and home program  Decreased ROM/flexibility - manual therapy, therapeutic exercise and home program  Decreased joint mobility - manual therapy and therapeutic exercise  Decreased proprioception - neuro re-education and therapeutic activities  Impaired muscle performance - neuro re-education  Decreased function - therapeutic activities  Impaired posture - neuro re-education  STG/LTGs have been met or progress has been made towards goals:  Yes (See Goal flow sheet completed today.)  Assessment of Progress: The patient's condition is improving.  The patient has met all of their long term goals.  Self Management Plans:  Patient has been instructed in a home treatment program.    Ruth continues to require the following intervention to meet STG and LTG's:  PT intervention is no longer required to meet STG/LTG.    Recommendations:  This patient is ready to be discharged from therapy and continue their home treatment program.    Please refer to the daily flowsheet for treatment today, total treatment time and time spent performing 1:1 timed codes.

## 2018-04-27 DIAGNOSIS — H40.1131 PRIMARY OPEN ANGLE GLAUCOMA OF BOTH EYES, MILD STAGE: ICD-10-CM

## 2018-04-27 RX ORDER — DORZOLAMIDE HYDROCHLORIDE AND TIMOLOL MALEATE 20; 5 MG/ML; MG/ML
1 SOLUTION/ DROPS OPHTHALMIC 2 TIMES DAILY
Qty: 10 ML | Refills: 12 | Status: SHIPPED | OUTPATIENT
Start: 2018-04-27 | End: 2018-04-30

## 2018-04-27 RX ORDER — LATANOPROST 50 UG/ML
1 SOLUTION/ DROPS OPHTHALMIC AT BEDTIME
Qty: 1 BOTTLE | Refills: 11 | Status: SHIPPED | OUTPATIENT
Start: 2018-04-27 | End: 2018-04-30

## 2018-04-30 DIAGNOSIS — H40.1131 PRIMARY OPEN ANGLE GLAUCOMA OF BOTH EYES, MILD STAGE: ICD-10-CM

## 2018-04-30 RX ORDER — LATANOPROST 50 UG/ML
1 SOLUTION/ DROPS OPHTHALMIC AT BEDTIME
Qty: 1 BOTTLE | Refills: 11 | Status: SHIPPED | OUTPATIENT
Start: 2018-04-30 | End: 2019-04-19

## 2018-04-30 RX ORDER — DORZOLAMIDE HYDROCHLORIDE AND TIMOLOL MALEATE 20; 5 MG/ML; MG/ML
1 SOLUTION/ DROPS OPHTHALMIC 2 TIMES DAILY
Qty: 10 ML | Refills: 12 | Status: SHIPPED | OUTPATIENT
Start: 2018-04-30 | End: 2019-04-25

## 2018-05-12 DIAGNOSIS — I10 HYPERTENSION GOAL BP (BLOOD PRESSURE) < 140/90: ICD-10-CM

## 2018-05-14 RX ORDER — HYDROCHLOROTHIAZIDE 25 MG/1
TABLET ORAL
Qty: 90 TABLET | Refills: 0 | Status: SHIPPED | OUTPATIENT
Start: 2018-05-14 | End: 2018-08-10

## 2018-05-14 NOTE — TELEPHONE ENCOUNTER
Okayed refill but see us in the next couple months    Please inform patient    Raymon Hernández MD

## 2018-05-14 NOTE — TELEPHONE ENCOUNTER
Routing refill request to provider for review/approval because:  Labs out of range.  Sofia Sanderson, YADIRA [facility] Triage.

## 2018-05-14 NOTE — TELEPHONE ENCOUNTER
"Requested Prescriptions   Pending Prescriptions Disp Refills     hydrochlorothiazide (HYDRODIURIL) 25 MG tablet [Pharmacy Med Name: HYDROCHLOROTHIAZIDE 25 MG TAB] 90 tablet 1    Last Written Prescription Date:  4/14/17  Last Fill Quantity: 90,  # refills: 3   Last office visit: 3/21/2018 with prescribing provider:     Future Office Visit:   Next 5 appointments (look out 90 days)     Jul 06, 2018  1:15 PM CDT   Return Visit with Abraham Byrd MD   [facility] ([facility]    [address] [facility] [address]   [phone]                  Sig: TAKE 1 TABLET (25 MG) BY MOUTH DAILY    Diuretics (Including Combos) Protocol Failed    5/12/2018  9:40 AM       Failed - Normal serum creatinine on file in past 12 months    Recent Labs   Lab Test  12/04/17   1545   CR  1.93*             Passed - Blood pressure under 140/90 in past 12 months    BP Readings from Last 3 Encounters:   12/04/17 120/58   11/29/17 137/64   11/27/17 124/73                Passed - Recent (12 mo) or future (30 days) visit within the authorizing provider's specialty    Patient had office visit in the last 12 months or has a visit in the next 30 days with authorizing provider or within the authorizing provider's specialty.  See \"Patient Info\" tab in inbasket, or \"Choose Columns\" in Meds & Orders section of the refill encounter.           Passed - Patient is age 18 or older       Passed - No active pregancy on record       Passed - Normal serum potassium on file in past 12 months    Recent Labs   Lab Test  12/04/17   1545   POTASSIUM  4.1                   Passed - Normal serum sodium on file in past 12 months    Recent Labs   Lab Test  12/04/17   1545   NA  138             Passed - No positive pregnancy test in past 12 months          "

## 2018-05-27 DIAGNOSIS — K21.9 GASTROESOPHAGEAL REFLUX DISEASE, ESOPHAGITIS PRESENCE NOT SPECIFIED: ICD-10-CM

## 2018-05-29 NOTE — TELEPHONE ENCOUNTER
"Requested Prescriptions   Pending Prescriptions Disp Refills     omeprazole (PRILOSEC) 40 MG capsule [Pharmacy Med Name: OMEPRAZOLE DR 40 MG CAPSULE] 90 capsule 2    Last Written Prescription Date:  11/28/17  Last Fill Quantity: 90,  # refills: 1   Last office visit: 3/21/2018 with prescribing provider:     Future Office Visit:   Next 5 appointments (look out 90 days)     Jul 06, 2018  1:15 PM CDT   Return Visit with Abraham Byrd MD   [facility] ([address] [address]   [phone]            Jul 20, 2018  7:20 AM CDT   [facility]    [address] [place] [address] [phone]                  Sig: TAKE 1 CAPSULE (40 MG) BY MOUTH DAILY    PPI Protocol Passed    5/27/2018  1:17 AM       Passed - Not on Clopidogrel (unless Pantoprazole ordered)       Passed - No diagnosis of osteoporosis on record       Passed - Recent (12 mo) or future (30 days) visit within the authorizing provider's specialty    Patient had office visit in the last 12 months or has a visit in the next 30 days with authorizing provider or within the authorizing provider's specialty.  See \"Patient Info\" tab in inbasket, or \"Choose Columns\" in Meds & Orders section of the refill encounter.           Passed - Patient is age 18 or older       Passed - No active pregnacy on record       Passed - No positive pregnancy test in past 12 months          "

## 2018-05-30 RX ORDER — OMEPRAZOLE 40 MG/1
CAPSULE, DELAYED RELEASE ORAL
Qty: 90 CAPSULE | Refills: 0 | Status: SHIPPED | OUTPATIENT
Start: 2018-05-30 | End: 2019-01-21

## 2018-06-03 DIAGNOSIS — E87.6 HYPOKALEMIA: ICD-10-CM

## 2018-06-04 NOTE — TELEPHONE ENCOUNTER
"Requested Prescriptions   Pending Prescriptions Disp Refills     KLOR-CON 10 MEQ tablet [Pharmacy Med Name: KLOR-CON 10 MEQ TABLET] 90 tablet 2    Last Written Prescription Date:  4-14-17  Last Fill Quantity: 90,  # refills: 3   Last office visit: 3/21/2018 with prescribing provider:     Future Office Visit:   Next 5 appointments (look out 90 days)     Jul 06, 2018  1:15 PM CDT   Return Visit with Abraham Byrd MD   [facility] ([address] [address]   [phone]            Jul 20, 2018  7:20 AM CDT   Office Visit with Raymon Hernández MD   [facility]    [address] [place] [id number] [phone]                  Sig: TAKE 1 TABLET (10 MEQ) BY MOUTH DAILY    Potassium Supplements Protocol Passed    6/3/2018  6:05 PM       Passed - Recent (12 mo) or future (30 days) visit within the authorizing provider's specialty    Patient had office visit in the last 12 months or has a visit in the next 30 days with authorizing provider or within the authorizing provider's specialty.  See \"Patient Info\" tab in inbasket, or \"Choose Columns\" in Meds & Orders section of the refill encounter.           Passed - Patient is age 18 or older       Passed - Normal serum potassium in past 12 months    Recent Labs   Lab Test  12/04/17   1545   POTASSIUM  4.1                      "

## 2018-06-05 RX ORDER — POTASSIUM CHLORIDE 750 MG/1
TABLET, FILM COATED, EXTENDED RELEASE ORAL
Qty: 90 TABLET | Refills: 1 | Status: SHIPPED | OUTPATIENT
Start: 2018-06-05 | End: 2018-11-17

## 2018-06-05 NOTE — TELEPHONE ENCOUNTER
Prescription approved per [facility].  Deepali Srinivasan RN  [facility]

## 2018-06-15 DIAGNOSIS — E11.21 TYPE 2 DIABETES MELLITUS WITH DIABETIC NEPHROPATHY, WITH LONG-TERM CURRENT USE OF INSULIN (H): ICD-10-CM

## 2018-06-15 DIAGNOSIS — Z79.4 TYPE 2 DIABETES MELLITUS WITH DIABETIC NEPHROPATHY, WITH LONG-TERM CURRENT USE OF INSULIN (H): ICD-10-CM

## 2018-06-18 ENCOUNTER — OFFICE VISIT (OUTPATIENT)
Dept: OPHTHALMOLOGY | Facility: CLINIC | Age: 78
End: 2018-06-18
Attending: OPHTHALMOLOGY
Payer: MEDICARE

## 2018-06-18 DIAGNOSIS — E11.3313 MODERATE NONPROLIFERATIVE DIABETIC RETINOPATHY OF BOTH EYES WITH MACULAR EDEMA ASSOCIATED WITH TYPE 2 DIABETES MELLITUS (H): Primary | ICD-10-CM

## 2018-06-18 PROCEDURE — G0463 HOSPITAL OUTPT CLINIC VISIT: HCPCS | Mod: ZF

## 2018-06-18 PROCEDURE — 92134 CPTRZ OPH DX IMG PST SGM RTA: CPT | Mod: ZF | Performed by: OPHTHALMOLOGY

## 2018-06-18 ASSESSMENT — CUP TO DISC RATIO
OS_RATIO: 0.6
OD_RATIO: 0.7

## 2018-06-18 ASSESSMENT — TONOMETRY
OD_IOP_MMHG: 13
OS_IOP_MMHG: 18
IOP_METHOD: TONOPEN

## 2018-06-18 ASSESSMENT — VISUAL ACUITY
OS_CC: 20/200
CORRECTION_TYPE: GLASSES
OD_CC: 20/200
METHOD: SNELLEN - LINEAR

## 2018-06-18 ASSESSMENT — REFRACTION_WEARINGRX
OD_AXIS: 157
OD_CYLINDER: +2.50
OS_ADD: +3.25
OS_SPHERE: +4.25
SPECS_TYPE: PAL
OD_SPHERE: +1.75
OS_AXIS: 034
OS_CYLINDER: +0.75
OD_ADD: +3.25

## 2018-06-18 ASSESSMENT — EXTERNAL EXAM - RIGHT EYE: OD_EXAM: 2+ BROW PTOSIS

## 2018-06-18 ASSESSMENT — EXTERNAL EXAM - LEFT EYE: OS_EXAM: 2+ BROW PTOSIS

## 2018-06-18 ASSESSMENT — CONF VISUAL FIELD
OS_NORMAL: 1
OD_NORMAL: 1

## 2018-06-18 NOTE — MR AVS SNAPSHOT
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      Your next 10 appointments already scheduled     Jul 06, 2018  1:15 PM CDT   Return Visit with Abraham Byrd MD   [facility] ([address] [id number] [phone]            Jul 20, 2018  7:20 AM CDT   Office Visit with Raymon Hernández MD   [facility] ([facility])    [address] [place] [address] [phone]           Bring a current list of meds and any records pertaining to this visit. For Physicals, please bring immunization records and any forms needing to be filled out. Please arrive 10 minutes early to complete paperwork.            Dec 17, 2018  8:30 AM CST   RETURN RETINA with Bryanna Jaime MD   [facility] ([facility])    [address]  [facility] [id number] [phone]              Who to contact     Please call your clinic at [phone] to:    Ask questions about your health    Make or cancel appointments    Discuss your medicines    Learn about your test results    Speak to your doctor            Additional Information About Your Visit        [employer] Information     [employer] is an electronic gateway that provides easy, online access to your medical records. With [employer], you can request a clinic appointment, read your  test results, renew a prescription or communicate with your care team.     To sign up for [employer] visit the website at [email or web address]   You will be asked to enter the access code listed below, as well as some personal information. Please follow the directions to create your username and password.     Your access code is: [email or web address]  Expires: 2018  8:16 AM     Your access code will  in 90 days. If you need help or a new code, please contact your [facility] or call [phone] for assistance.        Care EveryWhere ID     This is your Care EveryWhere ID. This could be used by other organizations to access your [facility] medical records  [id number]         Blood Pressure from Last 3 Encounters:   17 120/58   17 137/64   17 124/73    Weight from Last 3 Encounters:   18 93.7 kg (206 lb 8 oz)   17 105.2 kg (232 lb)   17 106.1 kg (234 lb)              We Performed the Following     OCT Retina Spectralis OU (both eyes)        Primary Care Provider Office Phone # Fax #    Raymon Hernández [phone]       [address] [address]        Equal Access to Services     RODOLFO SANTIAGO : Hadii aad ku hadasho Soomaali, waaxda luqadaha, qaybta kaalmada adeegyada, waxay idiin hayaan ifrah perry . So [facility] [phone].    ATENCIÓN: Si habla español, tiene a becker disposición servicios gratuitos de asistencia lingüística. Llame al [phone].    We comply with applicable federal civil rights laws and [place] laws. We do not discriminate on the basis of race, color, national origin, age, disability, sex, sexual orientation, or gender identity.            Thank you!     Thank you for choosing [facility]  for your care. Our goal is always to provide you with excellent care. Hearing back from our patients is one way we can continue to improve our services. Please take a few minutes to complete the  written survey that you may receive in the mail after your visit with us. Thank you!             Your Updated Medication List - Protect others around you: Learn how to safely use, store and throw away your medicines at www.disposemymeds.org.          This list is accurate as of 6/18/18 10:00 AM.  Always use your most recent med list.                   Brand Name Dispense Instructions for use Diagnosis    ACE/ARB/ARNI NOT PRESCRIBED (INTENTIONAL)      by Other route continuous prn Reported on 5/5/2017    Type 2 diabetes, HbA1C goal < 8% (H)       acetaminophen 500 MG tablet    TYLENOL     Take 2 tablets (1,000 mg) by mouth 3 times daily as needed        aspirin 325 MG EC tablet      Take 325 mg by mouth daily.        azithromycin 250 MG tablet    ZITHROMAX    6 tablet    2 po daily x one day then 1 po daily x 4 days    Acute bronchitis, unspecified organism       B-D U/F 31G X 8 MM   Generic drug:  insulin pen needle     100 each    USE AS DIRECTED - 2 DOSES PER DAY    Diabetes mellitus with background retinopathy (H)       blood glucose monitoring test strip    ONEYDA CONTOUR NEXT    100 strip    1 strip by In Vitro route 3 times daily Use to test blood sugar 3 times daily    Type 2 diabetes mellitus with diabetic nephropathy, with long-term current use of insulin (H)       calcitRIOL 0.25 MCG capsule    ROCALTROL     Take 1 capsule (0.25 mcg) by mouth Every Mon, Wed, Fri Morning        carvedilol 25 MG tablet    COREG    60 tablet    Take 0.5 tablets (12.5 mg) by mouth 2 times daily (with meals)        dorzolamide-timolol 2-0.5 % ophthalmic solution    COSOPT    10 mL    Place 1 drop into the right eye 2 times daily    Primary open angle glaucoma of both eyes, mild stage       ferrous sulfate 325 (65 Fe) MG tablet    IRON    180 tablet    Take 1 tablet (325 mg) by mouth 2 times daily    Anemia, unspecified type       fluticasone 50 MCG/ACT spray    FLONASE    16 mL    SPRAY 1-2 SPRAYS INTO BOTH NOSTRILS DAILY     Nasal congestion       GuaiFENesin 200 MG/5ML Liqd     1 Bottle    Take 5 mLs by mouth 2 times daily as needed    Viral URI with cough       hydrochlorothiazide 25 MG tablet    HYDRODIURIL    90 tablet    TAKE 1 TABLET (25 MG) BY MOUTH DAILY    Hypertension goal BP (blood pressure) < 140/90       hydrocortisone 1 % cream    CORTAID    60 g    Apply sparingly to affected area three times daily as needed to affected areas    Dermatitis       KLOR-CON 10 MEQ tablet   Generic drug:  potassium chloride     90 tablet    TAKE 1 TABLET (10 MEQ) BY MOUTH DAILY    Hypokalemia       * LANTUS SOLOSTAR 100 UNIT/ML injection   Generic drug:  insulin glargine     15 mL    30 units at bedtime or as directed Taking 25 units at HS    Type 2 diabetes mellitus with diabetic nephropathy, with long-term current use of insulin (H)       * LANTUS SOLOSTAR 100 UNIT/ML injection   Generic drug:  insulin glargine     20 mL    30 UNITS AT BEDTIME OR AS DIRECTED    Type 2 diabetes mellitus with diabetic nephropathy, with long-term current use of insulin (H)       latanoprost 0.005 % ophthalmic solution    XALATAN    1 Bottle    Place 1 drop into both eyes At Bedtime    Primary open angle glaucoma of both eyes, mild stage       lidocaine 5 % Patch    LIDODERM     Apply 1 patch to painful area of skin for up to 12 hours within a 24-hour period.        metoclopramide 10 MG tablet    REGLAN    360 tablet    Take 1 tablet by mouth. Take 1 tablet by mouth 4 times a day before meals and nightly as needed    Dyspepsia       * MICROLET LANCETS Misc     100 each    1 Device 3 times daily.    Type 2 diabetes mellitus with diabetic nephropathy, with long-term current use of insulin (H)       * blood glucose monitoring lancets     100 each    USE AS DIRECTED 3 TIMES A DAY    Type 2 diabetes mellitus with diabetic nephropathy, with long-term current use of insulin (H)       MULTIVITAMIN TABS   OR      1 TABLET DAILY        NovoLOG VIAL 100 UNITS/ML injection    Generic drug:  insulin aspart      Inject Subcutaneous daily TAKE 5 UNITS PLUS SLIDING SCALE, <150=0,  VERY LOW INSULIN RESISTANCE DOSING  For BG <150 give 0 units For  - 200 give 1 unit. For  - 250 give 2 units. For  - 300 give 3 units. For  - 350 give 4 units. For  - 1000 give 6 units        nystatin 847024 UNIT/GM Powd    MYCOSTATIN     Apply under breasts 2 times per day        omeprazole 40 MG capsule    priLOSEC    90 capsule    TAKE 1 CAPSULE (40 MG) BY MOUTH DAILY    Gastroesophageal reflux disease, esophagitis presence not specified       order for DME     1 each    Equipment being ordered: CPAP tubing, mask, and all needed equipment    FLOR (obstructive sleep apnea)       oxybutynin 5 MG tablet    DITROPAN    270 tablet    TAKE 1 TABLET (5 MG) BY MOUTH 3 TIMES DAILY AS NEEDED    Overactive bladder       SENEXON-S 8.6-50 MG per tablet   Generic drug:  senna-docusate     60 tablet    TAKE 1-4 TABLETS BY MOUTH 2 TIMES DAILY AS NEEDED CONSTIPATION    Chronic constipation       simvastatin 10 MG tablet    ZOCOR    90 tablet    Take 1 tablet (10 mg) by mouth At Bedtime    Hyperlipidemia LDL goal <100       traMADol 50 MG tablet    ULTRAM    20 tablet    Take 1 tablet (50 mg) by mouth every 6 hours as needed for severe pain    Neck pain       vitamin D 1000 units capsule      Take 1 capsule by mouth daily.        * Notice:  This list has 4 medication(s) that are the same as other medications prescribed for you. Read the directions carefully, and ask your doctor or other care provider to review them with you.

## 2018-06-18 NOTE — PROGRESS NOTES
CC -   NPDR and DME    INTERVAL HISTORY: VA stable. . Last A1c 5.8% on 12/ 2017.    HPI -   Ruth HANSA Rocha is a  78 year old year-old patient presenting for NPDR and DME OS > OD.  H/o K-scars and poor vision OS Sees Jordan pollard for glaucoma (last visit 1/2/17).    VA OS poor longstanding    PAST OCULAR SURGERY  CE/IOL OU  FML OU  Therapeutic PTK OS 8/2015    RETINAL IMAGING:  OCT   6-18-18  right eye- mod focal atrophy from FML scars, no CNVM, tr ERM, ?trace  IRF, stable  left eye -  Mod focal atrophy from FML scars, no CNVM, 1+  paracentral DME, CST - 220 -> 211, stable    FA 6-14-16  Right eye - blurry image, 2-3+ WD from FML scars, 1+ increased NURA, 1 peripheral ischemia, no NV  Left eye - (transit) 2+ increased NURA, large WD in macula from FML scars, 1+ MAs/peripheral atrophy, no NV      ASSESSMENT & PLAN  1.  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 DME OS   - s/p FML   - paracentral, mild   - VA varied 20/125-20/500, typically 20/400   - has moderate macular ischemia     - doubt vis sig given extensive FML scars and K-scarring   - doubt benefit to Tx   - has improved compared to past   - observe for now         3. DME OD   - very mild   - vision limited by cornea mostly         4.  PVD OU    5.  K- scarring OU   -s/p PTK OS   - sees Marcia   - most likely cause of vision subnormal OD       6.  OAG OU   - on cosopt OD and xalatan OU   - sees Agusto - last visit 1/2/17- stable    RTC 6 months, OCT OU, sooner if new vision changes    ATTESTATION     Attending Physician Attestation:      Complete documentation of historical and exam elements from today's encounter can be found in the full encounter summary report (not reduplicated in this progress note).  I personally obtained the chief complaint(s) and history of present illness.  I confirmed and edited as necessary the review of systems, past medical/surgical history, family history, social history, and examination findings as  documented by others; and I examined the patient myself.  I personally reviewed the relevant tests, images, and reports as documented above.  I formulated and edited as necessary the assessment and plan and discussed the findings and management plan with the patient and family    Bryanna Jaime MD, PhD  , Vitreoretinal Surgery  Department of Ophthalmology  [facility]

## 2018-06-18 NOTE — NURSING NOTE
Chief Complaints and History of Present Illnesses   Patient presents with     Follow Up For     Clinically significant macular edema associated with type 2 ...     HPI    Affected eye(s):  Both   Symptoms:     No blurred vision   No decreased vision   No floaters   No flashes   Dryness   Photophobia      Frequency:  Constant       Do you have eye pain now?:  No      Comments:  States va is the same since last visit.  Xalatan 1 X daily OU  cosopt  2 x daily TAWANA Burns  8:55 AM June 18, 2018

## 2018-06-21 DIAGNOSIS — R10.13 DYSPEPSIA: ICD-10-CM

## 2018-06-21 NOTE — TELEPHONE ENCOUNTER
"Requested Prescriptions   Pending Prescriptions Disp Refills     metoclopramide (REGLAN) 10 MG tablet [Pharmacy Med Name: METOCLOPRAMIDE 10 MG TABLET] 360 tablet 0    Last Written Prescription Date:  4/14/17  Last Fill Quantity: 360,  # refills: 1   Last office visit: 3/21/2018 with prescribing provider:     Future Office Visit:   Next 5 appointments (look out 90 days)     Jul 06, 2018  1:15 PM CDT   Return Visit with Abraham Byrd MD   [facility] ([address] [address]   [phone]            Jul 20, 2018  7:20 AM CDT   Office Visit with Raymon Hernández MD   [facility] ([facility])    [address] [place] [id number] [phone]                  Sig: TAKE 1 TABLET BY MOUTH 4 TIMES A DAY BEFORE MEALS AND NIGHTLY AS NEEDED     Antivertigo/Antiemetic Agents Passed    6/21/2018  4:15 PM       Passed - Recent (12 mo) or future (30 days) visit within the authorizing provider's specialty    Patient had office visit in the last 12 months or has a visit in the next 30 days with authorizing provider or within the authorizing provider's specialty.  See \"Patient Info\" tab in inbasket, or \"Choose Columns\" in Meds & Orders section of the refill encounter.           Passed - Patient is 18 years of age or older          "

## 2018-06-22 RX ORDER — METOCLOPRAMIDE 10 MG/1
TABLET ORAL
Qty: 360 TABLET | Refills: 0 | Status: SHIPPED | OUTPATIENT
Start: 2018-06-22 | End: 2018-12-07

## 2018-06-22 RX ORDER — METOCLOPRAMIDE 10 MG/1
TABLET ORAL
Qty: 360 TABLET | Refills: 0 | Status: SHIPPED | OUTPATIENT
Start: 2018-06-22 | End: 2018-06-22

## 2018-06-22 NOTE — TELEPHONE ENCOUNTER
"Other RN approved Rx but \"transmission to pharmacy failed\" message received in Epic.   Rx re-sent now.    Deepali Srinivasan RN  [facility]            "

## 2018-06-22 NOTE — TELEPHONE ENCOUNTER
Prescription approved per [facility] Refill Protocol.    Ian Jarquin RN

## 2018-07-20 ENCOUNTER — OFFICE VISIT (OUTPATIENT)
Dept: FAMILY MEDICINE | Facility: CLINIC | Age: 78
End: 2018-07-20
Payer: COMMERCIAL

## 2018-07-20 VITALS
BODY MASS INDEX: 36.44 KG/M2 | SYSTOLIC BLOOD PRESSURE: 124 MMHG | HEART RATE: 66 BPM | DIASTOLIC BLOOD PRESSURE: 60 MMHG | WEIGHT: 209 LBS | TEMPERATURE: 97.8 F | OXYGEN SATURATION: 99 %

## 2018-07-20 DIAGNOSIS — E78.5 HYPERLIPIDEMIA LDL GOAL <100: ICD-10-CM

## 2018-07-20 DIAGNOSIS — K21.9 GASTROESOPHAGEAL REFLUX DISEASE, ESOPHAGITIS PRESENCE NOT SPECIFIED: ICD-10-CM

## 2018-07-20 DIAGNOSIS — N32.81 OVERACTIVE BLADDER: ICD-10-CM

## 2018-07-20 DIAGNOSIS — Z79.4 TYPE 2 DIABETES MELLITUS WITH DIABETIC NEPHROPATHY, WITH LONG-TERM CURRENT USE OF INSULIN (H): Primary | ICD-10-CM

## 2018-07-20 DIAGNOSIS — K59.09 CHRONIC CONSTIPATION: ICD-10-CM

## 2018-07-20 DIAGNOSIS — N18.30 CKD (CHRONIC KIDNEY DISEASE) STAGE 3, GFR 30-59 ML/MIN (H): ICD-10-CM

## 2018-07-20 DIAGNOSIS — E11.21 TYPE 2 DIABETES MELLITUS WITH DIABETIC NEPHROPATHY, WITH LONG-TERM CURRENT USE OF INSULIN (H): Primary | ICD-10-CM

## 2018-07-20 LAB
ALBUMIN SERPL-MCNC: 3.3 G/DL (ref 3.4–5)
ALP SERPL-CCNC: 133 U/L (ref 40–150)
ALT SERPL W P-5'-P-CCNC: 26 U/L (ref 0–50)
ANION GAP SERPL CALCULATED.3IONS-SCNC: 4 MMOL/L (ref 3–14)
AST SERPL W P-5'-P-CCNC: 26 U/L (ref 0–45)
BASOPHILS # BLD AUTO: 0 10E9/L (ref 0–0.2)
BASOPHILS NFR BLD AUTO: 0.5 %
BILIRUB SERPL-MCNC: 0.3 MG/DL (ref 0.2–1.3)
BUN SERPL-MCNC: 28 MG/DL (ref 7–30)
CALCIUM SERPL-MCNC: 9.1 MG/DL (ref 8.5–10.1)
CHLORIDE SERPL-SCNC: 106 MMOL/L (ref 94–109)
CHOLEST SERPL-MCNC: 118 MG/DL
CO2 SERPL-SCNC: 31 MMOL/L (ref 20–32)
CREAT SERPL-MCNC: 1.39 MG/DL (ref 0.52–1.04)
CREAT UR-MCNC: 28 MG/DL
DIFFERENTIAL METHOD BLD: NORMAL
EOSINOPHIL # BLD AUTO: 0.4 10E9/L (ref 0–0.7)
EOSINOPHIL NFR BLD AUTO: 4.4 %
ERYTHROCYTE [DISTWIDTH] IN BLOOD BY AUTOMATED COUNT: 12.4 % (ref 10–15)
GFR SERPL CREATININE-BSD FRML MDRD: 37 ML/MIN/1.7M2
GLUCOSE SERPL-MCNC: 87 MG/DL (ref 70–99)
HBA1C MFR BLD: 6.4 % (ref 0–5.6)
HCT VFR BLD AUTO: 37.6 % (ref 35–47)
HDLC SERPL-MCNC: 48 MG/DL
HGB BLD-MCNC: 12 G/DL (ref 11.7–15.7)
LDLC SERPL CALC-MCNC: 45 MG/DL
LYMPHOCYTES # BLD AUTO: 1.7 10E9/L (ref 0.8–5.3)
LYMPHOCYTES NFR BLD AUTO: 20 %
MCH RBC QN AUTO: 29.7 PG (ref 26.5–33)
MCHC RBC AUTO-ENTMCNC: 31.9 G/DL (ref 31.5–36.5)
MCV RBC AUTO: 93 FL (ref 78–100)
MICROALBUMIN UR-MCNC: 15 MG/L
MICROALBUMIN/CREAT UR: 54.09 MG/G CR (ref 0–25)
MONOCYTES # BLD AUTO: 0.6 10E9/L (ref 0–1.3)
MONOCYTES NFR BLD AUTO: 7.6 %
NEUTROPHILS # BLD AUTO: 5.6 10E9/L (ref 1.6–8.3)
NEUTROPHILS NFR BLD AUTO: 67.5 %
NONHDLC SERPL-MCNC: 70 MG/DL
PLATELET # BLD AUTO: 280 10E9/L (ref 150–450)
POTASSIUM SERPL-SCNC: 4.8 MMOL/L (ref 3.4–5.3)
PROT SERPL-MCNC: 7.2 G/DL (ref 6.8–8.8)
RBC # BLD AUTO: 4.04 10E12/L (ref 3.8–5.2)
SODIUM SERPL-SCNC: 141 MMOL/L (ref 133–144)
TRIGL SERPL-MCNC: 125 MG/DL
WBC # BLD AUTO: 8.3 10E9/L (ref 4–11)

## 2018-07-20 PROCEDURE — 80061 LIPID PANEL: CPT | Performed by: FAMILY MEDICINE

## 2018-07-20 PROCEDURE — 80053 COMPREHEN METABOLIC PANEL: CPT | Performed by: FAMILY MEDICINE

## 2018-07-20 PROCEDURE — 82043 UR ALBUMIN QUANTITATIVE: CPT | Performed by: FAMILY MEDICINE

## 2018-07-20 PROCEDURE — 85025 COMPLETE CBC W/AUTO DIFF WBC: CPT | Performed by: FAMILY MEDICINE

## 2018-07-20 PROCEDURE — 36415 COLL VENOUS BLD VENIPUNCTURE: CPT | Performed by: FAMILY MEDICINE

## 2018-07-20 PROCEDURE — 83036 HEMOGLOBIN GLYCOSYLATED A1C: CPT | Performed by: FAMILY MEDICINE

## 2018-07-20 PROCEDURE — 99214 OFFICE O/P EST MOD 30 MIN: CPT | Performed by: FAMILY MEDICINE

## 2018-07-20 RX ORDER — AMOXICILLIN 250 MG
CAPSULE ORAL
Qty: 60 TABLET | Refills: 4 | Status: SHIPPED | OUTPATIENT
Start: 2018-07-20 | End: 2019-08-07

## 2018-07-20 RX ORDER — OXYBUTYNIN CHLORIDE 5 MG/1
TABLET ORAL
Qty: 270 TABLET | Refills: 3 | Status: SHIPPED | OUTPATIENT
Start: 2018-07-20 | End: 2019-01-21

## 2018-07-20 ASSESSMENT — PAIN SCALES - GENERAL: PAINLEVEL: NO PAIN (0)

## 2018-07-20 NOTE — MR AVS SNAPSHOT
After Visit Summary   7/20/2018    Ruth Rocha    MRN: [id number]           Patient Information     Date Of Birth          1940        Visit Information        Provider Department      7/20/2018 7:20 AM Raymon Hernández MD [facility]        Today's Diagnoses     Type 2 diabetes mellitus with diabetic nephropathy, with long-term current use of insulin (H)    -  1    Hyperlipidemia LDL goal <100        CKD (chronic kidney disease) stage 3, GFR 30-59 ml/min        Chronic constipation        Overactive bladder        Gastroesophageal reflux disease, esophagitis presence not specified          Care Instructions    See if rantidine ( zantac ) is covered.  Or how much it would be if not covered    Could consider taking ranitidine instead of the omeprazole    Keep working on healthy diet/exercise     We will send you lab results              Follow-ups after your visit        Additional Services     DIABETES EDUCATOR REFERRAL       DIABETES SELF MANAGEMENT TRAINING (DSMT)      Your provider has referred you to Diabetes Education: FMG: Diabetes Education - [facility] [phone]   [email or web address]     If an urgent visit is needed or A1C is above 12, Care Team to call the Diabetes  Education Team at [phone] or send an In Basket message to the Diabetes Education Pool (P DIAB ED-PATIENT CARE).    A  will call you to make your appointment. If it has been more than 3 business days since your referral was placed, please call the above phone number to schedule.    Type of training and number of hours: Previous Diagnosis: Follow-up DSMT - 2 hours.    Diabetes Type: Type 2 - On Insulin   Medicare covers: 10 hours of initial DSMT in 12 month period from the time of first visit, plus 2 hours of follow-up DSMT annually, and additional hours as requested for insulin training.         Diabetes Co-Morbidities:  kidney disease               A1C Goal:  <8.0       A1C is: Lab Results       Component                Value               Date                       A1C                      5.8                 12/04/2017              MEDICAL NUTRITION THERAPY (MNT) for Diabetes    Medical Nutrition Therapy with a Registered Dietitian can be provided in coordination with Diabetes Self-Management Training to assist in achieving optimal diabetes management.    MNT Type and Hours: Previous diagnosis: Annual follow-up MNT - 2 hours                       Medicare will cover: 3 hours initial MNT in 12 month period after first visit, plus 2 hours of follow-up MNT annually        Diabetes Education Topics: Comprehensive Knowledge Assessment and Instruction    Special Educational Needs Requiring Individual DSMT: None      Please be aware that coverage of these services is subject to the terms and limitations of your health insurance plan.  Call member services at your health plan to determine Diabetes Self-Management Training (Codes  and ) and Medical Nutrition Therapy (Codes 18412 and 64801) benefits and ask which blood glucose monitor brands are covered by your plan.  Please bring the following with you to your appointment:    (1)  List of current medications   (2)  List of Blood Glucose Monitor brands that are covered by your insurance plan  (3)  Blood Glucose Monitor and log book  (4)   Food records for the 3 days prior to your visit    The Certified Diabetes Educator may make diabetes medication adjustments per the CDE Protocol and Collaborative Practice Agreement.                  Your next 10 appointments already scheduled     Jul 26, 2018  3:15 PM CDT   Return Visit with Abraham Byrd MD   [facility] ([facility])    [address] [facility] [address]   [phone]            Dec 17, 2018  8:30 AM CST   RETURN RETINA with Bryanna Jaime MD   [facility] ([facility])     [address]  [address] [address]  [facility] [id number] [phone]              Who to contact     If you have questions or need follow up information about today's clinic visit or your schedule please contact [facility] directly at [phone].  Normal or non-critical lab and imaging results will be communicated to you by MyChart, letter or phone within 4 business days after the clinic has received the results. If you do not hear from us within 7 days, please contact the clinic through [employer] or phone. If you have a critical or abnormal lab result, we will notify you by phone as soon as possible.  Submit refill requests through [employer] or call your pharmacy and they will forward the refill request to us. Please allow 3 business days for your refill to be completed.          Additional Information About Your Visit        MyChart Information     [employer] gives you secure access to your electronic health record. If you see a primary care provider, you can also send messages to your care team and make appointments. If you have questions, please call your primary care clinic.  If you do not have a primary care provider, please call [phone] and they will assist you.        Care EveryWhere ID     This is your Care EveryWhere ID. This could be used by other organizations to access your [place] medical records  [id number]        Your Vitals Were     Pulse Temperature Pulse Oximetry Breastfeeding? BMI (Body Mass Index)       66 97.8  F (36.6  C) (Oral) 99% No 36.44 kg/m2        Blood Pressure from Last 3 Encounters:   07/20/18 124/60   12/04/17 120/58   11/29/17 137/64    Weight from Last 3 Encounters:   07/20/18 209 lb (94.8 kg)   03/21/18 206 lb 8 oz (93.7 kg)   12/04/17 232 lb (105.2 kg)              We Performed the Following     Albumin Random Urine Quantitative with Creat Ratio     CBC with platelets differential     Comprehensive metabolic panel      DIABETES EDUCATOR REFERRAL     Hemoglobin A1c     Lipid panel reflex to direct LDL Fasting          Today's Medication Changes          These changes are accurate as of 7/20/18  7:45 AM.  If you have any questions, ask your nurse or doctor.               Start taking these medicines.        Dose/Directions    ranitidine 150 MG tablet   Commonly known as:  ZANTAC   Used for:  Gastroesophageal reflux disease, esophagitis presence not specified   Started by:  Raymon Hernández MD        Dose:  150 mg   Take 1 tablet (150 mg) by mouth 2 times daily   Quantity:  60 tablet   Refills:  11         These medicines have changed or have updated prescriptions.        Dose/Directions    LANTUS SOLOSTAR 100 UNIT/ML injection   This may have changed:  Another medication with the same name was removed. Continue taking this medication, and follow the directions you see here.   Used for:  Type 2 diabetes mellitus with diabetic nephropathy, with long-term current use of insulin (H)   Generic drug:  insulin glargine   Changed by:  Raymon Hernández MD        30 UNITS AT BEDTIME OR AS DIRECTED   Quantity:  20 mL   Refills:  3         Stop taking these medicines if you haven't already. Please contact your care team if you have questions.     azithromycin 250 MG tablet   Commonly known as:  ZITHROMAX   Stopped by:  Raymon Hernández MD                Where to get your medicines      These medications were sent to [facility]/pharmacy #[id number] [address] AT CORNER OF [address] [place] [address]     Phone:  [phone]     oxybutynin 5 MG tablet    senna-docusate 8.6-50 MG per tablet         Some of these will need a paper prescription and others can be bought over the counter.  Ask your nurse if you have questions.     Bring a paper prescription for each of these medications     ranitidine 150 MG tablet                Primary Care Provider Office Phone # Fax #    Raymon Hernández [phone]  [phone]       [address] [facility] [address]        Equal Access to Services     RODOLFO SANTIAGO : Hadii aad ku hadangelitostephy Soreji, waaxda luqadaha, qaybta kaalmada ifrahmiguel, bernabe escamilla zitamirella garciatricia cliveelliottracie hernandez. So [facility] [phone].    ATENCIÓN: Si habla español, tiene a becker disposición servicios gratuitos de asistencia lingüística. Llame al [phone].    We comply with applicable federal civil rights laws and [place] laws. We do not discriminate on the basis of race, color, national origin, age, disability, sex, sexual orientation, or gender identity.            Thank you!     Thank you for choosing [facility]  for your care. Our goal is always to provide you with excellent care. Hearing back from our patients is one way we can continue to improve our services. Please take a few minutes to complete the written survey that you may receive in the mail after your visit with us. Thank you!             Your Updated Medication List - Protect others around you: Learn how to safely use, store and throw away your medicines at www.disposemymeds.org.          This list is accurate as of 7/20/18  7:45 AM.  Always use your most recent med list.                   Brand Name Dispense Instructions for use Diagnosis    ACE/ARB/ARNI NOT PRESCRIBED (INTENTIONAL)      by Other route continuous prn Reported on 5/5/2017    Type 2 diabetes, HbA1C goal < 8% (H)       acetaminophen 500 MG tablet    TYLENOL     Take 2 tablets (1,000 mg) by mouth 3 times daily as needed        aspirin 325 MG EC tablet      Take 325 mg by mouth daily.        B-D U/F 31G X 8 MM   Generic drug:  insulin pen needle     100 each    USE AS DIRECTED - 2 DOSES PER DAY    Diabetes mellitus with background retinopathy (H)       blood glucose monitoring test strip    ONEYDA CONTOUR NEXT    100 strip    1 strip by In Vitro route 3 times daily Use to test blood sugar 3 times daily    Type 2 diabetes mellitus with diabetic  nephropathy, with long-term current use of insulin (H)       calcitRIOL 0.25 MCG capsule    ROCALTROL     Take 1 capsule (0.25 mcg) by mouth Every Mon, Wed, Fri Morning        carvedilol 25 MG tablet    COREG    60 tablet    Take 0.5 tablets (12.5 mg) by mouth 2 times daily (with meals)        dorzolamide-timolol 2-0.5 % ophthalmic solution    COSOPT    10 mL    Place 1 drop into the right eye 2 times daily    Primary open angle glaucoma of both eyes, mild stage       ferrous sulfate 325 (65 Fe) MG tablet    IRON    180 tablet    Take 1 tablet (325 mg) by mouth 2 times daily    Anemia, unspecified type       fluticasone 50 MCG/ACT spray    FLONASE    16 mL    SPRAY 1-2 SPRAYS INTO BOTH NOSTRILS DAILY    Nasal congestion       GuaiFENesin 200 MG/5ML Liqd     1 Bottle    Take 5 mLs by mouth 2 times daily as needed    Viral URI with cough       hydrochlorothiazide 25 MG tablet    HYDRODIURIL    90 tablet    TAKE 1 TABLET (25 MG) BY MOUTH DAILY    Hypertension goal BP (blood pressure) < 140/90       hydrocortisone 1 % cream    CORTAID    60 g    Apply sparingly to affected area three times daily as needed to affected areas    Dermatitis       KLOR-CON 10 MEQ tablet   Generic drug:  potassium chloride     90 tablet    TAKE 1 TABLET (10 MEQ) BY MOUTH DAILY    Hypokalemia       LANTUS SOLOSTAR 100 UNIT/ML injection   Generic drug:  insulin glargine     20 mL    30 UNITS AT BEDTIME OR AS DIRECTED    Type 2 diabetes mellitus with diabetic nephropathy, with long-term current use of insulin (H)       latanoprost 0.005 % ophthalmic solution    XALATAN    1 Bottle    Place 1 drop into both eyes At Bedtime    Primary open angle glaucoma of both eyes, mild stage       lidocaine 5 % Patch    LIDODERM     Apply 1 patch to painful area of skin for up to 12 hours within a 24-hour period.        metoclopramide 10 MG tablet    REGLAN    360 tablet    TAKE 1 TABLET BY MOUTH 4 TIMES A DAY BEFORE MEALS AND NIGHTLY AS NEEDED    Dyspepsia        * MICROLET LANCETS Misc     100 each    1 Device 3 times daily.    Type 2 diabetes mellitus with diabetic nephropathy, with long-term current use of insulin (H)       * blood glucose monitoring lancets     100 each    USE AS DIRECTED 3 TIMES A DAY    Type 2 diabetes mellitus with diabetic nephropathy, with long-term current use of insulin (H)       MULTIVITAMIN TABS   OR      1 TABLET DAILY        NovoLOG VIAL 100 UNITS/ML injection   Generic drug:  insulin aspart      Inject Subcutaneous daily TAKE 5 UNITS PLUS SLIDING SCALE, <150=0,  VERY LOW INSULIN RESISTANCE DOSING  For BG <150 give 0 units For  - 200 give 1 unit. For  - 250 give 2 units. For  - 300 give 3 units. For  - 350 give 4 units. For  - 1000 give 6 units        nystatin 179544 UNIT/GM Powd    MYCOSTATIN     Apply under breasts 2 times per day        omeprazole 40 MG capsule    priLOSEC    90 capsule    TAKE 1 CAPSULE (40 MG) BY MOUTH DAILY    Gastroesophageal reflux disease, esophagitis presence not specified       order for DME     1 each    Equipment being ordered: CPAP tubing, mask, and all needed equipment    FLOR (obstructive sleep apnea)       oxybutynin 5 MG tablet    DITROPAN    270 tablet    TAKE 1 TABLET (5 MG) BY MOUTH 3 TIMES DAILY AS NEEDED    Overactive bladder       ranitidine 150 MG tablet    ZANTAC    60 tablet    Take 1 tablet (150 mg) by mouth 2 times daily    Gastroesophageal reflux disease, esophagitis presence not specified       senna-docusate 8.6-50 MG per tablet    SENEXON-S    60 tablet    TAKE 1-4 TABLETS BY MOUTH 2 TIMES DAILY AS NEEDED CONSTIPATION    Chronic constipation       simvastatin 10 MG tablet    ZOCOR    90 tablet    TAKE 1 TABLET (10 MG) BY MOUTH AT BEDTIME    Hyperlipidemia LDL goal <100       traMADol 50 MG tablet    ULTRAM    20 tablet    Take 1 tablet (50 mg) by mouth every 6 hours as needed for severe pain    Neck pain       vitamin D 1000 units capsule      Take 1 capsule by  mouth daily.        * Notice:  This list has 2 medication(s) that are the same as other medications prescribed for you. Read the directions carefully, and ask your doctor or other care provider to review them with you.

## 2018-07-20 NOTE — PATIENT INSTRUCTIONS
See if rantidine ( zantac ) is covered.  Or how much it would be if not covered    Could consider taking ranitidine instead of the omeprazole    Keep working on healthy diet/exercise     We will send you lab results

## 2018-07-20 NOTE — PROGRESS NOTES
SUBJECTIVE:   Ruth Rocha is a 78 year old female who presents to clinic today for the following health issues:       Diabetes Follow-up    Patient is checking blood sugars: 3 times daily.    Blood sugar testing frequency justification: Uncontrolled diabetes  Results are as follows:         Averaging 110-150's    Diabetic concerns: None     Symptoms of hypoglycemia (low blood sugar): none     Paresthesias (numbness or burning in feet) or sores: Yes always numbness     Date of last diabetic eye exam: 06/2018    BP Readings from Last 2 Encounters:   12/04/17 120/58   11/29/17 137/64     Hemoglobin A1C (%)   Date Value   12/04/2017 5.8   08/09/2017 5.8     LDL Cholesterol Calculated (mg/dL)   Date Value   04/14/2017 65   03/11/2016 57       Diabetes Management Resources    Amount of exercise or physical activity: 2-3 days/week for an average of greater than 60 minutes    Problems taking medications regularly: No    Medication side effects: none    Diet: regular (no restrictions)        none    Problem list and histories reviewed & adjusted, as indicated.  Additional history: as documented     blood pressure 117/60 when she checked at home recently    Joined [employer]  Go in water  Ride stationary bike and hand cycle   Yoga class        Reviewed and updated as needed this visit by clinical staff  Allergies  Meds  Problems       Reviewed and updated as needed this visit by Provider       Carvedilol is per the nephrologist       20 units lantus recently    104 this am    155 last night    135 mid afternoon yest    No short acting insulin currently      Physical Exam   Constitutional: She is oriented to person, place, and time and well-developed, well-nourished, and in no distress. No distress.   HENT:   Head: Normocephalic and atraumatic.   Neck: Carotid bruit is not present.   Cardiovascular: Normal rate, regular rhythm, normal heart sounds and intact distal pulses.  Exam reveals no gallop and no  friction rub.    No murmur heard.  Pulmonary/Chest: Effort normal and breath sounds normal.   Musculoskeletal: She exhibits edema (trace).   Neurological: She is alert and oriented to person, place, and time.   Skin: Skin is dry. She is not diaphoretic.   Psychiatric: Mood and affect normal.       ASSESSMENT / PLAN:  (E11.21,  Z79.4) Type 2 diabetes mellitus with diabetic nephropathy, with long-term current use of insulin (H)  (primary encounter diagnosis)  Comment: check lab again.  If high, could consider diab ed.    Plan: DIABETES EDUCATOR REFERRAL, Albumin Random         Urine Quantitative with Creat Ratio, Hemoglobin        A1c             (E78.5) Hyperlipidemia LDL goal <100  Comment: fasting   Plan: Lipid panel reflex to direct LDL Fasting,         Comprehensive metabolic panel        Check labs     (N18.3) CKD (chronic kidney disease) stage 3, GFR 30-59 ml/min  Comment: patient followed by nephrology.  I reviewed last note from them.   Plan: CBC with platelets differential             (K59.09) Chronic constipation  Comment: refill this, use prn   Plan: senna-docusate (SENEXON-S) 8.6-50 MG per tablet             (N32.81) Overactive bladder  Comment: still on this, helping   Plan: oxybutynin (DITROPAN) 5 MG tablet             (K21.9) Gastroesophageal reflux disease, esophagitis presence not specified  Comment: patient on ppi chronically.  Discussed in detail.  Could do trial of h2 blocker instead  Plan: ranitidine (ZANTAC) 150 MG tablet               I reviewed the patient's medications, allergies, medical history, family history, and social history.    Raymon Hernández MD

## 2018-07-21 NOTE — PROGRESS NOTES
You do have some protein in the urine, but the blood test for kidney function ( creatinine ) is actually better.    Overall diabetes test ( hemoglobin a1c ) is fine.    Other labs are fine.    Raymon Hernández MD

## 2018-07-24 ENCOUNTER — TELEPHONE (OUTPATIENT)
Dept: EDUCATION SERVICES | Facility: CLINIC | Age: 78
End: 2018-07-24

## 2018-07-24 NOTE — TELEPHONE ENCOUNTER
Diabetes Education Scheduling Outreach #1:    Call to patient to schedule. Left message with phone number to call to schedule.    Plan for 2nd outreach attempt within 1 week.    Sandhya Royal  [facility]  Diabetes and Nutrition Scheduling

## 2018-07-26 ENCOUNTER — OFFICE VISIT (OUTPATIENT)
Dept: OPHTHALMOLOGY | Facility: CLINIC | Age: 78
End: 2018-07-26
Payer: COMMERCIAL

## 2018-07-26 DIAGNOSIS — H40.1131 PRIMARY OPEN ANGLE GLAUCOMA OF BOTH EYES, MILD STAGE: Primary | ICD-10-CM

## 2018-07-26 PROCEDURE — 92083 EXTENDED VISUAL FIELD XM: CPT | Performed by: OPHTHALMOLOGY

## 2018-07-26 PROCEDURE — 92012 INTRM OPH EXAM EST PATIENT: CPT | Performed by: OPHTHALMOLOGY

## 2018-07-26 PROCEDURE — 92133 CPTRZD OPH DX IMG PST SGM ON: CPT | Performed by: OPHTHALMOLOGY

## 2018-07-26 ASSESSMENT — VISUAL ACUITY
OS_PH_SC: 20/125
OD_PH_SC: 20/100
CORRECTION_TYPE: GLASSES
OD_SC: 20/150
METHOD: SNELLEN - LINEAR
OS_SC: 20/300

## 2018-07-26 ASSESSMENT — REFRACTION_WEARINGRX
OS_CYLINDER: +0.75
OD_SPHERE: +1.75
OD_ADD: +3.25
SPECS_TYPE: PAL
OD_CYLINDER: +2.50
OS_SPHERE: +4.25
OS_AXIS: 034
OS_ADD: +3.25
OD_AXIS: 157

## 2018-07-26 ASSESSMENT — TONOMETRY
IOP_METHOD: APPLANATION
OD_IOP_MMHG: 13
OS_IOP_MMHG: 12

## 2018-07-26 NOTE — LETTER
7/26/2018         RE: Ruth Rocha  [address] [place] [address] [address]  [facility] [address]        Dear Colleague,    Thank you for referring your patient, Ruth Rocha, to the [facility]. Please see a copy of my visit note below.     Current Eye Medications:  Latanoprost at bedtime both eyes, last took at 10:30pm. Dorz/timolol twice a day both eyes, last took 7am     Subjective:  6 month follow up for intraocular pressure check, glaucoma OCT and Sarkar Visual Field. Vision is blurred both eyes, unchanged. No eye pain or discomfort in either eye.      Objective:  See Ophthalmology Exam.       Assessment:  Stable intraocular pressure, glaucoma OCT, and Sarkar Visual Field both eyes in patient with open angle glaucoma.      Plan:  Continue same medications.  Use artificial tears up to 4 times daily both eyes.  Continue care at [facility].  Return visit 6 months for complete exam.  Abraham Byrd M.D.  [phone]              Again, thank you for allowing me to participate in the care of your patient.        Sincerely,        Abraham Byrd MD

## 2018-07-26 NOTE — PATIENT INSTRUCTIONS
Continue same medications.  Use artificial tears up to 4 times daily both eyes.  Continue care at [facility].  Return visit 6 months for complete exam.  Abraham Byrd M.D.  [phone]

## 2018-07-26 NOTE — PROGRESS NOTES
Current Eye Medications:  Latanoprost at bedtime both eyes, last took at 10:30pm. Dorz/timolol twice a day both eyes, last took 7am     Subjective:  6 month follow up for intraocular pressure check, glaucoma OCT and Sarkar Visual Field. Vision is blurred both eyes, unchanged. No eye pain or discomfort in either eye.      Objective:  See Ophthalmology Exam.       Assessment:  Stable intraocular pressure, glaucoma OCT, and Sarkar Visual Field both eyes in patient with open angle glaucoma.      Plan:  Continue same medications.  Use artificial tears up to 4 times daily both eyes.  Continue care at [facility].  Return visit 6 months for complete exam.  Abraham Byrd M.D.  [phone]

## 2018-07-26 NOTE — MR AVS SNAPSHOT
After Visit Summary   7/26/2018    Ruth Rocha    MRN: [id number]           Patient Information     Date Of Birth          1940        Visit Information        Provider Department      7/26/2018 3:15 PM Abraham Byrd MD [facility]        Today's Diagnoses     Primary open angle glaucoma of both eyes, mild stage    -  1      Care Instructions    Continue same medications.  Use artificial tears up to 4 times daily both eyes.  Continue care at [facility].  Return visit 6 months for complete exam.  Abraham Byrd M.D.  [phone]            Follow-ups after your visit        Your next 10 appointments already scheduled     Aug 03, 2018  9:30 AM CDT   MA SCREENING DIGITAL BILATERAL with FKMA1   [facility] ([facility])    [address] [place] [id number] [phone]           Do not use any powder, lotion or deodorant under your arms or on your breast. If you do, we will ask you to remove it before your exam.  Wear comfortable, two-piece clothing.  If you have any allergies, tell your care team.  Bring any previous mammograms from other facilities or have them mailed to the breast center.            Dec 17, 2018  8:30 AM CST   RETURN RETINA with Bryanna Jaime MD   [facility] ([facility])    [address] [id number] [phone]              Future tests that were ordered for you today     Open Future Orders        Priority Expected Expires Ordered    MA Screening Digital Bilateral Routine  7/25/2019 7/25/2018            Who to contact     If you have questions or need follow up information about today's clinic visit or your schedule please contact [facility] directly at [phone].  Normal or non-critical lab and imaging results will be communicated to you by MyChart, letter or phone within 4 business days after the clinic has  received the results. If you do not hear from us within 7 days, please contact the clinic through [employer] or phone. If you have a critical or abnormal lab result, we will notify you by phone as soon as possible.  Submit refill requests through [employer] or call your pharmacy and they will forward the refill request to us. Please allow 3 business days for your refill to be completed.          Additional Information About Your Visit        [id number] Information     [employer] gives you secure access to your electronic health record. If you see a primary care provider, you can also send messages to your care team and make appointments. If you have questions, please call your primary care clinic.  If you do not have a primary care provider, please call [phone] and they will assist you.        Care EveryWhere ID     This is your Care EveryWhere ID. This could be used by other organizations to access your [facility] medical records  [id number]         Blood Pressure from Last 3 Encounters:   07/20/18 124/60   12/04/17 120/58   11/29/17 137/64    Weight from Last 3 Encounters:   07/20/18 94.8 kg (209 lb)   03/21/18 93.7 kg (206 lb 8 oz)   12/04/17 105.2 kg (232 lb)              Today, you had the following     No orders found for display       Primary Care Provider Office Phone # Fax #    Raymon Hernández [phone]       [address] [facility] [address]        Equal Access to Services     [facility]: Hadii aad ku hadasho Soomaali, waaxda luqadaha, qaybta kaalmada adeegyada, bernabe perry . So [facility] [phone].    ATENCIÓN: Si habla español, tiene a becker disposición servicios gratuitos de asistencia lingüística. Llame al [phone].    We comply with applicable federal civil rights laws and [place] laws. We do not discriminate on the basis of race, color, national origin, age, disability, sex, sexual orientation, or gender identity.            Thank you!      [facility] you for [facility] [place]  [facility] your [facility]. Our goal is always to provide you with excellent care. Hearing back from our patients is one way we can continue to improve our services. Please take a few minutes to complete the written survey that you may receive in the mail after your visit with us. Thank you!             Your Updated Medication List - Protect others around you: Learn how to safely use, store and throw away your medicines at www.disposemymeds.org.          This list is accurate as of 7/26/18  4:44 PM.  Always use your most recent med list.                   Brand Name Dispense Instructions for use Diagnosis    ACE/ARB/ARNI NOT PRESCRIBED (INTENTIONAL)      by Other route continuous prn Reported on 5/5/2017    Type 2 diabetes, HbA1C goal < 8% (H)       acetaminophen 500 MG tablet    TYLENOL     Take 2 tablets (1,000 mg) by mouth 3 times daily as needed        aspirin 325 MG EC tablet      Take 325 mg by mouth daily.        B-D U/F 31G X 8 MM   Generic drug:  insulin pen needle     100 each    USE AS DIRECTED - 2 DOSES PER DAY    Diabetes mellitus with background retinopathy (H)       blood glucose monitoring test strip    ONEYDA CONTOUR NEXT    100 strip    1 strip by In Vitro route 3 times daily Use to test blood sugar 3 times daily    Type 2 diabetes mellitus with diabetic nephropathy, with long-term current use of insulin (H)       calcitRIOL 0.25 MCG capsule    ROCALTROL     Take 1 capsule (0.25 mcg) by mouth Every Mon, Wed, Fri Morning        carvedilol 25 MG tablet    COREG    60 tablet    Take 0.5 tablets (12.5 mg) by mouth 2 times daily (with meals)        dorzolamide-timolol 2-0.5 % ophthalmic solution    COSOPT    10 mL    Place 1 drop into the right eye 2 times daily    Primary open angle glaucoma of both eyes, mild stage       ferrous sulfate 325 (65 Fe) MG tablet    IRON    180 tablet    Take 1 tablet (325 mg) by mouth 2 times daily    Anemia, unspecified type        fluticasone 50 MCG/ACT spray    FLONASE    16 mL    SPRAY 1-2 SPRAYS INTO BOTH NOSTRILS DAILY    Nasal congestion       GuaiFENesin 200 MG/5ML Liqd     1 Bottle    Take 5 mLs by mouth 2 times daily as needed    Viral URI with cough       hydrochlorothiazide 25 MG tablet    HYDRODIURIL    90 tablet    TAKE 1 TABLET (25 MG) BY MOUTH DAILY    Hypertension goal BP (blood pressure) < 140/90       hydrocortisone 1 % cream    CORTAID    60 g    Apply sparingly to affected area three times daily as needed to affected areas    Dermatitis       KLOR-CON 10 MEQ tablet   Generic drug:  potassium chloride     90 tablet    TAKE 1 TABLET (10 MEQ) BY MOUTH DAILY    Hypokalemia       LANTUS SOLOSTAR 100 UNIT/ML injection   Generic drug:  insulin glargine     20 mL    30 UNITS AT BEDTIME OR AS DIRECTED    Type 2 diabetes mellitus with diabetic nephropathy, with long-term current use of insulin (H)       latanoprost 0.005 % ophthalmic solution    XALATAN    1 Bottle    Place 1 drop into both eyes At Bedtime    Primary open angle glaucoma of both eyes, mild stage       lidocaine 5 % Patch    LIDODERM     Apply 1 patch to painful area of skin for up to 12 hours within a 24-hour period.        metoclopramide 10 MG tablet    REGLAN    360 tablet    TAKE 1 TABLET BY MOUTH 4 TIMES A DAY BEFORE MEALS AND NIGHTLY AS NEEDED    Dyspepsia       * MICROLET LANCETS Misc     100 each    1 Device 3 times daily.    Type 2 diabetes mellitus with diabetic nephropathy, with long-term current use of insulin (H)       * blood glucose monitoring lancets     100 each    USE AS DIRECTED 3 TIMES A DAY    Type 2 diabetes mellitus with diabetic nephropathy, with long-term current use of insulin (H)       MULTIVITAMIN TABS   OR      1 TABLET DAILY        NovoLOG VIAL 100 UNITS/ML injection   Generic drug:  insulin aspart      Inject Subcutaneous daily TAKE 5 UNITS PLUS SLIDING SCALE, <150=0,  VERY LOW INSULIN RESISTANCE DOSING  For BG <150 give 0 units For BG  150 - 200 give 1 unit. For  - 250 give 2 units. For  - 300 give 3 units. For  - 350 give 4 units. For  - 1000 give 6 units        nystatin 110256 UNIT/GM Powd    MYCOSTATIN     Apply under breasts 2 times per day        omeprazole 40 MG capsule    priLOSEC    90 capsule    TAKE 1 CAPSULE (40 MG) BY MOUTH DAILY    Gastroesophageal reflux disease, esophagitis presence not specified       order for DME     1 each    Equipment being ordered: CPAP tubing, mask, and all needed equipment    FLOR (obstructive sleep apnea)       oxybutynin 5 MG tablet    DITROPAN    270 tablet    TAKE 1 TABLET (5 MG) BY MOUTH 3 TIMES DAILY AS NEEDED    Overactive bladder       ranitidine 150 MG tablet    ZANTAC    60 tablet    Take 1 tablet (150 mg) by mouth 2 times daily    Gastroesophageal reflux disease, esophagitis presence not specified       senna-docusate 8.6-50 MG per tablet    SENEXON-S    60 tablet    TAKE 1-4 TABLETS BY MOUTH 2 TIMES DAILY AS NEEDED CONSTIPATION    Chronic constipation       simvastatin 10 MG tablet    ZOCOR    90 tablet    TAKE 1 TABLET (10 MG) BY MOUTH AT BEDTIME    Hyperlipidemia LDL goal <100       traMADol 50 MG tablet    ULTRAM    20 tablet    Take 1 tablet (50 mg) by mouth every 6 hours as needed for severe pain    Neck pain       vitamin D 1000 units capsule      Take 1 capsule by mouth daily.        * Notice:  This list has 2 medication(s) that are the same as other medications prescribed for you. Read the directions carefully, and ask your doctor or other care provider to review them with you.

## 2018-07-29 ASSESSMENT — EXTERNAL EXAM - LEFT EYE: OS_EXAM: 2+ BROW PTOSIS

## 2018-07-29 ASSESSMENT — PACHYMETRY
OS_CT(UM): 556
OD_CT(UM): 664

## 2018-07-29 ASSESSMENT — EXTERNAL EXAM - RIGHT EYE: OD_EXAM: 2+ BROW PTOSIS

## 2018-08-03 ENCOUNTER — RADIANT APPOINTMENT (OUTPATIENT)
Dept: MAMMOGRAPHY | Facility: CLINIC | Age: 78
End: 2018-08-03
Attending: FAMILY MEDICINE
Payer: COMMERCIAL

## 2018-08-03 DIAGNOSIS — Z12.31 VISIT FOR SCREENING MAMMOGRAM: ICD-10-CM

## 2018-08-03 PROCEDURE — 77067 SCR MAMMO BI INCL CAD: CPT | Mod: TC

## 2018-08-10 DIAGNOSIS — I10 HYPERTENSION GOAL BP (BLOOD PRESSURE) < 140/90: ICD-10-CM

## 2018-08-10 RX ORDER — HYDROCHLOROTHIAZIDE 25 MG/1
TABLET ORAL
Qty: 90 TABLET | Refills: 3 | Status: SHIPPED | OUTPATIENT
Start: 2018-08-10 | End: 2019-01-10

## 2018-08-10 NOTE — TELEPHONE ENCOUNTER
Lab is actually improved. Prescription approved per [facility] Refill Protocol.    Patria Koehler RN

## 2018-08-10 NOTE — TELEPHONE ENCOUNTER
Diabetes Education Scheduling Outreach #2:    Call to patient to schedule. Left message with phone number to call to schedule.    Letter sent to patient requesting to call to schedule.    Cinthya Calderon OnCall  Diabetes and Nutrition Scheduling

## 2018-08-10 NOTE — TELEPHONE ENCOUNTER
"Requested Prescriptions   Pending Prescriptions Disp Refills     hydrochlorothiazide (HYDRODIURIL) 25 MG tablet [Pharmacy Med Name: HYDROCHLOROTHIAZIDE 25 MG TAB] 90 tablet 0    Last Written Prescription Date:  5/14/18  Last Fill Quantity: 90,  # refills: 0   Last office visit: 7/20/2018 with prescribing provider:     Future Office Visit:     Sig: TAKE 1 TABLET (25 MG) BY MOUTH DAILY    Diuretics (Including Combos) Protocol Failed    8/10/2018  2:27 PM       Failed - Normal serum creatinine on file in past 12 months    Recent Labs   Lab Test  07/20/18   0756   CR  1.39*             Passed - Blood pressure under 140/90 in past 12 months    BP Readings from Last 3 Encounters:   07/20/18 124/60   12/04/17 120/58   11/29/17 137/64                Passed - Recent (12 mo) or future (30 days) visit within the authorizing provider's specialty    Patient had office visit in the last 12 months or has a visit in the next 30 days with authorizing provider or within the authorizing provider's specialty.  See \"Patient Info\" tab in inbasket, or \"Choose Columns\" in Meds & Orders section of the refill encounter.           Passed - Patient is age 18 or older       Passed - No active pregancy on record       Passed - Normal serum potassium on file in past 12 months    Recent Labs   Lab Test  07/20/18   0756   POTASSIUM  4.8                   Passed - Normal serum sodium on file in past 12 months    Recent Labs   Lab Test  07/20/18   0756   NA  141             Passed - No positive pregnancy test in past 12 months          "

## 2018-08-21 DIAGNOSIS — N32.81 OVERACTIVE BLADDER: ICD-10-CM

## 2018-08-21 NOTE — TELEPHONE ENCOUNTER
"Requested Prescriptions   Pending Prescriptions Disp Refills     oxybutynin (DITROPAN) 5 MG tablet [Pharmacy Med Name: OXYBUTYNIN 5 MG TABLET] 270 tablet 1    Last Written Prescription Date:  7/20/18  Last Fill Quantity: 270,  # refills: 3   Last office visit: 7/20/2018 with prescribing provider:     Future Office Visit:     Sig: TAKE 1 TABLET (5 MG) BY MOUTH 3 TIMES DAILY AS NEEDED    Muscarinic Antagonists (Urinary Incontinence Agents) Failed    8/21/2018 10:26 AM       Failed - Patient does not have a diagnosis of glaucoma on the problem list    If glaucoma diagnosis is new, refer refill to physician.         Passed - Recent (12 mo) or future (30 days) visit within the authorizing provider's specialty    Patient had office visit in the last 12 months or has a visit in the next 30 days with authorizing provider or within the authorizing provider's specialty.  See \"Patient Info\" tab in inbasket, or \"Choose Columns\" in Meds & Orders section of the refill encounter.           Passed - Medication is Oxybutynin and patient is 5 years of age or older       Passed - Patient is 18 years of age or older          "

## 2018-08-23 RX ORDER — OXYBUTYNIN CHLORIDE 5 MG/1
TABLET ORAL
Qty: 270 TABLET | Refills: 1 | OUTPATIENT
Start: 2018-08-23

## 2018-09-06 DIAGNOSIS — R09.81 NASAL CONGESTION: ICD-10-CM

## 2018-09-06 NOTE — TELEPHONE ENCOUNTER
"Requested Prescriptions   Pending Prescriptions Disp Refills     fluticasone (FLONASE) 50 MCG/ACT spray [Pharmacy Med Name: FLUTICASONE PROP 50 MCG SPRAY] 16 mL 2    Last Written Prescription Date:  4/14/17  Last Fill Quantity: 16,  # refills: 11   Last office visit: 7/20/2018 with prescribing provider:     Future Office Visit:     Sig: SPRAY 1-2 SPRAYS INTO BOTH NOSTRILS DAILY    Inhaled Steroids Protocol Passed    9/6/2018 11:51 AM       Passed - Patient is age 12 or older       Passed - Recent (12 mo) or future (30 days) visit within the authorizing provider's specialty    Patient had office visit in the last 12 months or has a visit in the next 30 days with authorizing provider or within the authorizing provider's specialty.  See \"Patient Info\" tab in inbasket, or \"Choose Columns\" in Meds & Orders section of the refill encounter.              "

## 2018-09-10 RX ORDER — FLUTICASONE PROPIONATE 50 MCG
SPRAY, SUSPENSION (ML) NASAL
Qty: 16 ML | Refills: 11 | Status: SHIPPED | OUTPATIENT
Start: 2018-09-10 | End: 2020-01-01

## 2018-09-10 NOTE — TELEPHONE ENCOUNTER
Prescription approved per [facility] Refill Protocol.  Page Galicia RN

## 2018-10-16 ENCOUNTER — MEDICAL CORRESPONDENCE (OUTPATIENT)
Dept: HEALTH INFORMATION MANAGEMENT | Facility: CLINIC | Age: 78
End: 2018-10-16

## 2018-10-18 ENCOUNTER — TELEPHONE (OUTPATIENT)
Dept: FAMILY MEDICINE | Facility: CLINIC | Age: 78
End: 2018-10-18

## 2018-10-18 NOTE — TELEPHONE ENCOUNTER
Forms received from: [employer]   Phone number listed: [phone]   Fax listed: [phone]  Date received: 10/18/18  Form description: Cert of Medical Necessity  Once forms are completed, please return to [employer] via Fax.  Is patient requesting to be contacted when forms are completed: NA    Form placed: in providers  Tabby Adames

## 2018-10-31 ENCOUNTER — OFFICE VISIT (OUTPATIENT)
Dept: FAMILY MEDICINE | Facility: CLINIC | Age: 78
End: 2018-10-31
Payer: COMMERCIAL

## 2018-10-31 VITALS
BODY MASS INDEX: 34.35 KG/M2 | SYSTOLIC BLOOD PRESSURE: 114 MMHG | OXYGEN SATURATION: 100 % | DIASTOLIC BLOOD PRESSURE: 70 MMHG | WEIGHT: 197 LBS | HEART RATE: 70 BPM | TEMPERATURE: 97.1 F

## 2018-10-31 DIAGNOSIS — I10 HYPERTENSION GOAL BP (BLOOD PRESSURE) < 140/90: ICD-10-CM

## 2018-10-31 DIAGNOSIS — E11.21 TYPE 2 DIABETES MELLITUS WITH DIABETIC NEPHROPATHY, WITH LONG-TERM CURRENT USE OF INSULIN (H): ICD-10-CM

## 2018-10-31 DIAGNOSIS — Z79.4 TYPE 2 DIABETES MELLITUS WITH DIABETIC NEPHROPATHY, WITH LONG-TERM CURRENT USE OF INSULIN (H): ICD-10-CM

## 2018-10-31 DIAGNOSIS — R53.83 FATIGUE, UNSPECIFIED TYPE: ICD-10-CM

## 2018-10-31 DIAGNOSIS — H81.11 BENIGN PAROXYSMAL POSITIONAL VERTIGO OF RIGHT EAR: Primary | ICD-10-CM

## 2018-10-31 LAB
ALBUMIN SERPL-MCNC: 3.5 G/DL (ref 3.4–5)
ALP SERPL-CCNC: 195 U/L (ref 40–150)
ALT SERPL W P-5'-P-CCNC: 71 U/L (ref 0–50)
ANION GAP SERPL CALCULATED.3IONS-SCNC: 8 MMOL/L (ref 3–14)
AST SERPL W P-5'-P-CCNC: 53 U/L (ref 0–45)
BASOPHILS # BLD AUTO: 0 10E9/L (ref 0–0.2)
BASOPHILS NFR BLD AUTO: 0.2 %
BILIRUB SERPL-MCNC: 0.3 MG/DL (ref 0.2–1.3)
BUN SERPL-MCNC: 45 MG/DL (ref 7–30)
CALCIUM SERPL-MCNC: 10 MG/DL (ref 8.5–10.1)
CHLORIDE SERPL-SCNC: 100 MMOL/L (ref 94–109)
CO2 SERPL-SCNC: 30 MMOL/L (ref 20–32)
CREAT SERPL-MCNC: 1.53 MG/DL (ref 0.52–1.04)
DIFFERENTIAL METHOD BLD: NORMAL
EOSINOPHIL # BLD AUTO: 0.2 10E9/L (ref 0–0.7)
EOSINOPHIL NFR BLD AUTO: 2.2 %
ERYTHROCYTE [DISTWIDTH] IN BLOOD BY AUTOMATED COUNT: 13.3 % (ref 10–15)
GFR SERPL CREATININE-BSD FRML MDRD: 33 ML/MIN/1.7M2
GLUCOSE SERPL-MCNC: 187 MG/DL (ref 70–99)
HCT VFR BLD AUTO: 39 % (ref 35–47)
HGB BLD-MCNC: 12.7 G/DL (ref 11.7–15.7)
LYMPHOCYTES # BLD AUTO: 1.6 10E9/L (ref 0.8–5.3)
LYMPHOCYTES NFR BLD AUTO: 19.5 %
MCH RBC QN AUTO: 29.8 PG (ref 26.5–33)
MCHC RBC AUTO-ENTMCNC: 32.6 G/DL (ref 31.5–36.5)
MCV RBC AUTO: 92 FL (ref 78–100)
MONOCYTES # BLD AUTO: 0.5 10E9/L (ref 0–1.3)
MONOCYTES NFR BLD AUTO: 5.9 %
NEUTROPHILS # BLD AUTO: 5.8 10E9/L (ref 1.6–8.3)
NEUTROPHILS NFR BLD AUTO: 72.2 %
PLATELET # BLD AUTO: 278 10E9/L (ref 150–450)
POTASSIUM SERPL-SCNC: 5.1 MMOL/L (ref 3.4–5.3)
PROT SERPL-MCNC: 7.6 G/DL (ref 6.8–8.8)
RBC # BLD AUTO: 4.26 10E12/L (ref 3.8–5.2)
SODIUM SERPL-SCNC: 138 MMOL/L (ref 133–144)
TSH SERPL DL<=0.005 MIU/L-ACNC: 3.47 MU/L (ref 0.4–4)
WBC # BLD AUTO: 8 10E9/L (ref 4–11)

## 2018-10-31 PROCEDURE — 85025 COMPLETE CBC W/AUTO DIFF WBC: CPT | Performed by: FAMILY MEDICINE

## 2018-10-31 PROCEDURE — 80053 COMPREHEN METABOLIC PANEL: CPT | Performed by: FAMILY MEDICINE

## 2018-10-31 PROCEDURE — 99214 OFFICE O/P EST MOD 30 MIN: CPT | Performed by: FAMILY MEDICINE

## 2018-10-31 PROCEDURE — 36415 COLL VENOUS BLD VENIPUNCTURE: CPT | Performed by: FAMILY MEDICINE

## 2018-10-31 PROCEDURE — 84443 ASSAY THYROID STIM HORMONE: CPT | Performed by: FAMILY MEDICINE

## 2018-10-31 ASSESSMENT — PAIN SCALES - GENERAL: PAINLEVEL: NO PAIN (0)

## 2018-10-31 NOTE — PROGRESS NOTES
SUBJECTIVE:   Ruth Rocha is a 78 year old female who presents to clinic today for the following health issues:       Dizziness and cough for the past few days    none    Problem list and histories reviewed & adjusted, as indicated.  Additional history: as documented         Reviewed and updated as needed this visit by clinical staff       Reviewed and updated as needed this visit by Provider          dizzy off and on for a week    Overall getting better    Had a spinning sensation    Had this in past a couple times    Nose plugged up    Using nasal spray    Some chills, always cold    Nothing new    Taking 20 units instead of 30    Had one episode of blood in stool last week but none since then    No black stools        Physical Exam   Constitutional: She is oriented to person, place, and time and well-developed, well-nourished, and in no distress. No distress.   HENT:   Head: Normocephalic and atraumatic.   Right Ear: Tympanic membrane, external ear and ear canal normal.   Left Ear: Tympanic membrane, external ear and ear canal normal.   Nose: Nose normal.   Mouth/Throat: Oropharynx is clear and moist.   No sinus/ submandib tenderness       Eyes: Conjunctivae are normal.   Neck: Neck supple. Carotid bruit is not present.   Cardiovascular: Normal rate, regular rhythm, normal heart sounds and intact distal pulses.  Exam reveals no gallop and no friction rub.    No murmur heard.  Pulmonary/Chest: Effort normal and breath sounds normal.   Musculoskeletal: She exhibits no edema.   Lymphadenopathy:     She has no cervical adenopathy.   Neurological: She is alert and oriented to person, place, and time.   Skin: She is not diaphoretic.   Psychiatric: Mood and affect normal.   sensation and strength okay in all 4 extremities    Finger nose okay     Provocative test for bppv pos when turning head to right , not left   Heel toe walking and standing on one leg are unsteady  Romberg okay    Pulses faint  today    ASSESSMENT / PLAN:  (H81.11) Benign paroxysmal positional vertigo of right ear  (primary encounter diagnosis)  Comment: this seems to be resolving  Plan: discussed in detail; follow up prn    (R53.83) Fatigue, unspecified type  Comment: this is multifactorial.  Checked labs today.  Of note, no increase in le edema and no regular orthopnea, so not highly suspicious for chf.    Plan: CBC with platelets differential, Comprehensive         metabolic panel, TSH with free T4 reflex        Be seen promptly if symptoms worsen  Of note she is up to date on colon screening      (I10) Hypertension goal BP (blood pressure) < 140/90  Comment: a little lower  today   Plan: monitor     (E11.21,  Z79.4) Type 2 diabetes mellitus with diabetic nephropathy, with long-term current use of insulin (H)  Comment: last hemoglobin a1c fine  Plan: adjust insulin as needed based on sugars     Stay well hydrated      I reviewed the patient's medications, allergies, medical history, family history, and social history.    Raymon Hernández MD

## 2018-10-31 NOTE — PATIENT INSTRUCTIONS
Stay well hydrated    We will send you lab results    If symptoms acutely worsen, be seen promptly

## 2018-11-01 NOTE — PROGRESS NOTES
A couple liver tests ( ALT and AST ) are mildly elevated.  Likely not worrisome but we can repeat these in a few months.    Kidney test elevated but overall stable ( creatinine ).    Other labs here are okay.    Raymon Hernández MD

## 2018-11-17 DIAGNOSIS — E87.6 HYPOKALEMIA: ICD-10-CM

## 2018-11-19 RX ORDER — POTASSIUM CHLORIDE 750 MG/1
TABLET, FILM COATED, EXTENDED RELEASE ORAL
Qty: 90 TABLET | Refills: 1 | Status: SHIPPED | OUTPATIENT
Start: 2018-11-19 | End: 2019-02-21

## 2018-11-19 NOTE — TELEPHONE ENCOUNTER
"Requested Prescriptions   Pending Prescriptions Disp Refills     KLOR-CON 10 MEQ tablet [Pharmacy Med Name: KLOR-CON 10 MEQ TABLET] 90 tablet 1    Last Written Prescription Date:  6-5-18  Last Fill Quantity: 90,  # refills: 1   Last office visit: 10/31/2018 with prescribing provider:  10-31-18   Future Office Visit:     Sig: TAKE 1 TABLET (10 MEQ) BY MOUTH DAILY    Potassium Supplements Protocol Passed    11/17/2018  1:25 AM       Passed - Recent (12 mo) or future (30 days) visit within the authorizing provider's specialty    Patient had office visit in the last 12 months or has a visit in the next 30 days with authorizing provider or within the authorizing provider's specialty.  See \"Patient Info\" tab in inbasket, or \"Choose Columns\" in Meds & Orders section of the refill encounter.             Passed - Patient is age 18 or older       Passed - Normal serum potassium in past 12 months    Recent Labs   Lab Test  10/31/18   1534   POTASSIUM  5.1                      "

## 2018-12-07 DIAGNOSIS — R10.13 DYSPEPSIA: ICD-10-CM

## 2018-12-10 RX ORDER — METOCLOPRAMIDE 10 MG/1
TABLET ORAL
Qty: 360 TABLET | Refills: 0 | Status: SHIPPED | OUTPATIENT
Start: 2018-12-10 | End: 2019-08-07

## 2018-12-10 NOTE — TELEPHONE ENCOUNTER
"  Requested Prescriptions   Pending Prescriptions Disp Refills     metoclopramide (REGLAN) 10 MG tablet [Pharmacy Med Name: METOCLOPRAMIDE 10 MG TABLET]  Last Written Prescription Date:  6/22/18  Last Fill Quantity: 360,  # refills: 0   Last office visit: 10/31/2018 with prescribing provider:  Edgar   Future Office Visit:     360 tablet 0     Sig: TAKE 1 TABLET BY MOUTH 4 TIMES A DAY BEFORE MEALS AND NIGHTLY AS NEEDED     Antivertigo/Antiemetic Agents Passed - 12/7/2018  5:07 PM       Passed - Recent (12 mo) or future (30 days) visit within the authorizing provider's specialty    Patient had office visit in the last 12 months or has a visit in the next 30 days with authorizing provider or within the authorizing provider's specialty.  See \"Patient Info\" tab in inbasket, or \"Choose Columns\" in Meds & Orders section of the refill encounter.             Passed - Patient is 18 years of age or older          "

## 2018-12-17 ENCOUNTER — OFFICE VISIT (OUTPATIENT)
Dept: OPHTHALMOLOGY | Facility: CLINIC | Age: 78
End: 2018-12-17
Attending: OPHTHALMOLOGY
Payer: MEDICARE

## 2018-12-17 DIAGNOSIS — E11.3313 MODERATE NONPROLIFERATIVE DIABETIC RETINOPATHY OF BOTH EYES WITH MACULAR EDEMA ASSOCIATED WITH TYPE 2 DIABETES MELLITUS (H): Primary | ICD-10-CM

## 2018-12-17 PROCEDURE — G0463 HOSPITAL OUTPT CLINIC VISIT: HCPCS | Mod: ZF

## 2018-12-17 PROCEDURE — 92134 CPTRZ OPH DX IMG PST SGM RTA: CPT | Mod: ZF | Performed by: OPHTHALMOLOGY

## 2018-12-17 ASSESSMENT — CONF VISUAL FIELD
OD_NORMAL: 1
OS_NORMAL: 1

## 2018-12-17 ASSESSMENT — VISUAL ACUITY
OS_PH_SC: 20/150-
OD_SC: 20/100-
OS_SC: 20/300
METHOD: SNELLEN - LINEAR

## 2018-12-17 ASSESSMENT — CUP TO DISC RATIO
OD_RATIO: 0.7
OS_RATIO: 0.7

## 2018-12-17 ASSESSMENT — TONOMETRY
OD_IOP_MMHG: 12
IOP_METHOD: TONOPEN
OS_IOP_MMHG: 11

## 2018-12-17 ASSESSMENT — EXTERNAL EXAM - LEFT EYE: OS_EXAM: 2+ BROW PTOSIS

## 2018-12-17 ASSESSMENT — EXTERNAL EXAM - RIGHT EYE: OD_EXAM: 2+ BROW PTOSIS

## 2018-12-17 NOTE — PROGRESS NOTES
CC -   NPDR and DME    INTERVAL HISTORY: VA stable. . Last A1c ~6.4 on ~ 10/2018 per patient    HPI -   Ruth HANSA Rocha is a  78 year old year-old patient presenting for NPDR and DME OS > OD.  H/o K-scars and poor vision OS Sees Jordan normally for glaucoma (last visit 1/2/17).    VA OS poor longstanding    PAST OCULAR SURGERY  CE/IOL OU  FML OU  Therapeutic PTK OS 8/2015    RETINAL IMAGING:  OCT   12-17-18  right eye- mod focal atrophy from FML scars, no CNVM, tr ERM, ?trace  IRF, stable  left eye -  Mod focal atrophy from FML scars, no CNVM, tr  paracentral DME, stable    FA 6-14-16  Right eye - blurry image, 2-3+ WD from FML scars, 1+ increased NURA, 1 peripheral ischemia, no NV  Left eye - (transit) 2+ increased NURA, large WD in macula from FML scars, 1+ MAs/peripheral atrophy, no NV      ASSESSMENT & PLAN  1.  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 DME OS   - s/p FML   - paracentral, mild   - VA varied 20/125-20/500, typically 20/400   - has moderate macular ischemia     - doubt vis sig given extensive FML scars and K-scarring   - doubt benefit to Tx   - has improved compared to past   - observe for now         3. DME OD   - very mild   - cornea contributing to vision   - observe         4.  PVD OU    5.  K- scarring OU   -s/p PTK OS   - sees Page   - contributing to subnormal OD       6.  OAG OU   - on cosopt OD and xalatan OU   - sees Agusto - 7/2018 last visit       RTC 6 months, OCT OU, sooner if new vision changes    ATTESTATION     Attending Physician Attestation:      Complete documentation of historical and exam elements from today's encounter can be found in the full encounter summary report (not reduplicated in this progress note).  I personally obtained the chief complaint(s) and history of present illness.  I confirmed and edited as necessary the review of systems, past medical/surgical history, family history, social history, and examination findings as documented  by others; and I examined the patient myself.  I personally reviewed the relevant tests, images, and reports as documented above.  I formulated and edited as necessary the assessment and plan and discussed the findings and management plan with the patient and family    Bryanna Jaime MD, PhD  , Vitreoretinal Surgery  Department of Ophthalmology  [facility]

## 2019-01-04 ENCOUNTER — TELEPHONE (OUTPATIENT)
Dept: FAMILY MEDICINE | Facility: CLINIC | Age: 79
End: 2019-01-04

## 2019-01-04 ENCOUNTER — TRANSFERRED RECORDS (OUTPATIENT)
Dept: HEALTH INFORMATION MANAGEMENT | Facility: CLINIC | Age: 79
End: 2019-01-04

## 2019-01-04 NOTE — TELEPHONE ENCOUNTER
Ruth Rocha is a 78 year old female who calls with shortness of breath.    Patient had an appointment today with Dr. Stevens at 0740.  Patients friend Renita is with patient now.  She stated that when she took patients BP sitting it was 98/50, when she stands up, she can not even get a reading.  Patient is feeling lightheaded and dizzy.  She also has a very bad cough, and is short of breath when she moves around.  She is able to talk in full sentences.  PATIENT DENIES: chest pain, blue lips or tongue, clammy skin, feeling of suffocation, altered mental status, severe shortness of breath, wheezing, inability to speak, drooling and inability to swallow.    Allergies:   Allergies   Allergen Reactions     Morphine      Telephone Oil [Fish Oil]          NURSING PLAN: Nursing advice to patient To ER now with a , call 911 if feeling faint and dizzy    RECOMMENDED DISPOSITION:  See above  Will comply with recommendation: Yes  If further questions/concerns or if symptoms do not improve, worsen or new symptoms develop, call your PCP or [facility] as soon as possible.      Guideline used:Breathing Problems  Telephone Triage Protocols for Nurses, Fifth Edition, Khushi Sanderson, YADIRA

## 2019-01-09 ENCOUNTER — DOCUMENTATION ONLY (OUTPATIENT)
Dept: OPHTHALMOLOGY | Facility: CLINIC | Age: 79
End: 2019-01-09

## 2019-01-09 DIAGNOSIS — E11.21 TYPE 2 DIABETES MELLITUS WITH DIABETIC NEPHROPATHY, WITH LONG-TERM CURRENT USE OF INSULIN (H): ICD-10-CM

## 2019-01-09 DIAGNOSIS — Z79.4 TYPE 2 DIABETES MELLITUS WITH DIABETIC NEPHROPATHY, WITH LONG-TERM CURRENT USE OF INSULIN (H): ICD-10-CM

## 2019-01-09 NOTE — TELEPHONE ENCOUNTER
"Requested Prescriptions   Pending Prescriptions Disp Refills     blood glucose monitoring (ONEYDA MICROLET) lancets [Pharmacy Med Name: MICROLET LANCETS] 100 each 1    Last Written Prescription Date:  6/15/18  Last Fill Quantity: 100,  # refills: 1   Last office visit: 10/31/2018 with prescribing provider:     Future Office Visit:   Next 5 appointments (look out 90 days)    Gregory 10, 2019  7:40 AM CST  SHORT with Raymon Hernández MD  [facility] ([facility]) [address] [place] [id number]  [phone]          Sig: USE AS DIRECTED 3 TIMES A DAY    Diabetic Supplies Protocol Passed - 1/9/2019  4:41 PM       Passed - Medication is active on med list       Passed - Patient is 18 years of age or older       Passed - Recent (6 mo) or future (30 days) visit within the authorizing provider's specialty    Patient had office visit in the last 6 months or has a visit in the next 30 days with authorizing provider.  See \"Patient Info\" tab in inbasket, or \"Choose Columns\" in Meds & Orders section of the refill encounter.              "

## 2019-01-10 ENCOUNTER — OFFICE VISIT (OUTPATIENT)
Dept: FAMILY MEDICINE | Facility: CLINIC | Age: 79
End: 2019-01-10
Payer: COMMERCIAL

## 2019-01-10 VITALS
SYSTOLIC BLOOD PRESSURE: 116 MMHG | BODY MASS INDEX: 34.02 KG/M2 | OXYGEN SATURATION: 99 % | DIASTOLIC BLOOD PRESSURE: 60 MMHG | WEIGHT: 199.25 LBS | HEART RATE: 71 BPM | TEMPERATURE: 97.3 F | HEIGHT: 64 IN

## 2019-01-10 DIAGNOSIS — I10 HYPERTENSION GOAL BP (BLOOD PRESSURE) < 140/90: ICD-10-CM

## 2019-01-10 DIAGNOSIS — E11.21 TYPE 2 DIABETES MELLITUS WITH DIABETIC NEPHROPATHY, WITH LONG-TERM CURRENT USE OF INSULIN (H): ICD-10-CM

## 2019-01-10 DIAGNOSIS — Z87.440 PERSONAL HISTORY OF URINARY TRACT INFECTION: ICD-10-CM

## 2019-01-10 DIAGNOSIS — J18.9 PNEUMONIA DUE TO INFECTIOUS ORGANISM, UNSPECIFIED LATERALITY, UNSPECIFIED PART OF LUNG: Primary | ICD-10-CM

## 2019-01-10 DIAGNOSIS — Z79.4 TYPE 2 DIABETES MELLITUS WITH DIABETIC NEPHROPATHY, WITH LONG-TERM CURRENT USE OF INSULIN (H): ICD-10-CM

## 2019-01-10 PROCEDURE — 99214 OFFICE O/P EST MOD 30 MIN: CPT | Performed by: FAMILY MEDICINE

## 2019-01-10 RX ORDER — FUROSEMIDE 20 MG
20 TABLET ORAL DAILY
COMMUNITY
Start: 2019-01-08 | End: 2019-01-10

## 2019-01-10 RX ORDER — HYDROCHLOROTHIAZIDE 25 MG/1
TABLET ORAL
Qty: 90 TABLET | Refills: 3 | COMMUNITY
Start: 2019-01-10 | End: 2019-01-18

## 2019-01-10 ASSESSMENT — PAIN SCALES - GENERAL: PAINLEVEL: NO PAIN (0)

## 2019-01-10 ASSESSMENT — MIFFLIN-ST. JEOR: SCORE: 1365.85

## 2019-01-10 NOTE — PATIENT INSTRUCTIONS
Starting tomorrow, stop the furosemide/ lasix    Restart the hydrochlorothiazide    Increase walking as able     Watch sodium intake    See us in a month, sooner if needed    If symptoms ever get real bad, go to emergency room

## 2019-01-10 NOTE — PROGRESS NOTES
SUBJECTIVE:   Ruth Rocha is a 79 year old female who presents to clinic today for the following health issues:           Hospital Follow-up Visit:    Hospital/Nursing Home/IP Rehab Facility: [facility]  Date of Admission: 01/05/2019  Date of Discharge: 01/07/2019  Reason(s) for Admission: pneumonia and multiple other issues            Problems taking medications regularly:  None       Medication changes since discharge: updated       Problems adhering to non-medication therapy:  None     Summary of hospitalization:  CareEverywhere information obtained and reviewed  Diagnostic Tests/Treatments reviewed.  Follow up needed: none  Other Healthcare Providers Involved in Patient s Care:         None  Update since discharge: improved.      Post Discharge Medication Reconciliation: discharge medications reconciled, continue medications without change.  Plan of care communicated with patient     Coding guidelines for this visit:  Type of Medical   Decision Making Face-to-Face Visit       within 7 Days of discharge Face-to-Face Visit        within 14 days of discharge   Moderate Complexity 71901 [id number]   High Complexity 57629 92497                   Problem list and histories reviewed & adjusted, as indicated.  Additional history: as documented         Reviewed and updated as needed this visit by clinical staff  Allergies  Meds       Reviewed and updated as needed this visit by Provider       Reviewed disch summary in detail      Still coughing some but a lot better today    Had pinkeye, better now    Urinating okay    Just finished the prednisone and the antibiotics today    Energy level not great but better than in hospital      Physical Exam   Constitutional: She is oriented to person, place, and time. She appears well-developed and well-nourished.   HENT:   Head: Normocephalic and atraumatic.   Eyes: Conjunctivae are normal.   Neck: Carotid bruit is not present.   Cardiovascular: Normal rate, regular rhythm and normal  heart sounds.   Pulmonary/Chest: Effort normal and breath sounds normal. No respiratory distress.   Musculoskeletal: She exhibits no edema.   Neurological: She is alert and oriented to person, place, and time.   oral mucosa dry      ASSESSMENT / PLAN:  (J18.9) Pneumonia due to infectious organism, unspecified laterality, unspecified part of lung  (primary encounter diagnosis)  Comment: patient feeling much better now.  Just finished antibiotics and steroids.  Plan: monitor symptoms.  See us in a month, can recheck cxr at that time     (I10) Hypertension goal BP (blood pressure) < 140/90  Comment: on recheck blood pressure fine.  In fact, she was put on the furosemide instead of hydrochlorothiazide in hospital.  She feels parched and tired.    Plan: hydrochlorothiazide (HYDRODIURIL) 25 MG tablet        Go back to hydrochlorothiazide.  Discontinue lasix.  If she gets any worsening of the swelling, breathing, etc she will call or be seen promptly     (Z87.492) Personal history of urinary tract infection  Comment: symptoms resolved   Plan: follow up prn     (E11.21,  Z79.4) Type 2 diabetes mellitus with diabetic nephropathy, with long-term current use of insulin (H)  Comment: last hemoglobin a1c fine   Plan: no change     See us in a month, sooner if needed       I reviewed the patient's medications, allergies, medical history, family history, and social history.    Raymon Hernández MD

## 2019-01-10 NOTE — TELEPHONE ENCOUNTER
Prescription approved per [facility] [place].  Sofia Sanderson, RN [facility] Triage.

## 2019-01-13 ENCOUNTER — TRANSFERRED RECORDS (OUTPATIENT)
Dept: HEALTH INFORMATION MANAGEMENT | Facility: CLINIC | Age: 79
End: 2019-01-13

## 2019-01-14 LAB
CREAT SERPL-MCNC: 1.6 MG/DL (ref 0.57–1.11)
GFR SERPL CREATININE-BSD FRML MDRD: 31 ML/MIN/1.73M2
GLUCOSE SERPL-MCNC: 128 MG/DL (ref 65–100)
POTASSIUM SERPL-SCNC: 4.1 MMOL/L (ref 3.5–5)

## 2019-01-18 ENCOUNTER — OFFICE VISIT (OUTPATIENT)
Dept: FAMILY MEDICINE | Facility: CLINIC | Age: 79
End: 2019-01-18
Payer: COMMERCIAL

## 2019-01-18 VITALS
HEART RATE: 74 BPM | SYSTOLIC BLOOD PRESSURE: 127 MMHG | WEIGHT: 191 LBS | BODY MASS INDEX: 33.3 KG/M2 | DIASTOLIC BLOOD PRESSURE: 79 MMHG | OXYGEN SATURATION: 100 % | TEMPERATURE: 97.4 F

## 2019-01-18 DIAGNOSIS — R13.10 SWALLOWING PROBLEM: ICD-10-CM

## 2019-01-18 DIAGNOSIS — E11.21 TYPE 2 DIABETES MELLITUS WITH DIABETIC NEPHROPATHY, WITH LONG-TERM CURRENT USE OF INSULIN (H): ICD-10-CM

## 2019-01-18 DIAGNOSIS — I50.9 CONGESTIVE HEART FAILURE, UNSPECIFIED HF CHRONICITY, UNSPECIFIED HEART FAILURE TYPE (H): Primary | ICD-10-CM

## 2019-01-18 DIAGNOSIS — Z79.4 TYPE 2 DIABETES MELLITUS WITH DIABETIC NEPHROPATHY, WITH LONG-TERM CURRENT USE OF INSULIN (H): ICD-10-CM

## 2019-01-18 DIAGNOSIS — D64.9 ANEMIA, UNSPECIFIED TYPE: ICD-10-CM

## 2019-01-18 DIAGNOSIS — Z87.01 HISTORY OF PNEUMONIA: ICD-10-CM

## 2019-01-18 LAB
BASOPHILS # BLD AUTO: 0 10E9/L (ref 0–0.2)
BASOPHILS NFR BLD AUTO: 0.2 %
CREAT SERPL-MCNC: 2.15 MG/DL (ref 0.52–1.04)
DIFFERENTIAL METHOD BLD: ABNORMAL
EOSINOPHIL # BLD AUTO: 0.2 10E9/L (ref 0–0.7)
EOSINOPHIL NFR BLD AUTO: 1.9 %
ERYTHROCYTE [DISTWIDTH] IN BLOOD BY AUTOMATED COUNT: 12.5 % (ref 10–15)
GFR SERPL CREATININE-BSD FRML MDRD: 22 ML/MIN/{1.73_M2}
HCT VFR BLD AUTO: 36 % (ref 35–47)
HGB BLD-MCNC: 11.6 G/DL (ref 11.7–15.7)
LYMPHOCYTES # BLD AUTO: 2.2 10E9/L (ref 0.8–5.3)
LYMPHOCYTES NFR BLD AUTO: 17.5 %
MCH RBC QN AUTO: 30.8 PG (ref 26.5–33)
MCHC RBC AUTO-ENTMCNC: 32.2 G/DL (ref 31.5–36.5)
MCV RBC AUTO: 96 FL (ref 78–100)
MONOCYTES # BLD AUTO: 1.1 10E9/L (ref 0–1.3)
MONOCYTES NFR BLD AUTO: 8.9 %
NEUTROPHILS # BLD AUTO: 8.9 10E9/L (ref 1.6–8.3)
NEUTROPHILS NFR BLD AUTO: 71.5 %
PLATELET # BLD AUTO: 276 10E9/L (ref 150–450)
POTASSIUM SERPL-SCNC: 4.1 MMOL/L (ref 3.4–5.3)
RBC # BLD AUTO: 3.77 10E12/L (ref 3.8–5.2)
WBC # BLD AUTO: 12.5 10E9/L (ref 4–11)

## 2019-01-18 PROCEDURE — 82565 ASSAY OF CREATININE: CPT | Performed by: FAMILY MEDICINE

## 2019-01-18 PROCEDURE — 36415 COLL VENOUS BLD VENIPUNCTURE: CPT | Performed by: FAMILY MEDICINE

## 2019-01-18 PROCEDURE — 85025 COMPLETE CBC W/AUTO DIFF WBC: CPT | Performed by: FAMILY MEDICINE

## 2019-01-18 PROCEDURE — 84132 ASSAY OF SERUM POTASSIUM: CPT | Performed by: FAMILY MEDICINE

## 2019-01-18 PROCEDURE — 99214 OFFICE O/P EST MOD 30 MIN: CPT | Performed by: FAMILY MEDICINE

## 2019-01-18 RX ORDER — FUROSEMIDE 20 MG
20 TABLET ORAL DAILY
Qty: 90 TABLET | Refills: 1 | Status: SHIPPED | OUTPATIENT
Start: 2019-01-18 | End: 2019-01-28

## 2019-01-18 ASSESSMENT — PAIN SCALES - GENERAL: PAINLEVEL: NO PAIN (0)

## 2019-01-18 NOTE — PATIENT INSTRUCTIONS
Be careful with the swallowing    Stay well hydrated    We will send you lab results    See us in Feb as scheduled.  Can do xray at that time

## 2019-01-18 NOTE — PROGRESS NOTES
SUBJECTIVE:   Ruth Rocha is a 79 year old female who presents to clinic today for the following health issues:           Hospital Follow-up Visit:    Hospital/Nursing Home/IP Rehab Facility: [facility]  Date of Admission: 01/13/2019  Date of Discharge: 01/14/2019  Reason(s) for Admission: anxiety attack            Problems taking medications regularly:  None       Medication changes since discharge: They stopped her hydrochlorothiazide and told her to follow up with primary to start furosemide again       Problems adhering to non-medication therapy:  None     Summary of hospitalization:  CareEverywhere information obtained and reviewed  Diagnostic Tests/Treatments reviewed.  Follow up needed: none  Other Healthcare Providers Involved in Patient s Care:         None  Update since discharge: improved.      Post Discharge Medication Reconciliation: discharge medications reconciled, continue medications without change.  Plan of care communicated with patient     Coding guidelines for this visit:  Type of Medical   Decision Making Face-to-Face Visit       within 7 Days of discharge Face-to-Face Visit        within 14 days of discharge   Moderate Complexity 25518 [id number]   High Complexity 10494 36403                   Problem list and histories reviewed & adjusted, as indicated.  Additional history: as documented         Reviewed and updated as needed this visit by clinical staff  Allergies  Meds       Reviewed and updated as needed this visit by Provider          one night in hospital    Reviewed disch summary in detail      Swallowing better    Patient eating and swallowing carefully    Avoid dry bread type foods    No new meds but she was told to take one lasix 20 mg daily     Patient has lost some weight    Off hydrochlorothiazide      Hemoglobin a1c was 6.5 1-4-19 at [facility]      Physical Exam   Constitutional: She is oriented to person, place, and time. She appears well-developed and well-nourished.    HENT:   Head: Normocephalic and atraumatic.   Eyes: Conjunctivae are normal.   Neck: Carotid bruit is not present.   Cardiovascular: Normal rate, regular rhythm and normal heart sounds.   Pulmonary/Chest: Effort normal and breath sounds normal. No respiratory distress.   Abdominal: Soft. There is no tenderness.   Musculoskeletal: She exhibits no edema.   Neurological: She is alert and oriented to person, place, and time.     Reviewed labs from hospital    ASSESSMENT / PLAN:  (I50.9) Congestive heart failure, unspecified HF chronicity, unspecified heart failure type (H)  (primary encounter diagnosis)  Comment: did prescription for the furosemide as she will stay on this, not the hydrochlorothiazide.  Check labs.  Adjust kcl dose if needed.   Plan: furosemide (LASIX) 20 MG tablet, Potassium,         Creatinine        Advised patient to stay hydrated    (D64.9) Anemia, unspecified type  Comment: hemoglobin a 10.7 in hospital   Plan: CBC with platelets differential        Recheck here today     (R13.10) Swallowing problem  Comment: patient being very careful with swallowing.  She saw speech language pathology in hospital   Plan: as above     (Z87.01) History of pneumonia  Comment: some residual on xray in hosp  Plan: see us in a few weeks as scheduled.  Recheck cxr then    (E11.21,  Z79.4) Type 2 diabetes mellitus with diabetic nephropathy, with long-term current use of insulin (H)  Comment: hemoglobin a1c in hospital fine earlier this year   Plan: no change in meds       I reviewed the patient's medications, allergies, medical history, family history, and social history.    Raymon Hernández MD

## 2019-01-19 NOTE — RESULT ENCOUNTER NOTE
Kidney test ( creatinine ) is higher than last time.  Stay on same medications.  Stay adequately hydrated.    Potassium level is normal.    Red blood count ( hemoglobin ) is a bit low.    See you in a few weeks, sooner if needed.    Raymon Hernández MD

## 2019-01-21 ENCOUNTER — OFFICE VISIT (OUTPATIENT)
Dept: FAMILY MEDICINE | Facility: CLINIC | Age: 79
End: 2019-01-21
Payer: COMMERCIAL

## 2019-01-21 ENCOUNTER — OFFICE VISIT (OUTPATIENT)
Dept: OPHTHALMOLOGY | Facility: CLINIC | Age: 79
End: 2019-01-21
Payer: COMMERCIAL

## 2019-01-21 VITALS
TEMPERATURE: 96.4 F | DIASTOLIC BLOOD PRESSURE: 63 MMHG | SYSTOLIC BLOOD PRESSURE: 116 MMHG | HEART RATE: 70 BPM | BODY MASS INDEX: 33.48 KG/M2 | OXYGEN SATURATION: 99 % | WEIGHT: 192 LBS

## 2019-01-21 DIAGNOSIS — N32.81 OVERACTIVE BLADDER: ICD-10-CM

## 2019-01-21 DIAGNOSIS — H18.9 KERATOPATHY: ICD-10-CM

## 2019-01-21 DIAGNOSIS — I50.9 CONGESTIVE HEART FAILURE, UNSPECIFIED HF CHRONICITY, UNSPECIFIED HEART FAILURE TYPE (H): ICD-10-CM

## 2019-01-21 DIAGNOSIS — H40.1131 PRIMARY OPEN ANGLE GLAUCOMA OF BOTH EYES, MILD STAGE: Primary | ICD-10-CM

## 2019-01-21 DIAGNOSIS — Z79.4 TYPE 2 DIABETES MELLITUS WITH DIABETIC NEPHROPATHY, WITH LONG-TERM CURRENT USE OF INSULIN (H): ICD-10-CM

## 2019-01-21 DIAGNOSIS — K21.9 GASTROESOPHAGEAL REFLUX DISEASE, ESOPHAGITIS PRESENCE NOT SPECIFIED: ICD-10-CM

## 2019-01-21 DIAGNOSIS — R42 DIZZINESS: Primary | ICD-10-CM

## 2019-01-21 DIAGNOSIS — E11.21 TYPE 2 DIABETES MELLITUS WITH DIABETIC NEPHROPATHY, WITH LONG-TERM CURRENT USE OF INSULIN (H): ICD-10-CM

## 2019-01-21 DIAGNOSIS — Z96.1 PSEUDOPHAKIA: ICD-10-CM

## 2019-01-21 DIAGNOSIS — N18.4 CKD (CHRONIC KIDNEY DISEASE) STAGE 4, GFR 15-29 ML/MIN (H): ICD-10-CM

## 2019-01-21 PROCEDURE — 99214 OFFICE O/P EST MOD 30 MIN: CPT | Performed by: FAMILY MEDICINE

## 2019-01-21 PROCEDURE — 92012 INTRM OPH EXAM EST PATIENT: CPT | Performed by: OPHTHALMOLOGY

## 2019-01-21 PROCEDURE — 93000 ELECTROCARDIOGRAM COMPLETE: CPT | Performed by: FAMILY MEDICINE

## 2019-01-21 RX ORDER — OXYBUTYNIN CHLORIDE 5 MG/1
TABLET ORAL
Qty: 270 TABLET | Refills: 3
Start: 2019-01-21 | End: 2019-11-06

## 2019-01-21 RX ORDER — OMEPRAZOLE 40 MG/1
CAPSULE, DELAYED RELEASE ORAL
Qty: 90 CAPSULE | Refills: 0
Start: 2019-01-21 | End: 2019-01-23

## 2019-01-21 RX ORDER — CARVEDILOL 6.25 MG/1
6.25 TABLET ORAL 2 TIMES DAILY WITH MEALS
Qty: 180 TABLET | Refills: 3 | Status: SHIPPED | OUTPATIENT
Start: 2019-01-21 | End: 2020-01-15

## 2019-01-21 ASSESSMENT — VISUAL ACUITY
OS_SC: 20/300
METHOD: SNELLEN - LINEAR
OD_SC: 20/200

## 2019-01-21 ASSESSMENT — TONOMETRY
OD_IOP_MMHG: 11
IOP_METHOD: APPLANATION
OS_IOP_MMHG: 11

## 2019-01-21 ASSESSMENT — EXTERNAL EXAM - RIGHT EYE: OD_EXAM: 2+ BROW PTOSIS

## 2019-01-21 ASSESSMENT — PAIN SCALES - GENERAL: PAINLEVEL: MODERATE PAIN (5)

## 2019-01-21 ASSESSMENT — EXTERNAL EXAM - LEFT EYE: OS_EXAM: 2+ BROW PTOSIS

## 2019-01-21 NOTE — PATIENT INSTRUCTIONS
Continue using Dorzolamide/Timolol (blue top) right eye twice daily (about 12 hours apart),  And Latanoprost (green top) both eyes at bedtime.  Wait at least 5 minutes between drops if using more than one at a time.  Recommend seeing Dr. Hernández for follow up of fall this morning.   Continue care with Dr. Jaime.  Return visit in 3 months for complete exam.     Abraham Byrd M.D.  [phone]    Patient Education   Diabetes weakens the blood vessels all over the body, including the eyes. Damage to the blood vessels in the eyes can cause swelling or bleeding into part of the eye (called the retina). This is called diabetic retinopathy ([facility]). If not treated, this disease can cause vision loss or blindness.   Symptoms may include blurred or distorted vision, but many people have no symptoms. It's important to see your eye doctor regularly to check for problems.   Early treatment and good control can help protect your vision. Here are the things you can do to help prevent vision loss:      1. Keep your blood sugar levels under tight control.      2. Bring high blood pressure under control.      3. No smoking.      4. Have yearly dilated eye exams.

## 2019-01-21 NOTE — PATIENT INSTRUCTIONS
Decreased Coreg to 6. 25 mg, one tab 2x a days ( new medications)  Decreased Oxyutynin chloride to one tab at bedtime only  Decrease Prilosec to one tab daily as needed  Follow up with Dr. Hernández in 1- 2 wks

## 2019-01-21 NOTE — LETTER
"    1/21/2019         RE: Ruth Rocha  [address] [place] [address] [address]  [address]  [place], Susan. \Hospital Has a New Name and Outlook.\"" [id number]        Dear Colleague,    Thank you for referring your patient, Ruth oRcha, to the [facility]. Please see a copy of my visit note below.     Current Eye Medications:  cosopt right eye twice a day, Latanoprost both eyes every evening.      Subjective:  Patient is scheduled for a Diabetic Eye Exam today.   Patient fell in her bathroom this morning and hit her head.  She is feeling dizzy and is unsure if she should proceed with her eye exam today.  Dr. Byrd would like to do a pressure check, only, today.   Patient states her vision continues to be poor - \"no better, no worse.\"     Lab Results   Component Value Date    A1C 6.4 07/20/2018    A1C 5.8 12/04/2017    A1C 5.8 08/09/2017    A1C 5.9 04/14/2017    A1C 6.1 07/15/2016        Objective:  See Ophthalmology Exam.       Assessment:  Stable intraocular pressure and anterior segment exam both eyes.      Plan:  Continue using Dorzolamide/Timolol (blue top) right eye twice daily (about 12 hours apart),  And Latanoprost (green top) both eyes at bedtime.  Wait at least 5 minutes between drops if using more than one at a time.  Recommend seeing Dr. Hernández for follow up of fall this morning.   Continue care with Dr. Jaime.  Return visit in 3 months for complete exam.     Abraham Byrd M.D.  [phone]         Again, thank you for allowing me to participate in the care of your patient.        Sincerely,        Abraham Byrd MD    "

## 2019-01-21 NOTE — PROGRESS NOTES
" Current Eye Medications:  cosopt right eye twice a day, Latanoprost both eyes every evening.      Subjective:  Patient is scheduled for a Diabetic Eye Exam today.   Patient fell in her bathroom this morning and hit her head.  She is feeling dizzy and is unsure if she should proceed with her eye exam today.  Dr. Byrd would like to do a pressure check, only, today.   Patient states her vision continues to be poor - \"no better, no worse.\"     Lab Results   Component Value Date    A1C 6.4 07/20/2018    A1C 5.8 12/04/2017    A1C 5.8 08/09/2017    A1C 5.9 04/14/2017    A1C 6.1 07/15/2016        Objective:  See Ophthalmology Exam.       Assessment:  Stable intraocular pressure and anterior segment exam both eyes.      Plan:  Continue using Dorzolamide/Timolol (blue top) right eye twice daily (about 12 hours apart),  And Latanoprost (green top) both eyes at bedtime.  Wait at least 5 minutes between drops if using more than one at a time.  Recommend seeing Dr. Hernández for follow up of fall this morning.   Continue care with Dr. Jaime.  Return visit in 3 months for complete exam.     Abraham Byrd M.D.  [phone]       "

## 2019-01-21 NOTE — PROGRESS NOTES
SUBJECTIVE:                                                    Ruth Rocha is a 79 year old female who presents to clinic today for the following health issues:  Patient comes in today with daughter with concern of falls.  She reports she fell this morning in her bathroom she reports she was getting out of the bathroom when she was using her walker and she fell forward on her walker.  She reports she fainted for a few seconds.  There was no head injury she did not fell down to the ground.  Patient reports that she had these symptoms before.  She reports no chest pain no chest palpitation no change in her vision.    She reports she was seen in ER on  January 13, 2019 for shortness of breath, cough she also had panic attack at that time.  Patient does have history of diabetes she reports she will use Lantus 20 units at bedtime her blood sugar has been in the 140s up to 160's  She denies any low blood sugar.  She does have history of chronic kidney disease stage III which been stable. This morning her blood sugar was 140    History of urine incontinent, bladder spasms she has been on oxybutynin 3 times daily.  She also reports history of hypertension .  She reports that sometimes she does feel lightheaded when she stands up especially in the morning.    She denies any recent sickness she has no fever no chills denies night sweats.  Denies any lower extremity edema she has no change in her weight.  She has no history of travels.  No sick contact.    Joint Pain    Onset: 01/21/2019    Description:   Location: right hip and head  Character: Dull ache    Intensity: moderate, 5/10    Progression of Symptoms: worse    Accompanying Signs & Symptoms:  Other symptoms: radiation of pain to neck    History:   Previous similar pain: YES      Precipitating factors:   Trauma or overuse: YES- fell    Alleviating factors:  Improved by: ice    Therapies Tried and outcome: Tri    Problem list and histories reviewed &  adjusted, as indicated.  Additional history: as documented    Patient Active Problem List   Diagnosis     Overactive bladder     GERD (gastroesophageal reflux disease)     HYPERLIPIDEMIA LDL GOAL <100     Type 2 diabetes, HbA1C goal < 8% (H)     Diabetes mellitus with background retinopathy (H)     Diabetic macular edema, hx focal laser ou     Advanced directives, counseling/discussion     Hypertension goal BP (blood pressure) < 140/90     CHF (congestive heart failure) (H)     Pseudophakia, Yag Caps, ou      CKD (chronic kidney disease) stage 3, GFR 30-59 ml/min (H)     Hx of keratopathy - hx of PTK, ou (MP)     Hx of corneal epithelial defect, left eye     Gout     Type 2 diabetes mellitus with diabetic nephropathy (H)     Obesity, unspecified obesity severity, unspecified obesity type     FLOR (obstructive sleep apnea)     Primary open angle glaucoma of both eyes, mild stage     Posterior vitreous detachment, bilateral     Type 2 diabetes mellitus with diabetic nephropathy, with long-term current use of insulin (H)     Status post ORIF of fracture of ankle     Morbid obesity (H)     CKD (chronic kidney disease) stage 4, GFR 15-29 ml/min (H)     Past Surgical History:   Procedure Laterality Date     CATARACT IOL, RT/LT       focal laser for diabetic macular edema      both eyes     HC REMOVAL GALLBLADDER  10-5-3     LASER SELECTIVE TRABECULOPLASTY  7/2003    right eye, 360     ORTHOPEDIC SURGERY      left ankle surgery     PHACOEMULSIFICATION WITH STANDARD INTRAOCULAR LENS IMPLANT  11/1996; 3/1999    right eye; left eye     PHOTOTHERAPEUTIC KERACTECTOMY Right 01/18/2017     PHOTOTHERAPEUTIC KERACTECTOMY Left 08/2015     TONSILLECTOMY  1948       Social History     Tobacco Use     Smoking status: Never Smoker     Smokeless tobacco: Never Used   Substance Use Topics     Alcohol use: No     Family History   Problem Relation Age of Onset     C.A.D. Mother      Glaucoma Mother      Hypertension Mother      C.A.D. Father       Glaucoma Father      Hypertension Father      Diabetes No family hx of         except dad's sister     Cancer No family hx of      Macular Degeneration No family hx of      Amblyopia No family hx of      Retinal detachment No family hx of          Current Outpatient Medications   Medication Sig Dispense Refill     acetaminophen (TYLENOL) 500 MG tablet Take 2 tablets (1,000 mg) by mouth 3 times daily as needed       aspirin 325 MG EC tablet Take 81 mg by mouth daily        B-D U/F 31G X 8 MM insulin pen needle USE AS DIRECTED - 2 DOSES PER  each 2     blood glucose monitoring (ONEYDA CONTOUR NEXT) test strip 1 strip by In Vitro route 3 times daily Use to test blood sugar 3 times daily 100 strip 11     blood glucose monitoring (ONEYDA MICROLET) lancets USE AS DIRECTED 3 TIMES A  each 1     calcitRIOL (ROCALTROL) 0.25 MCG capsule Take 1 capsule (0.25 mcg) by mouth Every Mon, Wed, Fri Morning       carvedilol (COREG) 6.25 MG tablet Take 1 tablet (6.25 mg) by mouth 2 times daily (with meals) 180 tablet 3     Cholecalciferol (VITAMIN D) 1000 UNITS capsule Take 1 capsule by mouth daily.       dorzolamide-timolol (COSOPT) 2-0.5 % ophthalmic solution Place 1 drop into the right eye 2 times daily 10 mL 12     ferrous sulfate (IRON) 325 (65 FE) MG tablet Take 1 tablet (325 mg) by mouth 2 times daily 180 tablet 1     fluticasone (FLONASE) 50 MCG/ACT spray SPRAY 1-2 SPRAYS INTO BOTH NOSTRILS DAILY 16 mL 11     furosemide (LASIX) 20 MG tablet Take 1 tablet (20 mg) by mouth daily 90 tablet 1     GuaiFENesin 200 MG/5ML LIQD Take 5 mLs by mouth 2 times daily as needed 1 Bottle 0     insulin aspart (NOVOLOG VIAL) 100 UNITS/ML injection Inject Subcutaneous daily TAKE 5 UNITS PLUS SLIDING SCALE, <150=0,  VERY LOW INSULIN RESISTANCE DOSING   For BG <150 give 0 units  For  - 200 give 1 unit.  For  - 250 give 2 units.  For  - 300 give 3 units.  For  - 350 give 4 units.  For  - 1000 give 6  units       KLOR-CON 10 MEQ tablet TAKE 1 TABLET (10 MEQ) BY MOUTH DAILY 90 tablet 1     latanoprost (XALATAN) 0.005 % ophthalmic solution Place 1 drop into both eyes At Bedtime 1 Bottle 11     metoclopramide (REGLAN) 10 MG tablet TAKE 1 TABLET BY MOUTH 4 TIMES A DAY BEFORE MEALS AND NIGHTLY AS NEEDED 360 tablet 0     MICROLET LANCETS MISC 1 Device 3 times daily. 100 each 11     MULTIVITAMIN TABS   OR 1 TABLET DAILY       omeprazole (PRILOSEC) 40 MG DR capsule One tab daily as needed 90 capsule 0     order for DME Equipment being ordered: CPAP tubing, mask, and all needed equipment 1 each 0     oxybutynin (DITROPAN) 5 MG tablet One tab at bedtime ( dose changed ) 270 tablet 3     senna-docusate (SENEXON-S) 8.6-50 MG per tablet TAKE 1-4 TABLETS BY MOUTH 2 TIMES DAILY AS NEEDED CONSTIPATION 60 tablet 4     simvastatin (ZOCOR) 10 MG tablet TAKE 1 TABLET (10 MG) BY MOUTH AT BEDTIME 90 tablet 1     ACE/ARB NOT PRESCRIBED, INTENTIONAL, by Other route continuous prn Reported on 5/5/2017       hydrocortisone (CORTAID) 1 % cream Apply sparingly to affected area three times daily as needed to affected areas (Patient not taking: Reported on 1/21/2019) 60 g 1     LANTUS SOLOSTAR 100 UNIT/ML soln 30 UNITS AT BEDTIME OR AS DIRECTED 20 mL 3     Allergies   Allergen Reactions     Morphine      [place] Oil [Fish Oil]        ROS:  Constitutional, HEENT, cardiovascular, pulmonary, gi and gu systems are negative, except as otherwise noted.    OBJECTIVE:     /63 (BP Location: Right arm, Patient Position: Chair, Cuff Size: Adult Large)   Pulse 70   Temp 96.4  F (35.8  C) (Oral)   Wt 87.1 kg (192 lb)   SpO2 99%   Breastfeeding? No   BMI 33.48 kg/m     Body mass index is 33.48 kg/m .  GENERAL: healthy, alert and no distress  NECK: no adenopathy, no asymmetry, masses, or scars and thyroid normal to palpation  RESP: lungs clear to auscultation - no rales, rhonchi or wheezes  CV: regular rate and rhythm, normal S1 S2, no S3 or S4,  no murmur, click or rub, no peripheral edema and peripheral pulses strong  ABDOMEN: soft, nontender, no hepatosplenomegaly, no masses and bowel sounds normal  MS: no gross musculoskeletal defects noted, no edema    Diagnostic Test Results:  EKG: normal rhythm,    ASSESSMENT/PLAN:     Hypertension; controlled   Associated with the following complications:    CKD   Plan:  The following changes are made - decreased medication  See Epic order      ICD-10-CM    1. Dizziness R42    2. Type 2 diabetes mellitus with diabetic nephropathy, with long-term current use of insulin (H) E11.21 EKG 12-lead complete w/read - Clinics    Z79.4    3. Congestive heart failure, unspecified HF chronicity, unspecified heart failure type (H) I50.9 carvedilol (COREG) 6.25 MG tablet   4. CKD (chronic kidney disease) stage 4, GFR 15-29 ml/min (H) N18.4 carvedilol (COREG) 6.25 MG tablet   5. Overactive bladder N32.81 oxybutynin (DITROPAN) 5 MG tablet   6. Gastroesophageal reflux disease, esophagitis presence not specified K21.9 omeprazole (PRILOSEC) 40 MG DR capsule   I did do an EKG which was normal .  No sign of abnormal arrhythmia.  Her EKG showed to within normal limit.  Patient did report previous history of congestive heart failure, likely diastolic.  She reports she has not been seen for over 4 years. if symptom continues and she may need to have further work up, 2 D ECHO or and Carotid  doppler.    I did discuss with the patient and her daughter symptom could be related to medication.  I advised her to decrease her Coreg to 6.25 mg twice daily.    She was seen.  In addition decrease taking oxybutynin to 1 tablet at bedtime only.  I also advised that she could take Prilosec 1 tablet daily as needed.    Patient reports she will follow-up with Dr. garcia to cardiac workup.  Or possibly a vascular workup   Patient Instructions   Decreased Coreg to 6. 25 mg, one tab 2x a days ( new medications)  Decreased Oxyutynin chloride to one tab at  bedtime only  Decrease Prilosec to one tab daily as needed  Follow up with Dr. Hernández in 1- 2 wks      Bettie Stevens MD  [facility]

## 2019-01-22 ENCOUNTER — TRANSFERRED RECORDS (OUTPATIENT)
Dept: HEALTH INFORMATION MANAGEMENT | Facility: CLINIC | Age: 79
End: 2019-01-22

## 2019-01-23 ENCOUNTER — OFFICE VISIT (OUTPATIENT)
Dept: FAMILY MEDICINE | Facility: CLINIC | Age: 79
End: 2019-01-23
Payer: COMMERCIAL

## 2019-01-23 VITALS
TEMPERATURE: 97.5 F | OXYGEN SATURATION: 100 % | BODY MASS INDEX: 33.3 KG/M2 | WEIGHT: 191 LBS | SYSTOLIC BLOOD PRESSURE: 120 MMHG | DIASTOLIC BLOOD PRESSURE: 69 MMHG | HEART RATE: 75 BPM

## 2019-01-23 DIAGNOSIS — N18.4 CKD (CHRONIC KIDNEY DISEASE) STAGE 4, GFR 15-29 ML/MIN (H): ICD-10-CM

## 2019-01-23 DIAGNOSIS — E21.3 HYPERPARATHYROIDISM (H): ICD-10-CM

## 2019-01-23 DIAGNOSIS — E11.21 TYPE 2 DIABETES MELLITUS WITH DIABETIC NEPHROPATHY, WITH LONG-TERM CURRENT USE OF INSULIN (H): ICD-10-CM

## 2019-01-23 DIAGNOSIS — E66.01 MORBID OBESITY (H): ICD-10-CM

## 2019-01-23 DIAGNOSIS — F41.0 PANIC ATTACK: Primary | ICD-10-CM

## 2019-01-23 DIAGNOSIS — Z79.4 TYPE 2 DIABETES MELLITUS WITH DIABETIC NEPHROPATHY, WITH LONG-TERM CURRENT USE OF INSULIN (H): ICD-10-CM

## 2019-01-23 PROCEDURE — 99214 OFFICE O/P EST MOD 30 MIN: CPT | Performed by: FAMILY MEDICINE

## 2019-01-23 RX ORDER — HYDROXYZINE PAMOATE 25 MG/1
25 CAPSULE ORAL DAILY PRN
COMMUNITY
Start: 2019-01-22 | End: 2019-12-30

## 2019-01-23 NOTE — PROGRESS NOTES
SUBJECTIVE:   Ruth Rocha is a 79 year old female who presents to clinic today for the following health issues:      ED/UC Followup:    Facility:  [facility]  Date of visit: 1/22/19  Reason for visit: Panic attack  Current Status: Still not good, Having a hard time breathing.     Creatinine and calcium level were elevated.     The patient has had multiple clinic and hospital visits in the last month.  She is normally followed by Dr. Hernández.  She was seen in the ER again yesterday and felt to have had a panic attack.  Her creatinine was 2.18, which is up slightly from her value this last Friday when it was 2.15.  Calcium was minimally elevated at 10.6.  I see from her chart that she has a history of hyperparathyroidism, which could certainly account for that.  She was prescribed hydroxyzine to help with anxiety.  She does have a history of heart and lung issues as well as diabetes and various other health conditions as per her chart.    Problem list and histories reviewed & adjusted, as indicated.  Additional history: as documented    Patient Active Problem List   Diagnosis     Overactive bladder     GERD (gastroesophageal reflux disease)     HYPERLIPIDEMIA LDL GOAL <100     Type 2 diabetes, HbA1C goal < 8% (H)     Diabetes mellitus with background retinopathy (H)     Diabetic macular edema, hx focal laser ou     Advanced directives, counseling/discussion     Hypertension goal BP (blood pressure) < 140/90     CHF (congestive heart failure) (H)     Pseudophakia, Yag Caps, ou      CKD (chronic kidney disease) stage 3, GFR 30-59 ml/min (H)     Hx of keratopathy - hx of PTK, ou (MP)     Hx of corneal epithelial defect, left eye     Gout     Type 2 diabetes mellitus with diabetic nephropathy (H)     Obesity, unspecified obesity severity, unspecified obesity type     FLOR (obstructive sleep apnea)     Primary open angle glaucoma of both eyes, mild stage     Posterior vitreous detachment, bilateral     Type 2 diabetes  mellitus with diabetic nephropathy, with long-term current use of insulin (H)     Status post ORIF of fracture of ankle     Morbid obesity (H)     CKD (chronic kidney disease) stage 4, GFR 15-29 ml/min (H)     Hyperparathyroidism (H)     Past Surgical History:   Procedure Laterality Date     CATARACT IOL, RT/LT       focal laser for diabetic macular edema      both eyes     HC REMOVAL GALLBLADDER  10-5-3     LASER SELECTIVE TRABECULOPLASTY  7/2003    right eye, 360     ORTHOPEDIC SURGERY      left ankle surgery     PHACOEMULSIFICATION WITH STANDARD INTRAOCULAR LENS IMPLANT  11/1996; 3/1999    right eye; left eye     PHOTOTHERAPEUTIC KERACTECTOMY Right 01/18/2017     PHOTOTHERAPEUTIC KERACTECTOMY Left 08/2015     TONSILLECTOMY  1948       Social History     Tobacco Use     Smoking status: Never Smoker     Smokeless tobacco: Never Used   Substance Use Topics     Alcohol use: No     Family History   Problem Relation Age of Onset     C.A.D. Mother      Glaucoma Mother      Hypertension Mother      C.A.D. Father      Glaucoma Father      Hypertension Father      Diabetes No family hx of         except dad's sister     Cancer No family hx of      Macular Degeneration No family hx of      Amblyopia No family hx of      Retinal detachment No family hx of          Current Outpatient Medications   Medication Sig Dispense Refill     ACE/ARB NOT PRESCRIBED, INTENTIONAL, by Other route continuous prn Reported on 5/5/2017       acetaminophen (TYLENOL) 500 MG tablet Take 2 tablets (1,000 mg) by mouth 3 times daily as needed       aspirin 325 MG EC tablet Take 81 mg by mouth daily        B-D U/F 31G X 8 MM insulin pen needle USE AS DIRECTED - 2 DOSES PER  each 2     blood glucose monitoring (ONEYDA CONTOUR NEXT) test strip 1 strip by In Vitro route 3 times daily Use to test blood sugar 3 times daily 100 strip 11     blood glucose monitoring (ONEYDA MICROLET) lancets USE AS DIRECTED 3 TIMES A  each 1     calcitRIOL  (ROCALTROL) 0.25 MCG capsule Take 1 capsule (0.25 mcg) by mouth Every Mon, Wed, Fri Morning       carvedilol (COREG) 6.25 MG tablet Take 1 tablet (6.25 mg) by mouth 2 times daily (with meals) 180 tablet 3     Cholecalciferol (VITAMIN D) 1000 UNITS capsule Take 1 capsule by mouth daily.       dorzolamide-timolol (COSOPT) 2-0.5 % ophthalmic solution Place 1 drop into the right eye 2 times daily 10 mL 12     ferrous sulfate (IRON) 325 (65 FE) MG tablet Take 1 tablet (325 mg) by mouth 2 times daily 180 tablet 1     fluticasone (FLONASE) 50 MCG/ACT spray SPRAY 1-2 SPRAYS INTO BOTH NOSTRILS DAILY 16 mL 11     furosemide (LASIX) 20 MG tablet Take 1 tablet (20 mg) by mouth daily 90 tablet 1     GuaiFENesin 200 MG/5ML LIQD Take 5 mLs by mouth 2 times daily as needed 1 Bottle 0     hydrocortisone (CORTAID) 1 % cream Apply sparingly to affected area three times daily as needed to affected areas 60 g 1     hydrOXYzine (VISTARIL) 25 MG capsule Take 25 mg by mouth       insulin aspart (NOVOLOG VIAL) 100 UNITS/ML injection Inject Subcutaneous daily TAKE 5 UNITS PLUS SLIDING SCALE, <150=0,  VERY LOW INSULIN RESISTANCE DOSING   For BG <150 give 0 units  For  - 200 give 1 unit.  For  - 250 give 2 units.  For  - 300 give 3 units.  For  - 350 give 4 units.  For  - 1000 give 6 units       KLOR-CON 10 MEQ tablet TAKE 1 TABLET (10 MEQ) BY MOUTH DAILY 90 tablet 1     LANTUS SOLOSTAR 100 UNIT/ML soln 30 UNITS AT BEDTIME OR AS DIRECTED 20 mL 3     latanoprost (XALATAN) 0.005 % ophthalmic solution Place 1 drop into both eyes At Bedtime 1 Bottle 11     metoclopramide (REGLAN) 10 MG tablet TAKE 1 TABLET BY MOUTH 4 TIMES A DAY BEFORE MEALS AND NIGHTLY AS NEEDED 360 tablet 0     MICROLET LANCETS MISC 1 Device 3 times daily. 100 each 11     MULTIVITAMIN TABS   OR 1 TABLET DAILY       order for DME Equipment being ordered: CPAP tubing, mask, and all needed equipment 1 each 0     oxybutynin (DITROPAN) 5 MG tablet One  tab at bedtime ( dose changed ) 270 tablet 3     ranitidine (ZANTAC) 150 MG tablet TAKE 1 TABLET BY MOUTH TWICE A DAY  11     senna-docusate (SENEXON-S) 8.6-50 MG per tablet TAKE 1-4 TABLETS BY MOUTH 2 TIMES DAILY AS NEEDED CONSTIPATION 60 tablet 4     simvastatin (ZOCOR) 10 MG tablet TAKE 1 TABLET (10 MG) BY MOUTH AT BEDTIME 90 tablet 1     Allergies   Allergen Reactions     Morphine      [place] [employer] [Fish Oil]        Reviewed and updated as needed this visit by clinical staff  Tobacco  Allergies  Meds  Med Hx  Surg Hx  Fam Hx  Soc Hx      Reviewed and updated as needed this visit by Provider         ROS:  Mainly significant for the above    OBJECTIVE:     /69 (BP Location: Right arm, Patient Position: Chair, Cuff Size: Adult Regular)   Pulse 75   Temp 97.5  F (36.4  C) (Oral)   Wt 86.6 kg (191 lb)   SpO2 100%   BMI 33.30 kg/m    Body mass index is 33.3 kg/m .  GENERAL: alert, no distress, over weight and elderly  CV: Regular rate and rhythm  NEURO:: Frequent lip smacking    Diagnostic Test Results:  ER note and lab results from yesterday were reviewed    ASSESSMENT/PLAN:       ICD-10-CM    1. Panic attack F41.0    2. Hyperparathyroidism (H) E21.3    3. Type 2 diabetes mellitus with diabetic nephropathy, with long-term current use of insulin (H) E11.21     Z79.4    4. CKD (chronic kidney disease) stage 4, GFR 15-29 ml/min (H) N18.4    5. Morbid obesity (H) E66.01      Her blood pressure and other vital signs look fine today  Her elevated creatinine could well be due to some mild dehydration and her elevated calcium due to hyperparathyroidism  I encouraged adequate fluid intake  Stay on same medications, including hydroxyzine as needed for anxiety  I suggested follow-up with her PCP next week for ongoing care    Jovani Newell MD  [facility]

## 2019-01-28 ENCOUNTER — ANCILLARY PROCEDURE (OUTPATIENT)
Dept: GENERAL RADIOLOGY | Facility: CLINIC | Age: 79
End: 2019-01-28
Payer: COMMERCIAL

## 2019-01-28 ENCOUNTER — OFFICE VISIT (OUTPATIENT)
Dept: FAMILY MEDICINE | Facility: CLINIC | Age: 79
End: 2019-01-28
Payer: COMMERCIAL

## 2019-01-28 VITALS
SYSTOLIC BLOOD PRESSURE: 128 MMHG | DIASTOLIC BLOOD PRESSURE: 78 MMHG | OXYGEN SATURATION: 100 % | BODY MASS INDEX: 33.52 KG/M2 | TEMPERATURE: 97.2 F | WEIGHT: 192.25 LBS | HEART RATE: 71 BPM

## 2019-01-28 DIAGNOSIS — E11.21 TYPE 2 DIABETES MELLITUS WITH DIABETIC NEPHROPATHY, WITH LONG-TERM CURRENT USE OF INSULIN (H): ICD-10-CM

## 2019-01-28 DIAGNOSIS — D64.9 ANEMIA, UNSPECIFIED TYPE: ICD-10-CM

## 2019-01-28 DIAGNOSIS — R05.9 COUGH: Primary | ICD-10-CM

## 2019-01-28 DIAGNOSIS — R05.9 COUGH: ICD-10-CM

## 2019-01-28 DIAGNOSIS — R79.89 ELEVATED SERUM CREATININE: ICD-10-CM

## 2019-01-28 DIAGNOSIS — I50.9 CONGESTIVE HEART FAILURE, UNSPECIFIED HF CHRONICITY, UNSPECIFIED HEART FAILURE TYPE (H): ICD-10-CM

## 2019-01-28 DIAGNOSIS — Z79.4 TYPE 2 DIABETES MELLITUS WITH DIABETIC NEPHROPATHY, WITH LONG-TERM CURRENT USE OF INSULIN (H): ICD-10-CM

## 2019-01-28 DIAGNOSIS — F41.9 ANXIETY: ICD-10-CM

## 2019-01-28 DIAGNOSIS — E83.52 HYPERCALCEMIA: ICD-10-CM

## 2019-01-28 LAB
ALBUMIN SERPL-MCNC: 3.5 G/DL (ref 3.4–5)
ALP SERPL-CCNC: 142 U/L (ref 40–150)
ALT SERPL W P-5'-P-CCNC: 34 U/L (ref 0–50)
ANION GAP SERPL CALCULATED.3IONS-SCNC: 8 MMOL/L (ref 3–14)
AST SERPL W P-5'-P-CCNC: 33 U/L (ref 0–45)
BASOPHILS # BLD AUTO: 0 10E9/L (ref 0–0.2)
BASOPHILS NFR BLD AUTO: 0.3 %
BILIRUB SERPL-MCNC: 0.4 MG/DL (ref 0.2–1.3)
BUN SERPL-MCNC: 41 MG/DL (ref 7–30)
CALCIUM SERPL-MCNC: 9.8 MG/DL (ref 8.5–10.1)
CHLORIDE SERPL-SCNC: 100 MMOL/L (ref 94–109)
CO2 SERPL-SCNC: 28 MMOL/L (ref 20–32)
CREAT SERPL-MCNC: 2.14 MG/DL (ref 0.52–1.04)
DIFFERENTIAL METHOD BLD: NORMAL
EOSINOPHIL # BLD AUTO: 0.2 10E9/L (ref 0–0.7)
EOSINOPHIL NFR BLD AUTO: 1.7 %
ERYTHROCYTE [DISTWIDTH] IN BLOOD BY AUTOMATED COUNT: 12.5 % (ref 10–15)
GFR SERPL CREATININE-BSD FRML MDRD: 22 ML/MIN/{1.73_M2}
GLUCOSE SERPL-MCNC: 179 MG/DL (ref 70–99)
HCT VFR BLD AUTO: 39.1 % (ref 35–47)
HGB BLD-MCNC: 12.5 G/DL (ref 11.7–15.7)
LYMPHOCYTES # BLD AUTO: 1.5 10E9/L (ref 0.8–5.3)
LYMPHOCYTES NFR BLD AUTO: 17.4 %
MCH RBC QN AUTO: 30.2 PG (ref 26.5–33)
MCHC RBC AUTO-ENTMCNC: 32 G/DL (ref 31.5–36.5)
MCV RBC AUTO: 94 FL (ref 78–100)
MONOCYTES # BLD AUTO: 0.6 10E9/L (ref 0–1.3)
MONOCYTES NFR BLD AUTO: 6.7 %
NEUTROPHILS # BLD AUTO: 6.5 10E9/L (ref 1.6–8.3)
NEUTROPHILS NFR BLD AUTO: 73.9 %
PLATELET # BLD AUTO: 267 10E9/L (ref 150–450)
POTASSIUM SERPL-SCNC: 4.4 MMOL/L (ref 3.4–5.3)
PROT SERPL-MCNC: 7.3 G/DL (ref 6.8–8.8)
PTH-INTACT SERPL-MCNC: 77 PG/ML (ref 18–80)
RBC # BLD AUTO: 4.14 10E12/L (ref 3.8–5.2)
SODIUM SERPL-SCNC: 136 MMOL/L (ref 133–144)
WBC # BLD AUTO: 8.8 10E9/L (ref 4–11)

## 2019-01-28 PROCEDURE — 83970 ASSAY OF PARATHORMONE: CPT | Performed by: FAMILY MEDICINE

## 2019-01-28 PROCEDURE — 71046 X-RAY EXAM CHEST 2 VIEWS: CPT

## 2019-01-28 PROCEDURE — 85025 COMPLETE CBC W/AUTO DIFF WBC: CPT | Performed by: FAMILY MEDICINE

## 2019-01-28 PROCEDURE — 80053 COMPREHEN METABOLIC PANEL: CPT | Performed by: FAMILY MEDICINE

## 2019-01-28 PROCEDURE — 99214 OFFICE O/P EST MOD 30 MIN: CPT | Performed by: FAMILY MEDICINE

## 2019-01-28 PROCEDURE — 36415 COLL VENOUS BLD VENIPUNCTURE: CPT | Performed by: FAMILY MEDICINE

## 2019-01-28 RX ORDER — FUROSEMIDE 20 MG
20 TABLET ORAL DAILY
Qty: 90 TABLET | Refills: 1 | Status: SHIPPED | OUTPATIENT
Start: 2019-01-28 | End: 2019-11-06

## 2019-01-28 ASSESSMENT — PAIN SCALES - GENERAL: PAINLEVEL: NO PAIN (0)

## 2019-01-28 NOTE — PROGRESS NOTES
SUBJECTIVE:   Ruth Rocha is a 79 year old female who presents to clinic today for the following health issues:       Follow up on panic attacks. She had another panic attack yesterday which caused her to be short of breath. She did call the EMT's and they came and listened to her lungs and stated she sounded good and to follow up with her primary.     none    Problem list and histories reviewed & adjusted, as indicated.  Additional history: as documented         Reviewed and updated as needed this visit by clinical staff  Allergies  Meds       Reviewed and updated as needed this visit by Provider          had another panic attack while at home yesterday    Walking sometimes sets off the breathing but other times not    Nose feel plugged up    Not much coughing    The hydroxyzine helps some and briefly     Took up to 3 per day    Now out of that one    No side effects on hydroxyzine except fatigue    Took melatonin for 1st time last night, helped    Some swallowing issues    Not going down wrong pipe     lots of gas    Feels constipated    Taking stool softener daily    Had some chills    The panic comes at various times, sporadic    Lowest glc in last few weeks was 66        Physical Exam   Constitutional: She is oriented to person, place, and time. She appears well-developed and well-nourished.   HENT:   Head: Normocephalic and atraumatic.   Eyes: Conjunctivae are normal.   Neck: Carotid bruit is not present.   Cardiovascular: Normal rate, regular rhythm and normal heart sounds.   Pulmonary/Chest: Effort normal and breath sounds normal. No respiratory distress.   Musculoskeletal: She exhibits no edema.   Neurological: She is alert and oriented to person, place, and time.       ASSESSMENT / PLAN:  (R05) Cough  (primary encounter diagnosis)  Comment: checked xray.  No obvious infiltrate or fluid congestion per my reading  Plan: XR Chest 2 Views             (R76.89) Elevated serum creatinine  Comment:  recheck today   Plan: Comprehensive metabolic panel        Encouraged patient to stay hydrated     (D64.9) Anemia, unspecified type  Comment: recheck today   Plan: CBC with platelets differential             (E83.52) Hypercalcemia  Comment: in emergency room calcium a bit high  Plan: Parathyroid Hormone Intact             (E11.21,  Z79.4) Type 2 diabetes mellitus with diabetic nephropathy, with long-term current use of insulin (H)  Comment: needs refill   Plan: blood glucose (NOEYDA CONTOUR NEXT) test strip             (F41.9) Anxiety  Comment: prudent to start long acting anxiety med; discussed in detail   Plan: sertraline (ZOLOFT) 50 MG tablet        Return to clinic in 1-2 months, sooner if needed     (I50.9) Congestive heart failure, unspecified HF chronicity, unspecified heart failure type (H)  Comment: refill med  Plan: furosemide (LASIX) 20 MG tablet               I reviewed the patient's medications, allergies, medical history, family history, and social history.    Raymon Hernández MD

## 2019-01-28 NOTE — PATIENT INSTRUCTIONS
Start sertraline 1/2 pill daily for 1-2 weeks.  Then if tolerating okay, increase to 1 pill daily    Increase walking as able    We will send you lab results    See us in a couple months, sooner if needed

## 2019-01-30 ENCOUNTER — TELEPHONE (OUTPATIENT)
Dept: FAMILY MEDICINE | Facility: CLINIC | Age: 79
End: 2019-01-30

## 2019-01-30 NOTE — RESULT ENCOUNTER NOTE
The kidney test ( creatinine ) is still high, but stable.      The parathyroid level is right at the high end of normal.    We should recheck both these labs when we see you back in clinic in a couple months.    Other labs are stable.    Raymon Hernández MD

## 2019-01-30 NOTE — TELEPHONE ENCOUNTER
Please call pt regarding results below.  [facility]  Team 3 Coordinator     The kidney test ( creatinine ) is still high, but stable.      The parathyroid level is right at the high end of normal.    We should recheck both these labs when we see you back in clinic in a couple months.    Other labs are stable.    Raymon Hernández MD

## 2019-02-07 PROBLEM — F41.9 ANXIETY: Status: ACTIVE | Noted: 2019-02-07

## 2019-02-21 ENCOUNTER — OFFICE VISIT (OUTPATIENT)
Dept: FAMILY MEDICINE | Facility: CLINIC | Age: 79
End: 2019-02-21
Payer: COMMERCIAL

## 2019-02-21 VITALS
DIASTOLIC BLOOD PRESSURE: 72 MMHG | TEMPERATURE: 97.2 F | HEART RATE: 67 BPM | SYSTOLIC BLOOD PRESSURE: 137 MMHG | BODY MASS INDEX: 33.74 KG/M2 | OXYGEN SATURATION: 100 % | WEIGHT: 193.5 LBS

## 2019-02-21 DIAGNOSIS — I10 HYPERTENSION GOAL BP (BLOOD PRESSURE) < 140/90: ICD-10-CM

## 2019-02-21 DIAGNOSIS — E87.6 HYPOKALEMIA: ICD-10-CM

## 2019-02-21 DIAGNOSIS — G47.33 OBSTRUCTIVE SLEEP APNEA: Primary | ICD-10-CM

## 2019-02-21 PROCEDURE — 99213 OFFICE O/P EST LOW 20 MIN: CPT | Performed by: FAMILY MEDICINE

## 2019-02-21 RX ORDER — POTASSIUM CHLORIDE 750 MG/1
10 TABLET, EXTENDED RELEASE ORAL DAILY
Qty: 90 TABLET | Refills: 1 | Status: SHIPPED | OUTPATIENT
Start: 2019-02-21 | End: 2019-11-06

## 2019-02-21 ASSESSMENT — PAIN SCALES - GENERAL: PAINLEVEL: NO PAIN (0)

## 2019-02-21 NOTE — PATIENT INSTRUCTIONS
See us in March, labs at that time    Stay on same meds    We will try to fax cpap prescription to [facility]

## 2019-02-21 NOTE — PROGRESS NOTES
SUBJECTIVE:   Ruth Rocha is a 79 year old female who presents to clinic today for the following health issues:       She needs a new RX for CPAP machine and supplies    none    Problem list and histories reviewed & adjusted, as indicated.  Additional history: as documented         Reviewed and updated as needed this visit by clinical staff  Tobacco  Allergies  Meds  Med Hx  Surg Hx  Fam Hx  Soc Hx      Reviewed and updated as needed this visit by Provider          12 years with same machine cpap    Uses cpap every night, wears till at least 4 am    At least 5 hours per night    Occasionally all night      Physical Exam   Constitutional: She is oriented to person, place, and time. She appears well-developed and well-nourished.   HENT:   Head: Normocephalic and atraumatic.   Eyes: Conjunctivae are normal.   Neck: Carotid bruit is not present.   Cardiovascular: Normal rate, regular rhythm and normal heart sounds.   Pulmonary/Chest: Effort normal and breath sounds normal. No respiratory distress.   Musculoskeletal: She exhibits no edema.   Neurological: She is alert and oriented to person, place, and time.      throat looks okay here    ASSESSMENT / PLAN:  (G47.33) Obstructive sleep apnea  (primary encounter diagnosis)  Comment: did prescription.  Will fax to [facility] in [facility].   Plan: order for DME             (E87.6) Hypokalemia  Comment: needs refill   Plan: potassium chloride ER (KLOR-CON) 10 MEQ CR         tablet             (I10) Hypertension goal BP (blood pressure) < 140/90  Comment: at goal   Plan: no change    We can check labs in a few weeks at next appointment       I reviewed the patient's medications, allergies, medical history, family history, and social history.    Raymon Hernández MD

## 2019-02-22 ENCOUNTER — TELEPHONE (OUTPATIENT)
Dept: FAMILY MEDICINE | Facility: CLINIC | Age: 79
End: 2019-02-22

## 2019-02-22 NOTE — TELEPHONE ENCOUNTER
Forms received from: [employer]   Phone number listed: [phone] Fax listed: [phone]  Date received: 02/22/2019  Form description: CPAP/BIPAP Written Order  Once forms are completed, please return to [facility] via fax.  Is patient requesting to be contacted when forms are completed: NA    Form placed: In provider's basket  Phuong Rodrigues

## 2019-02-24 ENCOUNTER — MEDICAL CORRESPONDENCE (OUTPATIENT)
Dept: HEALTH INFORMATION MANAGEMENT | Facility: CLINIC | Age: 79
End: 2019-02-24

## 2019-02-25 NOTE — TELEPHONE ENCOUNTER
Requested information faxed to number provided.  [facility]  Team 3 Coordinator

## 2019-03-11 ENCOUNTER — OFFICE VISIT (OUTPATIENT)
Dept: FAMILY MEDICINE | Facility: CLINIC | Age: 79
End: 2019-03-11
Payer: COMMERCIAL

## 2019-03-11 VITALS
DIASTOLIC BLOOD PRESSURE: 60 MMHG | OXYGEN SATURATION: 100 % | BODY MASS INDEX: 33.78 KG/M2 | WEIGHT: 193.75 LBS | SYSTOLIC BLOOD PRESSURE: 110 MMHG | TEMPERATURE: 97.4 F | HEART RATE: 91 BPM

## 2019-03-11 DIAGNOSIS — E66.9 OBESITY, UNSPECIFIED OBESITY SEVERITY, UNSPECIFIED OBESITY TYPE: ICD-10-CM

## 2019-03-11 DIAGNOSIS — E11.21 TYPE 2 DIABETES MELLITUS WITH DIABETIC NEPHROPATHY, WITH LONG-TERM CURRENT USE OF INSULIN (H): Primary | ICD-10-CM

## 2019-03-11 DIAGNOSIS — N18.30 CKD (CHRONIC KIDNEY DISEASE) STAGE 3, GFR 30-59 ML/MIN (H): ICD-10-CM

## 2019-03-11 DIAGNOSIS — Z79.4 TYPE 2 DIABETES MELLITUS WITH DIABETIC NEPHROPATHY, WITH LONG-TERM CURRENT USE OF INSULIN (H): Primary | ICD-10-CM

## 2019-03-11 DIAGNOSIS — G47.33 OSA (OBSTRUCTIVE SLEEP APNEA): ICD-10-CM

## 2019-03-11 DIAGNOSIS — R53.83 FATIGUE, UNSPECIFIED TYPE: ICD-10-CM

## 2019-03-11 LAB
ALBUMIN SERPL-MCNC: 3.4 G/DL (ref 3.4–5)
ALP SERPL-CCNC: 155 U/L (ref 40–150)
ALT SERPL W P-5'-P-CCNC: 48 U/L (ref 0–50)
ANION GAP SERPL CALCULATED.3IONS-SCNC: 8 MMOL/L (ref 3–14)
AST SERPL W P-5'-P-CCNC: 38 U/L (ref 0–45)
BILIRUB SERPL-MCNC: 0.3 MG/DL (ref 0.2–1.3)
BUN SERPL-MCNC: 42 MG/DL (ref 7–30)
CALCIUM SERPL-MCNC: 9.7 MG/DL (ref 8.5–10.1)
CHLORIDE SERPL-SCNC: 103 MMOL/L (ref 94–109)
CO2 SERPL-SCNC: 27 MMOL/L (ref 20–32)
CREAT SERPL-MCNC: 1.75 MG/DL (ref 0.52–1.04)
GFR SERPL CREATININE-BSD FRML MDRD: 28 ML/MIN/{1.73_M2}
GLUCOSE SERPL-MCNC: 191 MG/DL (ref 70–99)
HBA1C MFR BLD: 6.6 % (ref 0–5.6)
POTASSIUM SERPL-SCNC: 5 MMOL/L (ref 3.4–5.3)
PROT SERPL-MCNC: 6.9 G/DL (ref 6.8–8.8)
SODIUM SERPL-SCNC: 138 MMOL/L (ref 133–144)
TSH SERPL DL<=0.005 MIU/L-ACNC: 2.21 MU/L (ref 0.4–4)

## 2019-03-11 PROCEDURE — 99214 OFFICE O/P EST MOD 30 MIN: CPT | Performed by: FAMILY MEDICINE

## 2019-03-11 PROCEDURE — 80053 COMPREHEN METABOLIC PANEL: CPT | Performed by: FAMILY MEDICINE

## 2019-03-11 PROCEDURE — 36415 COLL VENOUS BLD VENIPUNCTURE: CPT | Performed by: FAMILY MEDICINE

## 2019-03-11 PROCEDURE — 99207 C FOOT EXAM  NO CHARGE: CPT | Performed by: FAMILY MEDICINE

## 2019-03-11 PROCEDURE — 84443 ASSAY THYROID STIM HORMONE: CPT | Performed by: FAMILY MEDICINE

## 2019-03-11 PROCEDURE — 83036 HEMOGLOBIN GLYCOSYLATED A1C: CPT | Performed by: FAMILY MEDICINE

## 2019-03-11 ASSESSMENT — PAIN SCALES - GENERAL: PAINLEVEL: NO PAIN (0)

## 2019-03-11 NOTE — PATIENT INSTRUCTIONS
We will send you lab results    Stay on same medications    Call/ return to clinic with problems/ questions    Increase exercise as able

## 2019-03-11 NOTE — PROGRESS NOTES
SUBJECTIVE:   Ruth Rocha is a 79 year old female who presents to clinic today for the following health issues:       Follow up    None    Problem list and histories reviewed & adjusted, as indicated.  Additional history: as documented         Reviewed and updated as needed this visit by clinical staff  Tobacco  Allergies  Meds  Med Hx  Surg Hx  Fam Hx  Soc Hx      Reviewed and updated as needed this visit by Provider          got the new cpap, working well    No new problems      Low sugars in am 80s then later in day 200ish    Not dizzy / lightheaded     Stomach doing okay    Bowels okay    One senokot daily    Lots of prunes/ prune juice/ benefiber    No chest pain    Sleeping okay    Dry mouth not bad; takes one oxybutynin bid    Going to [employer] some    Physical Exam   Constitutional: She is oriented to person, place, and time. She appears well-developed and well-nourished.   HENT:   Head: Normocephalic and atraumatic.   Eyes: Conjunctivae are normal.   Neck: Carotid bruit is not present.   Cardiovascular: Normal rate, regular rhythm and normal heart sounds.   Pulmonary/Chest: Effort normal and breath sounds normal. No respiratory distress.   Abdominal: Soft. There is no tenderness.   Musculoskeletal: She exhibits no edema.   Neurological: She is alert and oriented to person, place, and time.     Foot exam mild decreased sensation to light touch  Skin okay, just dry  Some calluses present    ASSESSMENT / PLAN:  (E11.21,  Z79.4) Type 2 diabetes mellitus with diabetic nephropathy, with long-term current use of insulin (H)  (primary encounter diagnosis)  Comment: check hemoglobin a1c.  Just has the long acting insulin.  Last summer lab looked good.  Could see educator if needed.   Plan: DIABETES EDUCATOR REFERRAL, FOOT EXAM,         Hemoglobin A1c             (N18.3) CKD (chronic kidney disease) stage 3, GFR 30-59 ml/min (H)  Comment: recheck.  Has appointment with nephrology in April   Plan:  Comprehensive metabolic panel             (R53.83) Fatigue, unspecified type  Comment: check lab   Plan: TSH with free T4 reflex             (E66.9) Obesity, unspecified obesity severity, unspecified obesity type  Comment: chronic   Plan: keep working on healthy diet/exercise and wt loss     (G47.33) FLOR (obstructive sleep apnea)  Comment: has the new cpap  Plan: working well      I reviewed the patient's medications, allergies, medical history, family history, and social history.    Raymon Hernández MD

## 2019-03-13 NOTE — RESULT ENCOUNTER NOTE
The diabetes test ( hemoglobin a1c ) is fine.    The kidney test ( creatinine ) is better.    Other labs are okay.    Raymon Hernández MD

## 2019-03-14 ENCOUNTER — DOCUMENTATION ONLY (OUTPATIENT)
Dept: LAB | Facility: CLINIC | Age: 79
End: 2019-03-14

## 2019-03-14 DIAGNOSIS — E78.5 HYPERLIPIDEMIA LDL GOAL <100: Primary | ICD-10-CM

## 2019-03-14 NOTE — PROGRESS NOTES
Patient just had a bunch of labs done but was not fasting.  Thus put in future lipid panel.  Raymon Hernández MD

## 2019-03-20 DIAGNOSIS — E11.9 TYPE II DIABETES MELLITUS (H): ICD-10-CM

## 2019-03-20 DIAGNOSIS — I10 HYPERTENSION, ESSENTIAL: ICD-10-CM

## 2019-03-20 DIAGNOSIS — I95.1 ORTHOSTATIC HYPOTENSION: ICD-10-CM

## 2019-03-20 DIAGNOSIS — N18.9 CHRONIC KIDNEY DISEASE, UNSPECIFIED: ICD-10-CM

## 2019-03-20 DIAGNOSIS — E55.9 VITAMIN D DEFICIENCY: ICD-10-CM

## 2019-03-20 DIAGNOSIS — D64.9 ANEMIA, UNSPECIFIED: ICD-10-CM

## 2019-03-20 DIAGNOSIS — E87.5 HYPERPOTASSEMIA: ICD-10-CM

## 2019-03-20 DIAGNOSIS — N17.9 ACUTE KIDNEY FAILURE, UNSPECIFIED (H): ICD-10-CM

## 2019-03-20 DIAGNOSIS — R80.9 PROTEINURIA: ICD-10-CM

## 2019-03-20 DIAGNOSIS — N18.30 CHRONIC KIDNEY DISEASE, STAGE III (MODERATE) (H): ICD-10-CM

## 2019-03-20 DIAGNOSIS — E66.9 OBESITY, UNSPECIFIED: ICD-10-CM

## 2019-03-20 DIAGNOSIS — E21.3 HYPERPARATHYROIDISM, UNSPECIFIED (H): ICD-10-CM

## 2019-03-20 DIAGNOSIS — E11.21 DIABETIC NEPHROPATHY ASSOCIATED WITH TYPE 2 DIABETES MELLITUS (H): Primary | ICD-10-CM

## 2019-03-20 DIAGNOSIS — E87.20 ACIDOSIS: ICD-10-CM

## 2019-03-20 DIAGNOSIS — N25.81 HYPERPARATHYROIDISM DUE TO RENAL INSUFFICIENCY (H): ICD-10-CM

## 2019-03-20 DIAGNOSIS — E83.52 HYPERCALCEMIA: ICD-10-CM

## 2019-03-22 DIAGNOSIS — D64.9 ANEMIA, UNSPECIFIED: ICD-10-CM

## 2019-03-22 DIAGNOSIS — E11.21 DIABETIC NEPHROPATHY ASSOCIATED WITH TYPE 2 DIABETES MELLITUS (H): ICD-10-CM

## 2019-03-22 DIAGNOSIS — E78.5 HYPERLIPIDEMIA LDL GOAL <100: ICD-10-CM

## 2019-03-22 DIAGNOSIS — N17.9 ACUTE KIDNEY FAILURE, UNSPECIFIED (H): ICD-10-CM

## 2019-03-22 DIAGNOSIS — E87.5 HYPERPOTASSEMIA: ICD-10-CM

## 2019-03-22 DIAGNOSIS — R80.9 PROTEINURIA: ICD-10-CM

## 2019-03-22 DIAGNOSIS — N18.30 CHRONIC KIDNEY DISEASE, STAGE III (MODERATE) (H): ICD-10-CM

## 2019-03-22 DIAGNOSIS — I95.1 ORTHOSTATIC HYPOTENSION: ICD-10-CM

## 2019-03-22 DIAGNOSIS — N18.9 CHRONIC KIDNEY DISEASE, UNSPECIFIED: ICD-10-CM

## 2019-03-22 DIAGNOSIS — N25.81 HYPERPARATHYROIDISM DUE TO RENAL INSUFFICIENCY (H): ICD-10-CM

## 2019-03-22 DIAGNOSIS — E83.52 HYPERCALCEMIA: ICD-10-CM

## 2019-03-22 DIAGNOSIS — E66.9 OBESITY, UNSPECIFIED: ICD-10-CM

## 2019-03-22 DIAGNOSIS — E87.20 ACIDOSIS: ICD-10-CM

## 2019-03-22 DIAGNOSIS — E21.3 HYPERPARATHYROIDISM, UNSPECIFIED (H): ICD-10-CM

## 2019-03-22 DIAGNOSIS — E55.9 VITAMIN D DEFICIENCY: ICD-10-CM

## 2019-03-22 DIAGNOSIS — I10 HYPERTENSION, ESSENTIAL: ICD-10-CM

## 2019-03-22 DIAGNOSIS — E11.9 TYPE II DIABETES MELLITUS (H): ICD-10-CM

## 2019-03-22 LAB
ALBUMIN SERPL-MCNC: 3.4 G/DL (ref 3.4–5)
ANION GAP SERPL CALCULATED.3IONS-SCNC: 10 MMOL/L (ref 3–14)
BUN SERPL-MCNC: 37 MG/DL (ref 7–30)
CALCIUM SERPL-MCNC: 9.3 MG/DL (ref 8.5–10.1)
CHLORIDE SERPL-SCNC: 103 MMOL/L (ref 94–109)
CHOLEST SERPL-MCNC: 124 MG/DL
CO2 SERPL-SCNC: 26 MMOL/L (ref 20–32)
CREAT SERPL-MCNC: 1.67 MG/DL (ref 0.52–1.04)
GFR SERPL CREATININE-BSD FRML MDRD: 29 ML/MIN/{1.73_M2}
GLUCOSE SERPL-MCNC: 66 MG/DL (ref 70–99)
HDLC SERPL-MCNC: 50 MG/DL
HGB BLD-MCNC: 11.6 G/DL (ref 11.7–15.7)
LDLC SERPL CALC-MCNC: 52 MG/DL
NONHDLC SERPL-MCNC: 74 MG/DL
PHOSPHATE SERPL-MCNC: 4.5 MG/DL (ref 2.5–4.5)
POTASSIUM SERPL-SCNC: 4.6 MMOL/L (ref 3.4–5.3)
PROT UR-MCNC: 0.12 G/L
PROT/CREAT 24H UR: 0.1 G/G CR (ref 0–0.2)
SODIUM SERPL-SCNC: 139 MMOL/L (ref 133–144)
TRIGL SERPL-MCNC: 109 MG/DL

## 2019-03-22 PROCEDURE — 80061 LIPID PANEL: CPT | Performed by: INTERNAL MEDICINE

## 2019-03-22 PROCEDURE — 85018 HEMOGLOBIN: CPT | Performed by: INTERNAL MEDICINE

## 2019-03-22 PROCEDURE — 36415 COLL VENOUS BLD VENIPUNCTURE: CPT | Performed by: INTERNAL MEDICINE

## 2019-03-22 PROCEDURE — 84156 ASSAY OF PROTEIN URINE: CPT | Performed by: INTERNAL MEDICINE

## 2019-03-22 PROCEDURE — 80069 RENAL FUNCTION PANEL: CPT | Performed by: INTERNAL MEDICINE

## 2019-03-28 ENCOUNTER — ALLIED HEALTH/NURSE VISIT (OUTPATIENT)
Dept: EDUCATION SERVICES | Facility: CLINIC | Age: 79
End: 2019-03-28
Payer: COMMERCIAL

## 2019-03-28 DIAGNOSIS — E11.21 TYPE 2 DIABETES MELLITUS WITH DIABETIC NEPHROPATHY, WITH LONG-TERM CURRENT USE OF INSULIN (H): Primary | ICD-10-CM

## 2019-03-28 DIAGNOSIS — Z79.4 TYPE 2 DIABETES MELLITUS WITH DIABETIC NEPHROPATHY, WITH LONG-TERM CURRENT USE OF INSULIN (H): Primary | ICD-10-CM

## 2019-03-28 PROCEDURE — G0108 DIAB MANAGE TRN  PER INDIV: HCPCS

## 2019-03-28 NOTE — Clinical Note
Hi Dr. Hernández, FYI - I will touch base with you about potential medication options to help Ruth's afternoon and evening glucose elevations. Thanks!Jackie

## 2019-03-28 NOTE — PATIENT INSTRUCTIONS
Write down how much insulin you take each day    Write down what you are eating as much as you can    Follow-up with Jackie on Thursday, April 11 at 12 pm    Diabetes Support Resources:  Offerings in Clinic Communities: [facility] ([place]); 2nd Wednesday of each month September through May  Located at the [facility] from 1:15-2:15 pm, no RSVP needed;   [email or web address]; [phone]     Bring blood glucose meter and logbook with you to all doctor and follow-up appointments.    Diabetes Education Telephone Visit Follow-up:    We realize your time is valuable and your health is important! We offer a convenient Telephone Visit follow up! It s a quick way to check in for a medication dose adjustment without having to come back to clinic as soon.    Telephone Visits are often covered by insurance. Please check with your insurance plan to see if this type of visit is covered. If not, the cost is less expensive than an office visit:      Up to 10 minutes (Code 83764): $30    11-20 minutes (Code 19381): $59    More than 20 minutes (Code 64184): $85    Talk with your Diabetes Educator if you want to learn more.      [facility]:  For Your Diabetes Education and Nutrition Appointments Call:  [phone]   For Diabetes Education or Nutrition Related Questions:   Phone: [phone]  E-mail: [email or web address]  Fax: [phone]   If you need a medication refill please contact your pharmacy. Please allow 3 business days for your refills to be completed.

## 2019-03-28 NOTE — PROGRESS NOTES
"Diabetes Self-Management Education & Support    Diabetes Education Self Management & Training    SUBJECTIVE/OBJECTIVE:  Presents for: Individual review  Accompanied by: Self  Diabetes education in the past 24mo: Yes  Focus of Visit: Problem Solving  Diabetes type: Type 2  Disease course: Stable  How confident are you filling out medical forms by yourself:: Quite a bit    Diabetes management related comments/concerns: None today, Lantus insulin dose reduced from 30 - MD suggested 15-20 units because of morning hypoglycemia, she usually takes 20 units, but decides to take less or more if blood sugars are lower or higher    Transportation concerns: No  Other concerns:: None  Cultural Influences/Ethnic Background:  American      Diabetes Symptoms & Complications  Blurred vision: No  Fatigue: No  Neuropathy: No  Foot ulcerations: No  Polydipsia: No  Polyphagia: No  Polyuria: No  Visual change: No  Weakness: No  Weight loss: No  Slow healing wounds: No  Recent Infection(s): No  Other: No  Symptom course: Stable  Weight trend: Decreasing steadily  Autonomic neuropathy: No  CVA: No  Heart disease: Yes  Nephropathy: Yes  Peripheral neuropathy: No  Peripheral Vascular Disease: No  Retinopathy: Yes  Sexual dysfunction: No    Patient Problem List and Family Medical History reviewed for relevant medical history, current medical status, and diabetes risk factors.    Vitals:  There were no vitals taken for this visit.  Estimated body mass index is 33.78 kg/m  as calculated from the following:    Height as of 1/10/19: 1.613 m (5' 3.5\").    Weight as of 3/11/19: 87.9 kg (193 lb 12 oz).   Last 3 BP:   BP Readings from Last 3 Encounters:   03/11/19 110/60   02/21/19 137/72   01/28/19 128/78       History   Smoking Status     Never Smoker   Smokeless Tobacco     Never Used       Labs:  Lab Results   Component Value Date    A1C 6.6 03/11/2019     Lab Results   Component Value Date    GLC 66 03/22/2019     Lab Results   Component Value " Date    LDL 52 03/22/2019     HDL Cholesterol   Date Value Ref Range Status   03/22/2019 50 >49 mg/dL Final   ]  GFR Estimate   Date Value Ref Range Status   03/22/2019 29 (L) >60 mL/min/[1.73_m2] Final     Comment:     Non  GFR Calc  Starting 12/18/2018, serum creatinine based estimated GFR (eGFR) will be   calculated using the Chronic Kidney Disease Epidemiology Collaboration   (CKD-EPI) equation.       GFR Estimate If Black   Date Value Ref Range Status   03/22/2019 34 (L) >60 mL/min/[1.73_m2] Final     Comment:      GFR Calc  Starting 12/18/2018, serum creatinine based estimated GFR (eGFR) will be   calculated using the Chronic Kidney Disease Epidemiology Collaboration   (CKD-EPI) equation.       Lab Results   Component Value Date    CR 1.67 03/22/2019     No results found for: MICROALBUMIN    Healthy Eating  Cultural/Judaism diet restrictions?: No  Patient on a regular basis: Eats 3 meals a day(sometimes meals are delayed, especially on exercise days (Tues/Thurs) )  Meals include: Breakfast, Lunch, Dinner, Snacks  Breakfast: mini muffin OR yogurt OR adolfo crackers  Lunch: egg sandwich OR other sandwich  Dinner: scalloped potatoes with meatballs OR 1/3 battered tilapia filet + fries + coleslaw   Snacks: 3 prunes OR prune juice OR pear  Beverages: Water, Milk, Diet soda, Tea  Has patient met with a dietitian in the past?: Yes    Being Active  Exercise:: Yes  Days per week of moderate to strenuous exercise (like a brisk walk): 2  On average, minutes per day of exercise at this level: 90  How intense was your typical exercise? : Moderate (like brisk walking)(water aerobics (60 min), walking laps (up to 30 min), recumbant bike (33 min))  Exercise Minutes per Week: 180  Barrier to exercise: None    Monitoring  Did patient bring glucose meter to appointment? : Yes  Blood Glucose Meter: [employer]  Home Glucose (Sugar) Monitoring: 3-4 times per day  Blood glucose trend: No  change        Taking Medications  Diabetes Medication(s)     Insulin       LANTUS SOLOSTAR 100 UNIT/ML soln    INJECY 30 UNITS AT BEDTIME OR AS DIRECTED     Patient taking differently:  Taking 15-20 units at 5:45 pm          Current Treatments: Insulin Injections  Dose schedule: Takes Lantus at 5:45 pm  Given by: Patient  Problems taking diabetes medications regularly?: No  Diabetes medication side effects?: No  Treatment Compliance: All of the time    Problem Solving  Hypoglycemia Frequency: Rarely  Hypoglycemia Treatment: Other food  Patient carries a carbohydrate source: Yes    Hypoglycemia symptoms  Confusion: No  Dizziness or Light-Headedness: No  Headaches: No  Hunger: No  Mood changes: No  Nervousness/Anxiety: No  Sleepiness: No  Speech difficulty: No  Sweats: No  Tremors: Yes(lips tingle and numb)    Hypoglycemia Complications  Blackouts: No  Hospitalization: No  Nocturnal hypoglycemia: No  Required assistance: No  Required glucagon injection: No  Seizures: No    Reducing Risks  Diabetes Risks: Age over 45 years  CAD Risks: Diabetes Mellitus, Obesity, Post-menopausal  Has dilated eye exam at least once a year?: Yes  Sees dentist every 6 months?: No(about once a year, poor dentition, only has 2 teeth)    Healthy Coping  Emotional response to diabetes: Ready to learn, Concern for health and well-being, Acceptance  Informal Support system:: Friends  Stage of change: ACTION (Actively working towards change)  Difficulty affording diabetes management supplies?: No  Support resources: Exercise, Offerings in Clinic Communities  Patient Activation Measure Survey Score:  SHAHRIAR Score (Last Two) 2/23/2012   SHAHRIAR Raw Score 36   Activation Score 47.4   SHAHRIAR Level 2       ASSESSMENT:  Fasting glucose levels are often to goal, however later day glucose are often elevated above goal. Patient would benefit from diet/activity adjustment and/or medication that impacts post-prandial glucose to help reduce glucose rise throughout the  day.     Goals Addressed as of 3/28/2019 at 1:47 PM        General      Taking Medication     Added 3/28/19 by Jackie Panchal RD     My Goal: I will write down how much insulin I take each day    What I need to meet my goal: space on my glucose log to write it in    I plan to meet my goal by this date: 4/11/19            Patient's most recent   Lab Results   Component Value Date    A1C 6.6 03/11/2019    is meeting goal of <8.0    INTERVENTION:   Diabetes knowledge and skills assessment:     Patient is knowledgeable in diabetes management concepts related to: Healthy Eating, Being Active, Monitoring and Taking Medication    Patient needs further education on the following diabetes management concepts: Healthy Eating, Being Active, Problem Solving, Reducing Risks and Healthy Coping    Based on learning assessment above, most appropriate setting for further diabetes education would be: Individual setting.    Education provided today on:  AADE Self-Care Behaviors:  Healthy Eating: consistency in amount, composition, and timing of food intake, portion control and plate planning method  Being Active: relationship to blood glucose and describe appropriate activity program  Taking Medication: option for additional medications to assist with lowering afternoon/evening glucose if lifestyle change is not adequately managing glucose.  Problem Solving: low blood glucose - causes, signs/symptoms, treatment and prevention and carrying a carbohydrate source at all times  Healthy Coping: benefits of making appropriate lifestyle changes and utilize support systems    Opportunities for ongoing education and support in diabetes-self management were discussed.    Pt verbalized understanding of concepts discussed and recommendations provided today.       Education Materials Provided:  [employer] Understanding Diabetes Booklet and My Plate Planner    PLAN:  CDE will work with PCP regarding medication options to assist with rising glucose  throughout the day - GLP-1 once weekly may be a good option.  See Patient Instructions for co-developed, patient-stated behavior change goals.  AVS printed and provided to patient today. See Follow-Up section for recommended follow-up.    Jackie Panchal MPH, RD, LD, CDE   Time Spent: 50 minutes  Encounter Type: Individual    Any diabetes medication dose changes were made via the CDE Protocol and Collaborative Practice Agreement with the patient's primary care provider. A copy of this encounter was shared with the provider.

## 2019-04-02 ENCOUNTER — TRANSFERRED RECORDS (OUTPATIENT)
Dept: HEALTH INFORMATION MANAGEMENT | Facility: CLINIC | Age: 79
End: 2019-04-02

## 2019-04-19 DIAGNOSIS — H40.1131 PRIMARY OPEN ANGLE GLAUCOMA OF BOTH EYES, MILD STAGE: ICD-10-CM

## 2019-04-19 RX ORDER — LATANOPROST 50 UG/ML
1 SOLUTION/ DROPS OPHTHALMIC AT BEDTIME
Qty: 1 BOTTLE | Refills: 11 | Status: SHIPPED | OUTPATIENT
Start: 2019-04-19 | End: 2020-01-01

## 2019-04-25 ENCOUNTER — OFFICE VISIT (OUTPATIENT)
Dept: OPHTHALMOLOGY | Facility: CLINIC | Age: 79
End: 2019-04-25
Payer: COMMERCIAL

## 2019-04-25 DIAGNOSIS — H18.9 KERATOPATHY: ICD-10-CM

## 2019-04-25 DIAGNOSIS — E11.21 TYPE 2 DIABETES MELLITUS WITH DIABETIC NEPHROPATHY, WITH LONG-TERM CURRENT USE OF INSULIN (H): Primary | ICD-10-CM

## 2019-04-25 DIAGNOSIS — H35.81 RETINAL EDEMA: ICD-10-CM

## 2019-04-25 DIAGNOSIS — H52.4 PRESBYOPIA: ICD-10-CM

## 2019-04-25 DIAGNOSIS — H40.1131 PRIMARY OPEN ANGLE GLAUCOMA OF BOTH EYES, MILD STAGE: ICD-10-CM

## 2019-04-25 DIAGNOSIS — E11.3299 DIABETES MELLITUS WITH BACKGROUND RETINOPATHY (H): ICD-10-CM

## 2019-04-25 DIAGNOSIS — H43.813 POSTERIOR VITREOUS DETACHMENT, BILATERAL: ICD-10-CM

## 2019-04-25 DIAGNOSIS — Z96.1 PSEUDOPHAKIA: ICD-10-CM

## 2019-04-25 DIAGNOSIS — H18.30 CORNEAL EPITHELIAL DEFECT: ICD-10-CM

## 2019-04-25 DIAGNOSIS — Z79.4 TYPE 2 DIABETES MELLITUS WITH DIABETIC NEPHROPATHY, WITH LONG-TERM CURRENT USE OF INSULIN (H): Primary | ICD-10-CM

## 2019-04-25 PROCEDURE — 92014 COMPRE OPH EXAM EST PT 1/>: CPT | Performed by: OPHTHALMOLOGY

## 2019-04-25 PROCEDURE — 92015 DETERMINE REFRACTIVE STATE: CPT | Performed by: OPHTHALMOLOGY

## 2019-04-25 RX ORDER — DORZOLAMIDE HYDROCHLORIDE AND TIMOLOL MALEATE 20; 5 MG/ML; MG/ML
1 SOLUTION/ DROPS OPHTHALMIC 2 TIMES DAILY
Qty: 10 ML | Refills: 12 | Status: SHIPPED | OUTPATIENT
Start: 2019-04-25 | End: 2019-05-17

## 2019-04-25 ASSESSMENT — CONF VISUAL FIELD
OD_SUPERIOR_NASAL_RESTRICTION: 2
OS_SUPERIOR_TEMPORAL_RESTRICTION: 2
OD_INFERIOR_TEMPORAL_RESTRICTION: 2
OS_INFERIOR_NASAL_RESTRICTION: 2
OS_INFERIOR_TEMPORAL_RESTRICTION: 2
OD_SUPERIOR_TEMPORAL_RESTRICTION: 2
OD_INFERIOR_NASAL_RESTRICTION: 2
OS_SUPERIOR_NASAL_RESTRICTION: 2

## 2019-04-25 ASSESSMENT — EXTERNAL EXAM - LEFT EYE: OS_EXAM: 2+ BROW PTOSIS

## 2019-04-25 ASSESSMENT — CUP TO DISC RATIO
OD_RATIO: 0.7
OS_RATIO: 0.6

## 2019-04-25 ASSESSMENT — REFRACTION_MANIFEST
OS_CYLINDER: SPHERE
OD_AXIS: 002
OS_SPHERE: +1.50
OD_SPHERE: +2.75
OD_CYLINDER: +1.00
OS_ADD: +3.50
OD_ADD: +3.50

## 2019-04-25 ASSESSMENT — VISUAL ACUITY
OD_SC: 20/150
METHOD: SNELLEN - LINEAR
OS_SC: 20/250

## 2019-04-25 ASSESSMENT — TONOMETRY
OD_IOP_MMHG: 15
OS_IOP_MMHG: 13
IOP_METHOD: APPLANATION

## 2019-04-25 ASSESSMENT — EXTERNAL EXAM - RIGHT EYE: OD_EXAM: 2+ BROW PTOSIS

## 2019-04-25 ASSESSMENT — REFRACTION_WEARINGRX
OS_CYLINDER: SPHERE
OS_SPHERE: +3.75
OD_SPHERE: +3.75
SPECS_TYPE: OTC READERS
OD_CYLINDER: SPHERE

## 2019-04-25 NOTE — PATIENT INSTRUCTIONS
Glasses Rx given - optional.  Continue using Dorzolamide/Timolol (Cosopt - dark blue top) right eye twice daily (about 12 hours apart),  And Latanoprost (green top) both eyes at bedtime.   Wait at least 5 minutes between drops if using more than one at a time.  Use artificial tears up to 4 times daily both eyes.  (Refresh Tears, Systane Ultra/Balance, or Theratears)  Continue care with Dr. Jaime.  Return visit in 6 months for intraocular pressure check, glaucoma OCT and Sarkar Visual Field.    Abraham Byrd M.D.  [phone]    Patient Education   Diabetes weakens the blood vessels all over the body, including the eyes. Damage to the blood vessels in the eyes can cause swelling or bleeding into part of the eye (called the retina). This is called diabetic retinopathy ([facility]-tin--[facility]-thee). If not treated, this disease can cause vision loss or blindness.   Symptoms may include blurred or distorted vision, but many people have no symptoms. It's important to see your eye doctor regularly to check for problems.   Early treatment and good control can help protect your vision. Here are the things you can do to help prevent vision loss:      1. Keep your blood sugar levels under tight control.      2. Bring high blood pressure under control.      3. No smoking.      4. Have yearly dilated eye exams.

## 2019-04-25 NOTE — PROGRESS NOTES
Current Eye Medications:  Dorzolamide/Timolol right eye twice daily, Latanoprost both eyes at bedtime.     Subjective:  Here for complete today. DM, last a1c was 6.6 on 3-11-19. Talked about just getting a pair of glasses of reading power, she may be open.  Also wondering if she had a YAG laser on the left eye at all, very hazy scope today.     Objective:  See Ophthalmology Exam.       Assessment:  Stable intraocular pressure and discs both eyes in patient with open angle glaucoma.  Stable background diabetic retinopathy both eyes with focal scarring in macular from previous laser for macular edema.      ICD-10-CM    1. Type 2 diabetes mellitus with diabetic nephropathy, with long-term current use of insulin (H) E11.21 EYE EXAM (SIMPLE-NONBILLABLE)    Z79.4    2. Pseudophakia, Yag Caps, ou  Z96.1    3. Diabetes mellitus with background retinopathy (H) E11.3299    4. Diabetic macular edema, hx focal laser ou H35.81    5. Primary open angle glaucoma of both eyes, mild stage H40.1131 DISCONTINUED: dorzolamide-timolol (COSOPT) 2-0.5 % ophthalmic solution   6. 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 Hx of keratopathy - hx of PTK, ou (MP) H18.9    8. Posterior vitreous detachment, bilateral H43.813    9. Presbyopia H52.4 REFRACTIVE STATUS        Plan:  Glasses Rx given - optional.  Continue using Dorzolamide/Timolol (Cosopt - dark blue top) right eye twice daily (about 12 hours apart),  And Latanoprost (green top) both eyes at bedtime.   Wait at least 5 minutes between drops if using more than one at a time.  Use artificial tears up to 4 times daily both eyes.  (Refresh Tears, Systane Ultra/Balance, or Theratears)  Continue care with Dr. Jaime.  Return visit in 6 months for intraocular pressure check, glaucoma OCT and Sarkar Visual Field.    Abraham Byrd M.D.  [phone]

## 2019-04-25 NOTE — LETTER
4/25/2019         RE: Ruth Rocha  [address] [place] [address] [address]  [id number]  [facility] [address]        Dear Colleague,    Thank you for referring your patient, Ruth Rocha, to the [facility]. Please see a copy of my visit note below.     Current Eye Medications:  Dorzolamide/Timolol right eye twice daily, Latanoprost both eyes at bedtime.     Subjective:  Here for complete today. DM, last a1c was 6.6 on 3-11-19. Talked about just getting a pair of glasses of reading power, she may be open.  Also wondering if she had a YAG laser on the left eye at all, very hazy scope today.     Objective:  See Ophthalmology Exam.       Assessment:  Stable intraocular pressure and discs both eyes in patient with open angle glaucoma.  Stable background diabetic retinopathy both eyes with focal scarring in macular from previous laser for macular edema.      ICD-10-CM    1. Type 2 diabetes mellitus with diabetic nephropathy, with long-term current use of insulin (H) E11.21 EYE EXAM (SIMPLE-NONBILLABLE)    Z79.4    2. Pseudophakia, Yag Caps, ou  Z96.1    3. Diabetes mellitus with background retinopathy (H) E11.3299    4. Diabetic macular edema, hx focal laser ou H35.81    5. Primary open angle glaucoma of both eyes, mild stage H40.1131 DISCONTINUED: dorzolamide-timolol (COSOPT) 2-0.5 % ophthalmic solution   6. 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 Hx of keratopathy - hx of PTK, ou (MP) H18.9    8. Posterior vitreous detachment, bilateral H43.813    9. Presbyopia H52.4 REFRACTIVE STATUS        Plan:  Glasses Rx given - optional.  Continue using Dorzolamide/Timolol (Cosopt - dark blue top) right eye twice daily (about 12 hours apart),  And Latanoprost (green top) both eyes at bedtime.   Wait at least 5 minutes between drops if using more than one at a time.  Use artificial tears up to 4 times daily both eyes.  (Refresh Tears, Systane Ultra/Balance, or Theratears)  Continue care with   Yeni.  Return visit in 6 months for intraocular pressure check, glaucoma OCT and Sarkar Visual Field.    Abraham Byrd M.D.  [phone]         Again, thank you for allowing me to participate in the care of your patient.        Sincerely,        Abraham Byrd MD

## 2019-05-09 ENCOUNTER — ALLIED HEALTH/NURSE VISIT (OUTPATIENT)
Dept: EDUCATION SERVICES | Facility: CLINIC | Age: 79
End: 2019-05-09
Payer: COMMERCIAL

## 2019-05-09 DIAGNOSIS — E11.21 TYPE 2 DIABETES MELLITUS WITH DIABETIC NEPHROPATHY, WITH LONG-TERM CURRENT USE OF INSULIN (H): Primary | ICD-10-CM

## 2019-05-09 DIAGNOSIS — Z79.4 TYPE 2 DIABETES MELLITUS WITH DIABETIC NEPHROPATHY, WITH LONG-TERM CURRENT USE OF INSULIN (H): Primary | ICD-10-CM

## 2019-05-09 PROCEDURE — G0108 DIAB MANAGE TRN  PER INDIV: HCPCS

## 2019-05-09 NOTE — PATIENT INSTRUCTIONS
Start Ozempic 0.25 mg once a week (take on the same day each week)    When you start Ozempic, start taking 15 units of Lantus daily    Follow-up on Thursday, June 6 at 1 pm.

## 2019-05-09 NOTE — Clinical Note
Kenn Hernández, Ruth would like to try Ozempic for helping with her BG management. I have recommended she decrease her Lantus from 20 units to 15 units with the start of Ozempic due to some fasting Hypoglycemia she's been experiencing.I've pended orders for these changes for you to sign, if in agreement.Thank you!Jackie

## 2019-05-09 NOTE — PROGRESS NOTES
"Diabetes Self-Management Education & Support    Diabetes Education Self Management & Training    SUBJECTIVE/OBJECTIVE:  Presents for: Individual review  Accompanied by: Other(Friend Renita)  Diabetes education in the past 24mo: Yes  Focus of Visit: Problem Solving, Taking Medication, Assistance w/ making life changes, GLP-1 Start  GLP-1 Type: Ozempic  Diabetes type: Type 2  Date of diagnosis: 25 years ago  Disease course: Stable  How confident are you filling out medical forms by yourself:: Quite a bit    Diabetes management related comments/concerns: would like to know more about the medicine that she could take to help with blood sugars. she's been watching what she's eating a bit more closely, but still some high blood sugars and also some low blood sugars. woke up with a BG of 58 last week.    Transportation concerns: No  Other concerns:: Visual impairment  Cultural Influences/Ethnic Background:  American    Diabetes Symptoms & Complications  Blurred vision: No  Fatigue: No  Neuropathy: No  Foot ulcerations: No  Polydipsia: No  Polyphagia: No  Polyuria: No  Visual change: No  Weakness: No  Weight loss: No  Slow healing wounds: No  Recent Infection(s): No  Other: No  Symptom course: Stable  Weight trend: Decreasing steadily  Autonomic neuropathy: No  CVA: No  Heart disease: Yes  Nephropathy: Yes  Peripheral neuropathy: No  Peripheral Vascular Disease: No  Retinopathy: Yes  Sexual dysfunction: No    Patient Problem List and Family Medical History reviewed for relevant medical history, current medical status, and diabetes risk factors.    Vitals:    Estimated body mass index is 33.78 kg/m  as calculated from the following:    Height as of 1/10/19: 1.613 m (5' 3.5\").    Weight as of 3/11/19: 87.9 kg (193 lb 12 oz).   Last 3 BP:   BP Readings from Last 3 Encounters:   03/11/19 110/60   02/21/19 137/72   01/28/19 128/78       History   Smoking Status     Never Smoker   Smokeless Tobacco     Never Used       Labs:  Lab " Results   Component Value Date    A1C 6.6 03/11/2019     Lab Results   Component Value Date    GLC 66 03/22/2019     Lab Results   Component Value Date    LDL 52 03/22/2019     HDL Cholesterol   Date Value Ref Range Status   03/22/2019 50 >49 mg/dL Final   ]  GFR Estimate   Date Value Ref Range Status   03/22/2019 29 (L) >60 mL/min/[1.73_m2] Final     Comment:     Non  GFR Calc  Starting 12/18/2018, serum creatinine based estimated GFR (eGFR) will be   calculated using the Chronic Kidney Disease Epidemiology Collaboration   (CKD-EPI) equation.       GFR Estimate If Black   Date Value Ref Range Status   03/22/2019 34 (L) >60 mL/min/[1.73_m2] Final     Comment:      GFR Calc  Starting 12/18/2018, serum creatinine based estimated GFR (eGFR) will be   calculated using the Chronic Kidney Disease Epidemiology Collaboration   (CKD-EPI) equation.       Lab Results   Component Value Date    CR 1.67 03/22/2019     No results found for: MICROALBUMIN    Healthy Eating  Cultural/Scientologist diet restrictions?: No  Patient on a regular basis: Eats 3 meals a day  Meal planning: Smaller portions  Meals include: Breakfast, Lunch, Dinner, Snacks  Breakfast: mini muffin OR yogurt OR adolfo crackers OR oatmeal  Lunch: egg sandwich OR other sandwich OR soup  Dinner: scalloped potatoes with meatballs OR 1/3 battered tilapia filet + fries + coleslaw OR roast beef with potatoes and carrots  Snacks: 3 prunes OR prune juice OR pear OR small piece of pie  Beverages: Water, Milk, Diet soda, Tea  Has patient met with a dietitian in the past?: Yes    Being Active  Exercise:: Yes  Days per week of moderate to strenuous exercise (like a brisk walk): 2  On average, minutes per day of exercise at this level: 60  How intense was your typical exercise? : Moderate (like brisk walking)  Exercise Minutes per Week: 120  Barrier to exercise: None    Monitoring  Did patient bring glucose meter to appointment? : Yes  Blood  Glucose Meter: [employer]  Home Glucose (Sugar) Monitoring: 3-4 times per day  Blood glucose trend: No change  Low Glucose Range (mg/dL): <70  High Glucose Range (mg/dL): >200    Fastin, 100, 131, 107, 147, 76, 114, 132, 93, 120, 88, 107, 58, 102, 72, 136, 112, 93    Pre-lunch: 148, 121, 260, 190, 145, 252, 266, 243, 104, 281, 109, 90, 214, 228, 168, 92, 145    Pre-dinner: 191, 194, 147, 152, 149, 160, 151, 152, 199, 211, 211, 181, 173, 154, 141, 92    Taking Medications  Diabetes Medication(s)     Insulin       LANTUS SOLOSTAR 100 UNIT/ML soln    INJECY 30 UNITS AT BEDTIME OR AS DIRECTED     Patient taking differently:  Taking 15-20 units at 5:45 pm          Current Treatments: Insulin Injections - usually takes 20 units, but if BG is low pre-dinner, she takes 15 units or if high pre-dinner takes 25 units.  Dose schedule: (Takes Lantus at 5:45 pm)  Given by: Patient  Problems taking diabetes medications regularly?: No  Diabetes medication side effects?: No  Treatment Compliance: All of the time    Problem Solving  Hypoglycemia Frequency: Rarely  Hypoglycemia Treatment: Other food  Patient carries a carbohydrate source: Yes    Hypoglycemia symptoms  Confusion: No  Dizziness or Light-Headedness: No  Headaches: No  Hunger: No  Mood changes: No  Nervousness/Anxiety: No  Sleepiness: No  Speech difficulty: No  Sweats: No  Tremors: Yes(lips tingle and numb)    Hypoglycemia Complications  Blackouts: No  Hospitalization: No  Nocturnal hypoglycemia: No  Required assistance: No  Required glucagon injection: No  Seizures: No    Reducing Risks  Diabetes Risks: Age over 45 years  CAD Risks: Diabetes Mellitus, Obesity, Post-menopausal  Has dilated eye exam at least once a year?: Yes  Sees dentist every 6 months?: No(about once a year)    Healthy Coping  Emotional response to diabetes: Ready to learn, Concern for health and well-being, Acceptance  Informal Support system:: Friends  Stage of change: ACTION (Actively working  towards change)  Difficulty affording diabetes management supplies?: No  Support resources: Exercise, Offerings in Clinic Communities  Patient Activation Measure Survey Score:  SHAHRIAR Score (Last Two) 2/23/2012   SHAHRIAR Raw Score 36   Activation Score 47.4   SHAHRIAR Level 2       ASSESSMENT:  Fasting BG are typically in goal range of  mg/dL with the exception of a few below 90 and one low of 58 mg/dL. Pre-lunch and pre-dinner are mostly above goal range. Patient would benefit from additional medication to help reduce elevated BG pre-meal which likely is carried over from elevated post-prandial BG. Patient is willing to try once-weekly GLP-1, Ozempic appears to be best covered by her insurance plan.      Patient's most recent   Lab Results   Component Value Date    A1C 6.6 03/11/2019    is meeting goal of <8.0    INTERVENTION:   Diabetes knowledge and skills assessment:     Patient is knowledgeable in diabetes management concepts related to: Healthy Eating, Being Active, Monitoring, Taking Medication and Healthy Coping    Patient needs further education on the following diabetes management concepts: Problem Solving and Reducing Risks    Based on learning assessment above, most appropriate setting for further diabetes education would be: Individual setting.    Education provided today on:  AADE Self-Care Behaviors:  Healthy Eating: portion control  Taking Medication: action of prescribed medication  Problem Solving: high blood glucose - causes, signs/symptoms, treatment and prevention and low blood glucose - causes, signs/symptoms, treatment and prevention  Healthy Coping: benefits of making appropriate lifestyle changes and utilize support systems  GLP-1 administration technique taught today. Patient verbalized understanding and was able to perform an accurate return demonstration of administration technique. Side effects were discussed.    Opportunities for ongoing education and support in diabetes-self management were  discussed.    Pt verbalized understanding of concepts discussed and recommendations provided today.       Education Materials Provided:  No new materials provided today    PLAN:  Recommend Ozempic, start at 0.25 mg weekly x 4 weeks. Follow-up appointment scheduled on 6/6/19 to review glucose results and discuss titration upward. Order pended for Dr. Hernández to sign, if in agreement.  With start of Ozempic, begin taking 15 units Lantus before dinner and stay consistent with dose.  AVS printed and provided to patient today.     Jackie Panchal, MPH, RD, LD, CDE   Time Spent: 60 minutes  Encounter Type: Individual    Any diabetes medication dose changes were made via the CDE Protocol and Collaborative Practice Agreement with the patient's referring provider. A copy of this encounter was shared with the provider.

## 2019-05-17 ENCOUNTER — TELEPHONE (OUTPATIENT)
Dept: FAMILY MEDICINE | Facility: CLINIC | Age: 79
End: 2019-05-17

## 2019-05-17 DIAGNOSIS — H40.1131 PRIMARY OPEN ANGLE GLAUCOMA OF BOTH EYES, MILD STAGE: ICD-10-CM

## 2019-05-17 RX ORDER — DORZOLAMIDE HYDROCHLORIDE AND TIMOLOL MALEATE 20; 5 MG/ML; MG/ML
1 SOLUTION/ DROPS OPHTHALMIC 2 TIMES DAILY
Qty: 10 ML | Refills: 12 | Status: SHIPPED | OUTPATIENT
Start: 2019-05-17 | End: 2020-01-01

## 2019-05-20 DIAGNOSIS — E87.6 HYPOKALEMIA: ICD-10-CM

## 2019-05-20 NOTE — TELEPHONE ENCOUNTER
"Requested Prescriptions   Pending Prescriptions Disp Refills     KLOR-CON 10 MEQ CR tablet [Pharmacy Med Name: KLOR-CON 10 MEQ TABLET] 90 tablet 1     Sig: TAKE 1 TABLET (10 MEQ) BY MOUTH DAILY   Last Written Prescription Date:  2/21/19  Last Fill Quantity: 90,  # refills: 1   Last office visit: 3/11/2019 with prescribing provider:     Future Office Visit:        Potassium Supplements Protocol Passed - 5/20/2019  1:09 AM        Passed - Recent (12 mo) or future (30 days) visit within the authorizing provider's specialty     Patient had office visit in the last 12 months or has a visit in the next 30 days with authorizing provider or within the authorizing provider's specialty.  See \"Patient Info\" tab in inbasket, or \"Choose Columns\" in Meds & Orders section of the refill encounter.              Passed - Medication is active on med list        Passed - Patient is age 18 or older        Passed - Normal serum potassium in past 12 months     Recent Labs   Lab Test 03/22/19  0830   POTASSIUM 4.6                      "

## 2019-05-22 RX ORDER — POTASSIUM CHLORIDE 750 MG/1
TABLET, FILM COATED, EXTENDED RELEASE ORAL
Qty: 90 TABLET | Refills: 1 | Status: SHIPPED | OUTPATIENT
Start: 2019-05-22 | End: 2019-12-11

## 2019-05-22 NOTE — TELEPHONE ENCOUNTER
Prescription approved per [facility]ill [facility].]  Page Galicia RN

## 2019-06-13 ENCOUNTER — TELEPHONE (OUTPATIENT)
Dept: EDUCATION SERVICES | Facility: CLINIC | Age: 79
End: 2019-06-13

## 2019-06-13 NOTE — TELEPHONE ENCOUNTER
Call to patient to check in on if she had been able to start Ozempic yet. Spoke with Renita, she states that the insurance company has not yet approved it so she has not been able to start on it. Thus, they cancelled the appointment last week.    Will plan to follow-up when medication gets approved and patient is able to start.    Jackie Panchal, MPH, RD, LD, CDE

## 2019-06-21 ENCOUNTER — OFFICE VISIT (OUTPATIENT)
Dept: OPHTHALMOLOGY | Facility: CLINIC | Age: 79
End: 2019-06-21
Attending: OPHTHALMOLOGY
Payer: COMMERCIAL

## 2019-06-21 DIAGNOSIS — E11.3313 MODERATE NONPROLIFERATIVE DIABETIC RETINOPATHY OF BOTH EYES WITH MACULAR EDEMA ASSOCIATED WITH TYPE 2 DIABETES MELLITUS (H): Primary | ICD-10-CM

## 2019-06-21 PROCEDURE — 92134 CPTRZ OPH DX IMG PST SGM RTA: CPT | Mod: ZF | Performed by: OPHTHALMOLOGY

## 2019-06-21 PROCEDURE — G0463 HOSPITAL OUTPT CLINIC VISIT: HCPCS | Mod: ZF

## 2019-06-21 ASSESSMENT — CONF VISUAL FIELD
OS_INFERIOR_NASAL_RESTRICTION: 2
OS_SUPERIOR_NASAL_RESTRICTION: 2
OD_SUPERIOR_NASAL_RESTRICTION: 2
OD_SUPERIOR_TEMPORAL_RESTRICTION: 2
OS_INFERIOR_TEMPORAL_RESTRICTION: 2
OS_SUPERIOR_TEMPORAL_RESTRICTION: 2
OD_INFERIOR_TEMPORAL_RESTRICTION: 2
OD_INFERIOR_NASAL_RESTRICTION: 2

## 2019-06-21 ASSESSMENT — EXTERNAL EXAM - RIGHT EYE: OD_EXAM: 2+ BROW PTOSIS

## 2019-06-21 ASSESSMENT — SLIT LAMP EXAM - LIDS
COMMENTS: 2+ DERMATOCHALASIS
COMMENTS: 2+ DERMATOCHALASIS

## 2019-06-21 ASSESSMENT — TONOMETRY
OD_IOP_MMHG: 13
OS_IOP_MMHG: 11
IOP_METHOD: TONOPEN

## 2019-06-21 ASSESSMENT — VISUAL ACUITY
OS_SC: 20/400
OD_PH_SC: 20/100
OS_PH_SC: 20/200
OD_SC: 20/150
METHOD: SNELLEN - LINEAR

## 2019-06-21 ASSESSMENT — EXTERNAL EXAM - LEFT EYE: OS_EXAM: 2+ BROW PTOSIS

## 2019-06-21 ASSESSMENT — CUP TO DISC RATIO
OD_RATIO: 0.7
OS_RATIO: 0.7

## 2019-06-21 NOTE — PROGRESS NOTES
CC -   NPDR and DME    INTERVAL HISTORY: VA stable. No interval updates.   Last A1c ~6.6 on ~ 3/2019     HPI -   Ruth HANSA Rocha is a  79 year old year-old patient presenting for NPDR and DME OS > OD.  H/o K-scars and poor vision OS Sees [facility] normally for glaucoma     VA OS poor longstanding    PAST OCULAR SURGERY  CE/IOL OU  FML OU  Therapeutic PTK OS 8/2015    RETINAL IMAGING:  OCT   6-21-19  right eye- mod focal atrophy from FML scars, no CNVM, tr ERM, ?trace  IRF, stable  left eye -  Mod focal atrophy from FML scars, no CNVM, tr  paracentral DME, stable    FA 6-14-16  Right eye - blurry image, 2-3+ WD from FML scars, 1+ increased NURA, 1 peripheral ischemia, no NV  Left eye - (transit) 2+ increased NURA, large WD in macula from FML scars, 1+ MAs/peripheral atrophy, no NV      ASSESSMENT & PLAN  1.  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 DME OS   - s/p FML   - Last Avastin #4  4/17/17  (> 2 years)   - paracentral, mild   - VA varied 20/125-20/500, typically 20/400     - doubt vis sig given extensive FML scars and K-scarring   - doubt benefit to Tx   - observe for now       3. DME OD   - very mild   - cornea contributing to vision   - observe    4.  PVD OU   - advised S/Sx RD 6/2019    5.  K- scarring OU   - s/p PTK OS   - sees Page   - contributing to subnormal OD       6.  OAG OU   - on cosopt OD and xalatan OU   - sees Agusto     - last vsiit 4/2019      RTC 6 months, OCT OU, sooner if new vision changes    Sheldon Prado MD  Ophthalmology Resident, PGY-3  [facility]      ATTESTATION     Attending Physician Attestation:      Complete documentation of historical and exam elements from today's encounter can be found in the full encounter summary report (not reduplicated in this progress note).  I personally obtained the chief complaint(s) and history of present illness.  I confirmed and edited as necessary the review of systems,  past medical/surgical history, family history, social history, and examination findings as documented by others; and I examined the patient myself.  I personally reviewed the relevant tests, images, and reports as documented above.  I personally reviewed the ophthalmic test(s) associated with this encounter, agree with the interpretation(s) as documented by the resident/fellow, and have edited the corresponding report(s) as necessary.   I formulated and edited as necessary the assessment and plan and discussed the findings and management plan with the patient and family    Bryanna Jaime MD, PhD  , Vitreoretinal Surgery  Department of Ophthalmology  [facility]

## 2019-07-04 DIAGNOSIS — E78.5 HYPERLIPIDEMIA LDL GOAL <100: ICD-10-CM

## 2019-07-05 RX ORDER — SIMVASTATIN 10 MG
TABLET ORAL
Qty: 90 TABLET | Refills: 1 | Status: SHIPPED | OUTPATIENT
Start: 2019-07-05 | End: 2020-01-07

## 2019-07-05 NOTE — TELEPHONE ENCOUNTER
"Requested Prescriptions   Pending Prescriptions Disp Refills     simvastatin (ZOCOR) 10 MG tablet [Pharmacy Med Name: SIMVASTATIN 10 MG TABLET] 90 tablet 1     Sig: TAKE 1 TABLET (10 MG) BY MOUTH AT BEDTIME   Last Written Prescription Date:  1-9-19  Last Fill Quantity: 90,  # refills: 1   Last office visit: 3/11/2019 with prescribing provider:  3-11-19   Future Office Visit:        Statins Protocol Passed - 7/4/2019  1:42 AM        Passed - LDL on file in past 12 months     Recent Labs   Lab Test 03/22/19  0830   LDL 52             Passed - No abnormal creatine kinase in past 12 months     No lab results found.             Passed - Recent (12 mo) or future (30 days) visit within the authorizing provider's specialty     Patient had office visit in the last 12 months or has a visit in the next 30 days with authorizing provider or within the authorizing provider's specialty.  See \"Patient Info\" tab in inbasket, or \"Choose Columns\" in Meds & Orders section of the refill encounter.              Passed - Medication is active on med list        Passed - Patient is age 18 or older        Passed - No active pregnancy on record        Passed - No positive pregnancy test in past 12 months          "

## 2019-07-05 NOTE — TELEPHONE ENCOUNTER
Prescription approved per [facility] Refill Protocol.    Deepali Srinivasan RN  [facility]

## 2019-07-20 DIAGNOSIS — F41.9 ANXIETY: ICD-10-CM

## 2019-07-22 NOTE — TELEPHONE ENCOUNTER
"Requested Prescriptions   Pending Prescriptions Disp Refills     sertraline (ZOLOFT) 50 MG tablet [Pharmacy Med Name: SERTRALINE HCL 50 MG TABLET] 90 tablet 1     Sig: TAKE 1/2 TABLET BY MOUTH DAILY FOR 1-2 WEEKS THEN INCREASE TO 1 TABLET DAILY   Last Written Prescription Date:  1/28/19  Last Fill Quantity: 90,  # refills: 1   Last office visit: 3/11/2019 with prescribing provider:     Future Office Visit:        SSRIs Protocol Passed - 7/20/2019  8:28 AM        Passed - Recent (12 mo) or future (30 days) visit within the authorizing provider's specialty     Patient had office visit in the last 12 months or has a visit in the next 30 days with authorizing provider or within the authorizing provider's specialty.  See \"Patient Info\" tab in inbasket, or \"Choose Columns\" in Meds & Orders section of the refill encounter.              Passed - Medication is active on med list        Passed - Patient is age 18 or older        Passed - No active pregnancy on record        Passed - No positive pregnancy test in last 12 months          "

## 2019-07-22 NOTE — TELEPHONE ENCOUNTER
Prescription approved per [facility] Refill Protocol.  Galdino Serrano RN

## 2019-08-07 ENCOUNTER — OFFICE VISIT (OUTPATIENT)
Dept: FAMILY MEDICINE | Facility: CLINIC | Age: 79
End: 2019-08-07
Payer: COMMERCIAL

## 2019-08-07 VITALS
WEIGHT: 193 LBS | HEART RATE: 66 BPM | DIASTOLIC BLOOD PRESSURE: 65 MMHG | TEMPERATURE: 97 F | SYSTOLIC BLOOD PRESSURE: 138 MMHG | BODY MASS INDEX: 33.65 KG/M2

## 2019-08-07 DIAGNOSIS — K59.09 CHRONIC CONSTIPATION: ICD-10-CM

## 2019-08-07 DIAGNOSIS — Z79.4 TYPE 2 DIABETES MELLITUS WITH DIABETIC NEPHROPATHY, WITH LONG-TERM CURRENT USE OF INSULIN (H): ICD-10-CM

## 2019-08-07 DIAGNOSIS — L98.9 SKIN LESIONS: ICD-10-CM

## 2019-08-07 DIAGNOSIS — E11.21 TYPE 2 DIABETES MELLITUS WITH DIABETIC NEPHROPATHY, WITH LONG-TERM CURRENT USE OF INSULIN (H): ICD-10-CM

## 2019-08-07 DIAGNOSIS — K62.5 RECTAL BLEEDING: ICD-10-CM

## 2019-08-07 DIAGNOSIS — G24.01 TARDIVE DYSKINESIA: Primary | ICD-10-CM

## 2019-08-07 DIAGNOSIS — H91.93 BILATERAL HEARING LOSS, UNSPECIFIED HEARING LOSS TYPE: ICD-10-CM

## 2019-08-07 LAB
ALBUMIN SERPL-MCNC: 3.2 G/DL (ref 3.4–5)
ALP SERPL-CCNC: 147 U/L (ref 40–150)
ALT SERPL W P-5'-P-CCNC: 45 U/L (ref 0–50)
ANION GAP SERPL CALCULATED.3IONS-SCNC: 7 MMOL/L (ref 3–14)
AST SERPL W P-5'-P-CCNC: 43 U/L (ref 0–45)
BASOPHILS # BLD AUTO: 0 10E9/L (ref 0–0.2)
BASOPHILS NFR BLD AUTO: 0.3 %
BILIRUB SERPL-MCNC: 0.4 MG/DL (ref 0.2–1.3)
BUN SERPL-MCNC: 39 MG/DL (ref 7–30)
CALCIUM SERPL-MCNC: 9.2 MG/DL (ref 8.5–10.1)
CHLORIDE SERPL-SCNC: 108 MMOL/L (ref 94–109)
CO2 SERPL-SCNC: 25 MMOL/L (ref 20–32)
CREAT SERPL-MCNC: 1.31 MG/DL (ref 0.52–1.04)
CREAT UR-MCNC: 80 MG/DL
DIFFERENTIAL METHOD BLD: ABNORMAL
EOSINOPHIL # BLD AUTO: 0.3 10E9/L (ref 0–0.7)
EOSINOPHIL NFR BLD AUTO: 2.7 %
ERYTHROCYTE [DISTWIDTH] IN BLOOD BY AUTOMATED COUNT: 12.6 % (ref 10–15)
GFR SERPL CREATININE-BSD FRML MDRD: 39 ML/MIN/{1.73_M2}
GLUCOSE SERPL-MCNC: 102 MG/DL (ref 70–99)
HCT VFR BLD AUTO: 34.1 % (ref 35–47)
HGB BLD-MCNC: 10.8 G/DL (ref 11.7–15.7)
LYMPHOCYTES # BLD AUTO: 2 10E9/L (ref 0.8–5.3)
LYMPHOCYTES NFR BLD AUTO: 18.2 %
MCH RBC QN AUTO: 30.2 PG (ref 26.5–33)
MCHC RBC AUTO-ENTMCNC: 31.7 G/DL (ref 31.5–36.5)
MCV RBC AUTO: 95 FL (ref 78–100)
MICROALBUMIN UR-MCNC: 12 MG/L
MICROALBUMIN/CREAT UR: 14.32 MG/G CR (ref 0–25)
MONOCYTES # BLD AUTO: 0.9 10E9/L (ref 0–1.3)
MONOCYTES NFR BLD AUTO: 7.9 %
NEUTROPHILS # BLD AUTO: 7.7 10E9/L (ref 1.6–8.3)
NEUTROPHILS NFR BLD AUTO: 70.9 %
PLATELET # BLD AUTO: 256 10E9/L (ref 150–450)
POTASSIUM SERPL-SCNC: 4.3 MMOL/L (ref 3.4–5.3)
PROT SERPL-MCNC: 6.9 G/DL (ref 6.8–8.8)
RBC # BLD AUTO: 3.58 10E12/L (ref 3.8–5.2)
SODIUM SERPL-SCNC: 140 MMOL/L (ref 133–144)
WBC # BLD AUTO: 10.9 10E9/L (ref 4–11)

## 2019-08-07 PROCEDURE — 80053 COMPREHEN METABOLIC PANEL: CPT | Performed by: FAMILY MEDICINE

## 2019-08-07 PROCEDURE — 36415 COLL VENOUS BLD VENIPUNCTURE: CPT | Performed by: FAMILY MEDICINE

## 2019-08-07 PROCEDURE — 99214 OFFICE O/P EST MOD 30 MIN: CPT | Performed by: FAMILY MEDICINE

## 2019-08-07 PROCEDURE — 85025 COMPLETE CBC W/AUTO DIFF WBC: CPT | Performed by: FAMILY MEDICINE

## 2019-08-07 PROCEDURE — 82043 UR ALBUMIN QUANTITATIVE: CPT | Performed by: FAMILY MEDICINE

## 2019-08-07 RX ORDER — AMOXICILLIN 250 MG
CAPSULE ORAL
Qty: 60 TABLET | Refills: 4 | Status: SHIPPED | OUTPATIENT
Start: 2019-08-07 | End: 2020-01-01

## 2019-08-07 NOTE — PROGRESS NOTES
Subjective     Ruth HANSA Rocha is a 79 year old female who presents to clinic today for the following health issues:    HPI   Blood in stool when constipated  Neck pain and right ear pain  Check spots on arms  Always slobbering   Hard time hearing  none            Reviewed and updated as needed this visit by Provider         Review of Systems   ROS COMP:  Feels very constipated    One time had fair amount of blood    Neck pain for a year    Went to physical therapy    Ear started hurting two weeks ago    Hearing getting worse    No hearing aids    No change in meds recently    Fainted on one of the hot days    Not really dizzy or light headed much     Sugars usually 70s and 80s in am    Patricia    Had colonoscopy in 2016, told to repeat in 5 years            Objective    There were no vitals taken for this visit.  There is no height or weight on file to calculate BMI.  Physical Exam   Constitutional: She is oriented to person, place, and time. She appears well-developed and well-nourished.   HENT:   Head: Normocephalic and atraumatic.   Eyes: Conjunctivae are normal.   Neck: Carotid bruit is not present.   Cardiovascular: Normal rate, regular rhythm and normal heart sounds.   Pulmonary/Chest: Effort normal and breath sounds normal. No respiratory distress.   Musculoskeletal: She exhibits no edema (mild).   Neurological: She is alert and oriented to person, place, and time.      Tympanic membranes and canals normal bilaterally    On right external ear is a scab area at 9-oclock position, patient does admit to scratching a lot at this area    Patient is a little tender subjectively over the right neck/ trapezius area        Diagnostic Test Results:  Labs reviewed in Epic         ASSESSMENT / PLAN:  (G24.01) Tardive dyskinesia  (primary encounter diagnosis)  Comment: this is new over the last 2 months.  Patient has not been on anypychotic meds so the metoclopramide is likely culprit.  Patient will stop this med right  away   Plan: follow up if symptoms not resolving     (H91.93) Bilateral hearing loss, unspecified hearing loss type  Comment: agree with audiology   Plan: AUDIOLOGY ADULT REFERRAL        Patient to call and schedule     (E11.21,  Z79.4) Type 2 diabetes mellitus with diabetic nephropathy, with long-term current use of insulin (H)  Comment: last hemoglobin a1c okay ; no change in meds   Plan: Albumin Random Urine Quantitative with Creat         Ratio, Comprehensive metabolic panel        Check urine protein test     (K62.5) Rectal bleeding  Comment: make sure cbc okay; she is up to date on colonoscopy screening   Plan: CBC with platelets differential             (K59.09) Chronic constipation  Comment: patient thinks the constipation contributed to the blood.  Discussed constipation treatment in detail   Plan: senna-docusate (SENEXON-S) 8.6-50 MG tablet             (L98.9) Skin lesions  Comment: patient to schedule with dermatology   Plan: DERMATOLOGY REFERRAL               I reviewed the patient's medications, allergies, medical history, family history, and social history.    Raymon Hernández MD

## 2019-08-07 NOTE — PATIENT INSTRUCTIONS
Stop metoclopramide ( reglan )    Make sure you are taking the senokot-S ( has both the laxative and the stool softener in it )     Increase fluid intake    Increase walking/ activity as able    Continue over the counter miralax    Add more fiber in diet.  Could also take supplemental fiber like metamucil    Schedule with dermatology and audiology    We will send you lab results    Work on range of motion exercises for neck/ back/ shoulders

## 2019-08-08 NOTE — RESULT ENCOUNTER NOTE
The red blood count ( hemoglobin ) is down a bit from last time.  Stay on the iron pill 2x daily.    See us in 2-3 months so we can recheck this.    The good news is the kidney function ( creatinine blood test and urine protein test ) are both better.    Raymon Hernández MD

## 2019-08-15 ENCOUNTER — TELEPHONE (OUTPATIENT)
Dept: FAMILY MEDICINE | Facility: CLINIC | Age: 79
End: 2019-08-15

## 2019-08-15 DIAGNOSIS — E11.21 TYPE 2 DIABETES MELLITUS WITH DIABETIC NEPHROPATHY, WITH LONG-TERM CURRENT USE OF INSULIN (H): ICD-10-CM

## 2019-08-15 DIAGNOSIS — Z79.4 TYPE 2 DIABETES MELLITUS WITH DIABETIC NEPHROPATHY, WITH LONG-TERM CURRENT USE OF INSULIN (H): ICD-10-CM

## 2019-08-15 NOTE — TELEPHONE ENCOUNTER
Reason for Call:  Other     Detailed comments:  Patient just made an appointment at [facility] in [facility] and they said they do not have the referral from Dr Hernández. Please send them a referral.    Phone Number Patient can be reached at: Home number on file [phone] (home)    Best Time: any    Can we leave a detailed message on this number? YES    Call taken on 8/15/2019 at 10:37 AM by Bailey Garcia

## 2019-08-15 NOTE — TELEPHONE ENCOUNTER
Reason for Call:  Medication or medication refill:    Do you use a Telephone Pharmacy?  Name of the pharmacy and phone number for the current request:  [facility], [address], [phone]    Name of the medication requested:  Test Strips/ Patient always runs out of her test strips before her next refill. Patient is requesting a new Rx sent to pharmacy with an increased amount so that she doesn't run out before her next refill.  Patient only has two test strips left right now.    Other request:     Can we leave a detailed message on this number? YES    Phone number patient can be reached at: Home number on file [phone] (home)    Best Time: any    Call taken on 8/15/2019 at 10:40 AM by Bailey Garcia

## 2019-08-15 NOTE — TELEPHONE ENCOUNTER
Attempt # 1  Called patient at home number.  Was call answered?  Yes, patient states checks BS tid but sometimes cannot get enough blood and so uses more strips than 3 per day.    Nurse increased dispense amount    Routing PCP to review    Adelina Verdugo RN  [facility]

## 2019-08-16 ENCOUNTER — ANCILLARY PROCEDURE (OUTPATIENT)
Dept: MAMMOGRAPHY | Facility: CLINIC | Age: 79
End: 2019-08-16
Attending: FAMILY MEDICINE
Payer: COMMERCIAL

## 2019-08-16 DIAGNOSIS — Z12.31 VISIT FOR SCREENING MAMMOGRAM: ICD-10-CM

## 2019-08-16 PROCEDURE — 77067 SCR MAMMO BI INCL CAD: CPT | Mod: TC

## 2019-08-16 NOTE — TELEPHONE ENCOUNTER
Called [facility] pharmacy - spoke to Abhi aquino tech - test strips went through with zero copay      Adelina Verdugo RN  [facility]

## 2019-08-16 NOTE — TELEPHONE ENCOUNTER
I sent in prescription specifying up to 5 x per day testing.  We may have to do PA on this.  Patient has diabetes, is on insulin, and is prone to quite variable sugars and hypoglycemia.    Thanks    Raymon Hernández MD

## 2019-08-20 ENCOUNTER — TRANSFERRED RECORDS (OUTPATIENT)
Dept: HEALTH INFORMATION MANAGEMENT | Facility: CLINIC | Age: 79
End: 2019-08-20

## 2019-09-03 ENCOUNTER — OFFICE VISIT (OUTPATIENT)
Dept: AUDIOLOGY | Facility: CLINIC | Age: 79
End: 2019-09-03
Payer: COMMERCIAL

## 2019-09-03 DIAGNOSIS — H90.3 SENSORINEURAL HEARING LOSS, BILATERAL: Primary | ICD-10-CM

## 2019-09-03 PROCEDURE — 99207 ZZC NO CHARGE LOS: CPT | Performed by: AUDIOLOGIST

## 2019-09-03 PROCEDURE — 92567 TYMPANOMETRY: CPT | Performed by: AUDIOLOGIST

## 2019-09-03 PROCEDURE — 92557 COMPREHENSIVE HEARING TEST: CPT | Performed by: AUDIOLOGIST

## 2019-09-03 NOTE — PROGRESS NOTES
AUDIOLOGY REPORT    SUBJECTIVE:  Ruth Rocha is a 79 year old female who was seen in the Audiology Clinic [facility] on 9/03/19 for audiologic evaluation, referred by Dr. Hernández.  he patient reports a right ear otalgia for the past 3 weeks which is also radiating into her neck. Patient reports that she is having difficulty hearing. The patient denies  bilateral tinnitus, otalgia in the left ear, bilateral drainage, bilateral aural fullness, family history of hearing loss, and history of noise exposure. Patient was unaccompanied to today's visit.     OBJECTIVE:    Otoscopic exam indicates ears are clear of cerumen bilaterally     Pure Tone Thresholds assessed using standard techniques  audiometry with good  reliability from 250-8000 Hz bilaterally using insert earphones and TDH headphones     RIGHT:  normal hearing sensitivity through 2000 Hz then a mild to moderate sensorineural hearing loss    LEFT:    normal hearing sensitivity through 2000 Hz then a mild to moderate sensorineural hearing loss    Tympanogram:    RIGHT: hyper compliant     LEFT:   normal eardrum mobility    Reflexes (reported by stimulus ear): 1000 Hz   Patient was unable to not move her tongue and jaw reflexes were unable to be obtained.     Speech Reception Threshold:    RIGHT: 25 dB HL    LEFT:   30 dB HL    Word Recognition Score:     RIGHT: 100% at 65 dB HL using NU-6 recorded word list.    LEFT:   100% at 65 dB HL using NU-6 recorded word list.    ASSESSMENT:   Bilateral sensorineural hearing loss      Today s results were discussed with the patient in detail.     PLAN:  Patient was counseled regarding hearing loss and impact on communication.  Patient is a candidate for amplification at this time.  Handout on good communication strategies, and hearing aid use was given to patient. It is recommended that the patient see ENT for the right ear otalgia and slight asymmetry between ears at 4000 Hz.  Please call this clinic with  questions regarding these results or recommendations.    Thomas Ervin CCC-A  Licensed Audiologist #[id number]  9/3/2019    CC: Dr. Hernández

## 2019-09-08 NOTE — PROGRESS NOTES
Chief Complaint   Patient presents with     Ear Problem     ear pain , neck pain and headaches     History of Present Illness   Ruth Rocha is a 79 year old female who presents to me today for ear evaluation.  The patient reports a right ear otalgia for the past several weeks.  She reports the pain to be intermittent, sharp pain that usually last seconds to minutes..  She also has pain/discomfort radiating into her right neck.  Patient reports that she is having difficulty hearing. The patient denies tinnitus, left ear otalgia, otorrhea, bloody otorrhea, aural fullness, or vertigo.  She does report some intermittent imbalance.  She does use a walker.  The patient has noticed increased difficulty hearing certain sounds and difficulty in understanding others, especially in noisy or crowded situations.  There is no history of recent head trauma, or chronic ear disease or ear surgery.  She denies recreational, , and work-related noise exposure. No family history of hearing loss at a young age.     Past Medical History  Patient Active Problem List   Diagnosis     Overactive bladder     GERD (gastroesophageal reflux disease)     HYPERLIPIDEMIA LDL GOAL <100     Type 2 diabetes, HbA1C goal < 8% (H)     Diabetes mellitus with background retinopathy (H)     Diabetic macular edema, hx focal laser ou     Advanced directives, counseling/discussion     Hypertension goal BP (blood pressure) < 140/90     CHF (congestive heart failure) (H)     Pseudophakia, Yag Caps, ou      CKD (chronic kidney disease) stage 3, GFR 30-59 ml/min (H)     Hx of keratopathy - hx of PTK, ou (MP)     Hx of corneal epithelial defect, left eye     Gout     Type 2 diabetes mellitus with diabetic nephropathy (H)     Obesity, unspecified obesity severity, unspecified obesity type     FLOR (obstructive sleep apnea)     Primary open angle glaucoma of both eyes, mild stage     Posterior vitreous detachment, bilateral     Type 2 diabetes mellitus  with diabetic nephropathy, with long-term current use of insulin (H)     Status post ORIF of fracture of ankle     Morbid obesity (H)     CKD (chronic kidney disease) stage 4, GFR 15-29 ml/min (H)     Hyperparathyroidism (H)     Anxiety     Current Medications     Current Outpatient Medications:      ACE/ARB NOT PRESCRIBED, INTENTIONAL,, by Other route continuous prn Reported on 5/5/2017, Disp: , Rfl:      acetaminophen (TYLENOL) 500 MG tablet, Take 2 tablets (1,000 mg) by mouth 3 times daily as needed, Disp: , Rfl:      aspirin 325 MG EC tablet, Take 81 mg by mouth daily , Disp: , Rfl:      B-D U/F 31G X 8 MM insulin pen needle, USE AS DIRECTED - 2 DOSES PER DAY, Disp: 100 each, Rfl: 2     blood glucose (ONEYDA CONTOUR NEXT) test strip, Use to test blood sugar up to 5 x daily.  Patient on insulin and has quite variable sugars and is prone to hypoglycemia., Disp: 300 strip, Rfl: 6     blood glucose monitoring (ONEYDA MICROLET) lancets, USE AS DIRECTED 3 TIMES A DAY, Disp: 100 each, Rfl: 1     calcitRIOL (ROCALTROL) 0.25 MCG capsule, Take 1 capsule (0.25 mcg) by mouth Every Mon, Wed, Fri Morning, Disp: , Rfl:      carvedilol (COREG) 6.25 MG tablet, Take 1 tablet (6.25 mg) by mouth 2 times daily (with meals), Disp: 180 tablet, Rfl: 3     Cholecalciferol (VITAMIN D) 1000 UNITS capsule, Take 1 capsule by mouth daily., Disp: , Rfl:      dorzolamide-timolol (COSOPT) 2-0.5 % ophthalmic solution, Place 1 drop into the right eye 2 times daily, Disp: 10 mL, Rfl: 12     ferrous sulfate (IRON) 325 (65 FE) MG tablet, Take 1 tablet (325 mg) by mouth 2 times daily, Disp: 180 tablet, Rfl: 1     fluticasone (FLONASE) 50 MCG/ACT spray, SPRAY 1-2 SPRAYS INTO BOTH NOSTRILS DAILY, Disp: 16 mL, Rfl: 11     furosemide (LASIX) 20 MG tablet, Take 1 tablet (20 mg) by mouth daily, Disp: 90 tablet, Rfl: 1     hydrocortisone (CORTAID) 1 % cream, Apply sparingly to affected area three times daily as needed to affected areas, Disp: 60 g, Rfl: 1      hydrOXYzine (VISTARIL) 25 MG capsule, Take 25 mg by mouth, Disp: , Rfl:      insulin glargine (LANTUS SOLOSTAR PEN) 100 UNIT/ML pen, Inject 15 Units Subcutaneous daily, Disp: 30 mL, Rfl: 2     KLOR-CON 10 MEQ CR tablet, TAKE 1 TABLET (10 MEQ) BY MOUTH DAILY, Disp: 90 tablet, Rfl: 1     latanoprost (XALATAN) 0.005 % ophthalmic solution, Place 1 drop into both eyes At Bedtime, Disp: 1 Bottle, Rfl: 11     MICROLET LANCETS MISC, 1 Device 3 times daily., Disp: 100 each, Rfl: 11     MULTIVITAMIN TABS   OR, 1 TABLET DAILY, Disp: , Rfl:      order for DME, Equipment being ordered: CPAP.  Needs new machine and all associated supplies ( mask, headgear, tubing, filters, water chamber)   Diagnosis is obstructive sleep apnea G47.33  Length of need = lifetime  cpap pressure of 8, Disp: 1 Device, Rfl: 1     oxybutynin (DITROPAN) 5 MG tablet, One tab at bedtime ( dose changed ), Disp: 270 tablet, Rfl: 3     potassium chloride ER (KLOR-CON) 10 MEQ CR tablet, Take 1 tablet (10 mEq) by mouth daily, Disp: 90 tablet, Rfl: 1     ranitidine (ZANTAC) 150 MG tablet, TAKE 1 TABLET BY MOUTH TWICE A DAY, Disp: 180 tablet, Rfl: 3     Semaglutide (OZEMPIC) 0.25 or 0.5 MG/DOSE SOPN, .25 mg subcut weekly for 4 weeks then increase to 0.5 mg weekly, Disp: 3 mL, Rfl: 1     senna-docusate (SENEXON-S) 8.6-50 MG tablet, TAKE 1-4 TABLETS BY MOUTH 2 TIMES DAILY AS NEEDED CONSTIPATION, Disp: 60 tablet, Rfl: 4     sertraline (ZOLOFT) 50 MG tablet, TAKE 1/2 TABLET BY MOUTH DAILY FOR 1-2 WEEKS THEN INCREASE TO 1 TABLET DAILY, Disp: 90 tablet, Rfl: 1     simvastatin (ZOCOR) 10 MG tablet, TAKE 1 TABLET (10 MG) BY MOUTH AT BEDTIME, Disp: 90 tablet, Rfl: 1    Allergies  Allergies   Allergen Reactions     Morphine      Other reaction(s): GI Upset     Fish Oil      Other reaction(s): Edema  [place]       Social History   Social History     Socioeconomic History     Marital status: Single     Spouse name: Not on file     Number of children: Not on file     Years of  education: Not on file     Highest education level: Not on file   Occupational History     Not on file   Social Needs     Financial resource strain: Not on file     Food insecurity:     Worry: Not on file     Inability: Not on file     Transportation needs:     Medical: Not on file     Non-medical: Not on file   Tobacco Use     Smoking status: Never Smoker     Smokeless tobacco: Never Used   Substance and Sexual Activity     Alcohol use: No     Drug use: No     Sexual activity: Never   Lifestyle     Physical activity:     Days per week: Not on file     Minutes per session: Not on file     Stress: Not on file   Relationships     Social connections:     Talks on phone: Not on file     Gets together: Not on file     Attends Buddhist service: Not on file     Active member of club or organization: Not on file     Attends meetings of clubs or organizations: Not on file     Relationship status: Not on file     Intimate partner violence:     Fear of current or ex partner: Not on file     Emotionally abused: Not on file     Physically abused: Not on file     Forced sexual activity: Not on file   Other Topics Concern     Parent/sibling w/ CABG, MI or angioplasty before 65F 55M? No   Social History Narrative     Not on file       Family History  Family History   Problem Relation Age of Onset     C.A.D. Mother      Glaucoma Mother      Hypertension Mother      C.A.D. Father      Glaucoma Father      Hypertension Father      Diabetes No family hx of         except dad's sister     Cancer No family hx of      Macular Degeneration No family hx of      Amblyopia No family hx of      Retinal detachment No family hx of        Review of Systems  As per HPI and PMHx, otherwise 10+ comprehensive system review is negative.    Physical Exam  /65   Pulse 70   Wt 87.5 kg (193 lb)   SpO2 100%   BMI 33.65 kg/m    GENERAL: Patient is a pleasant, cooperative 79 year old female in no acute distress.  HEAD: Normocephalic, atraumatic.   Hair and scalp are normal.  EYES: Pupils are equal, round, reactive to light and accommodation.  Extraocular movements are intact.  The sclera nonicteric without injection.  The extraocular structures are normal.  EARS: Normal shape and symmetry.  No tenderness when palpating the mastoid or tragal areas bilaterally.  Otoscopic exam reveals a minimal amount of cerumen bilaterally.  The bilateral tympanic membranes are round, intact without evidence of effusion, good landmarks.  No retraction, granulation, or drainage.  NOSE: Nares are patent.  Nasal mucosa is pink and moist.  Negative anterior rhinoscopy.  ORAL CAVITY: Patient is a dentulous of the maxilla with mixed dentition of the mandible.  Mucous membranes are dry.  Tongue is mobile, protrudes to the midline.  Palate elevates symmetrically.  No erythema or exudate.  No oral cavity or oropharyngeal masses, lesions, ulcerations, leukoplakia.  Palpation of the jaw joint reveals some crepitus bilaterally more so on the right-hand side.  NECK: Supple, trachea is midline.  There no palpable cervical lymphadenopathy or masses bilaterally.  Palpation of the bilateral parotid and submandibular areas reveal no masses.  No thyromegaly.    NEUROLOGIC: Cranial nerves II through XII are grossly intact.  Voice is strong.  Patient is House-Brackman I/VI bilaterally.  Patient does have constant consistent movement of her mouth and jaw during the examination.  She also has some tongue protrusion.  CARDIOVASCULAR: Extremities are warm and well-perfused.  No significant peripheral edema.  RESPIRATORY: Patient has nonlabored breathing without cough, wheeze, stridor.  PSYCHIATRIC: Patient is alert and oriented.  Mood and affect appear normal.  SKIN: Warm and dry.  No scalp, face, or neck lesions noted.    Audiogram  An audiogram and tympanogram were performed today and personally reviewed.  The audiogram showed normal sloping to moderate sensorineural hearing loss bilaterally.   Pure-tone average is 21 dB on the right and 19 dB on the left.  Speech reception threshhold is 25 dB on the right and 30 dB on the left.  The patient had 100% word recognition on the right and 100% word recognition on the left.  The sensorineural hearing was age-appropriate, with no evidence of conductive hearing loss or significant asymmetry.The tympanograms show normal type A tympanograms with normal middle ear pressures bilaterally.  There is no sign of eustachian tube dysfunction or middle ear effusion.      Assessment and Plan     ICD-10-CM    1. Sensorineural hearing loss, bilateral H90.3    2. Otalgia, right H92.01    3. 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  The patient primarily presents with right otalgia.  Based on today's history and physical exam, I can find no evidence of middle ear pathology or eustachian tube dysfunction.  At this point my primary diagnosis is of temporomandibular syndrome.  I have discussed the etiology of TMJ, and the reasons why referred pain can mimic symptoms of ear disease, headaches, and even sinusitis.  I have given the patient an instructional sheet of things to be tried at home. Finally, I counseled the patient that should the therapy not help, or should the symptoms change, that they should return to me, or contact me for a referral to a TMJ specialist.    The patient also presents today with hearing loss.  This is most consistent with presbycusis. I can find no evidence of serious CNS disorders or other complicating factors that could be causing this.  We spent the remainder of today's visit on education. We discussed hearing protection in noisy environments.    The patient will follow up as necessary for worsening symptoms or changes in symptoms. I have also recommended audiograms as needed, and consideration of a hearing aid evaluation.    Hua Aragon MD  Department of Otolarygology-Head and Neck  Surgery  [facility]

## 2019-09-09 ENCOUNTER — OFFICE VISIT (OUTPATIENT)
Dept: OTOLARYNGOLOGY | Facility: CLINIC | Age: 79
End: 2019-09-09
Payer: COMMERCIAL

## 2019-09-09 VITALS
HEART RATE: 70 BPM | OXYGEN SATURATION: 100 % | DIASTOLIC BLOOD PRESSURE: 65 MMHG | SYSTOLIC BLOOD PRESSURE: 118 MMHG | WEIGHT: 193 LBS | BODY MASS INDEX: 33.65 KG/M2

## 2019-09-09 DIAGNOSIS — H90.3 SENSORINEURAL HEARING LOSS, BILATERAL: Primary | ICD-10-CM

## 2019-09-09 DIAGNOSIS — H92.01 OTALGIA, RIGHT: ICD-10-CM

## 2019-09-09 DIAGNOSIS — M26.609 TEMPORAL MANDIBULAR JOINT DISORDER: ICD-10-CM

## 2019-09-09 PROCEDURE — 99204 OFFICE O/P NEW MOD 45 MIN: CPT | Performed by: OTOLARYNGOLOGY

## 2019-09-09 NOTE — LETTER
9/9/2019         RE: Ruth Rocha  [address] [place] [address]  [id number]  [facility] [id number]        Dear Colleague,    Thank you for referring your patient, Ruth Rocha, to the [facility]. Please see a copy of my visit note below.    Chief Complaint   Patient presents with     Ear Problem     ear pain , neck pain and headaches     History of Present Illness   Ruth Rocha is a 79 year old female who presents to me today for ear evaluation.  The patient reports a right ear otalgia for the past several weeks.  She reports the pain to be intermittent, sharp pain that usually last seconds to minutes..  She also has pain/discomfort radiating into her right neck.  Patient reports that she is having difficulty hearing. The patient denies tinnitus, left ear otalgia, otorrhea, bloody otorrhea, aural fullness, or vertigo.  She does report some intermittent imbalance.  She does use a walker.  The patient has noticed increased difficulty hearing certain sounds and difficulty in understanding others, especially in noisy or crowded situations.  There is no history of recent head trauma, or chronic ear disease or ear surgery.  She denies recreational, , and work-related noise exposure. No family history of hearing loss at a young age.     Past Medical History  Patient Active Problem List   Diagnosis     Overactive bladder     GERD (gastroesophageal reflux disease)     HYPERLIPIDEMIA LDL GOAL <100     Type 2 diabetes, HbA1C goal < 8% (H)     Diabetes mellitus with background retinopathy (H)     Diabetic macular edema, hx focal laser ou     Advanced directives, counseling/discussion     Hypertension goal BP (blood pressure) < 140/90     CHF (congestive heart failure) (H)     Pseudophakia, Yag Caps, ou      CKD (chronic kidney disease) stage 3, GFR 30-59 ml/min (H)     Hx of keratopathy - hx of PTK, ou (MP)     Hx of corneal epithelial defect, left eye     Gout     Type 2 diabetes  mellitus with diabetic nephropathy (H)     Obesity, unspecified obesity severity, unspecified obesity type     FLOR (obstructive sleep apnea)     Primary open angle glaucoma of both eyes, mild stage     Posterior vitreous detachment, bilateral     Type 2 diabetes mellitus with diabetic nephropathy, with long-term current use of insulin (H)     Status post ORIF of fracture of ankle     Morbid obesity (H)     CKD (chronic kidney disease) stage 4, GFR 15-29 ml/min (H)     Hyperparathyroidism (H)     Anxiety     Current Medications     Current Outpatient Medications:      ACE/ARB NOT PRESCRIBED, INTENTIONAL,, by Other route continuous prn Reported on 5/5/2017, Disp: , Rfl:      acetaminophen (TYLENOL) 500 MG tablet, Take 2 tablets (1,000 mg) by mouth 3 times daily as needed, Disp: , Rfl:      aspirin 325 MG EC tablet, Take 81 mg by mouth daily , Disp: , Rfl:      B-D U/F 31G X 8 MM insulin pen needle, USE AS DIRECTED - 2 DOSES PER DAY, Disp: 100 each, Rfl: 2     blood glucose (ONEYDA CONTOUR NEXT) test strip, Use to test blood sugar up to 5 x daily.  Patient on insulin and has quite variable sugars and is prone to hypoglycemia., Disp: 300 strip, Rfl: 6     blood glucose monitoring (ONEYDA MICROLET) lancets, USE AS DIRECTED 3 TIMES A DAY, Disp: 100 each, Rfl: 1     calcitRIOL (ROCALTROL) 0.25 MCG capsule, Take 1 capsule (0.25 mcg) by mouth Every Mon, Wed, Fri Morning, Disp: , Rfl:      carvedilol (COREG) 6.25 MG tablet, Take 1 tablet (6.25 mg) by mouth 2 times daily (with meals), Disp: 180 tablet, Rfl: 3     Cholecalciferol (VITAMIN D) 1000 UNITS capsule, Take 1 capsule by mouth daily., Disp: , Rfl:      dorzolamide-timolol (COSOPT) 2-0.5 % ophthalmic solution, Place 1 drop into the right eye 2 times daily, Disp: 10 mL, Rfl: 12     ferrous sulfate (IRON) 325 (65 FE) MG tablet, Take 1 tablet (325 mg) by mouth 2 times daily, Disp: 180 tablet, Rfl: 1     fluticasone (FLONASE) 50 MCG/ACT spray, SPRAY 1-2 SPRAYS INTO BOTH  NOSTRILS DAILY, Disp: 16 mL, Rfl: 11     furosemide (LASIX) 20 MG tablet, Take 1 tablet (20 mg) by mouth daily, Disp: 90 tablet, Rfl: 1     hydrocortisone (CORTAID) 1 % cream, Apply sparingly to affected area three times daily as needed to affected areas, Disp: 60 g, Rfl: 1     hydrOXYzine (VISTARIL) 25 MG capsule, Take 25 mg by mouth, Disp: , Rfl:      insulin glargine (LANTUS SOLOSTAR PEN) 100 UNIT/ML pen, Inject 15 Units Subcutaneous daily, Disp: 30 mL, Rfl: 2     KLOR-CON 10 MEQ CR tablet, TAKE 1 TABLET (10 MEQ) BY MOUTH DAILY, Disp: 90 tablet, Rfl: 1     latanoprost (XALATAN) 0.005 % ophthalmic solution, Place 1 drop into both eyes At Bedtime, Disp: 1 Bottle, Rfl: 11     MICROLET LANCETS MISC, 1 Device 3 times daily., Disp: 100 each, Rfl: 11     MULTIVITAMIN TABS   OR, 1 TABLET DAILY, Disp: , Rfl:      order for DME, Equipment being ordered: CPAP.  Needs new machine and all associated supplies ( mask, headgear, tubing, filters, water chamber)   Diagnosis is obstructive sleep apnea G47.33  Length of need = lifetime  cpap pressure of 8, Disp: 1 Device, Rfl: 1     oxybutynin (DITROPAN) 5 MG tablet, One tab at bedtime ( dose changed ), Disp: 270 tablet, Rfl: 3     potassium chloride ER (KLOR-CON) 10 MEQ CR tablet, Take 1 tablet (10 mEq) by mouth daily, Disp: 90 tablet, Rfl: 1     ranitidine (ZANTAC) 150 MG tablet, TAKE 1 TABLET BY MOUTH TWICE A DAY, Disp: 180 tablet, Rfl: 3     Semaglutide (OZEMPIC) 0.25 or 0.5 MG/DOSE SOPN, .25 mg subcut weekly for 4 weeks then increase to 0.5 mg weekly, Disp: 3 mL, Rfl: 1     senna-docusate (SENEXON-S) 8.6-50 MG tablet, TAKE 1-4 TABLETS BY MOUTH 2 TIMES DAILY AS NEEDED CONSTIPATION, Disp: 60 tablet, Rfl: 4     sertraline (ZOLOFT) 50 MG tablet, TAKE 1/2 TABLET BY MOUTH DAILY FOR 1-2 WEEKS THEN INCREASE TO 1 TABLET DAILY, Disp: 90 tablet, Rfl: 1     simvastatin (ZOCOR) 10 MG tablet, TAKE 1 TABLET (10 MG) BY MOUTH AT BEDTIME, Disp: 90 tablet, Rfl: 1    Allergies  Allergies    Allergen Reactions     Morphine      Other reaction(s): GI Upset     Fish Oil      Other reaction(s): Edema  [place]       Social History   Social History     Socioeconomic History     Marital status: Single     Spouse name: Not on file     Number of children: Not on file     Years of education: Not on file     Highest education level: Not on file   Occupational History     Not on file   Social Needs     Financial resource strain: Not on file     Food insecurity:     Worry: Not on file     Inability: Not on file     Transportation needs:     Medical: Not on file     Non-medical: Not on file   Tobacco Use     Smoking status: Never Smoker     Smokeless tobacco: Never Used   Substance and Sexual Activity     Alcohol use: No     Drug use: No     Sexual activity: Never   Lifestyle     Physical activity:     Days per week: Not on file     Minutes per session: Not on file     Stress: Not on file   Relationships     Social connections:     Talks on phone: Not on file     Gets together: Not on file     Attends Islam service: Not on file     Active member of club or organization: Not on file     Attends meetings of clubs or organizations: Not on file     Relationship status: Not on file     Intimate partner violence:     Fear of current or ex partner: Not on file     Emotionally abused: Not on file     Physically abused: Not on file     Forced sexual activity: Not on file   Other Topics Concern     Parent/sibling w/ CABG, MI or angioplasty before 65F 55M? No   Social History Narrative     Not on file       Family History  Family History   Problem Relation Age of Onset     C.A.D. Mother      Glaucoma Mother      Hypertension Mother      C.A.D. Father      Glaucoma Father      Hypertension Father      Diabetes No family hx of         except dad's sister     Cancer No family hx of      Macular Degeneration No family hx of      Amblyopia No family hx of      Retinal detachment No family hx of        Review of Systems  As per  HPI and PMHx, otherwise 10+ comprehensive system review is negative.    Physical Exam  /65   Pulse 70   Wt 87.5 kg (193 lb)   SpO2 100%   BMI 33.65 kg/m     GENERAL: Patient is a pleasant, cooperative 79 year old female in no acute distress.  HEAD: Normocephalic, atraumatic.  Hair and scalp are normal.  EYES: Pupils are equal, round, reactive to light and accommodation.  Extraocular movements are intact.  The sclera nonicteric without injection.  The extraocular structures are normal.  EARS: Normal shape and symmetry.  No tenderness when palpating the mastoid or tragal areas bilaterally.  Otoscopic exam reveals a minimal amount of cerumen bilaterally.  The bilateral tympanic membranes are round, intact without evidence of effusion, good landmarks.  No retraction, granulation, or drainage.  NOSE: Nares are patent.  Nasal mucosa is pink and moist.  Negative anterior rhinoscopy.  ORAL CAVITY: Patient is a dentulous of the maxilla with mixed dentition of the mandible.  Mucous membranes are dry.  Tongue is mobile, protrudes to the midline.  Palate elevates symmetrically.  No erythema or exudate.  No oral cavity or oropharyngeal masses, lesions, ulcerations, leukoplakia.  Palpation of the jaw joint reveals some crepitus bilaterally more so on the right-hand side.  NECK: Supple, trachea is midline.  There no palpable cervical lymphadenopathy or masses bilaterally.  Palpation of the bilateral parotid and submandibular areas reveal no masses.  No thyromegaly.    NEUROLOGIC: Cranial nerves II through XII are grossly intact.  Voice is strong.  Patient is House-Brackman I/VI bilaterally.  Patient does have constant consistent movement of her mouth and jaw during the examination.  She also has some tongue protrusion.  CARDIOVASCULAR: Extremities are warm and well-perfused.  No significant peripheral edema.  RESPIRATORY: Patient has nonlabored breathing without cough, wheeze, stridor.  PSYCHIATRIC: Patient is alert and  oriented.  Mood and affect appear normal.  SKIN: Warm and dry.  No scalp, face, or neck lesions noted.    Audiogram  An audiogram and tympanogram were performed today and personally reviewed.  The audiogram showed normal sloping to moderate sensorineural hearing loss bilaterally.  Pure-tone average is 21 dB on the right and 19 dB on the left.  Speech reception threshhold is 25 dB on the right and 30 dB on the left.  The patient had 100% word recognition on the right and 100% word recognition on the left.  The sensorineural hearing was age-appropriate, with no evidence of conductive hearing loss or significant asymmetry.The tympanograms show normal type A tympanograms with normal middle ear pressures bilaterally.  There is no sign of eustachian tube dysfunction or middle ear effusion.      Assessment and Plan     ICD-10-CM    1. Sensorineural hearing loss, bilateral H90.3    2. Otalgia, right H92.01    3. 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  The patient primarily presents with right otalgia.  Based on today's history and physical exam, I can find no evidence of middle ear pathology or eustachian tube dysfunction.  At this point my primary diagnosis is of temporomandibular syndrome.  I have discussed the etiology of TMJ, and the reasons why referred pain can mimic symptoms of ear disease, headaches, and even sinusitis.  I have given the patient an instructional sheet of things to be tried at home. Finally, I counseled the patient that should the therapy not help, or should the symptoms change, that they should return to me, or contact me for a referral to a TMJ specialist.    The patient also presents today with hearing loss.  This is most consistent with presbycusis. I can find no evidence of serious CNS disorders or other complicating factors that could be causing this.  We spent the remainder of today's visit on education. We discussed hearing  protection in noisy environments.    The patient will follow up as necessary for worsening symptoms or changes in symptoms. I have also recommended audiograms as needed, and consideration of a hearing aid evaluation.    Hua Aragon MD  Department of Otolarygology-Head and Neck Surgery  [facility]        Again, thank you for allowing me to participate in the care of your patient.        Sincerely,        Hua Aragon MD

## 2019-09-09 NOTE — PATIENT INSTRUCTIONS
1) Warm compresses to jaw joint  2) Limiting chewing  3) Massage to jaw joint  4) Use tylenol/ibuprofen as needed

## 2019-10-25 ENCOUNTER — OFFICE VISIT (OUTPATIENT)
Dept: OPHTHALMOLOGY | Facility: CLINIC | Age: 79
End: 2019-10-25
Payer: COMMERCIAL

## 2019-10-25 DIAGNOSIS — H40.1131 PRIMARY OPEN ANGLE GLAUCOMA OF BOTH EYES, MILD STAGE: Primary | ICD-10-CM

## 2019-10-25 PROCEDURE — 92012 INTRM OPH EXAM EST PATIENT: CPT | Performed by: OPHTHALMOLOGY

## 2019-10-25 PROCEDURE — 92083 EXTENDED VISUAL FIELD XM: CPT | Performed by: OPHTHALMOLOGY

## 2019-10-25 PROCEDURE — 92133 CPTRZD OPH DX IMG PST SGM ON: CPT | Performed by: OPHTHALMOLOGY

## 2019-10-25 ASSESSMENT — VISUAL ACUITY
OD_PH_SC: 20/70
OS_PH_SC+: -1
OD_PH_SC+: -2
OS_SC: 20/300
METHOD: SNELLEN - LINEAR
OS_PH_SC: 20/70
OD_SC: 20/125

## 2019-10-25 ASSESSMENT — TONOMETRY
IOP_METHOD: APPLANATION
OD_IOP_MMHG: 11
OS_IOP_MMHG: 16

## 2019-10-25 NOTE — PROGRESS NOTES
Current Eye Medications:  cosopt right eye twice a day, Latanoprost both eyes every evening.  Last drops: right eye:  6:30am, left eye:  10:30pm     Subjective:  Follow up Open Angle Glaucoma.  Patient is here for a pressure check, OCT, and visual field.    *patient had a difficulty remaining positioned for the OCT and visual field.       Objective:  See Ophthalmology Exam.       Assessment:  Stable glaucoma OCT and Sarkar Visual Field both eyes but intraocular pressure up left eye.      Plan:  Use Dorzolamide/Timolol (Cosopt - dark blue top) both eyes twice daily about 12 hours apart.  Continue Latanoprost drop both eyes at bedtime.  Return visit 6 months for a complete exam.  Continue care with Dr. Jaime.  Abraham Byrd M.D.  [phone]

## 2019-10-25 NOTE — PATIENT INSTRUCTIONS
Use Dorzolamide/Timolol (Cosopt - dark blue top) both eyes twice daily about 12 hours apart.  Continue Latanoprost drop both eyes at bedtime.  Return visit 6 months for a complete exam.  Continue care with Dr. Jaime.  Abraham Byrd M.D.  [phone]

## 2019-10-25 NOTE — LETTER
10/25/2019         RE: Ruth Rocha  [address] [place] [address]  [id number]  [facility] [id number]        Dear Colleague,    Thank you for referring your patient, Ruth Rocha, to the [facility]. Please see a copy of my visit note below.     Current Eye Medications:  cosopt right eye twice a day, Latanoprost both eyes every evening.  Last drops: right eye:  6:30am, left eye:  10:30pm     Subjective:  Follow up Open Angle Glaucoma.  Patient is here for a pressure check, OCT, and visual field.    *patient had a difficulty remaining positioned for the OCT and visual field.       Objective:  See Ophthalmology Exam.       Assessment:  Stable glaucoma OCT and Sarkar Visual Field both eyes but intraocular pressure up left eye.      Plan:  Use Dorzolamide/Timolol (Cosopt - dark blue top) both eyes twice daily about 12 hours apart.  Continue Latanoprost drop both eyes at bedtime.  Return visit 6 months for a complete exam.  Continue care with Dr. Jaime.  Abraham Byrd M.D.  [phone]           Again, thank you for allowing me to participate in the care of your patient.        Sincerely,        Abraham Byrd MD

## 2019-11-06 ENCOUNTER — OFFICE VISIT (OUTPATIENT)
Dept: FAMILY MEDICINE | Facility: CLINIC | Age: 79
End: 2019-11-06
Payer: COMMERCIAL

## 2019-11-06 VITALS
BODY MASS INDEX: 32.08 KG/M2 | TEMPERATURE: 97.4 F | SYSTOLIC BLOOD PRESSURE: 117 MMHG | HEART RATE: 76 BPM | DIASTOLIC BLOOD PRESSURE: 69 MMHG | OXYGEN SATURATION: 100 % | WEIGHT: 184 LBS

## 2019-11-06 DIAGNOSIS — E11.21 TYPE 2 DIABETES MELLITUS WITH DIABETIC NEPHROPATHY, WITH LONG-TERM CURRENT USE OF INSULIN (H): ICD-10-CM

## 2019-11-06 DIAGNOSIS — K14.8 ABNORMAL RHYTHMIC MOVEMENT OF TONGUE: ICD-10-CM

## 2019-11-06 DIAGNOSIS — I50.9 CONGESTIVE HEART FAILURE, UNSPECIFIED HF CHRONICITY, UNSPECIFIED HEART FAILURE TYPE (H): ICD-10-CM

## 2019-11-06 DIAGNOSIS — L08.9 SKIN INFECTION: Primary | ICD-10-CM

## 2019-11-06 DIAGNOSIS — E87.6 HYPOKALEMIA: ICD-10-CM

## 2019-11-06 DIAGNOSIS — Z79.4 TYPE 2 DIABETES MELLITUS WITH DIABETIC NEPHROPATHY, WITH LONG-TERM CURRENT USE OF INSULIN (H): ICD-10-CM

## 2019-11-06 DIAGNOSIS — G24.01 TARDIVE DYSKINESIA: ICD-10-CM

## 2019-11-06 PROCEDURE — 99214 OFFICE O/P EST MOD 30 MIN: CPT | Performed by: FAMILY MEDICINE

## 2019-11-06 RX ORDER — FUROSEMIDE 20 MG
20 TABLET ORAL DAILY
Qty: 90 TABLET | Refills: 1 | Status: SHIPPED | OUTPATIENT
Start: 2019-11-06 | End: 2020-01-01

## 2019-11-06 RX ORDER — POTASSIUM CHLORIDE 750 MG/1
10 TABLET, EXTENDED RELEASE ORAL DAILY
Qty: 90 TABLET | Refills: 1 | Status: SHIPPED | OUTPATIENT
Start: 2019-11-06 | End: 2020-01-01

## 2019-11-06 RX ORDER — CEPHALEXIN 500 MG/1
500 CAPSULE ORAL 3 TIMES DAILY
Qty: 30 CAPSULE | Refills: 0 | Status: SHIPPED | OUTPATIENT
Start: 2019-11-06 | End: 2019-12-04

## 2019-11-06 ASSESSMENT — PAIN SCALES - GENERAL: PAINLEVEL: NO PAIN (0)

## 2019-11-06 NOTE — PATIENT INSTRUCTIONS
Call to schedule neurology consult    Stay as active as possible    Use the antibiotic ointment 3-4 x daily to wound by knee    Take the oral antibiotics    Try not to scratch at all    Call if not resolving

## 2019-11-06 NOTE — PROGRESS NOTES
Subjective     Ruth Rocha is a 79 year old female who presents to clinic today for the following health issues:    HPI   Tongue issues  Spot on back of right knee  none            Reviewed and updated as needed this visit by Provider         Review of Systems   ROS COMP:  The lip smacking is getting worse    Especially when tired    Sometimes hard to talk    No tongue pain    Hard to use the tongue    Uses mouthwash for dry mouth    Drinks water pretty well    Skin lesion for about 10 days, staying about the same          Objective    /69 (BP Location: Right arm, Patient Position: Chair, Cuff Size: Adult Regular)   Pulse 76   Temp 97.4  F (36.3  C) (Oral)   Wt 83.5 kg (184 lb)   SpO2 100%   Breastfeeding? No   BMI 32.08 kg/m    Body mass index is 32.08 kg/m .  Physical Exam  Constitutional:       Appearance: She is well-developed.   HENT:      Head: Normocephalic and atraumatic.   Eyes:      Conjunctiva/sclera: Conjunctivae normal.   Neck:      Vascular: No carotid bruit.   Cardiovascular:      Rate and Rhythm: Normal rate and regular rhythm.      Heart sounds: Normal heart sounds.   Pulmonary:      Effort: Pulmonary effort is normal. No respiratory distress.      Breath sounds: Normal breath sounds.   Neurological:      Mental Status: She is alert and oriented to person, place, and time.             Mentation and affect are fine    No tremor of speech or extremity    Patient has the lip smackin/ tardive dyskinesia, maybe worse than last time    Speech still understandable    Tongue/ oral mucosa dry     On back right of knee patient has an almost dime sized very shallow open area surrounded but a nickel sized red area    No drainage    [facility] flu shot [facility]      Diagnostic Test Results:  Labs reviewed in Epic        ASSESSMENT / PLAN:  (L08.9) Skin infection  (primary encounter diagnosis)  Comment: prudent to cover with po antibiotics and also patient given samples of topical bacitracin to  use several times daily.  Don't scratch at area at all.   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    Raymon Hernández MD

## 2019-11-08 ENCOUNTER — HEALTH MAINTENANCE LETTER (OUTPATIENT)
Age: 79
End: 2019-11-08

## 2019-11-13 NOTE — TELEPHONE ENCOUNTER
RECORDS RECEIVED FROM: Internal - Dr Hernández - [facility]   DATE RECEIVED: 12/13/19   NOTES (FOR ALL VISITS) STATUS DETAILS   OFFICE NOTE from referring provider Internal 11/6/19 8/7/19   OFFICE NOTE from other specialist N/A    DISCHARGE SUMMARY from hospital N/A    DISCHARGE REPORT from the ER N/A    OPERATIVE REPORT N/A    MEDICATION LIST Internal    IMAGING  (FOR ALL VISITS)     EMG N/A    EEG N/A    ECT N/A    MRI (HEAD, NECK, SPINE) Received [facility]:  MRI Brain 7/29/10   LUMBAR PUNCTURE N/A    RICHIE Scan N/A    CT (HEAD, NECK, SPINE) N/A

## 2019-11-16 ASSESSMENT — PACHYMETRY
OS_CT(UM): 556
OD_CT(UM): 664

## 2019-11-16 ASSESSMENT — SLIT LAMP EXAM - LIDS
COMMENTS: 2+ DERMATOCHALASIS
COMMENTS: 2+ DERMATOCHALASIS

## 2019-11-16 ASSESSMENT — EXTERNAL EXAM - RIGHT EYE: OD_EXAM: 2+ BROW PTOSIS

## 2019-11-16 ASSESSMENT — EXTERNAL EXAM - LEFT EYE: OS_EXAM: 2+ BROW PTOSIS

## 2019-11-17 PROBLEM — R06.03 ACUTE RESPIRATORY DISTRESS: Status: ACTIVE | Noted: 2019-01-13

## 2019-11-17 PROBLEM — B96.20 E. COLI UTI: Status: ACTIVE | Noted: 2019-01-06

## 2019-11-17 PROBLEM — E66.9 OBESITY (BMI 30-39.9): Status: ACTIVE | Noted: 2019-01-04

## 2019-11-17 PROBLEM — G24.01 TARDIVE DYSKINESIA: Status: ACTIVE | Noted: 2019-11-17

## 2019-11-17 PROBLEM — S82.842D CLOSED BIMALLEOLAR FRACTURE OF LEFT ANKLE WITH ROUTINE HEALING: Status: ACTIVE | Noted: 2017-09-10

## 2019-11-17 PROBLEM — N39.0 E. COLI UTI: Status: ACTIVE | Noted: 2019-01-06

## 2019-11-17 PROBLEM — J15.9 BACTERIAL PNEUMONIA: Status: ACTIVE | Noted: 2019-01-13

## 2019-11-17 PROBLEM — G47.33 OSA ON CPAP: Status: ACTIVE | Noted: 2019-01-06

## 2019-11-17 RX ORDER — BENZONATATE 100 MG/1
100 CAPSULE ORAL
COMMUNITY
Start: 2019-01-07 | End: 2019-12-04

## 2019-11-17 RX ORDER — METOCLOPRAMIDE 10 MG/1
10 TABLET ORAL
COMMUNITY
End: 2019-12-11

## 2019-11-17 RX ORDER — HYDROCORTISONE 2.5 %
CREAM (GRAM) TOPICAL
Refills: 3 | COMMUNITY
Start: 2019-08-26 | End: 2019-12-11

## 2019-11-17 RX ORDER — PREDNISONE 20 MG/1
TABLET ORAL
Refills: 0 | COMMUNITY
Start: 2019-01-07 | End: 2019-12-11

## 2019-11-17 RX ORDER — BENZONATATE 100 MG/1
CAPSULE ORAL
Refills: 0 | COMMUNITY
Start: 2019-01-07 | End: 2019-12-30

## 2019-11-17 RX ORDER — FERROUS SULFATE 325(65) MG
325 TABLET, DELAYED RELEASE (ENTERIC COATED) ORAL
COMMUNITY
Start: 2013-12-06 | End: 2019-12-04

## 2019-11-17 RX ORDER — KETOCONAZOLE 20 MG/G
CREAM TOPICAL
Refills: 3 | COMMUNITY
Start: 2019-08-26 | End: 2019-12-11

## 2019-12-10 ASSESSMENT — ENCOUNTER SYMPTOMS
BOWEL INCONTINENCE: 0
PARALYSIS: 0
BLOATING: 1
INSOMNIA: 0
VOMITING: 1
JAUNDICE: 0
CONSTIPATION: 1
POOR WOUND HEALING: 1
NAIL CHANGES: 0
TINGLING: 0
HEADACHES: 1
LOSS OF CONSCIOUSNESS: 0
MEMORY LOSS: 0
DEPRESSION: 1
SPEECH CHANGE: 1
SKIN CHANGES: 0
HEARTBURN: 1
RECTAL PAIN: 0
DIARRHEA: 1
WEAKNESS: 1
DIZZINESS: 1
TREMORS: 1
DECREASED CONCENTRATION: 1
BLOOD IN STOOL: 0
NUMBNESS: 0
SEIZURES: 0
NERVOUS/ANXIOUS: 1
NAUSEA: 1
PANIC: 1
DISTURBANCES IN COORDINATION: 0
ABDOMINAL PAIN: 0

## 2019-12-11 RX ORDER — ASPIRIN 81 MG/1
81 TABLET ORAL DAILY
COMMUNITY
End: 2019-12-13

## 2019-12-11 RX ORDER — INSULIN GLARGINE 100 [IU]/ML
15-20 INJECTION, SOLUTION SUBCUTANEOUS AT BEDTIME
Refills: 1 | COMMUNITY
Start: 2019-10-21 | End: 2020-01-01

## 2019-12-13 ENCOUNTER — OFFICE VISIT (OUTPATIENT)
Dept: NEUROLOGY | Facility: CLINIC | Age: 79
End: 2019-12-13
Payer: COMMERCIAL

## 2019-12-13 ENCOUNTER — PRE VISIT (OUTPATIENT)
Dept: NEUROLOGY | Facility: CLINIC | Age: 79
End: 2019-12-13

## 2019-12-13 VITALS
BODY MASS INDEX: 31.11 KG/M2 | WEIGHT: 178.4 LBS | HEART RATE: 72 BPM | DIASTOLIC BLOOD PRESSURE: 77 MMHG | SYSTOLIC BLOOD PRESSURE: 132 MMHG | OXYGEN SATURATION: 100 %

## 2019-12-13 DIAGNOSIS — G24.01 TARDIVE DYSKINESIA: Primary | ICD-10-CM

## 2019-12-13 DIAGNOSIS — G24.01 TARDIVE DYSKINESIA: ICD-10-CM

## 2019-12-13 DIAGNOSIS — F41.9 ANXIETY: ICD-10-CM

## 2019-12-13 ASSESSMENT — PAIN SCALES - GENERAL: PAINLEVEL: MODERATE PAIN (4)

## 2019-12-13 NOTE — LETTER
"[facility]  [facility] NEUROLOGY  [address] [address]  [phone]            2019          Dear Hoa Proxy Patient,    We received a request to activate you as a proxy for another patient of [facility] or [place].  In order to do so, we need to activate your [employer] account as well.    Your access code is: [unfilled]      Please access the [employer] website:  -  [employer] [email or web address]  -  [employer] [email or web address].    Below the ID and password fields, select the \"Sign Up Now\" as New User.  You will be prompted to enter the access code listed above as well as additional personal information.  Please follow the directions carefully when creating your username and password.    Once your account is activated, you can access the proxy accounts under \"Shared Medical Records\".    If you allow your access code to , or if you have any questions please call a [employer] Representative during normal clinic hours.     Sincerely,        [employer] Customer Service    "

## 2019-12-13 NOTE — PATIENT INSTRUCTIONS
Eye issues -visual loss, glaucoma, s/p cataract  Hearing loss  GI issues - heartburn and constipation and may have dysmotility. Taking senna -docusate and ranitidine zantac  Had gallbladder surgery.  ?heart failure and may have some orthostatic symptoms. On diuretic and potassium  Allergies  Skin infections  Possible TIA  Long standing diabetes with renal, neurological and ophthalmological complications  On calcitriol for renal issues and has low GFR  Mood issues  - on sertraline 50mg  Anemia  - on iron  Has urinary problems and is using oxybutynin as needed - once per day?  No lung problems  Has arthritis back and neck and had left ankle surgery       -------------------      Tardive dyskinesia    She had been on metoclopramide (reglan) - duration   Last ecg was January 2019    PLAN  Repeat ECG today  [facility] pharmacy consultation with Nathalie Justin  Phone or face to face consultation with patient (rey) or her friend (Renita) whom she lives with  Documentation signed to allow for communication with patient, friend and one brother (lily) who lives in [place]    May be getting some home care services - details not clear. She is getting out twice a week to do water aerobics at [employer] [facility] with Renita.     Return back in 3-6 months to see Juani.     General information about TD below.    Tardive movement problems -     [email or web address]  [email or web address]    Gingko 80mg 3 times per day. Monitor for affects on anticoagulation and antidepressants    Clonazepam option    TETRABENAZINE (NITOMAN )    Austedo (deutetrabenazine) dosed 6mg daily and can be increased in 6mg increments up to 48mg/day. 50% of patients reported improvement in their symptoms and 40% of providers observed improvement in their patients. This data is from patients with chorea in [place]'s [facility]. There is an increased risk of suicidal idea and  depression with this medication but patient denies any mood disorder. Side effects include sleepiness, diarrhea, tiredness, dry mouth, prolonged QT interval. This medication costs ~$4000 for #60 of the 6mg capsules.    Ingrezza (valbenazine) dosed 40mg daily for 1 week and then can increase to 80mg daily. 1 in 4 patients treated for 6 weeks on 80mg of Ingrezza have severity of symptoms reduced by >50%. Brief review of studies showed this data is from patients with TD due to exposure to neuroleptics. Side effects: somnolence, increased QT interval. Patient is not on any other medications that would contribute to prolonged QT interval. This is a specialty medication and likely would have to be filled through a specialty pharmacy. Cost ~$5000-10,000/month. [facility] could help us navigate coverage for the patient.

## 2019-12-13 NOTE — LETTER
"[facility]  [facility] NEUROLOGY  [address] [address]  [phone]            2019          Dear Hoa Proxy Patient,    We received a request to activate you as a proxy for another patient of [facility] or [place].  In order to do so, we need to activate your [employer] account as well.    Your access code is: [unfilled]      Please access the [employer] website:  -  [email or web address]  -  [email or web address] [email or web address].    Below the ID and password fields, select the \"Sign Up Now\" as New User.  You will be prompted to enter the access code listed above as well as additional personal information.  Please follow the directions carefully when creating your username and password.    Once your account is activated, you can access the proxy accounts under \"Shared Medical Records\".    If you allow your access code to , or if you have any questions please call a [employer] Representative during normal clinic hours.     Sincerely,        [employer] Customer Service    "

## 2019-12-13 NOTE — NURSING NOTE
Chief Complaint   Patient presents with     Consult For     P NEW - TARDIVE DYSKINESIA       Manas Zheng, EMT

## 2019-12-13 NOTE — LETTER
12/13/2019       RE: Ruth Rocha  [address] [place] [address]  [id number]  [facility] [id number]     Dear Colleague,    Thank you for referring your patient, Ruth Rocha, to the [facility] NEUROLOGY at [facility]. Please see a copy of my visit note below.    Summary and Recommendations:     Eye issues -visual loss, glaucoma, s/p cataract  Hearing loss  GI issues - heartburn and constipation and may have dysmotility. Taking senna -docusate and ranitidine zantac  Had gallbladder surgery.  ?heart failure and may have some orthostatic symptoms. On diuretic and potassium  Allergies  Skin infections  Possible TIA  Long standing diabetes with renal, neurological and ophthalmological complications  On calcitriol for renal issues and has low GFR  Mood issues  - on sertraline 50mg  Anemia  - on iron  Has urinary problems and is using oxybutynin as needed - once per day?  No lung problems  Has arthritis back and neck and had left ankle surgery       -------------------      Tardive dyskinesia    She had been on metoclopramide (reglan) - duration   Last ecg was January 2019    PLAN  Repeat ECG today  [facility] pharmacy consultation with Nathalie Justin  Phone or face to face consultation with patient (rey) or her friend (Renita) whom she lives with  Documentation signed to allow for communication with patient, friend and one brother (lily) who lives in [place]    May be getting some home care services - details not clear. She is getting out twice a week to do water aerobics at [employer] in [facility] with Renita.     Return back in 3-6 months to see Juani.     General information about TD below.    Tardive movement problems -     [email or web address]  [email or web address]    Gingko 80mg 3 times per day. Monitor for affects on anticoagulation and antidepressants    Clonazepam option    TETRABENAZINE  (NITOMAN )    Austedo (deutetrabenazine) dosed 6mg daily and can be increased in 6mg increments up to 48mg/day. 50% of patients reported improvement in their symptoms and 40% of providers observed improvement in their patients. This data is from patients with chorea in Gratiot's Disease. There is an increased risk of suicidal idea and depression with this medication but patient denies any mood disorder. Side effects include sleepiness, diarrhea, tiredness, dry mouth, prolonged QT interval. This medication costs ~$4000 for #60 of the 6mg capsules.    Ingrezza (valbenazine) dosed 40mg daily for 1 week and then can increase to 80mg daily. 1 in 4 patients treated for 6 weeks on 80mg of Ingrezza have severity of symptoms reduced by >50%. Brief review of studies showed this data is from patients with TD due to exposure to neuroleptics. Side effects: somnolence, increased QT interval. Patient is not on any other medications that would contribute to prolonged QT interval. This is a specialty medication and likely would have to be filled through a specialty pharmacy. Cost ~$5000-10,000/month. [facility] could help us navigate coverage for the patient.            Jace Julio MD  _____________________________________________________________________  PATIENT: Ruth Rocha  79 year old female   : 1940  LAURA: 2019    Consult requested by pcp/other        Outside records reviewed and revealed = inserted.       History obtained from patient      History of Present Illness  79 year old yo with involunTary  Movements    SHE HAS been living with Renita since  in [place]    No biological children  3  Brothers  [facility]     She granted proxy access to brother Chago and friend Renita    Has had involuntary movements for 6-8 months  Began after leaving the hospital  She had pneumonia in the hospital  Had been on stomach medication -presumably metoclopramide (reglan)    Diabetes -  many years.  - 30 years.   A1c was 6.6 on 3/11/2019  Neuropathy   Has some problems with her eyes - retinopathy  Had cataract surgery long ago  She has glaucoma and using drops.     Has some hearing loss    Smell perception is okay     She is on zantac - for heartburn.   Has constipation.     Friend Renita is a medical assistant  She is retired and does senior  work - volunteer    She has sleep apnea - she uses cpap  She has some problems with sleeping    She has some mood changes.   Does not have a psychiatrist  She has been on sertraline  And it has helped but she still has some panic anxiety issues.     Has some sores    Allergies reviewed.    She has been on antibiotic in the past for a skin infection.   She had something behind her right knee    She has not fallen lately  She has a walker    She has had faints or near faint    She has a history of CHF  Not clear if she has had a heart attack    She may have had a TIA  Mild nonspecific white matter changes on a head ct 2014.     She has had renal problems.   Taking calcitriol.     She is taking iron 10.8 hemoglobin 10/8/2019  She is anemic and taking iron.     She is able to spell world forwards  And backwards 5/5     Recall 3/3      ECG 1/2019 was normal    She has some urinary issues with some nocturia    Neuropathy at her feet    She is taking cholesterol medication  She is not on thyroid medication   She is being treated for diabetes.     Memory/cognition - states it is not too bad  She plays solitaire  She states she sees cards  She has visual changes.     No family history of problems.     Medications            Acetaminophen tylenol 500mg As needed       Aspirin 325mg 1       Benzonatate tessalon 100mg As needed       Calcitriol rocaltrol 0.25mcg M/w/f       Carvedilol coreg 6.25 mg 1   1    Cholecalciferol vitamin D 1000 units  1       Dorzolamide-timolol cosopt 2-0.5% opathlmic solution  1 drop right eye   1 drop right eye    Ferrous sulfate fe  tabs 325 (65 Fe) mg EC tablet 1       Fluticasone flonase 50 mcg/act inhaler  1-2 sprays   1-2 sprays    Furosemide lasix 20mg  1       Hydrocortisone cortaid 1% cream As needed       Hydrocortisone 2.5% cream  As needed       Hydroxyzine vistaril 25mg capsule not taking       Insulin glargine lantus 1000 unit/ml   15-20 units     Ketoconazole nizoral 2% exteral cream ?taking       Klor-con 10 meq cr tablet Not taking       Latanoprost xalatan 0.005% ophthalmic solution     1 drop both eyes    Metoclopramide reglan 10mg  Not taking       MVI 1       Oxybutynin ditropan 5mg  3/day as needed       Polyethylene glycol-propylene glycol systane ultr 0.4-0.3% ophthalmic solution  ?taking       Potassium chloride ER kor-con 10meq 1       Ranitidine zantac 150mg 1   1    Semaglutide ozempic 0.25 or 0.5 mg/dose sopn Not taking       Senna-docusate senexon-S 8.6-50 As needed       Sertraline zoloft 50mg 1       Simvastatin zocor 10mg    1                              Answers for HPI/ROS submitted by the patient on 12/10/2019   General Symptoms: No  Skin Symptoms: Yes  HENT Symptoms: No  EYE SYMPTOMS: No  HEART SYMPTOMS: No  LUNG SYMPTOMS: No  INTESTINAL SYMPTOMS: Yes  URINARY SYMPTOMS: No  GYNECOLOGIC SYMPTOMS: No  BREAST SYMPTOMS: No  SKELETAL SYMPTOMS: No  BLOOD SYMPTOMS: No  NERVOUS SYSTEM SYMPTOMS: Yes  MENTAL HEALTH SYMPTOMS: Yes  Changes in hair: No  Changes in moles/birth marks: No  Itching: Yes  Rashes: No  Changes in nails: No  Acne: No  Hair in places you don't want it: No  Change in facial hair: No  Warts: No  Non-healing sores: Yes  Scarring: No  Flaking of skin: Yes  Color changes of hands/feet in cold : No  Sun sensitivity: No  Skin thickening: No  Heart burn or indigestion: Yes  Nausea: Yes  Vomiting: Yes  Abdominal pain: No  Bloating: Yes  Constipation: Yes  Diarrhea: Yes  Blood in stool: No  Black stools: No  Rectal or Anal pain: No  Fecal incontinence: No  Yellowing of skin or eyes: No  Vomit with blood:  No  Change in stools: No  Trouble with coordination: No  Dizziness or trouble with balance: Yes  Fainting or black-out spells: No  Memory loss: No  Headache: Yes  Seizures: No  Speech problems: Yes  Tingling: No  Tremor: Yes  Weakness: Yes  Difficulty walking: Yes  Paralysis: No  Numbness: No  Nervous or Anxious: Yes  Depression: Yes  Trouble sleeping: No  Trouble thinking or concentrating: Yes  Mood changes: No  Panic attacks: Yes      14 Review of systems  are negative except for   Patient Active Problem List   Diagnosis     Overactive bladder     GERD (gastroesophageal reflux disease)     HYPERLIPIDEMIA LDL GOAL <100     Type 2 diabetes, HbA1C goal < 8% (H)     Diabetes mellitus with background retinopathy (H)     Diabetic macular edema, hx focal laser ou     Advanced directives, counseling/discussion     Hypertension goal BP (blood pressure) < 140/90     CHF (congestive heart failure) (H)     Pseudophakia, Yag Caps, ou      CKD (chronic kidney disease) stage 3, GFR 30-59 ml/min (H)     Hx of keratopathy - hx of PTK, ou (MP)     Hx of corneal epithelial defect, left eye     Gout     Type 2 diabetes mellitus with diabetic nephropathy (H)     Obesity, unspecified obesity severity, unspecified obesity type     FLOR (obstructive sleep apnea)     Primary open angle glaucoma of both eyes, mild stage     Posterior vitreous detachment, bilateral     Type 2 diabetes mellitus with diabetic nephropathy, with long-term current use of insulin (H)     Status post ORIF of fracture of ankle     Morbid obesity (H)     CKD (chronic kidney disease) stage 4, GFR 15-29 ml/min (H)     Hyperparathyroidism (H)     Anxiety     Acute kidney failure, unspecified (H)     Acute respiratory distress     Bacterial pneumonia     Bell's palsy     CAP (community acquired pneumonia)     Closed bimalleolar fracture of left ankle with routine healing     Diabetic neuropathy (H)     E. coli UTI     Facial droop     Heart failure, diastolic, acute on  chronic (H)     Orthostatic hypotension     TIA (transient ischemic attack)     ACP (advance care planning)     Acute CHF (H)     Type 2 diabetes mellitus (H)     Hyperlipidemia     Hypertension     Obesity (BMI 30-39.9)     Glaucoma     FLOR on CPAP     Tardive dyskinesia        Allergies   Allergen Reactions     Morphine      Other reaction(s): GI Upset     Fish Oil      Other reaction(s): Edema  [place]     Past Surgical History:   Procedure Laterality Date     CATARACT IOL, RT/LT       focal laser for diabetic macular edema      both eyes     HC REMOVAL GALLBLADDER  10-5-3     LASER SELECTIVE TRABECULOPLASTY  7/2003    right eye, 360     LASER YAG CAPSULOTOMY  6/1998; 6/2007    right eye; left eye     ORTHOPEDIC SURGERY      left ankle surgery     PHACOEMULSIFICATION WITH STANDARD INTRAOCULAR LENS IMPLANT  11/1996; 3/1999    right eye; left eye     PHOTOTHERAPEUTIC KERACTECTOMY Right 01/18/2017     PHOTOTHERAPEUTIC KERACTECTOMY Left 08/2015     TONSILLECTOMY  1948     Past Medical History:   Diagnosis Date     Anemia      Arthritis      CKD (chronic kidney disease) stage 3, GFR 30-59 ml/min (H)      Diabetic retinopathy (H)      DM type 2 (diabetes mellitus, type 2) (H)      Glaucoma (increased eye pressure)      Gout      HTN      Hypercholesterolemia      Non-compliance      Obesity      Renal insufficiency      Sciatica     2011     Tardive dyskinesia 11/17/2019     Social History     Socioeconomic History     Marital status: Single     Spouse name: Not on file     Number of children: Not on file     Years of education: Not on file     Highest education level: Not on file   Occupational History     Not on file   Social Needs     Financial resource strain: Not on file     Food insecurity:     Worry: Not on file     Inability: Not on file     Transportation needs:     Medical: Not on file     Non-medical: Not on file   Tobacco Use     Smoking status: Never Smoker     Smokeless tobacco: Never Used   Substance and  "Sexual Activity     Alcohol use: No     Drug use: No     Sexual activity: Never   Lifestyle     Physical activity:     Days per week: Not on file     Minutes per session: Not on file     Stress: Not on file   Relationships     Social connections:     Talks on phone: Not on file     Gets together: Not on file     Attends Rastafarian service: Not on file     Active member of club or organization: Not on file     Attends meetings of clubs or organizations: Not on file     Relationship status: Not on file     Intimate partner violence:     Fear of current or ex partner: Not on file     Emotionally abused: Not on file     Physically abused: Not on file     Forced sexual activity: Not on file   Other Topics Concern     Parent/sibling w/ CABG, MI or angioplasty before 65F 55M? No   Social History Narrative    single. lives in [facility]. Renita Frostcarson friend     Family History   Problem Relation Age of Onset     C.A.D. Mother      Glaucoma Mother      Hypertension Mother      C.A.D. Father      Glaucoma Father      Hypertension Father      Diabetes No family hx of         except dad's sister     Cancer No family hx of      Macular Degeneration No family hx of      Amblyopia No family hx of      Retinal detachment No family hx of      Current Outpatient Medications   Medication Sig Dispense Refill     benzonatate (TESSALON) 100 MG capsule Take 100 mg by mouth       ferrous sulfate (FE TABS) 325 (65 Fe) MG EC tablet Take 325 mg by mouth       Glucose Blood (ACCU-CHEK COMFORT CURVE VI)        Insulin Syringe-Needle U-100 28G X 1/2\" 0.3 ML MISC use daily for insulin injection       ACE/ARB NOT PRESCRIBED, INTENTIONAL, by Other route continuous prn Reported on 5/5/2017       acetaminophen (TYLENOL) 500 MG tablet Take 2 tablets (1,000 mg) by mouth 3 times daily as needed       aspirin 325 MG EC tablet Take 81 mg by mouth daily        B-D U/F 31G X 8 MM insulin pen needle USE AS DIRECTED - 2 DOSES PER  each 2     " benzonatate (TESSALON) 100 MG capsule TAKE 1 CAPSULE BY MOUTH 3 TIMES DAILY IF NEEDED.  0     blood glucose (ONEYDA CONTOUR NEXT) test strip Use to test blood sugar up to 5 x daily.  Patient on insulin and has quite variable sugars and is prone to hypoglycemia. 300 strip 6     blood glucose monitoring (ONEYDA MICROLET) lancets USE AS DIRECTED 3 TIMES A  each 1     calcitRIOL (ROCALTROL) 0.25 MCG capsule Take 1 capsule (0.25 mcg) by mouth Every Mon, Wed, Fri Morning       carvedilol (COREG) 6.25 MG tablet Take 1 tablet (6.25 mg) by mouth 2 times daily (with meals) 180 tablet 3     Cholecalciferol (VITAMIN D) 1000 UNITS capsule Take 1 capsule by mouth daily.       dorzolamide-timolol (COSOPT) 2-0.5 % ophthalmic solution Place 1 drop into the right eye 2 times daily 10 mL 12     ferrous sulfate (IRON) 325 (65 FE) MG tablet Take 1 tablet (325 mg) by mouth 2 times daily 180 tablet 1     fluticasone (FLONASE) 50 MCG/ACT spray SPRAY 1-2 SPRAYS INTO BOTH NOSTRILS DAILY 16 mL 11     furosemide (LASIX) 20 MG tablet Take 1 tablet (20 mg) by mouth daily 90 tablet 1     hydrocortisone (CORTAID) 1 % cream Apply sparingly to affected area three times daily as needed to affected areas 60 g 1     hydrocortisone 2.5 % cream MIX 1:1 WITH KETOCONAZOLE CREAM AND APPLY TO AFFECTED AREAS TWICE DAILY FOR UP TO 14 DAYS  3     hydrOXYzine (VISTARIL) 25 MG capsule Take 25 mg by mouth       insulin glargine (LANTUS SOLOSTAR PEN) 100 UNIT/ML pen Inject 15 Units Subcutaneous daily 30 mL 2     insulin glargine (LANTUS SOLOSTAR) 100 UNIT/ML pen Inject 0-25 Units subcutaneous before dinner. Product desired: LANTUS       ketoconazole (NIZORAL) 2 % external cream MIX 1:1 WITH HYDROCORTISONE AND APPLY TWICE A DAY TO AFFECTED AREAS FOR UP TO 14 DAYS  3     KLOR-CON 10 MEQ CR tablet TAKE 1 TABLET (10 MEQ) BY MOUTH DAILY 90 tablet 1     latanoprost (XALATAN) 0.005 % ophthalmic solution Place 1 drop into both eyes At Bedtime 1 Bottle 11      metoclopramide (REGLAN) 10 MG tablet Take 10 mg by mouth       MICROLET LANCETS MISC 1 Device 3 times daily. 100 each 11     MULTIVITAMIN TABS   OR 1 TABLET DAILY       order for DME Equipment being ordered: CPAP.  Needs new machine and all associated supplies ( mask, headgear, tubing, filters, water chamber)     Diagnosis is obstructive sleep apnea G47.33    Length of need = lifetime    cpap pressure of 8 1 Device 1     oxybutynin (DITROPAN) 5 MG tablet TAKE 1 TABLET (5 MG) BY MOUTH 3 TIMES DAILY AS NEEDED 270 tablet 3     polyethylene glycol-propylene glycol (SYSTANE ULTRA) 0.4-0.3 % SOLN ophthalmic solution Apply 1-2 drops to eye       potassium chloride ER (KLOR-CON) 10 MEQ CR tablet Take 1 tablet (10 mEq) by mouth daily 90 tablet 1     predniSONE (DELTASONE) 20 MG tablet TAKE 1 TABLET BY MOUTH EVERY DAY WITH FOOD  0     ranitidine (ZANTAC) 150 MG tablet TAKE 1 TABLET BY MOUTH TWICE A  tablet 3     Semaglutide (OZEMPIC) 0.25 or 0.5 MG/DOSE SOPN .25 mg subcut weekly for 4 weeks then increase to 0.5 mg weekly 3 mL 1     senna-docusate (SENEXON-S) 8.6-50 MG tablet TAKE 1-4 TABLETS BY MOUTH 2 TIMES DAILY AS NEEDED CONSTIPATION 60 tablet 4     sertraline (ZOLOFT) 50 MG tablet TAKE 1/2 TABLET BY MOUTH DAILY FOR 1-2 WEEKS THEN INCREASE TO 1 TABLET DAILY 90 tablet 1     simvastatin (ZOCOR) 10 MG tablet TAKE 1 TABLET (10 MG) BY MOUTH AT BEDTIME 90 tablet 1     Examination  B/P: Data Unavailable, T: Data Unavailable, P: Data Unavailable, R: Data Unavailable 0 lbs 0 oz  not currently breastfeeding., There is no height or weight on file to calculate BMI.    Vitals signs were added and reviewed if not above. Please refer to the chart from this visit.    General examination: well developed, nourished and normal affect  Carotid: No bruits. Chest CTA, Heart regular without gallops or murmurs. Abdomen soft nontender, no masses, bowel sounds intact. Periphery: normal pulses without edema. No skin lesions. MENTAL STATUS:   Alert, oriented x3.  Speech fluent with normal naming, repetition, comprehension.  Good right-left orientation, Can remember 3/3 objects.   CRANIAL NERVES:  Disks flat. Pupils are equal, round, reactive to light.  Normal vascularity and fields. Extraocular movements full.  Facial sensation and movement normal.  Hearing intact. Palate moves symmetrically.  Tongue midline.  Sternocleidomastoid and trapezius strength intact.  Neck strength was normal.  NEUROLOGIC:  Tone: okay. Motor in upper and lower extremities. 5/5.  Reflexes 0-1/4.  Toe signs downgoing.  Good finger-nose-finger, fine finger movement, heel-shin maneuver, sensation to light touch, position sense and vibration and temperature was abnormal with absent vib at toes and intact jps and loss pp from above ankle. Gait normal except for circumducts the left leg. Romberg and postural stability - no romberg. Did not pull today Tremor =- absent Has involuntary vocalizations and orofacial tongue movements            Again, thank you for allowing me to participate in the care of your patient.      Sincerely,    Jace Julio MD

## 2019-12-16 LAB — INTERPRETATION ECG - MUSE: NORMAL

## 2019-12-17 ENCOUNTER — TELEPHONE (OUTPATIENT)
Dept: NEUROLOGY | Facility: CLINIC | Age: 79
End: 2019-12-17

## 2019-12-17 NOTE — TELEPHONE ENCOUNTER
MTM referral from: [facility] visit (referral by provider)    MTM referral outreach attempt #2 on December 17, 2019 at 1:37 PM      Outcome: Patient not reachable after several attempts, will route to MTM Pharmacist/Provider as an FYI. Thank you for the referral.    See [facility] Pharmacy Coordinator

## 2019-12-23 ENCOUNTER — OFFICE VISIT (OUTPATIENT)
Dept: OPHTHALMOLOGY | Facility: CLINIC | Age: 79
End: 2019-12-23
Attending: OPHTHALMOLOGY
Payer: COMMERCIAL

## 2019-12-23 DIAGNOSIS — E11.3313 MODERATE NONPROLIFERATIVE DIABETIC RETINOPATHY OF BOTH EYES WITH MACULAR EDEMA ASSOCIATED WITH TYPE 2 DIABETES MELLITUS (H): Primary | ICD-10-CM

## 2019-12-23 PROCEDURE — 92134 CPTRZ OPH DX IMG PST SGM RTA: CPT | Mod: ZF | Performed by: OPHTHALMOLOGY

## 2019-12-23 PROCEDURE — G0463 HOSPITAL OUTPT CLINIC VISIT: HCPCS | Mod: ZF

## 2019-12-23 ASSESSMENT — VISUAL ACUITY
OS_SC: 20/400
OD_PH_SC: 20/80
OS_PH_SC: 20/100
METHOD: SNELLEN - LINEAR
OS_PH_SC+: -1
OD_SC: 20/150
OD_PH_SC+: -1

## 2019-12-23 ASSESSMENT — TONOMETRY
OS_IOP_MMHG: 9
OD_IOP_MMHG: 8
IOP_METHOD: ICARE

## 2019-12-23 ASSESSMENT — CUP TO DISC RATIO
OD_RATIO: 0.7
OS_RATIO: 0.7

## 2019-12-23 ASSESSMENT — SLIT LAMP EXAM - LIDS
COMMENTS: 2+ DERMATOCHALASIS
COMMENTS: 2+ DERMATOCHALASIS

## 2019-12-23 ASSESSMENT — CONF VISUAL FIELD
METHOD: COUNTING FINGERS
OS_NORMAL: 1
OD_NORMAL: 1

## 2019-12-23 ASSESSMENT — EXTERNAL EXAM - LEFT EYE: OS_EXAM: 2+ BROW PTOSIS

## 2019-12-23 ASSESSMENT — EXTERNAL EXAM - RIGHT EYE: OD_EXAM: 2+ BROW PTOSIS

## 2019-12-23 NOTE — PROGRESS NOTES
CC -   NPDR and DME    INTERVAL HISTORY: VA stable. No interval updates.   More trouble speaking, seeing neurologist  Last A1c ~6.6 on ~ 3/2019     HPI -   Ruth HANSA Rocha is a  79 year old year-old patient presenting for NPDR and DME OS > OD.  H/o K-scars and poor vision OS Sees Jordan pollard for glaucoma     VA OS poor longstanding    PAST OCULAR SURGERY  CE/IOL OU  FML OU  Therapeutic PTK OS 8/2015    RETINAL IMAGING:  OCT   12-23-19  right eye- mod focal atrophy from FML scars, no CNVM, tr ERM, ?trace  IRF, stable, PVD  left eye -  Mod focal atrophy from FML scars, no CNVM, tr  paracentral DME, stable, PVD    FA 6-14-16  Right eye - blurry image, 2-3+ WD from FML scars, 1+ increased NURA, 1 peripheral ischemia, no NV  Left eye - (transit) 2+ increased NURA, large WD in macula from FML scars, 1+ MAs/peripheral atrophy, no NV      ASSESSMENT & PLAN  1.  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 DME OS   - s/p FML   - Last Avastin #4  4/17/17     - paracentral, mild   - VA varied 20/125-20/500, typically 20/400     - doubt vis sig given extensive FML scars and K-scarring   - doubt benefit to Tx   - observe for now       3. DME OD   - very mild   - cornea contributing to vision   - observe    4.  PVD OU   - advised S/Sx RD 6/2019    5.  K- scarring OU   - s/p PTK OS   - saw Page in past   - contributing to subnormal OD       6.  OAG OU   - on cosopt OD and xalatan OU   - sees Agusto     - last vsiit 4/2019      RTC 6 months, OCT OU, sooner if new vision changes        ATTESTATION     Attending Physician Attestation:      Complete documentation of historical and exam elements from today's encounter can be found in the full encounter summary report (not reduplicated in this progress note).  I personally obtained the chief complaint(s) and history of present illness.  I confirmed and edited as necessary the review of systems, past medical/surgical  history, family history, social history, and examination findings as documented by others; and I examined the patient myself.  I personally reviewed the relevant tests, images, and reports as documented above.  I formulated and edited as necessary the assessment and plan and discussed the findings and management plan with the patient and family    Bryanna Jaime MD, PhD  , Vitreoretinal Surgery  Department of Ophthalmology  [facility]

## 2019-12-23 NOTE — NURSING NOTE
Chief Complaints and History of Present Illnesses   Patient presents with     Follow Up     Moderate nonproliferative diabetic retinopathy of both eyes with macular edema associated with type 2 diabetes mellitus      Chief Complaint(s) and History of Present Illness(es)     Follow Up     Associated symptoms: flashes (No change.) and floaters.  Negative for eye pain, dryness and tearing    Comments: Moderate nonproliferative diabetic retinopathy of both eyes with macular edema associated with type 2 diabetes mellitus               Comments     Pt states vision is the same since last visit.  Pt reports seeing playing cards all over the place when she wakes up.  This lasts for a couple of minutes.  The cards are stationary and do not move with her eyes.  This started about a month ago.  Pt also reports also seeing flashes for a long period of time.  She also reports an increase in floaters in the past couple of weeks but not very often.    DM 2.  BS were 125 this am.  Lab Results       Component                Value               Date                       A1C                      6.6                 03/11/2019                 A1C                      6.4                 07/20/2018                 A1C                      5.8                 12/04/2017                 A1C                      5.8                 08/09/2017                 A1C                      5.9                 04/14/2017              VLADISLAV Marte December 23, 2019 8:36 AM

## 2019-12-30 ENCOUNTER — OFFICE VISIT (OUTPATIENT)
Dept: PHARMACY | Facility: CLINIC | Age: 79
End: 2019-12-30
Payer: COMMERCIAL

## 2019-12-30 ENCOUNTER — TELEPHONE (OUTPATIENT)
Dept: NEUROLOGY | Facility: CLINIC | Age: 79
End: 2019-12-30

## 2019-12-30 DIAGNOSIS — K59.00 CONSTIPATION, UNSPECIFIED CONSTIPATION TYPE: ICD-10-CM

## 2019-12-30 DIAGNOSIS — Z79.4 TYPE 2 DIABETES MELLITUS WITHOUT COMPLICATION, WITH LONG-TERM CURRENT USE OF INSULIN (H): ICD-10-CM

## 2019-12-30 DIAGNOSIS — F41.9 ANXIETY: ICD-10-CM

## 2019-12-30 DIAGNOSIS — K21.9 GASTROESOPHAGEAL REFLUX DISEASE, ESOPHAGITIS PRESENCE NOT SPECIFIED: ICD-10-CM

## 2019-12-30 DIAGNOSIS — N18.30 CKD (CHRONIC KIDNEY DISEASE) STAGE 3, GFR 30-59 ML/MIN (H): ICD-10-CM

## 2019-12-30 DIAGNOSIS — D50.9 IRON DEFICIENCY ANEMIA, UNSPECIFIED IRON DEFICIENCY ANEMIA TYPE: ICD-10-CM

## 2019-12-30 DIAGNOSIS — H40.9 GLAUCOMA OF BOTH EYES, UNSPECIFIED GLAUCOMA TYPE: ICD-10-CM

## 2019-12-30 DIAGNOSIS — E55.9 VITAMIN D DEFICIENCY: ICD-10-CM

## 2019-12-30 DIAGNOSIS — E78.5 HYPERLIPIDEMIA LDL GOAL <100: ICD-10-CM

## 2019-12-30 DIAGNOSIS — N32.81 OVERACTIVE BLADDER: ICD-10-CM

## 2019-12-30 DIAGNOSIS — G24.01 TARDIVE DYSKINESIA: Primary | ICD-10-CM

## 2019-12-30 DIAGNOSIS — J30.2 SEASONAL ALLERGIC RHINITIS, UNSPECIFIED TRIGGER: ICD-10-CM

## 2019-12-30 DIAGNOSIS — I10 HYPERTENSION, UNSPECIFIED TYPE: ICD-10-CM

## 2019-12-30 DIAGNOSIS — E11.9 TYPE 2 DIABETES MELLITUS WITHOUT COMPLICATION, WITH LONG-TERM CURRENT USE OF INSULIN (H): ICD-10-CM

## 2019-12-30 DIAGNOSIS — I50.9 HEART FAILURE, UNSPECIFIED HF CHRONICITY, UNSPECIFIED HEART FAILURE TYPE (H): ICD-10-CM

## 2019-12-30 PROCEDURE — 99207 ZZC NO CHARGE LOS: CPT | Performed by: PHARMACIST

## 2019-12-30 NOTE — PROGRESS NOTES
SUBJECTIVE/OBJECTIVE:                           Ruth Rocha is a 79 year old female coming in for an initial visit for Medication Therapy Management.  She was referred to me from Dr. Julio. Caregiver, Renita, also present for the visit today.     Chief Complaint: Initial MTM visit    Allergies/ADRs: Reviewed in Epic  Tobacco:  reports that she has never smoked. She has never used smokeless tobacco.  Alcohol: none  Caffeine: not discussed  Activity: water aerobics twice weekly  PMH: Reviewed in Epic    Medication Adherence/Access: no issues reported - friend, Renita, assists her    Tardive dyskinesia: Not currently taking any medications. She reports that the involuntary movements started about one year ago, December 2018/January 2019.  The movements are primarily in her mouth and affect her swallowing, eating, and talking.  She reports having dry mouth and her friend states that it looks like she bites on her tongue frequently as well.  Patient is unsure if the movements occurred during the nighttime.  She did try taking a vitamin B6 supplement with no benefit but has otherwise not tried any medications for the symptoms.  It is suspected that metoclopramide caused the TD.  She was on metoclopramide for many years (at least 2009 to 2019) for GERD and she is now off of metoclopramide.     Anxiety: Taking sertraline 50 mg daily. States it has been helpful but she still has some panic attacks at times. Hydroxyzine was on her medication list but she is not taking it. Friend states that anxiety may be worse because of the TD movements.     Diabetes:  Pt currently taking Lantus 15-20 units daily at 5 pm. States that she will use 15 units if her blood sugars are about 150 in the evening or 20 units if blood sugars are higher, like 170. AM blood sugars this morning was 112 and this is typical for her. She rarely experiences hypoglycemia but if she does, will eat gummy bears. Her friend offers to purchase glucose tablets  for her to have available and patient agreed. More recent A1c's are not available in Epic as she changed doctors.   Lab Results   Component Value Date    A1C 6.6 03/11/2019    A1C 6.4 07/20/2018    A1C 5.8 12/04/2017    A1C 5.8 08/09/2017    A1C 5.9 04/14/2017   ACEi/ARB: No.   Urine Albumin:   Lab Results   Component Value Date    UMALCR 14.32 08/07/2019   Aspirin: Taking 325mg daily and denies side effects (history of TIA)  Diet/Exercise: states she has lost 40 pounds in the last year    Wt Readings from Last 10 Encounters:   12/13/19 178 lb 6.4 oz (80.9 kg)   11/06/19 184 lb (83.5 kg)   09/09/19 193 lb (87.5 kg)   08/07/19 193 lb (87.5 kg)   03/11/19 193 lb 12 oz (87.9 kg)   02/21/19 193 lb 8 oz (87.8 kg)   01/28/19 192 lb 4 oz (87.2 kg)   01/23/19 191 lb (86.6 kg)   01/21/19 192 lb (87.1 kg)   01/18/19 191 lb (86.6 kg)     HFrEF/HTN: Current medications include carvedilol 6.25 mg twice daily, furosemide 20 mg daily, and potassium chloride 10 mEq daily.  Patient reports no current medication side effects. States edema is under good control.   ECHO:  Date 2012, EF 65%  Pt is not complaining of sx of HF.    Pt is not measuring daily weights.   Patient does not self-monitor BP.   Pt is following a low sodium diet, is avoiding EtOH.    CKD: Taking calcitriol 0.25 mcg 3 times daily. Last Scr was 1.31 on 8/7/19. Patient follows with nephrology (last seen March 2019).     OAB: Taking oxybutynin 5 mg nightly. States that the medication has been helpful and she does not need to get up and urinate as often overnight as before.     Hyperlipidemia: Current therapy includes simvastatin 10 mg once daily.  Pt reports no significant myalgias or other side effects.  The ASCVD Risk score (Burlingtonkaryn ESCOBAR Jr., et al., 2013) failed to calculate for the following reasons:    The valid total cholesterol range is 130 to 320 mg/dL    GERD: Current medications include: Zantac (ranitidine) 150 mg twice daily. She endorses still having some  symptoms of reflux and occasional mild vomiting (ie: this morning). Friend states she needs to slow down when she eats but the patient forgets. Will sometimes take Tums if symptoms are bothersome. No longer taking metoclopramide. Has not discussed with PCP recently.     Anemia: Currently taking ferrous sulfate 325 mg daily with breakfast.  Hemoglobin   Date Value Ref Range Status   08/07/2019 10.8 (L) 11.7 - 15.7 g/dL Final     Glaucoma: Uses latanaprost eye drops nightly in both eyes and uses Systane artificial tears as needed.     Allergic rhinitis: Current medications include fluticasone nasal spray when she remembers to take it. States it is effective but she only uses as needed.     Constipation: Takes 2 senna/doc tablets daily and states this works well for her.     Vitamin D deficiency: Taking 1000 units supplementation once daily. Labs have not been checked since 2012. Also takes a daily MVI.     Today's Vitals:   BP Readings from Last 1 Encounters:   12/13/19 132/77     Pulse Readings from Last 1 Encounters:   12/13/19 72     ASSESSMENT:                          Medicare Part D topics discussed:Medication changes    Medication Adherence: excellent, no issues identified    Tardive dyskinesia: Needs improvement.  Patient has exhibiting bothersome involuntary movements of her mouth which are affecting her ability to speak and eat.  Patient would benefit from a trial of a VMAT2 inhibitor.  Valbenazine is preferable with the least side effects and ease of dosing once a day.  We will attempt to obtain insurance coverage of this medication for her.  Her baseline QTc is 447 so it is okay to add the valbenazine to her current sertraline. Dr. Julio agrees to initiate valbenazine.     Anxiety: Needs improvement.  It seems that the patient's anxiety may be related to the tardive dyskinesia so we will wait to make any changes to her antidepressant until after she is on valbenazine.  In the future, may consider a higher  dose of sertraline.    Diabetes: Appears stable.  Patient may benefit from follow-up with her primary care provider for a repeat A1c as it has been about 9 months.    HFrEF/HTN: Appears stable.      CKD: Appears stable. Calculated CrCl is 30-36 mL/min. Patient due for follow-up with nephrology in March 2020.    OAB: Appears stable.  The patient is on a low dose of oxybutynin which does have anticholinergic effects and could be contributing to her dry mouth.  She is also at increased risk of cognitive impairment and falls while on an anticholinergic given her advanced age.  Primary concern at visit today is TD management so we will not make any changes to her urinary medications.     Hyperlipidemia: Appears stable.    GERD: Needs improvement.  It sounds as though the patient's reflux symptoms have been worse lately and ranitidine may not be fully controlling her symptoms.  Additionally, ranitidine is currently under recall by the FDA and she may not be able to obtain further refills.  Finally, given her poor kidney function, the dose of ranitidine should probably be no more than 150 mg/day. A PPI could be considered as an alternative given her severe reflux.     Anemia: Needs improvement. Due for repeat labs.     Glaucoma: Unable to assess.     Allergic rhinitis: Stable. Patient could use fluticasone more often if she remembers.     Constipation: Stable.     Vitamin D deficiency: Unable to assess but supplement dose appears safe.    PLAN:                            1. Trial of Ingrezza 40 mg daily. Will attempt to get insurance coverage.   2. Advised patient to follow up with PCP to discuss management of acid reflux - consider alternate treatments since ranitidine has been recalled and due to poor kidney function. May need to consider PPI.   3. Future: reassess anxiety after on Ingrezza and may need to consider an increase in sertraline dose.     Not discussed today:   1. Due for repeat Hgb.  2. Due for repeat A1c.    3. Due for nephrology visit in March 2020.   4. Consider alternatives to oxybuytnin with less anticholinergic properties.     I spent 30 minutes with this patient today (an extra 15 minutes was spent creating the Medication Action Plan). All changes were made via verbal approval with Dr. Julio. A copy of the visit note was provided to the patient's referring provider.    Will follow up in one month: 2/3/20.    The patient was given a summary of these recommendations as an after visit summary.     Nathalie Justin, Pharm.D.  Medication Therapy Management Pharmacist  Phone: [phone]

## 2019-12-30 NOTE — LETTER
[employer] [place]  [address] [address]   [place] [address]    Re: Ruth Rocha   ID [id number]  Group: [id number]  : [id number]    Dear Medical Director:    I am writing this letter to appeal the denial of coverage for valbenazine (Ingrezza) capsules on behalf of my patient, Ruth Rocha, who has a diagnosis of tardive dyskinesia (G24.01). Your organization cited that tetrabenazine is a preferred medication as the reason for denial. Please review the information below that supports use of this medication as approved by the US Food and Drug Administration.    Ingrezza is a vesicular monoamine transporter 2 (VMAT2) inhibitor and is FDA-approved for the treatment of adults with tardive dyskinesia. Based on a clinical assessment of my patient, the patient s diagnosis, and medical history, Ingrezza was prescribed. Below is a brief summary of Ruth's medical history and rationale for treatment with Ingrezza.     Patient s Medical History and Treatment Rationale:      Patient s medical history and current condition: Patient began experiencing involuntary movements after taking metoclopramide in early . She was diagnosed with tardive dyskinesia in the  of . She has been experiencing mouth movements and lip smacking, which are bothersome and embarassing to her. She has also had some trouble eating and swallowing.     Prior treatments and rationale: She has not tried any VMAT2 inhibitors and we prefer to try Ingrezza rather than tetrabenazine as the side-effect profile is preferable with less somnolence and the dosing is more convenient (once daily versus three times daily). Additionally, tetrabenazine is not actually FDA approved for tardive dyskinesia.     In summary, based on my clinical opinion, Ingrezza is medically necessary and reasonable for Ruth Rocha's medical condition. I trust that the information provided, along with my medical recommendations, will  establish the medical necessity of coverage for Ingrezza.     Please contact my office at [phone] if I can provide you with any additional information to approve this request.      Sincerely,  Jace Julio MD  Movement Disorders Neurologist

## 2019-12-30 NOTE — LETTER
"     [facility]     Date: 2020    Ruth Rocha  [address] [place] [address] [place]  [address]  [facility] [id number]    Dear Ms. Rocha,    Thank you for talking with me on 19 about your health and medications. Medicare s MTM (Medication Therapy Management) program helps you understand your medications and use them safely.      This letter includes an action plan (Medication Action Plan) and medication list (Personal Medication List). The action plan has steps you should take to help you get the best results from your medications. The medication list will help you keep track of your medications and how to use them the right way.       Have your action plan and medication list with you when you talk with your doctors, pharmacists, and other healthcare providers in your care team.     Ask your doctors, pharmacists, and other healthcare providers to update the action plan and medication list at every visit.     Take your medication list with you if you go to the hospital or emergency room.     Give a copy of the action plan and medication list to your family or caregivers.     If you want to talk about this letter or any of the papers with it, please call   [phone].   We look forward to working with you, your doctors, and other healthcare providers to help you stay healthy.     Sincerely,    Nathalie Justin, [facility]      Enclosed: Medication Action Plan and Personal Medication List    MEDICATION ACTION PLAN FOR Ruth Rocha,  1940     This action plan will help you get the best results from your medications if you:   1. Read \"What we talked about.\"   2. Take the steps listed in the \"What I need to do\" boxes.   3. Fill in \"What I did and when I did it.\"   4. Fill in \"My follow-up plan\" and \"Questions I want to ask.\"     Have this action plan with you when you talk with your doctors, pharmacists, and other healthcare providers in your care team. Share this with your family or " caregivers too.  DATE PREPARED: 2020  What we talked about: treatment of your involuntary movements                                            What I need to do: start Ingrezza 40 mg once daily     What I did and when I did it:                                              My follow-up plan:  Nathalie Justin PharmD will call you on 2/3/20 at 10 am               Questions I want to ask:              If you have any questions about your action plan, call Nathalie Justin [facility], Phone: [phone] , Monday-Friday 8-4:30pm.           MEDICATION LIST FOR NILSA Uriarte 1940     This medication list was made for you after we talked. We also used information from your doctor's chart.      Use blank rows to add new medications. Then fill in the dates you started using them.    Cross out medications when you no longer use them. Then write the date and why you stopped using them.    Ask your doctors, pharmacists, and other healthcare providers to update this list at every visit. Keep this list up-to-date with:       Prescription medications    Over the counter drugs     Herbals    Vitamins    Minerals      If you go to the hospital or emergency room, take this list with you. Share this with your family or caregivers too.     DATE PREPARED: 2020  Allergies or side effects: Morphine and Fish oil     Medication:  ACETAMINOPHEN 500 MG PO TABS      How I use it:  Take 2 tablets (1,000 mg) by mouth 3 times daily as needed      Why I use it:  Pain    Prescriber:  Raymon Hernández MD      Date I started using it:       Date I stopped using it:         Why I stopped using it:            Medication:  ASPIRIN 325 MG PO TBEC      How I use it:  Take 1 tablet (325 mg) by mouth daily       Why I use it:  TIA    Prescriber:  OTC      Date I started using it:       Date I stopped using it:         Why I stopped using it:              Medication:  ONEYDA MICROLET LANCETS MISC      How I use it:  USE TO TEST BLOOD SUGARS 2-3 TIMES  DAILY      Why I use it: Diabetes    Prescriber:  Raymon Hernández MD      Date I started using it:       Date I stopped using it:         Why I stopped using it:            Medication:  BD PEN NEEDLE SHORT U/F 31G X 8 MM MISC      How I use it:  USE FOR INSULIN ONCE DAILY      Why I use it: Diabetes     Prescriber:  Ryamon Hernández MD      Date I started using it:       Date I stopped using it:         Why I stopped using it:            Medication:  CALCITRIOL 0.25 MCG PO CAPS      How I use it:  Take 1 capsule (0.25 mcg) by mouth Every Mon, Wed, Fri Morning      Why I use it:  Kidney disease    Prescriber:  Raymon Hernández MD      Date I started using it:       Date I stopped using it:         Why I stopped using it:            Medication:  CARVEDILOL 6.25 MG PO TABS      How I use it:  Take 1 tablet (6.25 mg) by mouth 2 times daily (with meals)      Why I use it: Heart failure    Prescriber:  Bettie Stevens MD      Date I started using it:       Date I stopped using it:         Why I stopped using it:            Medication:  DORZOLAMIDE HCL-TIMOLOL MAL 22.3-6.8 MG/ML OP SOLN      How I use it:  Place 1 drop into the right eye 2 times daily      Why I use it: Glaucoma    Prescriber:  Abraham Byrd MD      Date I started using it:       Date I stopped using it:         Why I stopped using it:            Medication:  FERROUS SULFATE 325 (65 FE) MG PO TABS      How I use it:  Take 1 tablet (325 mg) by mouth daily      Why I use it: Low iron    Prescriber:  Raymon Hernández MD      Date I started using it:       Date I stopped using it:         Why I stopped using it:            Medication:  FLUTICASONE PROPIONATE 50 MCG/ACT NA SUSP      How I use it:  SPRAY 1-2 SPRAYS INTO BOTH NOSTRILS DAILY      Why I use it: Nasal congestion    Prescriber:  Raymon Hernández MD      Date I started using it:       Date I stopped using it:         Why I stopped using it:            Medication:  FUROSEMIDE 20 MG PO TABS      How  I use it:  Take 1 tablet (20 mg) by mouth daily      Why I use it: Heart failure    Prescriber:  Raymon Hernández MD      Date I started using it:       Date I stopped using it:         Why I stopped using it:            Medication:  GLUCOSE BLOOD VI STRP      How I use it:  USE TO TEST BLOOD SUGARS 2-3 TIMES DAILY      Why I use it: Diabetes    Prescriber:  Raymon Hernández MD      Date I started using it:       Date I stopped using it:         Why I stopped using it:            Medication:  LANTUS SOLOSTAR   UNIT/ML SC SOPN      How I use it:  Inject 15-20 Units Subcutaneous At Bedtime       Why I use it:  Diabetes    Prescriber:  Raymon Hernández MD      Date I started using it:       Date I stopped using it:         Why I stopped using it:            Medication:  LATANOPROST 0.005 % OP SOLN      How I use it:  Place 1 drop into both eyes At Bedtime      Why I use it: Glaucoma    Prescriber:  Abraham Byrd MD      Date I started using it:       Date I stopped using it:         Why I stopped using it:            Medication:  MULTIVITAMIN TABS        How I use it:  Take 1 tablet by mouth daily       Why I use it:  general health    Prescriber:  OTC      Date I started using it:       Date I stopped using it:         Why I stopped using it:              Medication:  OXYBUTYNIN CHLORIDE 5 MG PO TABS      How I use it:  TAKE 1 TABLET (5 MG) BY MOUTH NIGHTLY      Why I use it: Overactive bladder    Prescriber:  Raymon Hernández MD      Date I started using it:       Date I stopped using it:         Why I stopped using it:            Medication:  POLYETHYL GLYCOL-PROPYL GLYCOL 0.4-0.3 % OP SOLN      How I use it:  Apply 1-2 drops to eye 2 times daily       Why I use it:  dry eyes    Prescriber:  OTC      Date I started using it:       Date I stopped using it:         Why I stopped using it:            Medication:  POTASSIUM CHLORIDE ER 10 MEQ PO TBCR      How I use it:  Take 1 tablet (10 mEq) by mouth daily       Why I use it: Low potassium    Prescriber:  Raymon Hernández MD      Date I started using it:       Date I stopped using it:         Why I stopped using it:            Medication:  RANITIDINE  MG PO TABS      How I use it:  TAKE 1 TABLET BY MOUTH TWICE A DAY      Why I use it: Acid reflux    Prescriber:  Raymon Hernández MD      Date I started using it:       Date I stopped using it:         Why I stopped using it:            Medication:  SENNOSIDES-DOCUSATE SODIUM 8.6-50 MG PO TABS      How I use it:  TAKE 2 TABLETS BY MOUTH DAILY      Why I use it: Constipation    Prescriber:  Raymon Hernández MD      Date I started using it:       Date I stopped using it:         Why I stopped using it:            Medication:  SERTRALINE HCL 50 MG PO TABS      How I use it:  Take 1 tablet (50 mg) by mouth daily      Why I use it: Anxiety    Prescriber:  Raymon Hernández MD      Date I started using it:       Date I stopped using it:         Why I stopped using it:            Medication:  SIMVASTATIN 10 MG PO TABS      How I use it:  TAKE 1 TABLET (10 MG) BY MOUTH AT BEDTIME      Why I use it: High cholesterol     Prescriber:  Raymon Hernández MD      Date I started using it:       Date I stopped using it:         Why I stopped using it:            Medication:  VALBENAZINE TOSYLATE 40 MG PO CAPS      How I use it:  Take 1 capsule (40 mg) by mouth daily      Why I use it: Tardive dyskinesia    Prescriber:  Jace Julio MD      Date I started using it:       Date I stopped using it:         Why I stopped using it:            Medication:  VITAMIN D 1000 UNITS PO CAPS      How I use it:  Take 1 capsule (1000 units) by mouth daily      Why I use it:  low vitamin D    Prescriber:  OTC      Date I started using it:       Date I stopped using it:         Why I stopped using it:            Medication:         How I use it:         Why I use it:      Prescriber:         Date I started using it:       Date I stopped using it:          Why I stopped using it:            Medication:         How I use it:         Why I use it:      Prescriber:         Date I started using it:       Date I stopped using it:         Why I stopped using it:            Medication:         How I use it:         Why I use it:      Prescriber:         Date I started using it:       Date I stopped using it:         Why I stopped using it:              Other Information:     If you have any questions about your action plan, call [phone].    According to the Paperwork Reduction Act of 1995, no persons are required to respond to a collection of information unless it displays a valid OMB control number. The valid OMB number for this information collection is [id number]. The time required to complete this information collection is estimated to average 40 minutes per response, including the time to review instructions, searching existing data resources, gather the data needed, and complete and review the information collection. If you have any comments concerning the accuracy of the time estimate(s) or suggestions for improving this form, please write to: CMS, Attn: AFSANEH Reports Clearance Officer, [address] [place] [id number].

## 2019-12-30 NOTE — PATIENT INSTRUCTIONS
Recommendations from today's MTM visit:                                                    MTM (medication therapy management) is a service provided by a clinical pharmacist designed to help you get the most of out of your medicines.     1. See your primary care provider about the worsening reflux and you might need to switch to a different medication because of the ranitidine recalls.     2. We are going to try Ingrezza 40 mg once daily for the mouth movements. You can take this in the evening if it makes you sleepy. You can await a call from the specialty pharmacy and they will deliver the medication to your house.     3. If you are having more anxiety, you can talk with your doctor about increasing the dose of Zoloft. Let's see how Ingrezza goes first.     It was great to speak with you today.  I value your experience and would be very thankful for your time with providing feedback on our clinic survey. You may receive a survey via email or text message in the next few days.     Next MTM visit: 2/3/20 at 10 am - I will call you!    To schedule another MTM appointment, please call the clinic directly or you may call the MTM scheduling line at [phone] or toll-free at [phone].     My Clinical Pharmacist's contact information:                                                      It was a pleasure talking with you today!  Please feel free to contact me with any questions or concerns you have.      Nathalie Justin, Pharm.D.  Medication Therapy Management Pharmacist  Phone: [phone]

## 2019-12-30 NOTE — TELEPHONE ENCOUNTER
PA Initiation    Medication: Ingrezza 40MG Capsules (PENDING)  Insurance Company: [employer] [place] - Phone [phone] Fax [phone]  Pharmacy Filling the Rx: [facility] - [place], [place] - [facility] [address]  Filling Pharmacy Phone: [phone]  Filling Pharmacy Fax: [phone]  Start Date: 12/30/2019

## 2019-12-31 ENCOUNTER — TRANSFERRED RECORDS (OUTPATIENT)
Dept: HEALTH INFORMATION MANAGEMENT | Facility: CLINIC | Age: 79
End: 2019-12-31

## 2019-12-31 NOTE — TELEPHONE ENCOUNTER
"PRIOR AUTHORIZATION DENIED    Medication: Ingrezza 40MG Capsules (DENIED)    Denial Date: 12/30/2019    Denial Rational: \"must try/fail Tetrabenazine\"    Appeal Information:               "

## 2020-01-01 ENCOUNTER — TRANSFERRED RECORDS (OUTPATIENT)
Dept: HEALTH INFORMATION MANAGEMENT | Facility: CLINIC | Age: 80
End: 2020-01-01

## 2020-01-01 ENCOUNTER — TELEPHONE (OUTPATIENT)
Dept: FAMILY MEDICINE | Facility: CLINIC | Age: 80
End: 2020-01-01

## 2020-01-01 ENCOUNTER — NURSE TRIAGE (OUTPATIENT)
Dept: FAMILY MEDICINE | Facility: CLINIC | Age: 80
End: 2020-01-01

## 2020-01-01 ENCOUNTER — MEDICAL CORRESPONDENCE (OUTPATIENT)
Dept: HEALTH INFORMATION MANAGEMENT | Facility: CLINIC | Age: 80
End: 2020-01-01

## 2020-01-01 ENCOUNTER — OFFICE VISIT (OUTPATIENT)
Dept: PODIATRY | Facility: CLINIC | Age: 80
End: 2020-01-01
Payer: COMMERCIAL

## 2020-01-01 ENCOUNTER — PATIENT OUTREACH (OUTPATIENT)
Dept: CARE COORDINATION | Facility: CLINIC | Age: 80
End: 2020-01-01

## 2020-01-01 ENCOUNTER — HEALTH MAINTENANCE LETTER (OUTPATIENT)
Age: 80
End: 2020-01-01

## 2020-01-01 ENCOUNTER — TELEPHONE (OUTPATIENT)
Dept: NEUROLOGY | Facility: CLINIC | Age: 80
End: 2020-01-01

## 2020-01-01 ENCOUNTER — OFFICE VISIT (OUTPATIENT)
Dept: OPHTHALMOLOGY | Facility: CLINIC | Age: 80
End: 2020-01-01
Payer: COMMERCIAL

## 2020-01-01 ENCOUNTER — PATIENT OUTREACH (OUTPATIENT)
Dept: NURSING | Facility: CLINIC | Age: 80
End: 2020-01-01
Payer: COMMERCIAL

## 2020-01-01 ENCOUNTER — OFFICE VISIT (OUTPATIENT)
Dept: CARDIOLOGY | Facility: CLINIC | Age: 80
End: 2020-01-01
Payer: COMMERCIAL

## 2020-01-01 ENCOUNTER — DOCUMENTATION ONLY (OUTPATIENT)
Dept: FAMILY MEDICINE | Facility: CLINIC | Age: 80
End: 2020-01-01

## 2020-01-01 ENCOUNTER — TELEPHONE (OUTPATIENT)
Dept: CARE COORDINATION | Facility: CLINIC | Age: 80
End: 2020-01-01

## 2020-01-01 ENCOUNTER — VIRTUAL VISIT (OUTPATIENT)
Dept: FAMILY MEDICINE | Facility: CLINIC | Age: 80
End: 2020-01-01
Payer: COMMERCIAL

## 2020-01-01 ENCOUNTER — VIRTUAL VISIT (OUTPATIENT)
Dept: NEUROLOGY | Facility: CLINIC | Age: 80
End: 2020-01-01
Payer: COMMERCIAL

## 2020-01-01 ENCOUNTER — TELEPHONE (OUTPATIENT)
Dept: FAMILY MEDICINE | Facility: CLINIC | Age: 80
End: 2020-01-01
Payer: COMMERCIAL

## 2020-01-01 ENCOUNTER — OFFICE VISIT (OUTPATIENT)
Dept: FAMILY MEDICINE | Facility: CLINIC | Age: 80
End: 2020-01-01
Payer: COMMERCIAL

## 2020-01-01 ENCOUNTER — CARE COORDINATION (OUTPATIENT)
Dept: CARDIOLOGY | Facility: CLINIC | Age: 80
End: 2020-01-01

## 2020-01-01 ENCOUNTER — TELEPHONE (OUTPATIENT)
Dept: CARDIOLOGY | Facility: CLINIC | Age: 80
End: 2020-01-01

## 2020-01-01 ENCOUNTER — DOCUMENTATION ONLY (OUTPATIENT)
Dept: CARE COORDINATION | Facility: CLINIC | Age: 80
End: 2020-01-01

## 2020-01-01 VITALS
HEART RATE: 69 BPM | WEIGHT: 166.13 LBS | BODY MASS INDEX: 29.43 KG/M2 | DIASTOLIC BLOOD PRESSURE: 56 MMHG | OXYGEN SATURATION: 100 % | SYSTOLIC BLOOD PRESSURE: 135 MMHG

## 2020-01-01 VITALS
OXYGEN SATURATION: 100 % | WEIGHT: 170.13 LBS | TEMPERATURE: 97.9 F | BODY MASS INDEX: 30.14 KG/M2 | SYSTOLIC BLOOD PRESSURE: 125 MMHG | DIASTOLIC BLOOD PRESSURE: 70 MMHG | HEART RATE: 70 BPM

## 2020-01-01 VITALS
DIASTOLIC BLOOD PRESSURE: 64 MMHG | OXYGEN SATURATION: 95 % | TEMPERATURE: 97.5 F | HEART RATE: 58 BPM | SYSTOLIC BLOOD PRESSURE: 102 MMHG

## 2020-01-01 VITALS — SYSTOLIC BLOOD PRESSURE: 136 MMHG | HEART RATE: 67 BPM | DIASTOLIC BLOOD PRESSURE: 67 MMHG

## 2020-01-01 VITALS
WEIGHT: 170 LBS | HEART RATE: 64 BPM | DIASTOLIC BLOOD PRESSURE: 70 MMHG | SYSTOLIC BLOOD PRESSURE: 116 MMHG | BODY MASS INDEX: 30.11 KG/M2

## 2020-01-01 VITALS
DIASTOLIC BLOOD PRESSURE: 64 MMHG | HEART RATE: 63 BPM | OXYGEN SATURATION: 99 % | SYSTOLIC BLOOD PRESSURE: 121 MMHG | WEIGHT: 194 LBS | TEMPERATURE: 97.6 F | BODY MASS INDEX: 34.37 KG/M2

## 2020-01-01 DIAGNOSIS — E87.6 HYPOKALEMIA: ICD-10-CM

## 2020-01-01 DIAGNOSIS — G45.9 TIA (TRANSIENT ISCHEMIC ATTACK): ICD-10-CM

## 2020-01-01 DIAGNOSIS — E11.3299 DIABETES MELLITUS WITH BACKGROUND RETINOPATHY (H): ICD-10-CM

## 2020-01-01 DIAGNOSIS — Z96.1 PSEUDOPHAKIA: ICD-10-CM

## 2020-01-01 DIAGNOSIS — N39.0 URINARY TRACT INFECTION WITHOUT HEMATURIA, SITE UNSPECIFIED: ICD-10-CM

## 2020-01-01 DIAGNOSIS — N18.30 CKD (CHRONIC KIDNEY DISEASE) STAGE 3, GFR 30-59 ML/MIN (H): ICD-10-CM

## 2020-01-01 DIAGNOSIS — H40.1131 PRIMARY OPEN ANGLE GLAUCOMA OF BOTH EYES, MILD STAGE: ICD-10-CM

## 2020-01-01 DIAGNOSIS — R53.1 WEAKNESS: ICD-10-CM

## 2020-01-01 DIAGNOSIS — N18.4 CKD (CHRONIC KIDNEY DISEASE) STAGE 4, GFR 15-29 ML/MIN (H): ICD-10-CM

## 2020-01-01 DIAGNOSIS — I10 BENIGN ESSENTIAL HYPERTENSION: ICD-10-CM

## 2020-01-01 DIAGNOSIS — E11.21 TYPE 2 DIABETES MELLITUS WITH DIABETIC NEPHROPATHY, WITH LONG-TERM CURRENT USE OF INSULIN (H): ICD-10-CM

## 2020-01-01 DIAGNOSIS — Z79.4 TYPE 2 DIABETES MELLITUS WITH DIABETIC NEPHROPATHY, WITH LONG-TERM CURRENT USE OF INSULIN (H): ICD-10-CM

## 2020-01-01 DIAGNOSIS — I10 HYPERTENSION GOAL BP (BLOOD PRESSURE) < 140/90: ICD-10-CM

## 2020-01-01 DIAGNOSIS — R60.0 BILATERAL LOWER EXTREMITY EDEMA: ICD-10-CM

## 2020-01-01 DIAGNOSIS — Z53.9 DIAGNOSIS NOT YET DEFINED: Primary | ICD-10-CM

## 2020-01-01 DIAGNOSIS — I50.33 ACUTE ON CHRONIC DIASTOLIC HEART FAILURE (H): Primary | ICD-10-CM

## 2020-01-01 DIAGNOSIS — E78.5 HYPERLIPIDEMIA LDL GOAL <100: ICD-10-CM

## 2020-01-01 DIAGNOSIS — E11.51 TYPE II DIABETES MELLITUS WITH PERIPHERAL ARTERY DISEASE (H): Primary | ICD-10-CM

## 2020-01-01 DIAGNOSIS — R05.9 COUGH: ICD-10-CM

## 2020-01-01 DIAGNOSIS — I50.9 CONGESTIVE HEART FAILURE, UNSPECIFIED HF CHRONICITY, UNSPECIFIED HEART FAILURE TYPE (H): ICD-10-CM

## 2020-01-01 DIAGNOSIS — I10 HYPERTENSION GOAL BP (BLOOD PRESSURE) < 140/90: Primary | ICD-10-CM

## 2020-01-01 DIAGNOSIS — J18.9 PNEUMONIA OF LEFT LOWER LOBE DUE TO INFECTIOUS ORGANISM: Primary | ICD-10-CM

## 2020-01-01 DIAGNOSIS — Z01.00 EXAMINATION OF EYES AND VISION: ICD-10-CM

## 2020-01-01 DIAGNOSIS — R09.81 CONGESTION OF PARANASAL SINUS: Primary | ICD-10-CM

## 2020-01-01 DIAGNOSIS — E21.3 HYPERPARATHYROIDISM (H): ICD-10-CM

## 2020-01-01 DIAGNOSIS — I50.30 HEART FAILURE WITH PRESERVED EJECTION FRACTION, NYHA CLASS I (H): Primary | ICD-10-CM

## 2020-01-01 DIAGNOSIS — I48.0 PAF (PAROXYSMAL ATRIAL FIBRILLATION) (H): ICD-10-CM

## 2020-01-01 DIAGNOSIS — N18.30 CHRONIC KIDNEY DISEASE, STAGE III (MODERATE) (H): ICD-10-CM

## 2020-01-01 DIAGNOSIS — I50.32 CHRONIC DIASTOLIC HEART FAILURE (H): Primary | ICD-10-CM

## 2020-01-01 DIAGNOSIS — E78.5 HYPERLIPIDEMIA LDL GOAL <100: Primary | ICD-10-CM

## 2020-01-01 DIAGNOSIS — B35.1 ONYCHOMYCOSIS: ICD-10-CM

## 2020-01-01 DIAGNOSIS — R09.81 NASAL CONGESTION: ICD-10-CM

## 2020-01-01 DIAGNOSIS — K21.9 GASTROESOPHAGEAL REFLUX DISEASE, ESOPHAGITIS PRESENCE NOT SPECIFIED: Primary | ICD-10-CM

## 2020-01-01 DIAGNOSIS — F41.9 ANXIETY: ICD-10-CM

## 2020-01-01 DIAGNOSIS — H35.81 RETINAL EDEMA: ICD-10-CM

## 2020-01-01 DIAGNOSIS — G24.01 TARDIVE DYSKINESIA: Primary | ICD-10-CM

## 2020-01-01 DIAGNOSIS — Z79.4 TYPE 2 DIABETES MELLITUS WITHOUT COMPLICATION, WITH LONG-TERM CURRENT USE OF INSULIN (H): Primary | ICD-10-CM

## 2020-01-01 DIAGNOSIS — D64.9 ANEMIA, UNSPECIFIED TYPE: ICD-10-CM

## 2020-01-01 DIAGNOSIS — K59.09 CHRONIC CONSTIPATION: ICD-10-CM

## 2020-01-01 DIAGNOSIS — H52.4 PRESBYOPIA: ICD-10-CM

## 2020-01-01 DIAGNOSIS — N18.4 CKD (CHRONIC KIDNEY DISEASE), STAGE IV (H): ICD-10-CM

## 2020-01-01 DIAGNOSIS — E11.9 TYPE 2 DIABETES MELLITUS WITHOUT COMPLICATION, WITH LONG-TERM CURRENT USE OF INSULIN (H): Primary | ICD-10-CM

## 2020-01-01 DIAGNOSIS — G24.01 TARDIVE DYSKINESIA: ICD-10-CM

## 2020-01-01 DIAGNOSIS — N10 ACUTE PYELONEPHRITIS: Primary | ICD-10-CM

## 2020-01-01 DIAGNOSIS — E11.21 TYPE 2 DIABETES MELLITUS WITH DIABETIC NEPHROPATHY, WITH LONG-TERM CURRENT USE OF INSULIN (H): Primary | ICD-10-CM

## 2020-01-01 DIAGNOSIS — I95.1 ORTHOSTATIC HYPOTENSION: ICD-10-CM

## 2020-01-01 DIAGNOSIS — I95.1 ORTHOSTATIC HYPOTENSION: Primary | ICD-10-CM

## 2020-01-01 DIAGNOSIS — I50.9 CONGESTIVE HEART FAILURE, UNSPECIFIED HF CHRONICITY, UNSPECIFIED HEART FAILURE TYPE (H): Primary | ICD-10-CM

## 2020-01-01 DIAGNOSIS — N18.30 CHRONIC KIDNEY DISEASE, STAGE III (MODERATE) (H): Primary | ICD-10-CM

## 2020-01-01 DIAGNOSIS — E11.3313 MODERATE NONPROLIFERATIVE DIABETIC RETINOPATHY OF BOTH EYES WITH MACULAR EDEMA ASSOCIATED WITH TYPE 2 DIABETES MELLITUS (H): Primary | ICD-10-CM

## 2020-01-01 DIAGNOSIS — G51.0 BELL'S PALSY: ICD-10-CM

## 2020-01-01 DIAGNOSIS — H18.9 KERATOPATHY: ICD-10-CM

## 2020-01-01 DIAGNOSIS — M19.90 ARTHRITIS: ICD-10-CM

## 2020-01-01 DIAGNOSIS — J96.01 ACUTE RESPIRATORY FAILURE WITH HYPOXIA (H): ICD-10-CM

## 2020-01-01 DIAGNOSIS — K22.2 ESOPHAGEAL STENOSIS: ICD-10-CM

## 2020-01-01 DIAGNOSIS — H43.813 POSTERIOR VITREOUS DETACHMENT, BILATERAL: ICD-10-CM

## 2020-01-01 DIAGNOSIS — Z79.4 TYPE 2 DIABETES MELLITUS WITH DIABETIC NEPHROPATHY, WITH LONG-TERM CURRENT USE OF INSULIN (H): Primary | ICD-10-CM

## 2020-01-01 DIAGNOSIS — I48.0 PAROXYSMAL A-FIB (H): ICD-10-CM

## 2020-01-01 DIAGNOSIS — E66.9 OBESITY, UNSPECIFIED CLASSIFICATION, UNSPECIFIED OBESITY TYPE, UNSPECIFIED WHETHER SERIOUS COMORBIDITY PRESENT: ICD-10-CM

## 2020-01-01 DIAGNOSIS — R06.2 WHEEZING: ICD-10-CM

## 2020-01-01 DIAGNOSIS — L84 CORNS AND CALLOSITIES: ICD-10-CM

## 2020-01-01 LAB
ALBUMIN SERPL-MCNC: 3.6 G/DL (ref 3.4–5)
ALBUMIN SERPL-MCNC: 3.8 G/DL (ref 3.4–5)
ANION GAP SERPL CALCULATED.3IONS-SCNC: 3 MMOL/L (ref 3–14)
ANION GAP SERPL CALCULATED.3IONS-SCNC: 7 MMOL/L (ref 3–14)
BUN SERPL-MCNC: 28 MG/DL (ref 7–30)
BUN SERPL-MCNC: 43 MG/DL (ref 7–30)
CALCIUM SERPL-MCNC: 9.4 MG/DL (ref 8.5–10.1)
CALCIUM SERPL-MCNC: 9.5 MG/DL (ref 8.5–10.1)
CHLORIDE SERPL-SCNC: 103 MMOL/L (ref 94–109)
CHLORIDE SERPL-SCNC: 103 MMOL/L (ref 94–109)
CO2 SERPL-SCNC: 26 MMOL/L (ref 20–32)
CO2 SERPL-SCNC: 32 MMOL/L (ref 20–32)
CREAT SERPL-MCNC: 1.59 MG/DL (ref 0.52–1.04)
CREAT SERPL-MCNC: 1.6 MG/DL (ref 0.52–1.04)
CREAT SERPL-MCNC: 1.87 MG/DL (ref 0.55–1.02)
CREAT SERPL-MCNC: 1.88 MG/DL (ref 0.52–1.04)
CREAT SERPL-MCNC: 2.21 MG/DL (ref 0.57–1.11)
DEPRECATED CALCIDIOL+CALCIFEROL SERPL-MC: 66 UG/L (ref 20–75)
GFR SERPL CREATININE-BSD FRML MDRD: 21 ML/MIN/1.73M2
GFR SERPL CREATININE-BSD FRML MDRD: 25 ML/MIN/{1.73_M2}
GFR SERPL CREATININE-BSD FRML MDRD: 26 ML/MIN
GFR SERPL CREATININE-BSD FRML MDRD: 30 ML/MIN/{1.73_M2}
GFR SERPL CREATININE-BSD FRML MDRD: 31 ML/MIN/{1.73_M2}
GLUCOSE SERPL-MCNC: 106 MG/DL (ref 70–99)
GLUCOSE SERPL-MCNC: 128 MG/DL (ref 70–99)
GLUCOSE SERPL-MCNC: 133 MG/DL (ref 74–106)
GLUCOSE SERPL-MCNC: 155 MG/DL (ref 65–100)
HBA1C MFR BLD: 6.5 % (ref 0–5.7)
HGB BLD-MCNC: 11.1 G/DL (ref 11.7–15.7)
HGB BLD-MCNC: 11.4 G/DL (ref 11.7–15.7)
PHOSPHATE SERPL-MCNC: 3.9 MG/DL (ref 2.5–4.5)
PHOSPHATE SERPL-MCNC: 4.2 MG/DL (ref 2.5–4.5)
POTASSIUM SERPL-SCNC: 4.1 MMOL/L (ref 3.4–5.3)
POTASSIUM SERPL-SCNC: 4.3 MMOL/L (ref 3.4–5.3)
POTASSIUM SERPL-SCNC: 4.4 MMOL/L (ref 3.4–5.3)
POTASSIUM SERPL-SCNC: 4.4 MMOL/L (ref 3.5–5)
POTASSIUM SERPL-SCNC: 4.5 MMOL/L (ref 3.5–5.1)
PROT UR-MCNC: 0.1 G/L
PROT/CREAT 24H UR: 0.21 G/G CR (ref 0–0.2)
PTH-INTACT SERPL-MCNC: 67 PG/ML (ref 18–80)
SODIUM SERPL-SCNC: 136 MMOL/L (ref 133–144)
SODIUM SERPL-SCNC: 138 MMOL/L (ref 133–144)

## 2020-01-01 PROCEDURE — 36415 COLL VENOUS BLD VENIPUNCTURE: CPT | Performed by: FAMILY MEDICINE

## 2020-01-01 PROCEDURE — 85018 HEMOGLOBIN: CPT | Performed by: INTERNAL MEDICINE

## 2020-01-01 PROCEDURE — 99204 OFFICE O/P NEW MOD 45 MIN: CPT | Performed by: INTERNAL MEDICINE

## 2020-01-01 PROCEDURE — 84132 ASSAY OF SERUM POTASSIUM: CPT | Performed by: FAMILY MEDICINE

## 2020-01-01 PROCEDURE — 99214 OFFICE O/P EST MOD 30 MIN: CPT | Mod: 95 | Performed by: FAMILY MEDICINE

## 2020-01-01 PROCEDURE — 36415 COLL VENOUS BLD VENIPUNCTURE: CPT | Performed by: INTERNAL MEDICINE

## 2020-01-01 PROCEDURE — 11721 DEBRIDE NAIL 6 OR MORE: CPT | Mod: 51 | Performed by: PODIATRIST

## 2020-01-01 PROCEDURE — 84156 ASSAY OF PROTEIN URINE: CPT | Performed by: INTERNAL MEDICINE

## 2020-01-01 PROCEDURE — 92015 DETERMINE REFRACTIVE STATE: CPT | Performed by: OPHTHALMOLOGY

## 2020-01-01 PROCEDURE — 92014 COMPRE OPH EXAM EST PT 1/>: CPT | Performed by: OPHTHALMOLOGY

## 2020-01-01 PROCEDURE — 11055 PARING/CUTG B9 HYPRKER LES 1: CPT | Mod: Q8 | Performed by: PODIATRIST

## 2020-01-01 PROCEDURE — G0179 MD RECERTIFICATION HHA PT: HCPCS | Performed by: FAMILY MEDICINE

## 2020-01-01 PROCEDURE — 99495 TRANSJ CARE MGMT MOD F2F 14D: CPT | Performed by: FAMILY MEDICINE

## 2020-01-01 PROCEDURE — 99495 TRANSJ CARE MGMT MOD F2F 14D: CPT | Performed by: INTERNAL MEDICINE

## 2020-01-01 PROCEDURE — 82565 ASSAY OF CREATININE: CPT | Performed by: FAMILY MEDICINE

## 2020-01-01 PROCEDURE — 80069 RENAL FUNCTION PANEL: CPT | Performed by: INTERNAL MEDICINE

## 2020-01-01 PROCEDURE — 82306 VITAMIN D 25 HYDROXY: CPT | Performed by: INTERNAL MEDICINE

## 2020-01-01 PROCEDURE — 99214 OFFICE O/P EST MOD 30 MIN: CPT | Performed by: FAMILY MEDICINE

## 2020-01-01 PROCEDURE — G0180 MD CERTIFICATION HHA PATIENT: HCPCS | Performed by: FAMILY MEDICINE

## 2020-01-01 PROCEDURE — 99203 OFFICE O/P NEW LOW 30 MIN: CPT | Mod: 25 | Performed by: PODIATRIST

## 2020-01-01 PROCEDURE — 83970 ASSAY OF PARATHORMONE: CPT | Performed by: INTERNAL MEDICINE

## 2020-01-01 RX ORDER — AMLODIPINE BESYLATE 5 MG/1
5 TABLET ORAL 2 TIMES DAILY
COMMUNITY
Start: 2020-01-01 | End: 2020-01-01

## 2020-01-01 RX ORDER — POTASSIUM CHLORIDE 750 MG/1
10 TABLET, EXTENDED RELEASE ORAL DAILY
Qty: 90 TABLET | Refills: 1 | Status: SHIPPED | OUTPATIENT
Start: 2020-01-01 | End: 2020-01-01

## 2020-01-01 RX ORDER — AZITHROMYCIN 250 MG/1
TABLET, FILM COATED ORAL
Qty: 6 TABLET | Refills: 0 | Status: SHIPPED | OUTPATIENT
Start: 2020-01-01 | End: 2020-01-01

## 2020-01-01 RX ORDER — DOXYCYCLINE 100 MG/1
100 TABLET ORAL 2 TIMES DAILY
COMMUNITY
Start: 2020-01-01

## 2020-01-01 RX ORDER — POTASSIUM CHLORIDE 750 MG/1
TABLET, EXTENDED RELEASE ORAL
Qty: 90 TABLET | Refills: 1 | COMMUNITY
Start: 2020-01-01

## 2020-01-01 RX ORDER — GUAIFENESIN 600 MG/1
600 TABLET, EXTENDED RELEASE ORAL 2 TIMES DAILY
Qty: 28 TABLET | Refills: 0 | Status: SHIPPED | OUTPATIENT
Start: 2020-01-01

## 2020-01-01 RX ORDER — CARVEDILOL 6.25 MG/1
6.25 TABLET ORAL 2 TIMES DAILY WITH MEALS
Qty: 180 TABLET | Refills: 1 | Status: SHIPPED | OUTPATIENT
Start: 2020-01-01 | End: 2020-01-01

## 2020-01-01 RX ORDER — HYDROCHLOROTHIAZIDE 12.5 MG/1
12.5 TABLET ORAL DAILY
Qty: 90 TABLET | Refills: 0 | Status: SHIPPED | OUTPATIENT
Start: 2020-01-01 | End: 2020-01-01

## 2020-01-01 RX ORDER — VALBENAZINE 80 MG/1
1 CAPSULE ORAL DAILY
Qty: 30 CAPSULE | Refills: 11 | Status: SHIPPED | OUTPATIENT
Start: 2020-01-01

## 2020-01-01 RX ORDER — FLUTICASONE PROPIONATE 50 MCG
SPRAY, SUSPENSION (ML) NASAL
Qty: 48 ML | Refills: 0 | Status: SHIPPED | OUTPATIENT
Start: 2020-01-01

## 2020-01-01 RX ORDER — FERROUS SULFATE 325(65) MG
TABLET ORAL
COMMUNITY
Start: 2020-01-01

## 2020-01-01 RX ORDER — FUROSEMIDE 20 MG
20 TABLET ORAL DAILY
Qty: 90 TABLET | Refills: 0 | Status: SHIPPED | OUTPATIENT
Start: 2020-01-01 | End: 2020-01-01

## 2020-01-01 RX ORDER — CIMETIDINE 400 MG
400 TABLET ORAL 2 TIMES DAILY
Qty: 180 TABLET | Refills: 1 | Status: SHIPPED | OUTPATIENT
Start: 2020-01-01 | End: 2020-01-01

## 2020-01-01 RX ORDER — AMOXICILLIN 250 MG
CAPSULE ORAL
Qty: 60 TABLET | Refills: 4 | Status: SHIPPED | OUTPATIENT
Start: 2020-01-01

## 2020-01-01 RX ORDER — INSULIN GLARGINE 100 [IU]/ML
15-20 INJECTION, SOLUTION SUBCUTANEOUS AT BEDTIME
Qty: 18 ML | Refills: 0 | Status: SHIPPED | OUTPATIENT
Start: 2020-01-01 | End: 2020-01-01

## 2020-01-01 RX ORDER — AMLODIPINE BESYLATE 5 MG/1
5 TABLET ORAL DAILY
COMMUNITY
Start: 2020-01-01 | End: 2020-01-01

## 2020-01-01 RX ORDER — SULFAMETHOXAZOLE/TRIMETHOPRIM 800-160 MG
1 TABLET ORAL 2 TIMES DAILY
Qty: 10 TABLET | Refills: 0 | Status: SHIPPED | OUTPATIENT
Start: 2020-01-01 | End: 2020-01-01

## 2020-01-01 RX ORDER — SIMVASTATIN 10 MG
10 TABLET ORAL DAILY
Qty: 90 TABLET | Refills: 0 | Status: SHIPPED | OUTPATIENT
Start: 2020-01-01 | End: 2020-01-01

## 2020-01-01 RX ORDER — FUROSEMIDE 20 MG
20 TABLET ORAL DAILY
COMMUNITY
Start: 2020-01-01 | End: 2020-01-01

## 2020-01-01 RX ORDER — AMLODIPINE BESYLATE 5 MG/1
5 TABLET ORAL 2 TIMES DAILY
Qty: 180 TABLET | Refills: 1 | Status: SHIPPED | OUTPATIENT
Start: 2020-01-01 | End: 2020-01-01

## 2020-01-01 RX ORDER — FLUTICASONE PROPIONATE 50 MCG
SPRAY, SUSPENSION (ML) NASAL
Qty: 16 ML | Refills: 0 | Status: SHIPPED | OUTPATIENT
Start: 2020-01-01 | End: 2020-01-01

## 2020-01-01 RX ORDER — FUROSEMIDE 20 MG
TABLET ORAL
Qty: 90 TABLET | Refills: 0 | OUTPATIENT
Start: 2020-01-01

## 2020-01-01 RX ORDER — LATANOPROST 50 UG/ML
1 SOLUTION/ DROPS OPHTHALMIC AT BEDTIME
Qty: 1 BOTTLE | Refills: 11 | Status: SHIPPED | OUTPATIENT
Start: 2020-01-01

## 2020-01-01 RX ORDER — CEFPODOXIME PROXETIL 200 MG/1
1 TABLET, FILM COATED ORAL 2 TIMES DAILY
COMMUNITY
Start: 2020-01-01

## 2020-01-01 RX ORDER — PRAVASTATIN SODIUM 20 MG
20 TABLET ORAL DAILY
Qty: 90 TABLET | Refills: 1 | Status: SHIPPED | OUTPATIENT
Start: 2020-01-01

## 2020-01-01 RX ORDER — CHOLECALCIFEROL (VITAMIN D3) 50 MCG
1 TABLET ORAL DAILY
COMMUNITY
Start: 2020-01-01

## 2020-01-01 RX ORDER — SERTRALINE HYDROCHLORIDE 100 MG/1
100 TABLET, FILM COATED ORAL DAILY
Qty: 90 TABLET | Refills: 0 | Status: SHIPPED | OUTPATIENT
Start: 2020-01-01

## 2020-01-01 RX ORDER — CARVEDILOL 6.25 MG/1
18.75 TABLET ORAL 2 TIMES DAILY
Qty: 180 TABLET | Refills: 1 | Status: SHIPPED | OUTPATIENT
Start: 2020-01-01

## 2020-01-01 RX ORDER — AMLODIPINE BESYLATE 5 MG/1
TABLET ORAL
COMMUNITY
Start: 2020-01-01 | End: 2021-01-01

## 2020-01-01 RX ORDER — MIDODRINE HYDROCHLORIDE 5 MG/1
5 TABLET ORAL DAILY
COMMUNITY
Start: 2020-01-01 | End: 2020-01-01

## 2020-01-01 RX ORDER — CARVEDILOL 6.25 MG/1
18.75 TABLET ORAL 2 TIMES DAILY
COMMUNITY
Start: 2020-01-01 | End: 2020-01-01

## 2020-01-01 RX ORDER — DORZOLAMIDE HYDROCHLORIDE AND TIMOLOL MALEATE 20; 5 MG/ML; MG/ML
1 SOLUTION/ DROPS OPHTHALMIC 2 TIMES DAILY
Qty: 10 ML | Refills: 12 | Status: SHIPPED | OUTPATIENT
Start: 2020-01-01

## 2020-01-01 RX ORDER — AMLODIPINE BESYLATE 5 MG/1
TABLET ORAL
Qty: 180 TABLET | Refills: 1 | COMMUNITY
Start: 2020-01-01 | End: 2020-01-01

## 2020-01-01 RX ORDER — AMOXICILLIN 500 MG/1
500 CAPSULE ORAL 3 TIMES DAILY
COMMUNITY
Start: 2020-01-01 | End: 2020-01-01

## 2020-01-01 RX ORDER — CIMETIDINE 400 MG
400 TABLET ORAL 2 TIMES DAILY
Qty: 180 TABLET | Refills: 1 | Status: SHIPPED | OUTPATIENT
Start: 2020-01-01

## 2020-01-01 SDOH — ECONOMIC STABILITY: TRANSPORTATION INSECURITY
IN THE PAST 12 MONTHS, HAS THE LACK OF TRANSPORTATION KEPT YOU FROM MEDICAL APPOINTMENTS OR FROM GETTING MEDICATIONS?: NO

## 2020-01-01 SDOH — ECONOMIC STABILITY: FOOD INSECURITY: WITHIN THE PAST 12 MONTHS, THE FOOD YOU BOUGHT JUST DIDN'T LAST AND YOU DIDN'T HAVE MONEY TO GET MORE.: NEVER TRUE

## 2020-01-01 SDOH — ECONOMIC STABILITY: FOOD INSECURITY: WITHIN THE PAST 12 MONTHS, YOU WORRIED THAT YOUR FOOD WOULD RUN OUT BEFORE YOU GOT MONEY TO BUY MORE.: NEVER TRUE

## 2020-01-01 SDOH — ECONOMIC STABILITY: INCOME INSECURITY: HOW HARD IS IT FOR YOU TO PAY FOR THE VERY BASICS LIKE FOOD, HOUSING, MEDICAL CARE, AND HEATING?: NOT HARD AT ALL

## 2020-01-01 SDOH — ECONOMIC STABILITY: TRANSPORTATION INSECURITY
IN THE PAST 12 MONTHS, HAS LACK OF TRANSPORTATION KEPT YOU FROM MEETINGS, WORK, OR FROM GETTING THINGS NEEDED FOR DAILY LIVING?: NO

## 2020-01-01 ASSESSMENT — VISUAL ACUITY
OD_SC: 20/100
METHOD: SNELLEN - LINEAR
OS_PH_SC: 20/100
OD_SC+: -1
OS_SC: 20/400

## 2020-01-01 ASSESSMENT — ACTIVITIES OF DAILY LIVING (ADL)
DEPENDENT_IADLS:: CLEANING;COOKING;LAUNDRY;SHOPPING;MEAL PREPARATION;MEDICATION MANAGEMENT;TRANSPORTATION;MONEY MANAGEMENT

## 2020-01-01 ASSESSMENT — REFRACTION_WEARINGRX
OD_SPHERE: ?
OD_CYLINDER: SPHERE
SPECS_TYPE: OTC READERS
OS_CYLINDER: SPHERE
OS_SPHERE: ?

## 2020-01-01 ASSESSMENT — CONF VISUAL FIELD
OS_NORMAL: 1
OD_NORMAL: 1

## 2020-01-01 ASSESSMENT — REFRACTION_MANIFEST
OD_SPHERE: +0.50
OD_CYLINDER: +1.00
OD_ADD: +3.25
OS_CYLINDER: SPHERE
OS_ADD: +3.25

## 2020-01-01 ASSESSMENT — TONOMETRY
IOP_METHOD: APPLANATION
OD_IOP_MMHG: 18
OS_IOP_MMHG: 17

## 2020-01-01 ASSESSMENT — EXTERNAL EXAM - LEFT EYE: OS_EXAM: 2+ BROW PTOSIS

## 2020-01-01 ASSESSMENT — EXTERNAL EXAM - RIGHT EYE: OD_EXAM: 2+ BROW PTOSIS

## 2020-01-01 ASSESSMENT — CUP TO DISC RATIO
OS_RATIO: 0.6
OD_RATIO: 0.7

## 2020-01-01 ASSESSMENT — PAIN SCALES - GENERAL
PAINLEVEL: NO PAIN (0)
PAINLEVEL: MODERATE PAIN (5)

## 2020-01-06 DIAGNOSIS — E78.5 HYPERLIPIDEMIA LDL GOAL <100: ICD-10-CM

## 2020-01-06 NOTE — TELEPHONE ENCOUNTER
MEDICATION APPEAL APPROVED    Medication: Ingrezza 40MG Capsules (Appeal - APPROVED)  Authorization Effective Date: 10/5/2019  Authorization Expiration Date: 1/3/2021  Approved Dose/Quantity: 30  Reference #: CMM KEY: [id number]   Insurance Company: [employer] [place] - Phone [phone] Fax [phone]  Expected CoPay: $1000.12     CoPay Card Available:      Foundation Assistance Needed:    Which Pharmacy is filling the prescription (Not needed for infusion/clinic administered): [facility] - [place] [address]      Cost is high due to donut hole/coverage gap. There are grants available for Tardive Dyskinesia through [employer].

## 2020-01-06 NOTE — TELEPHONE ENCOUNTER
"Requested Prescriptions   Pending Prescriptions Disp Refills     simvastatin (ZOCOR) 10 MG tablet [Pharmacy Med Name: SIMVASTATIN 10 MG TABLET] 90 tablet 1     Sig: TAKE 1 TABLET (10 MG) BY MOUTH AT BEDTIME   Last Written Prescription Date:  7/5/19  Last Fill Quantity: 90,  # refills: 1   Last office visit: 11/6/2019 with prescribing provider:     Future Office Visit:        Statins Protocol Passed - 1/6/2020  1:29 AM        Passed - LDL on file in past 12 months     Recent Labs   Lab Test 03/22/19  0830   LDL 52             Passed - No abnormal creatine kinase in past 12 months     No lab results found.             Passed - Recent (12 mo) or future (30 days) visit within the authorizing provider's specialty     Patient has had an office visit with the authorizing provider or a provider within the authorizing providers department within the previous 12 mos or has a future within next 30 days. See \"Patient Info\" tab in inbasket, or \"Choose Columns\" in Meds & Orders section of the refill encounter.              Passed - Medication is active on med list        Passed - Patient is age 18 or older        Passed - No active pregnancy on record        Passed - No positive pregnancy test in past 12 months          "

## 2020-01-07 RX ORDER — SIMVASTATIN 10 MG
TABLET ORAL
Qty: 90 TABLET | Refills: 0 | Status: SHIPPED | OUTPATIENT
Start: 2020-01-07 | End: 2020-01-01

## 2020-01-07 NOTE — TELEPHONE ENCOUNTER
Prescription approved per [facility] Refill Protocol.  Sofia Sanderson, RN,BSN  [facility]

## 2020-01-13 DIAGNOSIS — E11.21 TYPE 2 DIABETES MELLITUS WITH DIABETIC NEPHROPATHY, WITH LONG-TERM CURRENT USE OF INSULIN (H): ICD-10-CM

## 2020-01-13 DIAGNOSIS — Z79.4 TYPE 2 DIABETES MELLITUS WITH DIABETIC NEPHROPATHY, WITH LONG-TERM CURRENT USE OF INSULIN (H): ICD-10-CM

## 2020-01-13 DIAGNOSIS — I50.9 CONGESTIVE HEART FAILURE, UNSPECIFIED HF CHRONICITY, UNSPECIFIED HEART FAILURE TYPE (H): ICD-10-CM

## 2020-01-13 DIAGNOSIS — N18.4 CKD (CHRONIC KIDNEY DISEASE) STAGE 4, GFR 15-29 ML/MIN (H): ICD-10-CM

## 2020-01-13 NOTE — TELEPHONE ENCOUNTER
Prescription approved per [facility] [place].  Sofia Sanderson, RN,BSN  [facility]

## 2020-01-13 NOTE — TELEPHONE ENCOUNTER
"Requested Prescriptions   Pending Prescriptions Disp Refills     CONTOUR NEXT TEST test strip [Pharmacy Med Name: CONTOUR NEXT TEST STRIP] 100 strip 11     Sig: USE TO TEST BLOOD SUGAR 3 TIMES DAILY   Last Written Prescription Date:  8/15/19  Last Fill Quantity: 300,  # refills: 6   Last office visit: 11/6/2019 with prescribing provider:     Future Office Visit:        Diabetic Supplies Protocol Passed - 1/13/2020  1:49 AM        Passed - Medication is active on med list        Passed - Patient is 18 years of age or older        Passed - Recent (6 mo) or future (30 days) visit within the authorizing provider's specialty     Patient had office visit in the last 6 months or has a visit in the next 30 days with authorizing provider.  See \"Patient Info\" tab in inbasket, or \"Choose Columns\" in Meds & Orders section of the refill encounter.              "

## 2020-01-13 NOTE — TELEPHONE ENCOUNTER
"Requested Prescriptions   Pending Prescriptions Disp Refills     carvedilol (COREG) 6.25 MG tablet 180 tablet 3     Sig: Take 1 tablet (6.25 mg) by mouth 2 times daily (with meals)   Last Written Prescription Date:  1-21-19  Last Fill Quantity: 180,  # refills: 3   Last office visit: 11/6/2019 with prescribing provider:  11-6-19   Future Office Visit:        Beta-Blockers Protocol Passed - 1/13/2020  3:28 PM        Passed - Blood pressure under 140/90 in past 12 months     BP Readings from Last 3 Encounters:   12/13/19 132/77   11/06/19 117/69   09/09/19 118/65                 Passed - Patient is age 6 or older        Passed - Recent (12 mo) or future (30 days) visit within the authorizing provider's specialty     Patient has had an office visit with the authorizing provider or a provider within the authorizing providers department within the previous 12 mos or has a future within next 30 days. See \"Patient Info\" tab in inbasket, or \"Choose Columns\" in Meds & Orders section of the refill encounter.              Passed - Medication is active on med list          "

## 2020-01-14 DIAGNOSIS — Z79.4 TYPE 2 DIABETES MELLITUS WITH DIABETIC NEPHROPATHY, WITH LONG-TERM CURRENT USE OF INSULIN (H): ICD-10-CM

## 2020-01-14 DIAGNOSIS — E11.21 TYPE 2 DIABETES MELLITUS WITH DIABETIC NEPHROPATHY, WITH LONG-TERM CURRENT USE OF INSULIN (H): ICD-10-CM

## 2020-01-14 NOTE — TELEPHONE ENCOUNTER
"Requested Prescriptions   Pending Prescriptions Disp Refills     blood glucose monitoring (ONEYDA MICROLET) lancets [Pharmacy Med Name: MICROLET LANCETS] 100 each 1     Sig: USE AS DIRECTED 3 TIMES A DAY   Last Written Prescription Date:  1-10-19  Last Fill Quantity: 100,  # refills: 1   Last office visit: 11/6/2019 with prescribing provider:  11-6-19   Future Office Visit:        Diabetic Supplies Protocol Passed - 1/14/2020  1:38 PM        Passed - Medication is active on med list        Passed - Patient is 18 years of age or older        Passed - Recent (6 mo) or future (30 days) visit within the authorizing provider's specialty     Patient had office visit in the last 6 months or has a visit in the next 30 days with authorizing provider.  See \"Patient Info\" tab in inbasket, or \"Choose Columns\" in Meds & Orders section of the refill encounter.              "

## 2020-01-15 RX ORDER — CARVEDILOL 6.25 MG/1
6.25 TABLET ORAL 2 TIMES DAILY WITH MEALS
Qty: 180 TABLET | Refills: 1 | Status: SHIPPED | OUTPATIENT
Start: 2020-01-15 | End: 2020-01-01

## 2020-01-15 NOTE — TELEPHONE ENCOUNTER
Prescription approved per [facility] Refill Protocol.    Verna Stewart, RN, BSN, PHN  [facility] [place]

## 2020-01-16 NOTE — TELEPHONE ENCOUNTER
Prescription approved per [facility] Refill Protocol.      Adelina Verdugo RN  [facility]

## 2020-01-20 DIAGNOSIS — Z79.4 TYPE 2 DIABETES MELLITUS WITH DIABETIC NEPHROPATHY, WITH LONG-TERM CURRENT USE OF INSULIN (H): Primary | ICD-10-CM

## 2020-01-20 DIAGNOSIS — E11.21 TYPE 2 DIABETES MELLITUS WITH DIABETIC NEPHROPATHY, WITH LONG-TERM CURRENT USE OF INSULIN (H): Primary | ICD-10-CM

## 2020-01-20 RX ORDER — GLUCOSAMINE HCL/CHONDROITIN SU 500-400 MG
CAPSULE ORAL
Qty: 100 EACH | Refills: 6 | Status: CANCELLED | OUTPATIENT
Start: 2020-01-20

## 2020-01-20 NOTE — TELEPHONE ENCOUNTER
Patient requests a new microlet lancet device.  Theirs is broken    [facility]  [phone]

## 2020-01-29 ENCOUNTER — TRANSFERRED RECORDS (OUTPATIENT)
Dept: HEALTH INFORMATION MANAGEMENT | Facility: CLINIC | Age: 80
End: 2020-01-29

## 2020-01-29 DIAGNOSIS — N18.30 CHRONIC KIDNEY DISEASE, STAGE III (MODERATE) (H): Primary | ICD-10-CM

## 2020-02-03 ENCOUNTER — ALLIED HEALTH/NURSE VISIT (OUTPATIENT)
Dept: PHARMACY | Facility: CLINIC | Age: 80
End: 2020-02-03
Payer: COMMERCIAL

## 2020-02-03 DIAGNOSIS — K21.9 GASTROESOPHAGEAL REFLUX DISEASE, ESOPHAGITIS PRESENCE NOT SPECIFIED: ICD-10-CM

## 2020-02-03 DIAGNOSIS — G24.01 TARDIVE DYSKINESIA: Primary | ICD-10-CM

## 2020-02-03 DIAGNOSIS — F41.9 ANXIETY: ICD-10-CM

## 2020-02-03 PROCEDURE — 99207 ZZC NO CHARGE LOS: CPT | Performed by: PHARMACIST

## 2020-02-03 NOTE — LETTER
"     [facility]     Date: 2/3/2020    Ruth Rocha  [address] [place] [address] [place]  [address]  [facility] [id number]    Dear Ms. Rocha,    Thank you for talking with me on 2/3/20 about your health and medications. Medicare s MTM (Medication Therapy Management) program helps you understand your medications and use them safely.      This letter includes an action plan (Medication Action Plan) and medication list (Personal Medication List). The action plan has steps you should take to help you get the best results from your medications. The medication list will help you keep track of your medications and how to use them the right way.       Have your action plan and medication list with you when you talk with your doctors, pharmacists, and other healthcare providers in your care team.     Ask your doctors, pharmacists, and other healthcare providers to update the action plan and medication list at every visit.     Take your medication list with you if you go to the hospital or emergency room.     Give a copy of the action plan and medication list to your family or caregivers.     If you want to talk about this letter or any of the papers with it, please call   [phone].   We look forward to working with you, your doctors, and other healthcare providers to help you stay healthy.     Sincerely,    Nathalie Justin, [facility]      Enclosed: Medication Action Plan and Personal Medication List    MEDICATION ACTION PLAN FOR Ruth Rocha,  1940     This action plan will help you get the best results from your medications if you:   1. Read \"What we talked about.\"   2. Take the steps listed in the \"What I need to do\" boxes.   3. Fill in \"What I did and when I did it.\"   4. Fill in \"My follow-up plan\" and \"Questions I want to ask.\"     Have this action plan with you when you talk with your doctors, pharmacists, and other healthcare providers in your care team. Share this with your family or " caregivers too.  DATE PREPARED: 2/3/2020  What we talked about: What my medicines are for, how to know if my medicines are working, made sure my medicines are safe for me and reviewed how to take my medicines.                                              What I need to do: Take my medicines every day What I did and when I did it:                                              My follow-up plan:  See Nathalie Justin PharmD in one year or sooner if needed         Questions I want to ask:              If you have any questions about your action plan, call Nathalie Justin [facility], Phone: [phone] , Monday-Friday 8-4:30pm.  MEDICATION LIST FOR Ruth Rocha, NILSA 1940     This medication list was made for you after we talked. We also used information from your doctor's chart.      Use blank rows to add new medications. Then fill in the dates you started using them.    Cross out medications when you no longer use them. Then write the date and why you stopped using them.    Ask your doctors, pharmacists, and other healthcare providers to update this list at every visit. Keep this list up-to-date with:       Prescription medications    Over the counter drugs     Herbals    Vitamins    Minerals      If you go to the hospital or emergency room, take this list with you. Share this with your family or caregivers too.     DATE PREPARED: 2/3/2020  Allergies or side effects: Morphine; Fish oil; and Metoclopramide     Medication:  ACETAMINOPHEN 500 MG PO TABS      How I use it:  Take 2 tablets (1,000 mg) by mouth 3 times daily as needed      Why I use it:  Pain    Prescriber:  Raymon Hernández MD      Date I started using it:       Date I stopped using it:         Why I stopped using it:            Medication:  ASPIRIN 325 MG PO TBEC      How I use it:  Take 1 tablet (325 mg) by mouth daily       Why I use it:  Heart prevention    Prescriber:  Raymon Hernández MD      Date I started using it:       Date I stopped using it:          Why I stopped using it:            Medication:  CALCITRIOL 0.25 MCG PO CAPS      How I use it:  Take 1 capsule (0.25 mcg) by mouth Every Mon, Wed, Fri Morning      Why I use it:  Kidneys    Prescriber:  Raymon Hernández MD      Date I started using it:       Date I stopped using it:         Why I stopped using it:            Medication:  CARVEDILOL 6.25 MG PO TABS      How I use it:  Take 1 tablet (6.25 mg) by mouth 2 times daily (with meals)      Why I use it: Heart failure    Prescriber:  Raymon Hernández MD      Date I started using it:       Date I stopped using it:         Why I stopped using it:            Medication:  DORZOLAMIDE HCL-TIMOLOL MAL 22.3-6.8 MG/ML OP SOLN      How I use it:  Place 1 drop into the right eye 2 times daily      Why I use it: Glaucoma    Prescriber:  Abraham Byrd MD      Date I started using it:       Date I stopped using it:         Why I stopped using it:            Medication:  FERROUS SULFATE 325 (65 FE) MG PO TABS      How I use it:  Take 1 tablet (325 mg) by mouth daily      Why I use it: Anemia    Prescriber:  Raymon Hernández MD      Date I started using it:       Date I stopped using it:         Why I stopped using it:            Medication:  FLUTICASONE PROPIONATE 50 MCG/ACT NA SUSP      How I use it:  SPRAY 1-2 SPRAYS INTO BOTH NOSTRILS DAILY      Why I use it: Nasal congestion    Prescriber:  Raymon Hernández MD      Date I started using it:       Date I stopped using it:         Why I stopped using it:            Medication:  FUROSEMIDE 20 MG PO TABS      How I use it:  Take 1 tablet (20 mg) by mouth daily      Why I use it: Heart failure    Prescriber:  Raymon Hernández MD      Date I started using it:       Date I stopped using it:         Why I stopped using it:            Medication:  LANTUS SOLOSTAR   UNIT/ML SC SOPN      How I use it:  Inject 15-20 Units Subcutaneous At Bedtime       Why I use it:  Diabetes    Prescriber:  Raymon Hernández MD      Date  I started using it:       Date I stopped using it:         Why I stopped using it:            Medication:  LATANOPROST 0.005 % OP SOLN      How I use it:  Place 1 drop into both eyes At Bedtime      Why I use it: Glaucoma    Prescriber:  Abraham Byrd MD      Date I started using it:       Date I stopped using it:         Why I stopped using it:            Medication:  MULTIVITAMIN TABS   OR      How I use it:  Take 1 TABLET DAILY      Why I use it:  general health    Prescriber:  OTC      Date I started using it:       Date I stopped using it:         Why I stopped using it:            Medication:  OXYBUTYNIN CHLORIDE 5 MG PO TABS      How I use it:  TAKE 1 TABLET (5 MG) BY MOUTH 3 TIMES DAILY AS NEEDED      Why I use it: Overactive bladder    Prescriber:  Raymon Hernández MD      Date I started using it:       Date I stopped using it:         Why I stopped using it:            Medication:  POLYETHYL GLYCOL-PROPYL GLYCOL 0.4-0.3 % OP SOLN      How I use it:  Apply 1-2 drops to eye 2 times daily       Why I use it:  Dry eyes    Prescriber:  OTC      Date I started using it:       Date I stopped using it:         Why I stopped using it:            Medication:  POTASSIUM CHLORIDE ER 10 MEQ PO TBCR      How I use it:  Take 1 tablet (10 mEq) by mouth daily      Why I use it: Low potassium    Prescriber:  Raymon Hernández MD      Date I started using it:       Date I stopped using it:         Why I stopped using it:                        Medication:  RANITIDINE  MG PO TABS      How I use it:  TAKE 1 TABLET BY MOUTH TWICE A DAY      Why I use it: Heartburn/reflux    Prescriber:  Raymon Hernández MD      Date I started using it:       Date I stopped using it:         Why I stopped using it:            Medication:  SENNOSIDES-DOCUSATE SODIUM 8.6-50 MG PO TABS      How I use it:  TAKE 1-4 TABLETS BY MOUTH 2 TIMES DAILY AS NEEDED CONSTIPATION      Why I use it: Constipation    Prescriber:  Raymon Hernández MD       Date I started using it:       Date I stopped using it:         Why I stopped using it:            Medication:  SERTRALINE HCL 50 MG PO TABS      How I use it:  Take 1 tablet (50 mg) by mouth daily      Why I use it: Anxiety    Prescriber:  Raymon Hernández MD      Date I started using it:       Date I stopped using it:         Why I stopped using it:            Medication:  SIMVASTATIN 10 MG PO TABS      How I use it:  TAKE 1 TABLET (10 MG) BY MOUTH AT BEDTIME      Why I use it: High cholesterol    Prescriber:  Raymon Hernández MD      Date I started using it:       Date I stopped using it:         Why I stopped using it:            Medication:  VALBENAZINE TOSYLATE 40 MG PO CAPS      How I use it:  Take 1 capsule (40 mg) by mouth daily      Why I use it: Mouth movements    Prescriber:  Jace Julio MD      Date I started using it:       Date I stopped using it:         Why I stopped using it:            Medication:  VITAMIN D 1000 UNITS PO CAPS      How I use it:  Take 1 capsule by mouth daily.      Why I use it:  General health    Prescriber:  OTC      Date I started using it:       Date I stopped using it:         Why I stopped using it:            Medication:         How I use it:         Why I use it:      Prescriber:         Date I started using it:       Date I stopped using it:         Why I stopped using it:            Medication:         How I use it:         Why I use it:      Prescriber:         Date I started using it:       Date I stopped using it:         Why I stopped using it:            Medication:         How I use it:         Why I use it:      Prescriber:         Date I started using it:       Date I stopped using it:         Why I stopped using it:              Other Information:     If you have any questions about your action plan, call [phone].    According to the Paperwork Reduction Act of 1995, no persons are required to respond to a collection of information unless it  displays a valid OMB control number. The valid OMB number for this information collection is [id number]. The time required to complete this information collection is estimated to average 40 minutes per response, including the time to review instructions, searching existing data resources, gather the data needed, and complete and review the information collection. If you have any comments concerning the accuracy of the time estimate(s) or suggestions for improving this form, please write to: CMS, Attn: AFSANEH Reports Clearance Officer, [address] [place] [id number].

## 2020-02-03 NOTE — PROGRESS NOTES
"SUBJECTIVE/OBJECTIVE:                Ruth Rocha is a 80 year old female called for a follow-up visit for Medication Therapy Management.  She was referred to me from Dr. Julio. Caregiver, Renita, also present on the phone call today.    Chief Complaint: Follow up from [facility] visit on 12/30/19  Tobacco:  reports that she has never smoked. She has never used smokeless tobacco.  Alcohol: none    Medication Adherence/Access: no issues reported    Of note, patient is receiving some home care services through [facility].  will be meeting with her today.    Tardive dyskinesia: Taking valbenazine (Ingrezza) 40 mg nightly. Patient has been on the medication for about 2 weeks (starting) 1/16/20) and reports a \"marked difference\" in facial movements. She states that people at swimming class even commented on the improvement. Patient is also in speech therapy. She has been off metoclopramide (offending agent) for about a year.     GERD: Current medications include: Zantac (ranitidine) 150 mg twice daily. She endorses still having some symptoms of reflux and last week was in ED because she started vomiting after speech therapy. States that she will get hiccups or start coughing before vomiting. Patient states she has never had an endoscopy. Wondering if she should be on omeprazole. Is trying to eat slowly and take medications with food.     Anxiety: Taking sertraline 50 mg daily. States that she is still having some panic attacks and Renita is concerned anxiety is contributing to the GI problems. She will be seeing her PCP later this week to discuss anxiety and possibly increasing sertraline dose. Patient is considering seeing a therapist but has not scheduled this yet.     Last vitals:   BP Readings from Last 1 Encounters:   12/13/19 132/77     Pulse Readings from Last 1 Encounters:   12/13/19 72     ASSESSMENT:              Medicare Part D topics discussed:Medications reviewed-no issues identified or changes " needed    Medication Adherence: excellent, no issues identified    Tardive dyskinesia: Improved. Seems reasonable to continue the VMAT2 inhibitor as it has already been effective for the TD movements in only 2 weeks.     GERD: Needs improvement.  Patient may benefit from being on a proton pump inhibitor given her severe symptoms of GERD.  Ranitidine has been recalled and may not be available anyway.  Additionally, the patient's dose of ranitidine may be too high given her poor renal function but she should have a repeat SCr checked to know for sure.  Patient will be seeing PCP this week.    Anxiety: Needs improvement.  Patient would benefit from following up with her primary care provider this week and will be discussing her anxiety with him at that time.  It seems reasonable to increase the dose of sertraline if deemed necessary.     PLAN:                  1. No changes to medications today.    2. Patient to see PCP this week regarding GERD and anxiety - consideration for switching ranitidine to PPI and increase in sertraline.    I spent 15 minutes with this patient today (an extra 15 minutes was spent creating the Medication Action Plan). I offer these suggestions for consideration by Dr. Julio. A copy of the visit note was provided to the patient's referring provider.     Will follow up in 1 year or sooner if needed.    The patient declined a summary of these recommendations as an after visit summary.    Nathalie Justin, Pharm.D.  Medication Therapy Management Pharmacist  Phone: [phone]

## 2020-02-03 NOTE — Clinical Note
FYI -- Patient will be discussing GERD and anxiety with you at your appt on Wednesday. I might suggest switching the ranitidine to a PPI and increasing sertraline.

## 2020-02-05 ENCOUNTER — OFFICE VISIT (OUTPATIENT)
Dept: FAMILY MEDICINE | Facility: CLINIC | Age: 80
End: 2020-02-05
Payer: COMMERCIAL

## 2020-02-05 VITALS
HEIGHT: 63 IN | OXYGEN SATURATION: 100 % | WEIGHT: 170 LBS | DIASTOLIC BLOOD PRESSURE: 84 MMHG | BODY MASS INDEX: 30.12 KG/M2 | HEART RATE: 72 BPM | TEMPERATURE: 97.7 F | SYSTOLIC BLOOD PRESSURE: 137 MMHG

## 2020-02-05 DIAGNOSIS — N18.30 CKD (CHRONIC KIDNEY DISEASE) STAGE 3, GFR 30-59 ML/MIN (H): ICD-10-CM

## 2020-02-05 DIAGNOSIS — R53.83 FATIGUE, UNSPECIFIED TYPE: ICD-10-CM

## 2020-02-05 DIAGNOSIS — E11.21 TYPE 2 DIABETES MELLITUS WITH DIABETIC NEPHROPATHY, WITH LONG-TERM CURRENT USE OF INSULIN (H): ICD-10-CM

## 2020-02-05 DIAGNOSIS — R11.10 VOMITING, INTRACTABILITY OF VOMITING NOT SPECIFIED, PRESENCE OF NAUSEA NOT SPECIFIED, UNSPECIFIED VOMITING TYPE: Primary | ICD-10-CM

## 2020-02-05 DIAGNOSIS — K21.9 GASTROESOPHAGEAL REFLUX DISEASE, ESOPHAGITIS PRESENCE NOT SPECIFIED: ICD-10-CM

## 2020-02-05 DIAGNOSIS — Z79.4 TYPE 2 DIABETES MELLITUS WITH DIABETIC NEPHROPATHY, WITH LONG-TERM CURRENT USE OF INSULIN (H): ICD-10-CM

## 2020-02-05 LAB
ALBUMIN SERPL-MCNC: 3.5 G/DL (ref 3.4–5)
ALP SERPL-CCNC: 138 U/L (ref 40–150)
ALT SERPL W P-5'-P-CCNC: 47 U/L (ref 0–50)
ANION GAP SERPL CALCULATED.3IONS-SCNC: 6 MMOL/L (ref 3–14)
AST SERPL W P-5'-P-CCNC: 44 U/L (ref 0–45)
BASOPHILS # BLD AUTO: 0.1 10E9/L (ref 0–0.2)
BASOPHILS NFR BLD AUTO: 0.5 %
BILIRUB SERPL-MCNC: 0.3 MG/DL (ref 0.2–1.3)
BUN SERPL-MCNC: 38 MG/DL (ref 7–30)
CALCIUM SERPL-MCNC: 9.6 MG/DL (ref 8.5–10.1)
CHLORIDE SERPL-SCNC: 105 MMOL/L (ref 94–109)
CO2 SERPL-SCNC: 30 MMOL/L (ref 20–32)
CREAT SERPL-MCNC: 1.42 MG/DL (ref 0.52–1.04)
DIFFERENTIAL METHOD BLD: NORMAL
EOSINOPHIL # BLD AUTO: 0.2 10E9/L (ref 0–0.7)
EOSINOPHIL NFR BLD AUTO: 1.6 %
ERYTHROCYTE [DISTWIDTH] IN BLOOD BY AUTOMATED COUNT: 12.7 % (ref 10–15)
GFR SERPL CREATININE-BSD FRML MDRD: 35 ML/MIN/{1.73_M2}
GLUCOSE SERPL-MCNC: 62 MG/DL (ref 70–99)
HBA1C MFR BLD: 6.5 % (ref 0–5.6)
HCT VFR BLD AUTO: 38.8 % (ref 35–47)
HGB BLD-MCNC: 12.3 G/DL (ref 11.7–15.7)
LYMPHOCYTES # BLD AUTO: 2 10E9/L (ref 0.8–5.3)
LYMPHOCYTES NFR BLD AUTO: 21.2 %
MCH RBC QN AUTO: 29.7 PG (ref 26.5–33)
MCHC RBC AUTO-ENTMCNC: 31.7 G/DL (ref 31.5–36.5)
MCV RBC AUTO: 94 FL (ref 78–100)
MONOCYTES # BLD AUTO: 0.8 10E9/L (ref 0–1.3)
MONOCYTES NFR BLD AUTO: 8.6 %
NEUTROPHILS # BLD AUTO: 6.4 10E9/L (ref 1.6–8.3)
NEUTROPHILS NFR BLD AUTO: 68.1 %
PLATELET # BLD AUTO: 252 10E9/L (ref 150–450)
POTASSIUM SERPL-SCNC: 4.6 MMOL/L (ref 3.4–5.3)
PROT SERPL-MCNC: 7.2 G/DL (ref 6.8–8.8)
RBC # BLD AUTO: 4.14 10E12/L (ref 3.8–5.2)
SODIUM SERPL-SCNC: 141 MMOL/L (ref 133–144)
TSH SERPL DL<=0.005 MIU/L-ACNC: 3.4 MU/L (ref 0.4–4)
WBC # BLD AUTO: 9.4 10E9/L (ref 4–11)

## 2020-02-05 PROCEDURE — 83036 HEMOGLOBIN GLYCOSYLATED A1C: CPT | Performed by: FAMILY MEDICINE

## 2020-02-05 PROCEDURE — 84443 ASSAY THYROID STIM HORMONE: CPT | Performed by: FAMILY MEDICINE

## 2020-02-05 PROCEDURE — 36415 COLL VENOUS BLD VENIPUNCTURE: CPT | Performed by: FAMILY MEDICINE

## 2020-02-05 PROCEDURE — 85025 COMPLETE CBC W/AUTO DIFF WBC: CPT | Performed by: FAMILY MEDICINE

## 2020-02-05 PROCEDURE — 80053 COMPREHEN METABOLIC PANEL: CPT | Performed by: FAMILY MEDICINE

## 2020-02-05 PROCEDURE — 99214 OFFICE O/P EST MOD 30 MIN: CPT | Performed by: FAMILY MEDICINE

## 2020-02-05 RX ORDER — FAMOTIDINE 20 MG/1
20 TABLET, FILM COATED ORAL 2 TIMES DAILY
Qty: 180 TABLET | Refills: 3 | Status: SHIPPED | OUTPATIENT
Start: 2020-02-05 | End: 2020-01-01

## 2020-02-05 ASSESSMENT — PAIN SCALES - GENERAL: PAINLEVEL: NO PAIN (0)

## 2020-02-05 ASSESSMENT — MIFFLIN-ST. JEOR: SCORE: 1220.24

## 2020-02-05 NOTE — PATIENT INSTRUCTIONS
Increase walking/ exercise as able    Range of motion exercises for the neck    We will send you lab results    Famotidine to replace ranitidine    Daily tums at bedtime    Let us know know if symptoms not  improving

## 2020-02-05 NOTE — PROGRESS NOTES
"Subjective     Ruth HANSA Rocha is a 80 year old female who presents to clinic today for the following health issues:    HPI   ED/UC Followup:    Facility:  [facility]  Date of visit: 01/29/2020  Reason for visit: vomiting  Current Status: stable                    Reviewed and updated as needed this visit by Provider         Review of Systems   ROS COMP: reviewed emergency room  Note in detail    Coughing and  Hiccups before  Hand    Maybe  Some acid?    One small  episoded a  Couple days ago    To  Emergency room a week ago    No unusual  Foods    To follow up with [facility]          Objective    BP (!) 159/90 (BP Location: Left arm, Patient Position: Chair, Cuff Size: Adult Regular)   Pulse 69   Temp 97.7  F (36.5  C) (Oral)   Ht 1.6 m (5' 3\")   Wt 77.1 kg (170 lb)   SpO2 100%   Breastfeeding No   BMI 30.11 kg/m    Body mass index is 30.11 kg/m .  Physical Exam  Constitutional:       Appearance: She is well-developed.   HENT:      Head: Normocephalic and atraumatic.   Eyes:      Conjunctiva/sclera: Conjunctivae normal.   Neck:      Vascular: No carotid bruit.   Cardiovascular:      Rate and Rhythm: Normal rate and regular rhythm.      Heart sounds: Normal heart sounds.   Pulmonary:      Effort: Pulmonary effort is normal. No respiratory distress.      Breath sounds: Normal breath sounds.   Abdominal:      General: There is no distension.      Palpations: Abdomen is soft.      Tenderness: There is no abdominal tenderness.   Musculoskeletal:      Right lower leg: No edema.      Left lower leg: No edema.   Neurological:      Mental Status: She is alert and oriented to person, place, and time.      tardive  dyskinesia    Diagnostic Test Results:  Labs reviewed in Epic           ASSESSMENT / PLAN:  (R11.10) Vomiting, intractability of vomiting not specified, presence of nausea not specified, unspecified vomiting type  (primary encounter diagnosis)  Comment: vomiting now resolved.    Plan: monitor " symptoms. Follow up prn.  Be seen promptly if symptoms acutely worsen.    (K21.9) Gastroesophageal reflux disease, esophagitis presence not specified  Comment: this have contributed to some recent symptoms.  Due to ranitidine concerns, change to famotidine.  Can also try a daily tums at bedtime.  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    Raymon Hernández MD

## 2020-02-07 DIAGNOSIS — N18.30 CHRONIC KIDNEY DISEASE, STAGE III (MODERATE) (H): Primary | ICD-10-CM

## 2020-02-07 NOTE — RESULT ENCOUNTER NOTE
Your diabetes test ( hemoglobin a1c ) is excellent.  Other labs are stable.    Raymon Hernández MD    [facility] - please fax to [facility]  Thanks  Raymon Hernández MD

## 2020-02-10 ENCOUNTER — MEDICAL CORRESPONDENCE (OUTPATIENT)
Dept: HEALTH INFORMATION MANAGEMENT | Facility: CLINIC | Age: 80
End: 2020-02-10

## 2020-02-10 ENCOUNTER — PATIENT OUTREACH (OUTPATIENT)
Dept: CARE COORDINATION | Facility: CLINIC | Age: 80
End: 2020-02-10

## 2020-02-10 NOTE — PROGRESS NOTES
Social Work Intervention  [facility] [place]    Data/Intervention:    Patient Name:  Ruth oRcha  /Age:  1940 (80 year old)    Visit Type: telephone. Follow up phone call today. Original contact was on 1/3/2020  Referral Source: Dr Julio  Reason for Referral:  Health care directive and other resources    Collaborated With:    -Lyla and her friend Renita Guerra    Patient Concerns/Issues:   Pt and her friend Renita live in a double wide mobile home. They have lives together for many years. Renita works as a senior . Lyla has numerous medical issues. She is single and doesn't have a health care directive in place. She also could use some services at home when Renita is at work steve help with lunch that she often forgets to eat and some homemaking services.   Pt's income is very low. She is on the Part D extra help program so her Rx copays are reasonable.     Intervention/Education/Resources Provided:  Reviewed her income/assets and discussed the EW/AC program with [facility]. Pt and Renita were interested and so agreed that I could make a referral for a home visit for a mnchoices assessment. Since my original call when them, they had contact from [facility] and the assessment is scheduled for this week 20.  In addition, we completed the health care directive over the phone and I mailed it to Lyla today to be notarized. Requested she send a copy back to me for her medical record.     Assessment/Plan:  Health care directive nearly completed. Pt should be eligible for services at home, possibly with a small co pay. Encouraged them to contact me again as needed.     Provided patient/family with contact information and availability.    AZEB Rosario, [facility]    [facility] [place]  [phone]

## 2020-02-16 NOTE — LETTER
DEPARTMENT OF HEALTH AND HUMAN SERVICES  CENTERS FOR MEDICARE & MEDICAID SERVICES    PLAN/UPDATED PLAN OF PROGRESS FOR OUTPATIENT REHABILITATION    PATIENTS NAME:  Ruth Rocha   :   PR[facility] NUMBER:    [id number]  [facility]  PROVIDER NAME: [employer] [facility] [facility] ALIE CAMILO PT  MEDICAL RECORD NUMBER: [id number]   START OF CARE DATE:  SOC Date: 18   TYPE:  PT    PRIMARY/TREATMENT DIAGNOSIS: (Pertinent Medical Diagnosis)  Neck pain    VISITS FROM START OF CARE:  Rxs Used: 1     [place] [facility] Initial Evaluation    Subjective:  Patient is a 78 year old female presenting with rehab cervical spine hpi. The history is provided by the patient.   Ruth Rocha is a 78 year old female with a cervical spine condition.  Condition occurred with:  Degenerative joint disease.  Condition occurred: at home.  This is a new condition  Pt reports she sprained her neck when she was getting out of a car 2/15/18. DOI.  She describes Rt sided neck pain w/o radiating pain.  .    Patient reports pain:  Cervical right side. Pain is described as sharp and is intermittent and reported as 8/10.  Associated symptoms:  Loss of motion/stiffness. Pain is the same all the time.  Symptoms are exacerbated by rotating head, looking up or down and certain positions and relieved by rest.  Since onset symptoms are unchanged.        General health as reported by patient is fair.  Pertinent medical history includes: Kidney disease, anemia, sleep disorder/apnea, high blood pressure, heart problems, osteoarthritis, osteoporosis, history of fractures, diabetes and overweight. Other surgeries include:  Orthopedic surgery, other and cancer surgery (ankle ).  Current medications:  Cardiac medication, pain medication and high blood pressure medication.  Current occupation is Retired .   MD order  3/21/2018.    Barriers include:  None as reported by the patient.  Red flags:  None as reported by the  patient.    Objective:  Cervical/Thoracic Evaluation  AROM:  AROM Cervical:  Flexion:          ++ 50% loss   Extension:       Retxns 50% loss   Rotation:         Left: 50% loss      Right: 50% loss   Side Bend:      Left:     Right:     Cervical Myotomes:  not assessed  DTR's:  not assessed  Cervical Dermatomes:  not assessed    PATIENTS NAME:  Ruth Rocha   :     Cervical Palpation:    Tenderness present at Right:    Upper Trap and Erector Spinae    Spinal Segmental Conclusions:    Level:  Hypo at T2, T1, C7, C6 and C5    Cord Sign:  not assessed    Assessment/Plan:    Patient is a 78 year old female with cervical complaints.    Patient has the following significant findings with corresponding treatment plan.                Diagnosis 1:  Neck pain   Pain -  hot/cold therapy, manual therapy, self management and home program  Decreased ROM/flexibility - manual therapy, therapeutic exercise and home program  Decreased joint mobility - manual therapy and therapeutic exercise  Decreased proprioception - neuro re-education and therapeutic activities  Impaired muscle performance - neuro re-education  Decreased function - therapeutic activities  Impaired posture - neuro re-education    Therapy Evaluation Codes:   1) History comprised of:   Personal factors that impact the plan of care:      Age, Coping style, Overall behavior pattern and Past/current experiences.    Comorbidity factors that impact the plan of care are:      Diabetes, Heart problems, High blood pressure, Menopausal, Osteoarthritis, Overweight and kidney dis., anemia .     Medications impacting care: Cardiac, High blood pressure and Pain.  2) Examination of Body Systems comprised of:   Body structures and functions that impact the plan of care:      Cervical spine.   Activity limitations that impact the plan of care are:      Cooking, Reading/Computer work and Sleeping.  3) Clinical presentation characteristics  "are:   Stable/Uncomplicated.  4) Decision-Making    Low complexity using standardized patient assessment instrument and/or measureable assessment of functional outcome.  Cumulative Therapy Evaluation is: Low complexity.    Previous and current functional limitations:  (See Goal Flow Sheet for this information)    Short term and Long term goals: (See Goal Flow Sheet for this information)   Communication ability:  Patient appears to be able to clearly communicate and understand verbal and written communication and follow directions correctly.  Treatment Explanation - The following has been discussed with the patient:   RX ordered/plan of care  Anticipated outcomes  Possible risks and side effects  This patient would benefit from PT intervention to resume normal activities.   Rehab potential is good.  PATIENTS NAME:  Ruth Rocha   :     Frequency:  1 X week, once daily  Duration:  for 6 weeks  Discharge Plan:  Achieve all LTG.  Independent in home treatment program.  Reach maximal therapeutic benefit.    Please refer to the daily flowsheet for treatment today, total treatment time and time spent performing 1:1 timed codes.         Caregiver Signature/Credentials _____________________________ Date ________       Treating Provider: Ryan Izquierdo PT    I have reviewed and certified the need for these services and plan of treatment while under my care.        PHYSICIAN'S SIGNATURE:   _________________________________________  Date___________   Raymon Hernández MD    Certification period:  Beginning of Cert date period: 18 to  End of Cert period date: 18     Functional Level Progress Report: Please see attached \"Goal Flow sheet for Functional level.\"    ____X____ Continue Services or       ________ DC Services                Service dates: From  SOC Date: 18 date to present                         " 62.5

## 2020-02-18 ENCOUNTER — TELEPHONE (OUTPATIENT)
Dept: FAMILY MEDICINE | Facility: CLINIC | Age: 80
End: 2020-02-18

## 2020-02-18 DIAGNOSIS — M79.671 BILATERAL FOOT PAIN: Primary | ICD-10-CM

## 2020-02-18 DIAGNOSIS — M79.672 BILATERAL FOOT PAIN: Primary | ICD-10-CM

## 2020-02-18 NOTE — TELEPHONE ENCOUNTER
Reason for Call:  Other     Detailed comments: Patient would like a referral for a podiatrist. She states that she has a couple of painful nails. She said she is diabetic and has not had her nails cut in a long time. Patient states her feet are also rough on the bottom of them.     Phone Number Patient can be reached at: Home number on file [phone] (home)    Best Time: any    Can we leave a detailed message on this number? YES    Call taken on 2/18/2020 at 11:50 AM by Phuong Rodrigues

## 2020-02-18 NOTE — TELEPHONE ENCOUNTER
Routing to PCP to review and advise.    Requesting podiatry referral for painful nails and rough bottoms of feet.        Adelina Verdugo RN  [facility]

## 2020-02-19 NOTE — TELEPHONE ENCOUNTER
Did referral to see Kandace Zamoray or [facility]  Please inform patient and give her numbers to call    Raymon Hernández MD

## 2020-03-02 NOTE — TELEPHONE ENCOUNTER
Forms received from: Parvin Lui   Phone number listed: [phone]   Fax listed: [phone]  Date received: 03.02.20  Form description: Order  Once forms are completed, please return to Parvin Lui via Fax.  Is patient requesting to be contacted when forms are completed: NA    Form placed: in providers micaela Adames

## 2020-03-05 NOTE — PATIENT INSTRUCTIONS
We wish you continued good healing. If you have any questions or concerns, please do not hesitate to contact us at [phone]    Please remember to call and schedule a follow up appointment if one was recommended at your earliest convenience.   PODIATRY CLINIC HOURS  TELEPHONE NUMBER    Dr. Karri Jeronimo D.P.M [facility]    Clinics:  [facility]    [facility]   Tuesday 1PM-6PM  [place]  Wednesday 7AM-2PM  [facility]  Thursday 10AM-6PM  [place]  Friday 7AM-3PM  [place]  Specialty schedulers:   [phone] to make an appointment with any Specialty Provider.        Urgent Care locations:    [facility] Monday-Friday 5 pm - 9 pm. Saturday-Sunday 9 am -5pm    Monday-Friday 11 am - 9 pm Saturday 9 am - 5 pm     Monday-Sunday 12 noon-8PM [phone]     If you need a medication refill, please contact us you may need lab work and/or a follow up visit prior to your refill (i.e. Antifungal medications).    [facility] (secure e-mail communication and access to your chart) to send a message or to make an appointment.    If MRI needed please call Pedro Plunkett at [phone]

## 2020-03-05 NOTE — PROGRESS NOTES
Subjective:    Pt is seen today in consult from Dr. Hernández for a diabetic foot exam.  Patient is having a hard time trimming his nails and points to all of them.  This has been getting more difficult over the years.  Sometimes they are painful.  Most of the pain is in her hallux nails.  The left is worse.  She has recently lost the left hallux nail and she has pain on the tip of the toe.  The pain is aggravated by activity and relieved by rest.  Patient also has callus painful.  Describes it as a burning pain which is aggravated by activity and relieved by rest.  Patient will be getting a new pair of diabetic shoes soon.  She does have numbness and tingling in her feet.  She denies any history of ulcerations in her feet.  Primary care is Dr. Hernández last seen 2/5/20.      ROS:  A 10-point review of systems was performed and is positive for that noted in the HPI and as seen above.  All other areas are negative.          Allergies   Allergen Reactions     Morphine      Other reaction(s): GI Upset     Fish Oil      Other reaction(s): Edema  Golf     Metoclopramide      Tardive dyskinesia       Current Outpatient Medications   Medication Sig Dispense Refill     ACE/ARB NOT PRESCRIBED, INTENTIONAL, by Other route continuous prn Reported on 5/5/2017       acetaminophen (TYLENOL) 500 MG tablet Take 2 tablets (1,000 mg) by mouth 3 times daily as needed       aspirin 325 MG EC tablet Take 325 mg by mouth daily        B-D U/F 31G X 8 MM insulin pen needle USE AS DIRECTED - 2 DOSES PER  each 2     blood glucose (ONEYDA MICROLET 2) lancing device Lancing device to be used with lancets. 1 each 0     blood glucose monitoring (ONEYDA MICROLET) lancets USE AS DIRECTED 3 TIMES A  each 1     calcitRIOL (ROCALTROL) 0.25 MCG capsule Take 1 capsule (0.25 mcg) by mouth Every Mon, Wed, Fri Morning       carvedilol (COREG) 6.25 MG tablet Take 1 tablet (6.25 mg) by mouth 2 times daily (with meals) 180 tablet 1     Cholecalciferol  "(VITAMIN D) 1000 UNITS capsule Take 1 capsule by mouth daily.       CONTOUR NEXT TEST test strip USE TO TEST BLOOD SUGAR 3 TIMES DAILY 100 strip 3     dorzolamide-timolol (COSOPT) 2-0.5 % ophthalmic solution Place 1 drop into the right eye 2 times daily 10 mL 12     famotidine (PEPCID) 20 MG tablet Take 1 tablet (20 mg) by mouth 2 times daily 180 tablet 3     ferrous sulfate (IRON) 325 (65 FE) MG tablet Take 1 tablet (325 mg) by mouth 2 times daily (Patient taking differently: Take 1 tablet by mouth daily (with breakfast) ) 180 tablet 1     fluticasone (FLONASE) 50 MCG/ACT spray SPRAY 1-2 SPRAYS INTO BOTH NOSTRILS DAILY 16 mL 11     furosemide (LASIX) 20 MG tablet Take 1 tablet (20 mg) by mouth daily 90 tablet 1     Insulin Syringe-Needle U-100 28G X 1/2\" 0.3 ML MISC use daily for insulin injection       LANTUS SOLOSTAR 100 UNIT/ML soln Inject 15-20 Units Subcutaneous At Bedtime   1     latanoprost (XALATAN) 0.005 % ophthalmic solution Place 1 drop into both eyes At Bedtime 1 Bottle 11     MULTIVITAMIN TABS   OR 1 TABLET DAILY       oxybutynin (DITROPAN) 5 MG tablet TAKE 1 TABLET (5 MG) BY MOUTH 3 TIMES DAILY AS NEEDED 270 tablet 3     polyethylene glycol-propylene glycol (SYSTANE ULTRA) 0.4-0.3 % SOLN ophthalmic solution Apply 1-2 drops to eye 2 times daily        potassium chloride ER (KLOR-CON) 10 MEQ CR tablet Take 1 tablet (10 mEq) by mouth daily 90 tablet 1     senna-docusate (SENEXON-S) 8.6-50 MG tablet TAKE 1-4 TABLETS BY MOUTH 2 TIMES DAILY AS NEEDED CONSTIPATION 60 tablet 4     sertraline (ZOLOFT) 50 MG tablet Take 1 tablet (50 mg) by mouth daily 90 tablet 1     simvastatin (ZOCOR) 10 MG tablet TAKE 1 TABLET (10 MG) BY MOUTH AT BEDTIME 90 tablet 0     valbenazine (INGREZZA) 40 MG capsule Take 1 capsule (40 mg) by mouth daily 30 capsule 11       Patient Active Problem List   Diagnosis     Overactive bladder     GERD (gastroesophageal reflux disease)     HYPERLIPIDEMIA LDL GOAL <100     Type 2 diabetes, " HbA1C goal < 8% (H)     Diabetes mellitus with background retinopathy (H)     Diabetic macular edema, hx focal laser ou     Advanced directives, counseling/discussion     Hypertension goal BP (blood pressure) < 140/90     CHF (congestive heart failure) (H)     Pseudophakia, Yag Caps, ou      CKD (chronic kidney disease) stage 3, GFR 30-59 ml/min (H)     Hx of keratopathy - hx of PTK, ou (MP)     Hx of corneal epithelial defect, left eye     Gout     Type 2 diabetes mellitus with diabetic nephropathy (H)     Obesity, unspecified obesity severity, unspecified obesity type     FLOR (obstructive sleep apnea)     Primary open angle glaucoma of both eyes, mild stage     Posterior vitreous detachment, bilateral     Type 2 diabetes mellitus with diabetic nephropathy, with long-term current use of insulin (H)     Status post ORIF of fracture of ankle     Morbid obesity (H)     CKD (chronic kidney disease) stage 4, GFR 15-29 ml/min (H)     Hyperparathyroidism (H)     Anxiety     Acute kidney failure, unspecified (H)     Acute respiratory distress     Bacterial pneumonia     Bell's palsy     CAP (community acquired pneumonia)     Closed bimalleolar fracture of left ankle with routine healing     Diabetic neuropathy (H)     E. coli UTI     Facial droop     Heart failure, diastolic, acute on chronic (H)     Orthostatic hypotension     TIA (transient ischemic attack)     ACP (advance care planning)     Acute CHF (H)     Type 2 diabetes mellitus (H)     Hyperlipidemia     Hypertension     Obesity (BMI 30-39.9)     Glaucoma     FLOR on CPAP     Tardive dyskinesia       Past Medical History:   Diagnosis Date     Anemia      Arthritis      CKD (chronic kidney disease) stage 3, GFR 30-59 ml/min (H)      Diabetic retinopathy (H)      DM type 2 (diabetes mellitus, type 2) (H)      Glaucoma (increased eye pressure)      Gout      HTN      Hypercholesterolemia      Non-compliance      Obesity      Renal insufficiency      Sciatica     2011      Tardive dyskinesia 11/17/2019       Past Surgical History:   Procedure Laterality Date     CATARACT IOL, RT/LT       focal laser for diabetic macular edema      both eyes     HC REMOVAL GALLBLADDER  10-5-3     LASER SELECTIVE TRABECULOPLASTY  7/2003    right eye, 360     LASER YAG CAPSULOTOMY  6/1998; 6/2007    right eye; left eye     ORTHOPEDIC SURGERY Left     left ankle surgery     PHACOEMULSIFICATION WITH STANDARD INTRAOCULAR LENS IMPLANT  11/1996; 3/1999    right eye; left eye     PHOTOTHERAPEUTIC KERACTECTOMY Right 01/18/2017     PHOTOTHERAPEUTIC KERACTECTOMY Left 08/2015     TONSILLECTOMY  1948       Family History   Problem Relation Age of Onset     C.A.D. Mother      Glaucoma Mother      Hypertension Mother      C.A.D. Father      Glaucoma Father      Hypertension Father      Other - See Comments Brother         [place], [place]     Other - See Comments Brother         [place]     Other - See Comments Brother         [place]     Diabetes No family hx of         except dad's sister     Cancer No family hx of      Macular Degeneration No family hx of      Amblyopia No family hx of      Retinal detachment No family hx of        Social History     Tobacco Use     Smoking status: Never Smoker     Smokeless tobacco: Never Used   Substance Use Topics     Alcohol use: No         Exam:    Vitals: /70   Pulse 64   Wt 77.1 kg (170 lb)   BMI 30.11 kg/m    BMI: Body mass index is 30.11 kg/m .  Height: Data Unavailable    Constitutional/ general:  Pt is in no apparent distress, appears well-nourished.  Cooperative with history and physical exam.     Psych:  The patient answered questions appropriately.  Normal affect.  Seems to have reasonable expectations, in terms of treatment.     Eyes:  Visual scanning/ tracking without deficit.     Ears:  Response to auditory stimuli is normal.    Auricles in proper alignment.     Lymphatic:  Popliteal lymph nodes not enlarged.     Lungs:  Non labored breathing,  non labored speech. No cough.  No audible wheezing. Even, quiet breathing.       Vascular:  positive  DP pulse.  negative PT pulse.  CFT < 3 sec.  positive ankle edema.  positive varicosities.  negative pedal hair growth.    Neuro:  Alert and oriented x 3. Coordinated gait.  Light touch sensation is intact to the L4, L5, S1 distributions. No obvious deficits.  No evidence of neurological-based weakness, spasticity, or contracture in the lower extremities.  Monofilament intact on all digits    Derm:  Skin thin, shiny, atrophic, with no hair growth noted.   No erythema, ecchymosis, or cyanosis.  No foot ulcers.    All nails are thickened, elongated, discolored with subungual debris.  The left hallux nail is gone and a new nail is growing in proximally.  The tip of this toe is somewhat erythematous.  Patient has callus left third toe distal with underlying healthy tissue noted..  No masses or breakdown in the skin bilaterally.  No erythema or ecchymosis noted bilateral.     Musculoskeletal:    Lower extremity muscle strength is normal.  Patient is ambulatory without an assistive device or brace.  No gross deformities.  Normal arch.  We note that the patient's shoe is somewhat tight and we can see where her left hallux is hitting the shoe.       A/P  Diabetes mellitus with PAD  Onychomycosis  Callus   Pain in toes    Mycotic nails manually debrided with a .  Callus debrided with a fifteen blade.   Explained to patient that her shoes are too tight causing pain.  She will make sure her new diabetic shoes have plenty of room.  Discussed how loss of nail can make the tip of hallux toe sensitive.  Discussed with patient  that I do not do routine foot care in my practice unless patient at significant risk of limb loss.  Will refer to foot care service/nurse.  Return to clinic prn.  Thank you for allowing me participate in the care of this patient.              Karri Jeronimo, TEODORO DPHANSA, FACFAS

## 2020-03-05 NOTE — LETTER
3/5/2020         RE: Ruth Rocha  [address] [place] [address]  [phone]  [facility] [id number]        Dear Colleague,    Thank you for referring your patient, Ruth Rocha, to the [facility]. Please see a copy of my visit note below.    Subjective:    Pt is seen today in consult from Dr. Hernández for a diabetic foot exam.  Patient is having a hard time trimming his nails and points to all of them.  This has been getting more difficult over the years.  Sometimes they are painful.  Most of the pain is in her hallux nails.  The left is worse.  She has recently lost the left hallux nail and she has pain on the tip of the toe.  The pain is aggravated by activity and relieved by rest.  Patient also has callus painful.  Describes it as a burning pain which is aggravated by activity and relieved by rest.  Patient will be getting a new pair of diabetic shoes soon.  She does have numbness and tingling in her feet.  She denies any history of ulcerations in her feet.  Primary care is Dr. Hernández last seen 2/5/20.      ROS:  A 10-point review of systems was performed and is positive for that noted in the HPI and as seen above.  All other areas are negative.          Allergies   Allergen Reactions     Morphine      Other reaction(s): GI Upset     Fish Oil      Other reaction(s): Edema  [place]     Metoclopramide      Tardive dyskinesia       Current Outpatient Medications   Medication Sig Dispense Refill     ACE/ARB NOT PRESCRIBED, INTENTIONAL, by Other route continuous prn Reported on 5/5/2017       acetaminophen (TYLENOL) 500 MG tablet Take 2 tablets (1,000 mg) by mouth 3 times daily as needed       aspirin 325 MG EC tablet Take 325 mg by mouth daily        B-D U/F 31G X 8 MM insulin pen needle USE AS DIRECTED - 2 DOSES PER  each 2     blood glucose (ONEYDA MICROLET 2) lancing device Lancing device to be used with lancets. 1 each 0     blood glucose monitoring (ONEYDA MICROLET) lancets USE AS  "DIRECTED 3 TIMES A  each 1     calcitRIOL (ROCALTROL) 0.25 MCG capsule Take 1 capsule (0.25 mcg) by mouth Every Mon, Wed, Fri Morning       carvedilol (COREG) 6.25 MG tablet Take 1 tablet (6.25 mg) by mouth 2 times daily (with meals) 180 tablet 1     Cholecalciferol (VITAMIN D) 1000 UNITS capsule Take 1 capsule by mouth daily.       CONTOUR NEXT TEST test strip USE TO TEST BLOOD SUGAR 3 TIMES DAILY 100 strip 3     dorzolamide-timolol (COSOPT) 2-0.5 % ophthalmic solution Place 1 drop into the right eye 2 times daily 10 mL 12     famotidine (PEPCID) 20 MG tablet Take 1 tablet (20 mg) by mouth 2 times daily 180 tablet 3     ferrous sulfate (IRON) 325 (65 FE) MG tablet Take 1 tablet (325 mg) by mouth 2 times daily (Patient taking differently: Take 1 tablet by mouth daily (with breakfast) ) 180 tablet 1     fluticasone (FLONASE) 50 MCG/ACT spray SPRAY 1-2 SPRAYS INTO BOTH NOSTRILS DAILY 16 mL 11     furosemide (LASIX) 20 MG tablet Take 1 tablet (20 mg) by mouth daily 90 tablet 1     Insulin Syringe-Needle U-100 28G X 1/2\" 0.3 ML MISC use daily for insulin injection       LANTUS SOLOSTAR 100 UNIT/ML soln Inject 15-20 Units Subcutaneous At Bedtime   1     latanoprost (XALATAN) 0.005 % ophthalmic solution Place 1 drop into both eyes At Bedtime 1 Bottle 11     MULTIVITAMIN TABS   OR 1 TABLET DAILY       oxybutynin (DITROPAN) 5 MG tablet TAKE 1 TABLET (5 MG) BY MOUTH 3 TIMES DAILY AS NEEDED 270 tablet 3     polyethylene glycol-propylene glycol (SYSTANE ULTRA) 0.4-0.3 % SOLN ophthalmic solution Apply 1-2 drops to eye 2 times daily        potassium chloride ER (KLOR-CON) 10 MEQ CR tablet Take 1 tablet (10 mEq) by mouth daily 90 tablet 1     senna-docusate (SENEXON-S) 8.6-50 MG tablet TAKE 1-4 TABLETS BY MOUTH 2 TIMES DAILY AS NEEDED CONSTIPATION 60 tablet 4     sertraline (ZOLOFT) 50 MG tablet Take 1 tablet (50 mg) by mouth daily 90 tablet 1     simvastatin (ZOCOR) 10 MG tablet TAKE 1 TABLET (10 MG) BY MOUTH AT BEDTIME " 90 tablet 0     valbenazine (INGREZZA) 40 MG capsule Take 1 capsule (40 mg) by mouth daily 30 capsule 11       Patient Active Problem List   Diagnosis     Overactive bladder     GERD (gastroesophageal reflux disease)     HYPERLIPIDEMIA LDL GOAL <100     Type 2 diabetes, HbA1C goal < 8% (H)     Diabetes mellitus with background retinopathy (H)     Diabetic macular edema, hx focal laser ou     Advanced directives, counseling/discussion     Hypertension goal BP (blood pressure) < 140/90     CHF (congestive heart failure) (H)     Pseudophakia, Yag Caps, ou      CKD (chronic kidney disease) stage 3, GFR 30-59 ml/min (H)     Hx of keratopathy - hx of PTK, ou (MP)     Hx of corneal epithelial defect, left eye     Gout     Type 2 diabetes mellitus with diabetic nephropathy (H)     Obesity, unspecified obesity severity, unspecified obesity type     FLOR (obstructive sleep apnea)     Primary open angle glaucoma of both eyes, mild stage     Posterior vitreous detachment, bilateral     Type 2 diabetes mellitus with diabetic nephropathy, with long-term current use of insulin (H)     Status post ORIF of fracture of ankle     Morbid obesity (H)     CKD (chronic kidney disease) stage 4, GFR 15-29 ml/min (H)     Hyperparathyroidism (H)     Anxiety     Acute kidney failure, unspecified (H)     Acute respiratory distress     Bacterial pneumonia     Bell's palsy     CAP (community acquired pneumonia)     Closed bimalleolar fracture of left ankle with routine healing     Diabetic neuropathy (H)     E. coli UTI     Facial droop     Heart failure, diastolic, acute on chronic (H)     Orthostatic hypotension     TIA (transient ischemic attack)     ACP (advance care planning)     Acute CHF (H)     Type 2 diabetes mellitus (H)     Hyperlipidemia     Hypertension     Obesity (BMI 30-39.9)     Glaucoma     FLOR on CPAP     Tardive dyskinesia       Past Medical History:   Diagnosis Date     Anemia      Arthritis      CKD (chronic kidney disease)  stage 3, GFR 30-59 ml/min (H)      Diabetic retinopathy (H)      DM type 2 (diabetes mellitus, type 2) (H)      Glaucoma (increased eye pressure)      Gout      HTN      Hypercholesterolemia      Non-compliance      Obesity      Renal insufficiency      Sciatica     2011     Tardive dyskinesia 11/17/2019       Past Surgical History:   Procedure Laterality Date     CATARACT IOL, RT/LT       focal laser for diabetic macular edema      both eyes     HC REMOVAL GALLBLADDER  10-5-3     LASER SELECTIVE TRABECULOPLASTY  7/2003    right eye, 360     LASER YAG CAPSULOTOMY  6/1998; 6/2007    right eye; left eye     ORTHOPEDIC SURGERY Left     left ankle surgery     PHACOEMULSIFICATION WITH STANDARD INTRAOCULAR LENS IMPLANT  11/1996; 3/1999    right eye; left eye     PHOTOTHERAPEUTIC KERACTECTOMY Right 01/18/2017     PHOTOTHERAPEUTIC KERACTECTOMY Left 08/2015     TONSILLECTOMY  1948       Family History   Problem Relation Age of Onset     C.A.D. Mother      Glaucoma Mother      Hypertension Mother      C.A.D. Father      Glaucoma Father      Hypertension Father      Other - See Comments Brother         [place], [place]     Other - See Comments Brother         [place]     Other - See Comments Brother         [place]     Diabetes No family hx of         except dad's sister     Cancer No family hx of      Macular Degeneration No family hx of      Amblyopia No family hx of      Retinal detachment No family hx of        Social History     Tobacco Use     Smoking status: Never Smoker     Smokeless tobacco: Never Used   Substance Use Topics     Alcohol use: No         Exam:    Vitals: /70   Pulse 64   Wt 77.1 kg (170 lb)   BMI 30.11 kg/m     BMI: Body mass index is 30.11 kg/m .  Height: Data Unavailable    Constitutional/ general:  Pt is in no apparent distress, appears well-nourished.  Cooperative with history and physical exam.     Psych:  The patient answered questions appropriately.  Normal affect.  Seems to have  reasonable expectations, in terms of treatment.     Eyes:  Visual scanning/ tracking without deficit.     Ears:  Response to auditory stimuli is normal.    Auricles in proper alignment.     Lymphatic:  Popliteal lymph nodes not enlarged.     Lungs:  Non labored breathing, non labored speech. No cough.  No audible wheezing. Even, quiet breathing.       Vascular:  positive  DP pulse.  negative PT pulse.  CFT < 3 sec.  positive ankle edema.  positive varicosities.  negative pedal hair growth.    Neuro:  Alert and oriented x 3. Coordinated gait.  Light touch sensation is intact to the L4, L5, S1 distributions. No obvious deficits.  No evidence of neurological-based weakness, spasticity, or contracture in the lower extremities.  Monofilament intact on all digits    Derm:  Skin thin, shiny, atrophic, with no hair growth noted.   No erythema, ecchymosis, or cyanosis.  No foot ulcers.    All nails are thickened, elongated, discolored with subungual debris.  The left hallux nail is gone and a new nail is growing in proximally.  The tip of this toe is somewhat erythematous.  Patient has callus left third toe distal with underlying healthy tissue noted..  No masses or breakdown in the skin bilaterally.  No erythema or ecchymosis noted bilateral.     Musculoskeletal:    Lower extremity muscle strength is normal.  Patient is ambulatory without an assistive device or brace.  No gross deformities.  Normal arch.  We note that the patient's shoe is somewhat tight and we can see where her left hallux is hitting the shoe.       A/P  Diabetes mellitus with PAD  Onychomycosis  Callus   Pain in toes    Mycotic nails manually debrided with a .  Callus debrided with a fifteen blade.   Explained to patient that her shoes are too tight causing pain.  She will make sure her new diabetic shoes have plenty of room.  Discussed how loss of nail can make the tip of hallux toe sensitive.  Discussed with patient  that I do not do routine  foot care in my practice unless patient at significant risk of limb loss.  Will refer to foot care service/nurse.  Return to clinic prn.  Thank you for allowing me participate in the care of this patient.              Karri Jeronimo DPM DPM, FACFAS           Again, thank you for allowing me to participate in the care of your patient.        Sincerely,        Karri Jeronimo DPM

## 2020-03-13 NOTE — TELEPHONE ENCOUNTER
Due to the recent developments of COVID-19 we are changing all in-person office visits to telephone visits for your safety.  It is important that a second set of ears is available if possible. If someone is there with you, you could have your phone on the speaker setting. Please have the following close by during the call:    1. A list of any questions written down beforehand  2.   3. List of medications and the EXACT times they are taken - better if you have your medications in front of you  4. Please follow up with any questions or concerns via [employer] as the call center wait times have increased dramatically    One of our CMA's will call you to go through your medications before the doctor calls.    What is the preferred number to be called for that visit?  [phone]    Please bare with us during this new process. The provider may call you earlier or later than the expected appointment time.

## 2020-03-16 NOTE — LETTER
"3/16/2020      RE: Ruth Rocha  [address] [place] [address]  [id number]  [facility] [address]       TELEPHONE VISIT MEDICATION F/U    Marlen Alegre CMA      TELEPHONE VISIT: [facility]ERS CLINIC    PATIENT: Ruth Rocha    : 1940    DATE: 2020    REASON FOR VISIT: Tardive dyskinesias (TD) follow up.    HPI: Ms. Ruth Rocha is a 80 year old female with one year history of TD.     After review of patient s situation, this visit was changed from an in-person visit to a telephone visit to reduce risk of COVID19 exposure.   Patient is being evaluated via a billable telephone visit.      Patient was initially seen in clinic by Dr. Julio on 2019 for evaluation on TD.  Subsequently, patient was seen by Dr. Justin on 2019 and was started on Ingrezza 40 mg.   During the f/u telephone visit on 2/3/2020, pt had reported \"marked difference\" in facial movements.    Today, patient was on the speaker phone and Renita, her friend \"\" was with her.  Renita reports that patient's TD symptoms started about one year ago.   After the Ingrezza 40 mg was started, \"there was no doubt that it made a big difference.\"  Patient reports that her tongue movements were better.  Renita reports that \"the squeaky sound\" was better.  However, in the last 2 weeks, the tongue movements and \"squeaky sounds\" are getting worse.      Patient has been taking Ingrezza in the evening due to sleepiness.  She denies any side effects from Ingrezza.     Renita reports that patient was feeling lightheaded yesterday and had a fall.  She attributed this due to dehydration and has been encouraging     MEDICATIONS:   Outpatient Medications Marked as Taking for the 3/16/20 encounter (Virtual Visit) with Juancarlos Woody APRN CNP   Medication Sig     ACE/ARB NOT PRESCRIBED, INTENTIONAL, by Other route continuous prn Reported on 2017     acetaminophen (TYLENOL) 500 MG tablet Take 2 tablets (1,000 mg) by " "mouth 3 times daily as needed     aspirin 325 MG EC tablet Take 325 mg by mouth daily      B-D U/F 31G X 8 MM insulin pen needle USE AS DIRECTED - 2 DOSES PER DAY     blood glucose (ONEYDA MICROLET 2) lancing device Lancing device to be used with lancets.     blood glucose monitoring (ONEYDA MICROLET) lancets USE AS DIRECTED 3 TIMES A DAY     calcitRIOL (ROCALTROL) 0.25 MCG capsule Take 1 capsule (0.25 mcg) by mouth Every Mon, Wed, Fri Morning     carvedilol (COREG) 6.25 MG tablet Take 1 tablet (6.25 mg) by mouth 2 times daily (with meals)     Cholecalciferol (VITAMIN D) 1000 UNITS capsule Take 1 capsule by mouth daily.     CONTOUR NEXT TEST test strip USE TO TEST BLOOD SUGAR 3 TIMES DAILY     dorzolamide-timolol (COSOPT) 2-0.5 % ophthalmic solution Place 1 drop into the right eye 2 times daily     famotidine (PEPCID) 20 MG tablet Take 1 tablet (20 mg) by mouth 2 times daily     ferrous sulfate (IRON) 325 (65 FE) MG tablet Take 1 tablet (325 mg) by mouth 2 times daily (Patient taking differently: Take 1 tablet by mouth daily (with breakfast) )     fluticasone (FLONASE) 50 MCG/ACT spray SPRAY 1-2 SPRAYS INTO BOTH NOSTRILS DAILY     furosemide (LASIX) 20 MG tablet Take 1 tablet (20 mg) by mouth daily     Insulin Syringe-Needle U-100 28G X 1/2\" 0.3 ML MISC use daily for insulin injection     LANTUS SOLOSTAR 100 UNIT/ML soln Inject 15-20 Units Subcutaneous At Bedtime      latanoprost (XALATAN) 0.005 % ophthalmic solution Place 1 drop into both eyes At Bedtime     MULTIVITAMIN TABS   OR 1 TABLET DAILY     oxybutynin (DITROPAN) 5 MG tablet TAKE 1 TABLET (5 MG) BY MOUTH 3 TIMES DAILY AS NEEDED     polyethylene glycol-propylene glycol (SYSTANE ULTRA) 0.4-0.3 % SOLN ophthalmic solution Apply 1-2 drops to eye 2 times daily      potassium chloride ER (KLOR-CON) 10 MEQ CR tablet Take 1 tablet (10 mEq) by mouth daily     senna-docusate (SENEXON-S) 8.6-50 MG tablet TAKE 1-4 TABLETS BY MOUTH 2 TIMES DAILY AS NEEDED CONSTIPATION     " sertraline (ZOLOFT) 50 MG tablet Take 1 tablet (50 mg) by mouth daily     simvastatin (ZOCOR) 10 MG tablet TAKE 1 TABLET (10 MG) BY MOUTH AT BEDTIME     valbenazine (INGREZZA) 40 MG capsule Take 1 capsule (40 mg) by mouth daily       ALLERGIES: Morphine; Fish oil; and Metoclopramide      ASSESSMENT:    Tardive Dyskinesias:  Was improved after Ingrezza was started; however, the benefit has worn off in the last 2 weeks.       PLAN:    __  Discussed the plan with Dr. Justin.  Patient's baseline QTc was 447.  It is okay to increase the Ingrezza's dose to her current Sertraline.     __  Will increase Ingrezza to 80 mg.  Patient is in agreement with the plan.     I have reviewed the note as documented above.  This accurately captures the substance of my conversation with the patient.    DAVIE Jung,  CNP  [facility]     Phone call contact time  Call Started at 9:29 AM  Call Ended at 9:41 AM    DAVIE Luong CNP

## 2020-03-16 NOTE — PROGRESS NOTES
"TELEPHONE VISIT MEDICATION F/U    Marlen Sis [facility]      TELEPHONE VISIT: MOVEMENT DISORDERS CLINIC    PATIENT: Ruth Rocha    : 1940    DATE: 2020    REASON FOR VISIT: Tardive dyskinesias (TD) follow up.    HPI: Ms. Ruth Rocha is a 80 year old female with one year history of TD.     After review of patient s situation, this visit was changed from an in-person visit to a telephone visit to reduce risk of COVID19 exposure.   Patient is being evaluated via a billable telephone visit.      Patient was initially seen in clinic by Dr. Julio on 2019 for evaluation on TD.  Subsequently, patient was seen by Dr. Justin on 2019 and was started on Ingrezza 40 mg.   During the f/u telephone visit on 2/3/2020, pt had reported \"marked difference\" in facial movements.    Today, patient was on the speaker phone and Renita, her friend \"\" was with her.  Renita reports that patient's TD symptoms started about one year ago.   After the Ingrezza 40 mg was started, \"there was no doubt that it made a big difference.\"  Patient reports that her tongue movements were better.  Renita reports that \"the squeaky sound\" was better.  However, in the last 2 weeks, the tongue movements and \"squeaky sounds\" are getting worse.      Patient has been taking Ingrezza in the evening due to sleepiness.  She denies any side effects from Ingrezza.     eRnita reports that patient was feeling lightheaded yesterday and had a fall.  She attributed this due to dehydration and has been encouraging     MEDICATIONS:   Outpatient Medications Marked as Taking for the 3/16/20 encounter (Virtual Visit) with Juancarlos Woody APRN CNP   Medication Sig     ACE/ARB NOT PRESCRIBED, INTENTIONAL, by Other route continuous prn Reported on 2017     acetaminophen (TYLENOL) 500 MG tablet Take 2 tablets (1,000 mg) by mouth 3 times daily as needed     aspirin 325 MG EC tablet Take 325 mg by mouth daily      B-D U/F 31G X 8 MM " "insulin pen needle USE AS DIRECTED - 2 DOSES PER DAY     blood glucose (ONEYDA MICROLET 2) lancing device Lancing device to be used with lancets.     blood glucose monitoring (ONEYDA MICROLET) lancets USE AS DIRECTED 3 TIMES A DAY     calcitRIOL (ROCALTROL) 0.25 MCG capsule Take 1 capsule (0.25 mcg) by mouth Every Mon, Wed, Fri Morning     carvedilol (COREG) 6.25 MG tablet Take 1 tablet (6.25 mg) by mouth 2 times daily (with meals)     Cholecalciferol (VITAMIN D) 1000 UNITS capsule Take 1 capsule by mouth daily.     CONTOUR NEXT TEST test strip USE TO TEST BLOOD SUGAR 3 TIMES DAILY     dorzolamide-timolol (COSOPT) 2-0.5 % ophthalmic solution Place 1 drop into the right eye 2 times daily     famotidine (PEPCID) 20 MG tablet Take 1 tablet (20 mg) by mouth 2 times daily     ferrous sulfate (IRON) 325 (65 FE) MG tablet Take 1 tablet (325 mg) by mouth 2 times daily (Patient taking differently: Take 1 tablet by mouth daily (with breakfast) )     fluticasone (FLONASE) 50 MCG/ACT spray SPRAY 1-2 SPRAYS INTO BOTH NOSTRILS DAILY     furosemide (LASIX) 20 MG tablet Take 1 tablet (20 mg) by mouth daily     Insulin Syringe-Needle U-100 28G X 1/2\" 0.3 ML MISC use daily for insulin injection     LANTUS SOLOSTAR 100 UNIT/ML soln Inject 15-20 Units Subcutaneous At Bedtime      latanoprost (XALATAN) 0.005 % ophthalmic solution Place 1 drop into both eyes At Bedtime     MULTIVITAMIN TABS   OR 1 TABLET DAILY     oxybutynin (DITROPAN) 5 MG tablet TAKE 1 TABLET (5 MG) BY MOUTH 3 TIMES DAILY AS NEEDED     polyethylene glycol-propylene glycol (SYSTANE ULTRA) 0.4-0.3 % SOLN ophthalmic solution Apply 1-2 drops to eye 2 times daily      potassium chloride ER (KLOR-CON) 10 MEQ CR tablet Take 1 tablet (10 mEq) by mouth daily     senna-docusate (SENEXON-S) 8.6-50 MG tablet TAKE 1-4 TABLETS BY MOUTH 2 TIMES DAILY AS NEEDED CONSTIPATION     sertraline (ZOLOFT) 50 MG tablet Take 1 tablet (50 mg) by mouth daily     simvastatin (ZOCOR) 10 MG tablet " TAKE 1 TABLET (10 MG) BY MOUTH AT BEDTIME     valbenazine (INGREZZA) 40 MG capsule Take 1 capsule (40 mg) by mouth daily       ALLERGIES: Morphine; Fish oil; and Metoclopramide      ASSESSMENT:    Tardive Dyskinesias:  Was improved after Ingrezza was started; however, the benefit has worn off in the last 2 weeks.       PLAN:    __  Discussed the plan with Dr. Justin.  Patient's baseline QTc was 447.  It is okay to increase the Ingrezza's dose to her current Sertraline.     __  Will increase Ingrezza to 80 mg.  Patient is in agreement with the plan.     I have reviewed the note as documented above.  This accurately captures the substance of my conversation with the patient.    DAVIE uJng,  CNP  [facility]     Phone call contact time  Call Started at 9:29 AM  Call Ended at 9:41 AM

## 2020-03-16 NOTE — PATIENT INSTRUCTIONS
March 16, 2020     Dear Ms. Ruth SANTO Josefina,     It was nice talking to you and Long during your Telephone Visit today.  The plan is as follows:  =         __  Will increase Ingrezza dose from 40 mg to 80 mg daily.  I have sent a new prescription.       __  Please call and make a follow up appointments as follows: -                  __  Telephone appointment follow up with Dr. Justin in 4 - 6 weeks.                 __  Return follow up visit in 3 months to see Dr. Julio.       __  You may return to our clinic sooner as needed.        For questions, you may send us a Showcase message or call [phone]     Fax number: [phone]     DAVIE Jung, CNP  [facility]

## 2020-04-01 NOTE — TELEPHONE ENCOUNTER
"Requested Prescriptions   Pending Prescriptions Disp Refills     simvastatin (ZOCOR) 10 MG tablet 90 tablet 0     Sig: Take 1 tablet (10 mg) by mouth daily   Last Written Prescription Date:  1/7/20  Last Fill Quantity: 90,  # refills: 0   Last office visit: 2/5/2020 with prescribing provider:     Future Office Visit:        Statins Protocol Failed - 4/1/2020 11:00 AM        Failed - LDL on file in past 12 months     Recent Labs   Lab Test 03/22/19  0830   LDL 52             Passed - No abnormal creatine kinase in past 12 months     No lab results found.             Passed - Recent (12 mo) or future (30 days) visit within the authorizing provider's specialty     Patient has had an office visit with the authorizing provider or a provider within the authorizing providers department within the previous 12 mos or has a future within next 30 days. See \"Patient Info\" tab in inbasket, or \"Choose Columns\" in Meds & Orders section of the refill encounter.              Passed - Medication is active on med list        Passed - Patient is age 18 or older        Passed - No active pregnancy on record        Passed - No positive pregnancy test in past 12 months             "

## 2020-05-04 NOTE — TELEPHONE ENCOUNTER
"Last Written Prescription Date:  9-10-18  Last Fill Quantity: 16ml,  # refills: 11   Last office visit: 2/5/2020 with prescribing provider:  2-5-2020   Future Office Visit:  Requested Prescriptions   Pending Prescriptions Disp Refills     fluticasone (FLONASE) 50 MCG/ACT nasal spray 16 mL 11       Nasal Allergy Protocol Passed - 5/4/2020 10:23 AM        Passed - Patient is age 12 or older        Passed - Recent (12 mo) or future (30 days) visit within the authorizing provider's specialty     Patient has had an office visit with the authorizing provider or a provider within the authorizing providers department within the previous 12 mos or has a future within next 30 days. See \"Patient Info\" tab in inbasket, or \"Choose Columns\" in Meds & Orders section of the refill encounter.              Passed - Medication is active on med list           "

## 2020-05-04 NOTE — TELEPHONE ENCOUNTER
"Last Written Prescription Date:  1-  Last Fill Quantity: 100,  # refills: 3   Last office visit: 2/5/2020 with prescribing provider:  2-5-2020   Future Office Visit:  Requested Prescriptions   Pending Prescriptions Disp Refills     blood glucose (CONTOUR NEXT TEST) test strip 100 strip 3     Sig: Use to test blood sugar  times daily or as directed.       Diabetic Supplies Protocol Passed - 5/4/2020  1:04 PM        Passed - Medication is active on med list        Passed - Patient is 18 years of age or older        Passed - Recent (6 mo) or future (30 days) visit within the authorizing provider's specialty     Patient had office visit in the last 6 months or has a visit in the next 30 days with authorizing provider.  See \"Patient Info\" tab in inbasket, or \"Choose Columns\" in Meds & Orders section of the refill encounter.               "

## 2020-05-05 NOTE — TELEPHONE ENCOUNTER
Prescription approved per [facility] Refill Protocol.  Sofia PISANON,RN  [facility]

## 2020-05-05 NOTE — TELEPHONE ENCOUNTER
Prescription approved per [facility] Refill Protocol.  Sofia PISANON,RN  [facility]

## 2020-05-20 NOTE — TELEPHONE ENCOUNTER
Reason for Call:  Other prescription    Detailed comments: Famotidine 20 mg tablet is backordered/unavailable please send in an alternative    Phone Number [facility] pharmacy     Best Time:     Can we leave a detailed message on this number? Not Applicable    Call taken on 5/20/2020 at 12:44 PM by Jania Diop

## 2020-05-20 NOTE — TELEPHONE ENCOUNTER
I called and notified patient alternative was sent in  [facility]

## 2020-05-20 NOTE — TELEPHONE ENCOUNTER
Routing to PCP to review and advise.    Requesting alternative to María Elena Verdugo RN  [facility]

## 2020-05-21 NOTE — TELEPHONE ENCOUNTER
Reason for Call:  Other prescription    Detailed comments: Famotidine 20 mg tablet is on back order/unavailable. Please send in an alternative     Phone Number [facility] pharmacy     Best Time:     Can we leave a detailed message on this number? Not Applicable    Call taken on 5/21/2020 at 9:34 AM by Jania Diop

## 2020-06-01 NOTE — TELEPHONE ENCOUNTER
[facility] pharmacy is closed.  Requests prescription to be sent to [facility].

## 2020-06-05 NOTE — TELEPHONE ENCOUNTER
"      Last Written Prescription Date:  na  Last Fill Quantity: na,   # refills: na  Last Office Visit: na  Future Office visit:       Routing refill request to provider for review/approval because:  Medication is reported/historicalRequested Prescriptions   Pending Prescriptions Disp Refills     LANTUS SOLOSTAR 100 UNIT/ML soln  1     Sig: Inject 15-20 Units Subcutaneous At Bedtime       Long Acting Insulin Protocol Passed - 6/5/2020 10:09 AM        Passed - Serum creatinine on file in past 12 months     Recent Labs   Lab Test 04/24/20  1058   CR 1.59*       Ok to refill medication if creatinine is low          Passed - HgbA1C in past 3 or 6 months     If HgbA1C is 8 or greater, it needs to be on file within the past 3 months.  If less than 8, must be on file within the past 6 months.     Recent Labs   Lab Test 02/05/20  0845   A1C 6.5*             Passed - Medication is active on med list        Passed - Patient is age 18 or older        Passed - Recent (6 mo) or future (30 days) visit within the authorizing provider's specialty     Patient had office visit in the last 6 months or has a visit in the next 30 days with authorizing provider or within the authorizing provider's specialty.  See \"Patient Info\" tab in inbasket, or \"Choose Columns\" in Meds & Orders section of the refill encounter.               "

## 2020-06-05 NOTE — TELEPHONE ENCOUNTER
Prescription approved per [facility] Refill Protocol.  Natasha Zapata, PharmD  Medication Therapy Management Pharmacist  [phone]

## 2020-06-24 NOTE — TELEPHONE ENCOUNTER
"Last Written Prescription Date:  4-1-2020  Last Fill Quantity: 90,  # refills: 0   Last office visit: 2/5/2020 with prescribing provider:  1   Future Office Visit:  Requested Prescriptions   Pending Prescriptions Disp Refills     simvastatin (ZOCOR) 10 MG tablet 90 tablet 0     Sig: Take 1 tablet (10 mg) by mouth daily       Statins Protocol Failed - 6/24/2020  1:19 PM        Failed - LDL on file in past 12 months     Recent Labs   Lab Test 03/22/19  0830   LDL 52             Passed - No abnormal creatine kinase in past 12 months     No lab results found.             Passed - Recent (12 mo) or future (30 days) visit within the authorizing provider's specialty     Patient has had an office visit with the authorizing provider or a provider within the authorizing providers department within the previous 12 mos or has a future within next 30 days. See \"Patient Info\" tab in inbasket, or \"Choose Columns\" in Meds & Orders section of the refill encounter.              Passed - Medication is active on med list        Passed - Patient is age 18 or older        Passed - No active pregnancy on record        Passed - No positive pregnancy test in past 12 months           "

## 2020-07-03 NOTE — PATIENT INSTRUCTIONS
Use OTC +3.50 readers.  Continue using Cosopt (dorzolamide-timolol -- dark blue top) both eyes twice daily (about 12 hours apart),  And Latanoprost (green top) both eyes at bedtime.  Use artificial tears up to 4 times daily both eyes.  (Refresh Tears, Systane Ultra/Balance, or Theratears)  Could try using a gel/ointment at bedtime for additional comfort (Celluvisc, Refresh PM, Genteal Gel, etc)  Wait at least 5 minutes between drops if using more than one at a time.  Continue care with Dr. Jaime.  Return Visit in 6 months for iop check, Glaucoma OCT and Sarkar Visual Field.     Abraham Byrd M.D.  [phone]    Patient Education   Diabetes weakens the blood vessels all over the body, including the eyes. Damage to the blood vessels in the eyes can cause swelling or bleeding into part of the eye (called the retina). This is called diabetic retinopathy ([facility]-tin--[facility]-the). If not treated, this disease can cause vision loss or blindness.   Symptoms may include blurred or distorted vision, but many people have no symptoms. It's important to see your eye doctor regularly to check for problems.   Early treatment and good control can help protect your vision. Here are the things you can do to help prevent vision loss:      1. Keep your blood sugar levels under tight control.      2. Bring high blood pressure under control.      3. No smoking.      4. Have yearly dilated eye exams.

## 2020-07-03 NOTE — LETTER
7/3/2020         RE: Ruth Rocha  [address]  [place] [address]        Dear Colleague,    Thank you for referring your patient, Ruth Rocha, to the [facility]. Please see a copy of my visit note below.     Current Eye Medications:  Latanoprost at bedtime both eyes, Cosopt BID right eye and artificial tears QID  both eyes      Subjective:  Here for complete today. Last A1c was 6.5 on 2-5-20. One day she was outdoors and everything went the same color. Hoping it will come back to normal     Objective:  See Ophthalmology Exam.       Assessment:  Intraocular pressure up both eyes today but stable discs.  Has not been using Dorzolamide/Timolol (Cosopt - dark blue top) left eye.  Stable diabetic retinopathy.      ICD-10-CM    1. Type 2 diabetes mellitus without complication, with long-term current use of insulin (H)  E11.9 EYE EXAM (SIMPLE-NONBILLABLE)    Z79.4    2. Diabetes mellitus with background retinopathy (H)  E11.3299    3. Diabetic macular edema, hx focal laser ou  H35.81    4. Primary open angle glaucoma of both eyes, mild stage  H40.1131 dorzolamide-timolol (COSOPT) 2-0.5 % ophthalmic solution   5. Hx of keratopathy - hx of PTK, ou (MP)  H18.9    6. Pseudophakia, Yag Caps, ou   Z96.1    7. Bell's palsy  G51.0    8. Posterior vitreous detachment, bilateral  H43.813    9. Examination of eyes and vision  Z01.00    10. Presbyopia  H52.4 REFRACTIVE STATUS        Plan:  Use OTC +3.50 readers.  Continue using Cosopt (dorzolamide-timolol -- dark blue top) both eyes twice daily (about 12 hours apart),  And Latanoprost (green top) both eyes at bedtime.  Use artificial tears up to 4 times daily both eyes.  (Refresh Tears, Systane Ultra/Balance, or Theratears)  Could try using a gel/ointment at bedtime for additional comfort (Celluvisc, Refresh PM, Genteal Gel, etc)  Wait at least 5 minutes between drops if using more than one at a time.  Continue care with   Yeni.  Return Visit in 6 months for iop check, Glaucoma OCT and Sarkar Visual Field.     Abraham Byrd M.D.  [phone]         Again, thank you for allowing me to participate in the care of your patient.        Sincerely,        Abraham Byrd MD

## 2020-07-03 NOTE — TELEPHONE ENCOUNTER
Approved per [facility] CPA.   Natasha Zapata, PharmD  Medication Therapy Management Pharmacist  [phone]

## 2020-07-03 NOTE — PROGRESS NOTES
Current Eye Medications:  Latanoprost at bedtime both eyes, Cosopt BID right eye and artificial tears QID  both eyes      Subjective:  Here for complete today. Last A1c was 6.5 on 2-5-20. One day she was outdoors and everything went the same color. Hoping it will come back to normal     Objective:  See Ophthalmology Exam.       Assessment:  Intraocular pressure up both eyes today but stable discs.  Has not been using Dorzolamide/Timolol (Cosopt - dark blue top) left eye.  Stable diabetic retinopathy.      ICD-10-CM    1. Type 2 diabetes mellitus without complication, with long-term current use of insulin (H)  E11.9 EYE EXAM (SIMPLE-NONBILLABLE)    Z79.4    2. Diabetes mellitus with background retinopathy (H)  E11.3299    3. Diabetic macular edema, hx focal laser ou  H35.81    4. Primary open angle glaucoma of both eyes, mild stage  H40.1131 dorzolamide-timolol (COSOPT) 2-0.5 % ophthalmic solution   5. Hx of keratopathy - hx of PTK, ou (MP)  H18.9    6. Pseudophakia, Yag Caps, ou   Z96.1    7. Bell's palsy  G51.0    8. Posterior vitreous detachment, bilateral  H43.813    9. Examination of eyes and vision  Z01.00    10. Presbyopia  H52.4 REFRACTIVE STATUS        Plan:  Use OTC +3.50 readers.  Continue using Cosopt (dorzolamide-timolol -- dark blue top) both eyes twice daily (about 12 hours apart),  And Latanoprost (green top) both eyes at bedtime.  Use artificial tears up to 4 times daily both eyes.  (Refresh Tears, Systane Ultra/Balance, or Theratears)  Could try using a gel/ointment at bedtime for additional comfort (Celluvisc, Refresh PM, Genteal Gel, etc)  Wait at least 5 minutes between drops if using more than one at a time.  Continue care with Dr. Jaime.  Return Visit in 6 months for iop check, Glaucoma OCT and Sarkar Visual Field.     Abraham Byrd M.D.  [phone]

## 2020-07-07 NOTE — TELEPHONE ENCOUNTER
Routing refill request to provider for review/approval because:  Drug interaction warning - override needed

## 2020-07-14 NOTE — TELEPHONE ENCOUNTER
Forms received from: [facility]    Phone number listed: [phone]   Fax listed: [phone]  Date received: 07/14/2020  Form description: speech order  Once forms are completed, please return to [facility]  via fax.  Form placed: in providers folder  Jania Diop

## 2020-08-04 NOTE — TELEPHONE ENCOUNTER
"Red flag call taken, fall.   Patient has not been seen by Dr. Hernández since February 2020.    I spoke to patient who reports she got dizzy and fell about a half an hour ago, hit her head on a floor lamp, knocked the lamp over and scraped her right elbow on the carpet and has 2 skin tears from the sounds of the description.  Her roommate Florencia is on the call as well, they live together in a mobile home and Florencia has some medical training and helps with Ruth's cares.      See triage below.    CALL  NOW: Immediate medical attention is needed. You need to hang up and call 911 (or an ambulance). ([facility] Discretion:  I'll call you back in a few minutes to be sure you were able to reach them.)    Patient is alert and feels she can walk with her walker with Florencia's assistance to the car to go to ER for evaluation.   They note the dizziness has been happening, no sudden onset today.   Advised ER eval needed due to head injury and patient on 325 mg aspirin, possibly fainting spell, skin tears will need evaluation and care, might be dehydrated.     Florencia reports orthostatic BP at 7:15 am today: sitting 143/66 pulse 69, standing 78/45, pulse 68.  Reports after fall: sitting 151/75, pulse 58; standing 101/51, pulse 73.       Advised if patient having trouble walking to car or faints/falls again, then call 911.  Call for follow up after ER visit.    Patient and roommate Florencia verbalize understanding and agreement.    Deepali Srinivasan RN  [facility]    Reason for Disposition    Fainted, and still feels dizzy afterwards    Answer Assessment - Initial Assessment Questions  1. DESCRIPTION: \"Describe your dizziness.\"      Off balance when standing for past month, getting  2. LIGHTHEADED: \"Do you feel lightheaded?\" (e.g., somewhat faint, woozy, weak upon standing)      dizzy  3. VERTIGO: \"Do you feel like either you or the room is spinning or tilting?\" (i.e. vertigo)      Not spinning  4. " "SEVERITY: \"How bad is it?\"  \"Do you feel like you are going to faint?\" \"Can you stand and walk?\"    - MILD - walking normally    - MODERATE - interferes with normal activities (e.g., work, school)     - SEVERE - unable to stand, requires support to walk, feels like passing out now.       Severe, has fallen twice in the past few days, now fell and hit head and elbow about 30 minutes ago  5. ONSET:  \"When did the dizziness begin?\"      A few weeks ago  6. AGGRAVATING FACTORS: \"Does anything make it worse?\" (e.g., standing, change in head position)      Going from sitting from standing is when she reports the dizziness.   Roommate lives with her, reports she has seen episodes of unresponsiveness for past few month, brief when sitting  7. HEART RATE: \"Can you tell me your heart rate?\" \"How many beats in 15 seconds?\"  (Note: not all patients can do this)        68-73 per BP reads today  8. CAUSE: \"What do you think is causing the dizziness?\"      Could be low BP  9. RECURRENT SYMPTOM: \"Have you had dizziness before?\" If so, ask: \"When was the last time?\" \"What happened that time?\"      Last few weeks feeling dizzy  10. OTHER SYMPTOMS: \"Do you have any other symptoms?\" (e.g., fever, chest pain, vomiting, diarrhea, bleeding)        No symptoms currently, occasional vomiting with her GERD if she eats too fast, last emesis was about 2 weeks ago.  States she is drinking fluids and urinating at least once in 8 hours.   Is diabetic, 106 blood glucose before breakfast, 208 after the fall  11. PREGNANCY: \"Is there any chance you are pregnant?\" \"When was your last menstrual period?\"        no    Protocols used: DIZZINESS-A-OH      "

## 2020-08-04 NOTE — TELEPHONE ENCOUNTER
Reason for call:  Patient reporting a symptom    Symptom or request: fell hit head    Duration (how long have symptoms been present): 10am    Have you been treated for this before? No    Additional comments: patient has been having increase lightheadedness possibly due to uncontrolled BP and blood sugars.    Phone Number patient can be reached at:  Home number on file [phone] (home)    Best Time:  anytime    Can we leave a detailed message on this number:  YES    Call taken on 8/4/2020 at 11:14 AM by Dorcas Hurst

## 2020-08-11 NOTE — PROGRESS NOTES
[facility] now requests orders and shares plan of care/discharge summaries for some patients through [employer].  Please REPLY TO THIS MESSAGE OR ROUTE BACK TO THE AUTHOR in order to give authorization for orders when needed.  This is considered a verbal order, you will still receive a faxed copy of orders for signature.  Thank you for your assistance in improving collaboration for our patients.    ORDER  Skilled nursing 1 week 1, 2 week 2, 1 week 1, 3 PRN for BP assessment, medication teaching and assess effects, diabetic assessment, wound cares, disease and symptom management.   Skin tear right arm: cleanse with soap and water, apply non stick foam border dressing 2 times weekly until it scabs over.   PT to evaluate and treat to include comprehensive fall risk assessment, strengthening, ambulation, balance, and transfers.   OT to evaluate and treat to include HSE, adls, UE strengthening, and BR and shower safety.   SW to assist with psychosocial needs, available resources, and long term care planning.   HHA 1 week 4, to assist with bathing and personal hygiene cares    Janis Moreno RN Admission Clinician  [facility]  [phone]

## 2020-08-14 NOTE — PROGRESS NOTES
Subjective     Ruth HANSA Rocha is a 80 year old female who presents to clinic today for the following health issues:    HPI       ED/UC Followup:    Facility:  [place]  Date of visit: 08/04/2020  Reason for visit: syncope and fall  Current Status: stable                  Reviewed and updated as needed this visit by Provider         Review of Systems    getting home care    Aide  Twice weekly    Nurse visits also    Has wound  On right forearm  Small  Bedsore also     next  Week    Blood pressure drops  A lot when standing    Feels wobbly when stands  Up    Always has walker when going somewhere/  Walking    Lives  With friend    Nystatin in skin  Fold areas    Midodrine 3x daily     Drinking fluids okay    Urinating good amount    Watching salt    Sugars variable      Objective    BP (!) 177/99 (BP Location: Left arm, Patient Position: Chair, Cuff Size: Adult Regular)   Pulse 70   Temp 97.9  F (36.6  C) (Oral)   Wt 77.2 kg (170 lb 2 oz)   SpO2 100%   Breastfeeding No   BMI 30.14 kg/m    Body mass index is 30.14 kg/m .  Physical Exam  Constitutional:       Appearance: She is well-developed.   HENT:      Head: Normocephalic and atraumatic.   Eyes:      Conjunctiva/sclera: Conjunctivae normal.   Neck:      Vascular: No carotid bruit.   Cardiovascular:      Rate and Rhythm: Normal rate and regular rhythm.      Heart sounds: Normal heart sounds.   Pulmonary:      Effort: Pulmonary effort is normal. No respiratory distress.      Breath sounds: Normal breath sounds.   Neurological:      Mental Status: She is alert and oriented to person, place, and time.             Diagnostic Test Results:  Labs reviewed in Epic         ASSESSMENT / PLAN:  (I95.1) Orthostatic hypotension  (primary encounter diagnosis)  Comment: discussed in detail with patient.  Off the diuretic and beta blocker. On midodrine.   Plan: as above.     (N39.0) Urinary tract infection without hematuria, site unspecified  Comment: of note  urine culture from hospital did show infection.  I looked through records and am unsure if she was treated..  Short course of antibiotics reasonable.   Plan: sulfamethoxazole-trimethoprim (BACTRIM DS)         800-160 MG tablet             (R53.1) Weakness  Comment: gave prescription for bedside commode.   Patient is quite weak. Try to increase walking with walker/ activity if safe to do so.  Has home care.   Plan: order for DME             (E11.21,  Z79.4) Type 2 diabetes mellitus with diabetic nephropathy, with long-term current use of insulin (H)  Comment:  I believe hemoglobin a1c in hospital was okay  Plan: no change  In meds     (R60.0) Bilateral lower extremity edema  Comment: has the knee high stockings.   Since off the lasix, want to make sure  K and creat okay.  Can discontinue the potassium pill if needed.   Plan: Potassium, Creatinine             (D64.9) Anemia, unspecified type  Comment: hemoglobin  Lower in hospital than it was here when last checked.    Plan: increase iron to bid if tolerated.  Watch for constipation, treat this  Prn.       See us in 1 to 1 1/2 months, sooner if needed     I reviewed the patient's medications, allergies, medical history, family history, and social history.    Raymon Hernández MD

## 2020-08-14 NOTE — PATIENT INSTRUCTIONS
If tolerated, increase iron to 2x daily ( watch for constipation )    Stay well  Hydrated    Take the antibiotic for 5 days for uti    Follow up as needed based on symptoms    We will send you lab results

## 2020-08-18 NOTE — TELEPHONE ENCOUNTER
Forms received from [facility]/OT// PT 2 week 2, 1 week 1 (begin date 08/16/20) //OT// for Raymon Hernández MD.  Forms placed in provider 'sign me' folder.  Please fax forms to [phone] after completion.    Bailey Garcia  Patient Representative

## 2020-08-24 NOTE — PROGRESS NOTES
Clinic Care Coordination Contact  Care Coordination Transition Communication         Clinical Data: Patient was hospitalized at [facility] from 8/18/2020 to 8/22/2020 with diagnosis of pyelonephritis, vomiting, HTN.     Transition to Facility:              Facility Name: [facility]              Contact name and phone number/fax:     Plan: RN/SW Care Coordinator will await notification from facility staff informing RN/SW Care Coordinator of patient's discharge plans/needs. RN/SW Care Coordinator will review chart and outreach to facility staff every 4 weeks and as needed.     Gregory Wilder MSN, RN, PHN, CCM   Primary Care Clinical RN Care Coordinator  [facility]  8/24/2020   8:25 AM  [email or web address]  Office: [phone]

## 2020-08-27 NOTE — TELEPHONE ENCOUNTER
Forms received from: [facility]    Fax listed: [phone]  Date received: August 27, 2020  Form description: HHC & POC 08/11-10/09  Once forms are completed, please return to [facility] via fax.  Form placed: Provider sign me folder.    Thank you,  Dorcas GRAHAM  [facility]  Team Ruth Coordinator

## 2020-09-08 NOTE — TELEPHONE ENCOUNTER
Reason for Call: Request for an order or referral:    Order or referral being requested: skilled nursing  2 times a week for 2 weeks and 1 time a week for 2 weeks and 2 PRNs  PT,OT, ST to eval and treat and a home health aid 2 times a week for 4 weeks.     Date needed: as soon as possible     Has the patient been seen by the PCP for this problem? YES    Phone number Patient can be reached at:  Other phone number:  [phone]    Best Time:  any    Can we leave a detailed message on this number?  YES    Call taken on 9/8/2020 at 8:13 AM by Brianna Hunter

## 2020-09-08 NOTE — TELEPHONE ENCOUNTER
Notified Kayy of the orders below.    She is also noticed that there is a contraindication between her Amlodipine and Simvastatin.  Should patient continue to take both.    Sofia SandersonRN,BSN  [facility]

## 2020-09-09 NOTE — TELEPHONE ENCOUNTER
[facility] now requests orders and shares plan of care/discharge summaries for some patients through [employer].  Please REPLY TO THIS MESSAGE OR ROUTE BACK TO THE AUTHOR in order to give authorization for orders when needed.  This is considered a verbal order, you will still receive a faxed copy of orders for signature.  Thank you for your assistance in improving collaboration for our patients.    ORDER ok for OT to cont 2w3 for HEP, IADL/ ADL safety/indep, low vis ed/resources.    Carol GAN/L  [phone]  [email or web address]

## 2020-09-09 NOTE — PROGRESS NOTES
Radha Rocha is a 80 year old female who presents to clinic today for the following health issues:    HPI     Hospital Follow-up Visit:    Hospital/Nursing Home/IP Rehab Facility: [facility]  Date of Admission: 8/18/2020  Date of Discharge: 8/22/2020  Reason(s) for Admission: Pyelonephritis       Was your hospitalization related to COVID-19? No   Problems taking medications regularly:  None  Medication changes since discharge: NORVASC 5 MG BID, AUGMENTIN 875 MG, CARVEDILOL 6.25 MG 3 TABLETS BID  Problems adhering to non-medication therapy:  None  No blood pressure medication ( florinef  Orthostatic hypotension      Summary of hospitalization:  [facility] and reviewed  Diagnostic Tests/Treatments reviewed.  Follow up needed: pt home healthot  Nurse health tocoordinate  Other Healthcare Providers Involved in Patient s Care:         None  Update since discharge: improved. Post Discharge Medication Reconciliation: discharge medications reconciled and changed, per note/orders.  Plan of care communicated with patient        New Patient/Transfer of CareNew Patient/Transfer of Care    Review of Systems   Constitutional, HEENT, cardiovascular, pulmonary, gi and gu systems are negative, except as otherwise noted.      Objective    /64 (BP Location: Right arm, Patient Position: Chair, Cuff Size: Adult Large)   Pulse 58   Temp 97.5  F (36.4  C) (Oral)   SpO2 95%   There is no height or weight on file to calculate BMI.  Physical Exam   GENERAL: healthy, alert and no distress  EYES: Eyes grossly normal to inspection, PERRL and conjunctivae and sclerae normal  HENT: ear canals and TM's normal, nose and mouth without ulcers or lesions  NECK: no adenopathy, no asymmetry, masses, or scars and thyroid normal to palpation  RESP: lungs clear to auscultation - no rales, rhonchi or wheezes  CV: regular rate and rhythm, normal S1 S2, no S3 or S4, no murmur, click or rub, no  "peripheral edema and peripheral pulses strong  ABDOMEN: soft, nontender, no hepatosplenomegaly, no masses and bowel sounds normal  MS: no gross musculoskeletal defects noted, no edema    No results found for any visits on 09/09/20.        Assessment & Plan       ICD-10-CM    1. Chronic diastolic heart failure (H)  I50.32 CARDIOLOGY EVAL ADULT REFERRAL   2. Paroxysmal A-fib (H)  I48.0 CARDIOLOGY EVAL ADULT REFERRAL   3. CKD (chronic kidney disease) stage 4, GFR 15-29 ml/min (H)  N18.4    4. Hyperparathyroidism (H)  E21.3    5. Type 2 diabetes mellitus with diabetic nephropathy, with long-term current use of insulin (H)  E11.21     Z79.4       BP     102/64  9/10/2020    Lab Results   Component Value Date     08/04/2020     Lab Results   Component Value Date    A1C 6.5 08/04/2020     Lab Results   Component Value Date    LDL 52 03/22/2019     Lab Results   Component Value Date    MICROL 12 08/07/2019     No results found for: MICROALBUMIN  Reviewed the recent hospital stay with diastolic CHF and pyelonephritis  Patient very weak from infection    Working with home care with PT and OT  Continues on home CPAP treatment      BMI:   Estimated body mass index is 30.14 kg/m  as calculated from the following:    Height as of 2/5/20: 1.6 m (5' 3\").    Weight as of 8/14/20: 77.2 kg (170 lb 2 oz).   Weight management plan: Discussed healthy diet and exercise guidelines    Medication changes appropriate  Patient initially was hypotensive earlier in the summer but now hypertensive with diastolic dysfunction      Regular exercise    No follow-ups on file.    Karri Finn MD  [facility]      "

## 2020-09-09 NOTE — PROGRESS NOTES
[facility] now requests orders and shares plan of care/discharge summaries for some patients through [employer].  Please REPLY TO THIS MESSAGE OR ROUTE BACK TO THE AUTHOR in order to give authorization for orders when needed.  This is considered a verbal order, you will still receive a faxed copy of orders for signature.  Thank you for your assistance in improving collaboration for our patients.    ORDER    Request ok for Home PT for strengthening, transfers, gait training, home program and falls prevention education  Clara Angulo PT  [phone]  [email or web address]

## 2020-09-10 NOTE — TELEPHONE ENCOUNTER
Forms received from: [facility]   Phone number listed: [phone]   Fax listed: [phone]  Date received: 09.10.20  Form description: Many orders  Once forms are completed, please return to [facility] via Fax.  Is patient requesting to be contacted when forms are completed: NA    Form placed: in providers micaela Adames

## 2020-09-10 NOTE — TELEPHONE ENCOUNTER
[facility] now requests orders and shares plan of care/discharge summaries for some patients through [employer].  Please REPLY TO THIS MESSAGE OR ROUTE BACK TO THE AUTHOR in order to give authorization for orders when needed.  This is considered a verbal order, you will still receive a faxed copy of orders for signature.  Thank you for your assistance in improving collaboration for our patients.    ORDER ok for AWAIS balderas for community resources.    Carol GAN/L  [phone]  [email or web address]

## 2020-09-11 NOTE — TELEPHONE ENCOUNTER
Reason for Call:  Other Patient Information     Detailed comments: Patient had a fall yesterday when she was walking to the bathroom. She is okay, no injuries to report.     If needed please call home care worker.     Phone Number Patient can be reached at: Other phone number:  [phone]    Best Time: any    Can we leave a detailed message on this number? YES    Call taken on 9/11/2020 at 12:13 PM by Diana Callahan

## 2020-09-11 NOTE — TELEPHONE ENCOUNTER
Forms received from: [facility]   Phone number listed: [phone]   Fax listed: [phone]  Date received: 09/11/2020  Form description: pt 1 week 1 2 week 2 1 week 1  Once forms are completed, please return to [facility]  via fax.  Form placed: in providers folder  Jania Diop

## 2020-09-16 NOTE — TELEPHONE ENCOUNTER
[facility] requests orders via Epic. Please REPLY TO THIS MESSAGE with authorization for orders. This is considered a verbal order, you will still receive a faxed copy of orders for signature.    ORDER REQUESTED: Speech Therapy 1w3    ST SUMMARY/PLAN OF CARE: Speech Therapy evaluation completed, requesting auth to continue treatment weekly x3 weeks for dysphagia, dysarthria, and cognitive linguistic deficits.    Thank you,    Autumn Aguayo MS (Kate), CCC-SLP  Speech Language Pathologist, [facility]  Phone: [phone]   Email: [email or web address]  Days Worked: Monday, Wednesday, Friday

## 2020-09-16 NOTE — PROGRESS NOTES
"Ruth Rocha is a 80 year old female who is being evaluated via a billable telephone visit.      The patient has been notified of following:     \"This telephone visit will be conducted via a call between you and your physician/provider. We have found that certain health care needs can be provided without the need for a physical exam.  This service lets us provide the care you need with a short phone conversation.  If a prescription is necessary we can send it directly to your pharmacy.  If lab work is needed we can place an order for that and you can then stop by our lab to have the test done at a later time.    Telephone visits are billed at different rates depending on your insurance coverage. During this emergency period, for some insurers they may be billed the same as an in-person visit.  Please reach out to your insurance provider with any questions.    If during the course of the call the physician/provider feels a telephone visit is not appropriate, you will not be charged for this service.\"    Patient has given verbal consent for Telephone visit?  Yes    What phone number would you like to be contacted at? [phone]    How would you like to obtain your AVS? Mail a copy    Subjective     Ruth Rocha is a 80 year old female who presents via phone visit today for the following health issues:    HPI  Patient with history of cough, chest congestion for a week and a half now.  She has no fever, she reports minimal, mild short of breath however she has no chest pain no short of breath when she coughs.  She has no fever, no sick contact, she has no history of travel. No sick contact. Negative COVID on 08/18/2020.    Patient is not a smoker, no history of asthma.  She does have sleep apnea on CPAP machine.    Patient reports cough worse at night when she sleeps she has postnasal drainage.  She does use, Flonase.    I also spoke with Carol, personal care giver report patient's been having cough " sometimes wheezing.  She has no fever, she has no sick contact, no headache, no sore throat, no loss of taste or smell.  Patient does get home care services.  Today,  she is scheduled to have a nurse visitation.  In which she would be having her vital signs checked.    Acute Illness  Acute illness concerns: cough  Onset/Duration: week and half  Symptoms:  Fever: no  Chills/Sweats: no  Headache (location?): no  Sinus Pressure: no  Conjunctivitis:  no  Ear Pain: no  Rhinorrhea: no  Congestion: YES  Sore Throat: YES  Cough: no  Wheeze: YES  Decreased Appetite: YES  Nausea: no  Vomiting: YES  Diarrhea: no  Dysuria/Freq.: YES  Dysuria or Hematuria: YES  Fatigue/Achiness: YES  Sick/Strep Exposure: no  Therapies tried and outcome: OTC medication    Review of Systems   Constitutional, HEENT, cardiovascular, pulmonary, gi and gu systems are negative, except as otherwise noted.       Objective          Vitals:  No vitals were obtained today due to virtual visit.    healthy, alert, active and no distress  Pt. Speaks in full sentences.  PSYCH: Alert and oriented times 3; coherent speech, normal   rate and volume, able to articulate logical thoughts, able   to abstract reason, no tangential thoughts, no hallucinations   or delusions  Her affect is normal and pleasant  RESP: No cough, no audible wheezing, able to talk in full sentences  Remainder of exam unable to be completed due to telephone visits    No results found for this or any previous visit (from the past 24 hour(s)).        Assessment/Plan:    Assessment & Plan     Cough  She has spoke in full sentence, no distress noted.  Previous,  COVID -19,  test was negative.    She has home care,  nurse visiting today, and nursing staff, will exam her and  Check vital signs, oxygen saturation.  Follow-up with the clinic.    Discussed with patient, and personal care giver, if started having more wheezing, coughing, does not improve on current medications to  taken to the ER.  -  ipratropium-albuterol (COMBIVENT RESPIMAT)  MCG/ACT inhaler; Inhale 1 puff into the lungs 4 times daily as needed for shortness of breath / dyspnea or wheezing  - guaiFENesin (MUCINEX) 600 MG 12 hr tablet; Take 1 tablet (600 mg) by mouth 2 times daily    Wheezing    - ipratropium-albuterol (COMBIVENT RESPIMAT)  MCG/ACT inhaler; Inhale 1 puff into the lungs 4 times daily as needed for shortness of breath / dyspnea or wheezing  - guaiFENesin (MUCINEX) 600 MG 12 hr tablet; Take 1 tablet (600 mg) by mouth 2 times daily    Congestion of paranasal sinus    - azithromycin (ZITHROMAX) 250 MG tablet; Take 2 tablets (500 mg) by mouth daily for 1 day, THEN 1 tablet (250 mg) daily for 4 days.       There are no Patient Instructions on file for this visit.    No follow-ups on file.    Bettie Stevens MD  [facility]    Phone call duration:  11 minutes

## 2020-09-18 NOTE — TELEPHONE ENCOUNTER
Forms received from: [facility]   Phone number listed: [phone]   Fax listed: [phone]  Date received: 09.18.20  Form description: ST  Once forms are completed, please return to [facility] via Fax.  Is patient requesting to be contacted when forms are completed: NA   Form placed: in providers micaela Adames

## 2020-09-18 NOTE — TELEPHONE ENCOUNTER
[facility] now requests orders and shares plan of care/discharge summaries for some patients through [employer].  Please REPLY TO THIS MESSAGE OR ROUTE BACK TO THE AUTHOR in order to give authorization for orders when needed.  This is considered a verbal order, you will still receive a faxed copy of orders for signature.  Thank you for your assistance in improving collaboration for our patients.    ORDER ok for LT eval and rx d/t ongoing LE edema.    Carol GAN/L  [phone]  [email or web address]

## 2020-09-21 NOTE — TELEPHONE ENCOUNTER
Requested information faxed to number provided.  [facility]  Team 3 Coordinator

## 2020-09-22 NOTE — TELEPHONE ENCOUNTER
Cardiology referral in work que note. Left message to return clinic call to .  Gurdeep Simeon L.P.N.

## 2020-09-23 NOTE — TELEPHONE ENCOUNTER
Forms received from: [facility]   Phone number listed: [phone]   Fax listed: [phone]  Date received: 09.23.20  Form description: OT  Once forms are completed, please return to [facility] via Fax.  Is patient requesting to be contacted when forms are completed: NA    Form placed: in providers micaela Adames

## 2020-09-23 NOTE — PROGRESS NOTES
Clinic Care Coordination Contact    Situation: Patient chart reviewed by care coordinator.    Background: The patient is a current inpatient for pneumonia and hypoxia.      Assessment: The patient is hospitalized for hypoxia, cough, on oxygen.  COVID was negative.  The RN CC will monitor the chart for discharge from the hospital.      Plan/Recommendations: The RN CC will monitor the chart for discharge from the hospital.    Gregory Wilder MSN, RN, PHN, CCM   Primary Care Clinical RN Care Coordinator  [facility]  9/23/2020   8:54 AM  [email or web address]  Office: [phone]

## 2020-09-24 NOTE — PROGRESS NOTES
Referral- Heart Failure    REFERRING CLINIC INFORMATION:    Referring Clinic: [facility]   Referring Provider: Dr. Finn  Contact Information: Phone: [phone]; Fax: [phone]    REFERRING PATIENT INFORMATION:    Patient Phone Number:   Home Phone [phone]   Mobile [phone]      Consent to Communicate: Yes with Renita (friend) and Cielo (brother)  Insurance Obtained: Yes Entered      PATIENT/REFERRAL Hx:       September 24, 2020  Received TE regarding referral.     Sending to [facility] Nurse to Triage    IMAGING REQUESTED        All in epic    PLAN:       Patient has appt in Dr. Zacarias at [place]. Closing referral.     ATTEMPTS TO CONTACT:  September 24, 2020 - NAYELY Foy CMA  Heart Failure, Advanced Heart Failure & CORE  Referral Specialist &     [facility]  Cardiology  Office: [phone]

## 2020-09-25 NOTE — LETTER
[facility]  Complex Care Plan  About Me:    Patient Name:  Ruth Rocha    YOB: 1940  Age:         80 year old   [place] MRN:    [id number] Telephone Information:  Home Phone [phone]   Mobile [phone]       Address:  [address]  [facility] [address] Email address:  [email or web address]      Emergency Contact(s)    Name Relationship Lgl Grd Work Phone Home Phone Mobile Phone   1. OLIVA ROBIN Friend   [phone]    2. AMAN ROCHA Brother   [phone]            Primary language:  English     needed? No   [place] [employer]:  [phone] op. 1  Other communication barriers: None  Preferred Method of Communication:  Mail  Current living arrangement: I live in a private home with family  Mobility Status/ Medical Equipment: Independent w/Device    Health Maintenance  Health Maintenance Reviewed: Due/Overdue   Health Maintenance Due   Topic Date Due     HF ACTION PLAN  1940     HEPATITIS B IMMUNIZATION (1 of 3 - Risk 3-dose series) 01/07/1961     ZOSTER IMMUNIZATION (1 of 2) 01/07/1992     MEDICARE ANNUAL WELLNESS VISIT  01/07/2007     DIABETIC FOOT EXAM  03/11/2020     LIPID  03/22/2020     INFLUENZA VACCINE (1) 09/01/2020     BMP  10/24/2020         My Access Plan  Medical Emergency 911   Primary Clinic Line [facility] [phone]   24 Hour Appointment Line [phone] or  [phone]) (toll-free)   24 Hour Nurse Line [phone] (toll-free)   Preferred Urgent Care     Preferred Hospital [facility]  [phone]   Preferred Pharmacy [id number] - [place], [place] - [address]     Behavioral Health Crisis Line The National Suicide Prevention Lifeline at 1-236.910.5261 or 911             My Care Team Members  Patient Care Team       Relationship Specialty Notifications Start End    Raymon Hernández MD PCP - General   3/10/09     Phone:  [phone] Fax: [phone]         [address] [facility] [address]    Raymon Hernández MD Assigned PCP   10/4/12     Phone: [phone] Fax: [phone]         [address] [facility] [address]    Robb Kennedy MD MD Ophthalmology  6/26/15     Phone: [phone] Fax: [phone]         [address] [place] [address]    Abraham Byrd MD Referring Physician Ophthalmology  6/26/15     Referred to Dr Kennedy in [facility]    Phone: [phone] Fax: [phone] [address] [facility] [address]    Jackie Panchal RD Diabetes Educator Dietitian, Registered  3/28/19     Phone: [phone] Fax: [phone] [address] [facility] [address]    Nathalie Justin [facility] Pharmacist Pharmacist  12/30/19     Phone: [phone] Pager: [phone]         [address] [facility] [address]    [facility]  HOME HEALTH AGENCY ([facility] ([place])  8/8/20     Phone: [phone]         Gregory Jeter, RN Lead Care Coordinator Primary Care - CC Admissions 8/24/20     Phone: [phone] Fax: [phone]                My Care Plans  Self Management and Treatment Plan  Goals and (Comments)  Goals        General    #1  Monitoring (pt-stated)     Notes - Note edited  9/25/2020 11:50 AM by Gregory Jeter, RN    Goal Statement: I will cough and deep breathe multiple times each hour to aide in the prevention of pneumonia or blood clots.   Date Goal set: 9/25/2020  Barriers: recent case of pneumonia  Strengths: engaged in care coordination  Date to Achieve By: 3/25/2020  Patient expressed understanding of goal: yes  Action steps to achieve this goal:  1. I will deep breathe and cough at least 4 times per hour to prevent a return of pneumonia.  2. I will deep breathe without coughing every few minutes to keep all areas of the lungs aerated.                Action Plans on File:                       Advance Care Plans/Directives Type:         My Medical and Care Information  Problem List   Patient Active Problem List   Diagnosis     Overactive bladder     GERD (gastroesophageal reflux disease)     HYPERLIPIDEMIA LDL GOAL <100     Type 2 diabetes, HbA1C goal < 8% (H)     Diabetes mellitus with background retinopathy (H)     Diabetic macular edema, hx focal laser ou     Advanced directives, counseling/discussion     Hypertension goal BP (blood pressure) < 140/90     CHF (congestive heart failure) (H)     Pseudophakia, Yag Caps, ou      CKD (chronic kidney disease) stage 3, GFR 30-59 ml/min (H)     Hx of keratopathy - hx of PTK, ou (MP)     Hx of corneal epithelial defect, left eye     Gout     Type 2 diabetes mellitus with diabetic nephropathy (H)     Obesity, unspecified obesity severity, unspecified obesity type     FLOR (obstructive sleep apnea)     Primary open angle glaucoma of both eyes, mild stage     Posterior vitreous detachment, bilateral     Type 2 diabetes mellitus with diabetic nephropathy, with long-term current use of insulin (H)     Status post ORIF of fracture of ankle     Morbid obesity (H)     CKD (chronic kidney disease) stage 4, GFR 15-29 ml/min (H)     Hyperparathyroidism (H)     Anxiety     Acute kidney failure, unspecified (H)     Acute respiratory distress     Bacterial pneumonia     Bell's palsy     CAP (community acquired pneumonia)     Closed bimalleolar fracture of left ankle with routine healing     Diabetic neuropathy (H)     E. coli UTI     Facial droop     Heart failure, diastolic, acute on chronic (H)     Orthostatic hypotension     TIA (transient ischemic attack)     ACP (advance care planning)     Acute CHF (H)     Type 2 diabetes mellitus (H)     Hyperlipidemia     Hypertension     Obesity (BMI 30-39.9)     Glaucoma     FLOR on CPAP     Tardive dyskinesia      Current Medications and Allergies:  See printed Medication Report.    Care Coordination Start Date: 9/25/2020   Frequency of Care Coordination: monthly   Form  Last Updated: 09/25/2020

## 2020-09-25 NOTE — LETTER
[facility] CARE COORDINATION  [address]  [facility] [address]    September 25, 2020    Ruth Rocha  [address]  [place] [address]      Dear Ruth,    I am a clinic care coordinator who works with Raymon Hernández MD at [facility]. I wanted to thank you for spending the time to talk with me.  Below is a description of clinic care coordination and how I can further assist you.      The clinic care coordination team is made up of a registered nurse,  and community health worker who understand the health care system. The goal of clinic care coordination is to help you manage your health and improve access to the health care system in the most efficient manner. The team can assist you in meeting your health care goals by providing education, coordinating services, strengthening the communication among your providers and supporting you with any resource needs.    Please feel free to contact me at [phone] with any questions or concerns. We are focused on providing you with the highest-quality healthcare experience possible and that all starts with you.     Sincerely,     Gregory Wilder MSN, RN, PHN, [facility]   Primary Care Clinical RN Care Coordinator  [facility]  9/25/2020   11:51 AM  [email or web address]  Office: [phone]      Enclosed: I have enclosed a copy of the Complex Care Plan. This has helpful information and goals that we have talked about. Please keep this in an easy to access place to use as needed.

## 2020-09-25 NOTE — PROGRESS NOTES
Clinic Care Coordination Contact    Clinic Care Coordination Contact  OUTREACH    Referral Information:  Referral Source: Non-[facility]    Primary Diagnosis: Pneumonia    Chief Complaint   Patient presents with     Clinic Care Coordination - Post Hospital     Gregory Wilder RN nurse care coordinator post hospital assessment call.        Universal Utilization: The patient uses the [facility] system and the [facility] system.  Clinic Utilization  Difficulty keeping appointments:: No  Compliance Concerns: No  No-Show Concerns: No  No PCP office visit in Past Year: No  Utilization    Last refreshed: 9/25/2020 11:14 AM:  Hospital Admissions 0           Last refreshed: 9/25/2020 11:14 AM:  ED Visits 0           Last refreshed: 9/25/2020 11:14 AM:  No Show Count (past year) 1              Current as of: 9/25/2020 11:14 AM              Clinical Concerns:  Current Medical Concerns: Patient was recently hospitalized at [facility] for pneumonia and hypoxia.  The patient has returned home and continues to recuperate and feel better every day.  The patient uses a walker to get from place to place and lives in a mobile home with a ramp to get in.  Patient agrees to be enrolled in care coordination and sets a goal of working on deep breathing and coughing to keep her lungs clear.  The patient's roommate sets up her medications and ensures that she gets the proper dose at the proper time making all the changes for the new medications.  At this time there are no questions or concerns regarding the medications.  The patient has a follow-up appointment with a provider at [place].  And she needs to call cardiology in order to make an appointment with them.    Medication reconciliation was completed with the patient and marked as reviewed.  Health maintenance was reviewed and questions were answered with the patient.  The assessment for CHF was completed with the patient today as well as the social determinants  [facility] and they were marked as reviewed.    Medication reconciliation status: Medications reviewed and reconciled.  Continue medications without change.     Patient Active Problem List   Diagnosis     Overactive bladder     GERD (gastroesophageal reflux disease)     HYPERLIPIDEMIA LDL GOAL <100     Type 2 diabetes, HbA1C goal < 8% (H)     Diabetes mellitus with background retinopathy (H)     Diabetic macular edema, hx focal laser ou     Advanced directives, counseling/discussion     Hypertension goal BP (blood pressure) < 140/90     CHF (congestive heart failure) (H)     Pseudophakia, Yag Caps, ou      CKD (chronic kidney disease) stage 3, GFR 30-59 ml/min (H)     Hx of keratopathy - hx of PTK, ou (MP)     Hx of corneal epithelial defect, left eye     Gout     Type 2 diabetes mellitus with diabetic nephropathy (H)     Obesity, unspecified obesity severity, unspecified obesity type     FLOR (obstructive sleep apnea)     Primary open angle glaucoma of both eyes, mild stage     Posterior vitreous detachment, bilateral     Type 2 diabetes mellitus with diabetic nephropathy, with long-term current use of insulin (H)     Status post ORIF of fracture of ankle     Morbid obesity (H)     CKD (chronic kidney disease) stage 4, GFR 15-29 ml/min (H)     Hyperparathyroidism (H)     Anxiety     Acute kidney failure, unspecified (H)     Acute respiratory distress     Bacterial pneumonia     Bell's palsy     CAP (community acquired pneumonia)     Closed bimalleolar fracture of left ankle with routine healing     Diabetic neuropathy (H)     E. coli UTI     Facial droop     Heart failure, diastolic, acute on chronic (H)     Orthostatic hypotension     TIA (transient ischemic attack)     ACP (advance care planning)     Acute CHF (H)     Type 2 diabetes mellitus (H)     Hyperlipidemia     Hypertension     Obesity (BMI 30-39.9)     Glaucoma     FLOR on CPAP     Tardive dyskinesia       Current Behavioral Concerns: None currently  noted.  Education Provided to patient: Options for care coordination.  The importance of deep breathing and coughing to keep her lungs clear.  Pain  Pain (GOAL):: No  Health Maintenance Reviewed: Due/Overdue   Health Maintenance Due   Topic Date Due     HF ACTION PLAN  1940     HEPATITIS B IMMUNIZATION (1 of 3 - Risk 3-dose series) 01/07/1961     ZOSTER IMMUNIZATION (1 of 2) 01/07/1992     MEDICARE ANNUAL WELLNESS VISIT  01/07/2007     DIABETIC FOOT EXAM  03/11/2020     LIPID  03/22/2020     INFLUENZA VACCINE (1) 09/01/2020     BMP  10/24/2020       Clinical Pathway: Clinic Care Coordination CHF Assessment    Discharge:    Hospital summary: pneumonia and hypoxia  Day of hospital discharge: 9/24/2020   What recommendations were made for follow up after your recent hospitalization?  Follow-up appointments with the PCP and cardiology  Have the follow up appointments been scheduled? Yes  If not, can I help you set up these appointments? N/A       CHF:    Home scale available:  No  Home scale weight this morning:   Hospital discharge weight: 194 pounds   Current weight:    Heart Failure Zones sheet on refrigerator or available: No  Any increased SOB since hospital discharge:  No  Any increased edema since hospital discharge:  No  What number to call for YELLOW zones: [phone]    Symptom Review:   Heart Failure Symptoms  Shortness of breath:: Yes  Shortness of breath symptom course:: Stable  Waking up at night short of breath?: Yes  Prop up on pillows or sleep in chair to breathe easier? : Yes  Trouble walking or talking while short of breath?: Yes  Wheezing or noisy breathing?: Yes  Cough: No  Increased sputum: No  Fever: No  Chest pain: : No  Heartbeat: Regular  Dizzy or Lightheaded: No  Checking weight daily? : No  Does the patient have understanding of Diuretic self-management?: No  Diet:: No added salt  Appetite:: Normal  Swelling: : Lower legs  Urination:: Waking up at night  Fatigue: Sometimes  Weakness  "(Heaviness in limbs):: No  What Heart Failure zone are you currently in?: Yellow  Overall your CHF symptoms are (GOAL):: Stable    Medications:  \"How many new medications are you on since your hospitalization?\"  0 - 1  \"How many of your current medicines changed (dose, timing, name, etc.) while you were in the hospital?\"  0 - 1  \"Do you have questions about your medications?\"  No  For patients on insulin: \"Did you start on insulin in the hospital or did you have your insulin dose changed?\"  No  Is patient on Warfarin?  No  Is Ejection Fraction <40%: No    When is the first appointment with the CHF clinic: calling today.               Medication Management:  Patient is knowledgeable on medications and is adherent.  No financial concerns reported at this time.  Medication reconciliation was performed with the patient and there are no questions or concerns regarding medications.    Functional Status:  Dependent ADLs:: Ambulation-walker, Bathing, Dressing  Dependent IADLs:: Cleaning, Cooking, Laundry, Shopping, Meal Preparation, Medication Management, Transportation, Money Management  Bed or wheelchair confined:: No  Mobility Status: Independent w/Device  Fallen 2 or more times in the past year?: No  Any fall with injury in the past year?: No    Living Situation:  Current living arrangement:: I live in a private home with family  Type of residence:: Other([facility])    Lifestyle & Psychosocial Needs:     Social Needs     Financial resource strain: Not hard at all     Food insecurity     Worry: Never true     Inability: Never true     Transportation needs     Medical: No     Non-medical: No     Diet:: Diabetic diet  Inadequate nutrition (GOAL):: No  Tube Feeding: No  Inadequate activity/exercise (GOAL):: No  Significant changes in sleep pattern (GOAL): No  Transportation means:: Family, Regular car     Anabaptism or spiritual beliefs that impact treatment:: No  Mental health DX:: No  Mental health management concern " (GOAL):: No  Informal Support system:: Friends   Socioeconomic History     Marital status: Single     Spouse name: Not on file     Number of children: Not on file     Years of education: Not on file     Highest education level: Not on file     Tobacco Use     Smoking status: Never Smoker     Smokeless tobacco: Never Used   Substance and Sexual Activity     Alcohol use: No     Drug use: No     Sexual activity: Never               Resources and Interventions:  Current Resources:   List of home care services:: Skilled Nursing, Physicial Therapy, Occupational Therapy  Community Resources: Home Care  Supplies used at home:: Diabetic Supplies  Equipment Currently Used at Home: walker, rolling, commode, glucometer    Advance Care Plan/Directive  Advanced Care Plans/Directives on file:: No  Advanced Care Plan/Directive Status: Considering Options    Referrals Placed: None     Goals:   Goals        General    #1  Monitoring (pt-stated)     Notes - Note edited  9/25/2020 11:50 AM by Gregory Jeter RN    Goal Statement: I will cough and deep breathe multiple times each hour to aide in the prevention of pneumonia or blood clots.   Date Goal set: 9/25/2020  Barriers: recent case of pneumonia  Strengths: engaged in care coordination  Date to Achieve By: 3/25/2020  Patient expressed understanding of goal: yes  Action steps to achieve this goal:  1. I will deep breathe and cough at least 4 times per hour to prevent a return of pneumonia.  2. I will deep breathe without coughing every few minutes to keep all areas of the lungs aerated.               Patient/Caregiver understanding: Patient has a good to fair understanding of the disease process.    Outreach Frequency: monthly  Future Appointments              In 3 days Jovani Newell MD [facility]    In 3 months Abraham Byrd MD [facility]          Plan: 1.  The patient will deep breathing cough at least 4 times  per hour in order to keep her lungs clear of any fluid.  2.  The patient will deep breathe without coughing every few minutes to make sure that she is irrigating all of the alveoli in her lungs.  3.  The patient will take all medications as prescribed by the providers.              Gregory Wilder MSN, RN, PHN, [facility]   Primary Care Clinical RN Care Coordinator  [facility]  9/25/2020   12:31 PM  [email or web address]  Office: [phone]

## 2020-09-27 NOTE — TELEPHONE ENCOUNTER
[facility] now requests orders and shares plan of care/discharge summaries for some patients through [employer].  Please REPLY TO THIS MESSAGE OR ROUTE BACK TO THE AUTHOR in order to give authorization for orders when needed.  This is considered a verbal order, you will still receive a faxed copy of orders for signature.  Thank you for your assistance in improving collaboration for our patients.    ORDER  Skilled Nursing 2W2, 3PRN's for disease and symptom management, assessment, medication education.   Physical Therapy for balance, strengthening, fall risk education.  Occupational Therapy for DME, home safety eval, education on safe ADL's/iADL's.   Speech Therapy for symptom management and swallow eval.   for resources and care planning.  Home Health Aide 2W2 for bathing, hygiene and personal cares.

## 2020-09-28 NOTE — PROGRESS NOTES
Subjective     Ruth Rocha is a 80 year old female who presents to clinic today for the following health issues:    HPI         Hospital Follow-up Visit:    Hospital/Nursing Home/IP Rehab Facility: [facility]  Date of Admission: 9/21/20  Date of Discharge: 9/24/20  Reason(s) for Admission: Pneumonia of left lower lobe      Was your hospitalization related to COVID-19? No   Problems taking medications regularly:  None  Medication changes since discharge: None  Problems adhering to non-medication therapy:  None    Summary of hospitalization:  CareEverywhere information obtained and reviewed  Diagnostic Tests/Treatments reviewed.  Follow up needed: Ongoing primary care  Other Healthcare Providers Involved in Patient s Care:         Cardiology  Update since discharge: improved.       Post Discharge Medication Reconciliation: discharge medications reconciled, continue medications without change.  Plan of care communicated with patient and caregiver (friend, Renita)              She was hospitalized last week for pneumonia.  Her oxygen saturations were in the 70s to 80s prior to being hospitalized.  She was treated with ceftriaxone, azithromycin and amoxicillin.  She is just finishing her amoxicillin today.  She is feeling better.  She does have a history of diabetes and CHF and obesity.  She normally sees Dr. Hernández for her primary care.  She is due for a cardiology follow-up and they will be making an appointment for that in the near future.  She still has some cough, but she is feeling a lot better than prior to her hospitalization.  She remains on baseline medications as below.    Patient Active Problem List   Diagnosis     Overactive bladder     GERD (gastroesophageal reflux disease)     HYPERLIPIDEMIA LDL GOAL <100     Type 2 diabetes, HbA1C goal < 8% (H)     Diabetes mellitus with background retinopathy (H)     Diabetic macular edema, hx focal laser ou     Advanced directives, counseling/discussion     Hypertension  goal BP (blood pressure) < 140/90     CHF (congestive heart failure) (H)     Pseudophakia, Yag Caps, ou      CKD (chronic kidney disease) stage 3, GFR 30-59 ml/min (H)     Hx of keratopathy - hx of PTK, ou (MP)     Hx of corneal epithelial defect, left eye     Gout     Type 2 diabetes mellitus with diabetic nephropathy (H)     Obesity, unspecified obesity severity, unspecified obesity type     FLOR (obstructive sleep apnea)     Primary open angle glaucoma of both eyes, mild stage     Posterior vitreous detachment, bilateral     Type 2 diabetes mellitus with diabetic nephropathy, with long-term current use of insulin (H)     Status post ORIF of fracture of ankle     Morbid obesity (H)     CKD (chronic kidney disease) stage 4, GFR 15-29 ml/min (H)     Hyperparathyroidism (H)     Anxiety     Acute kidney failure, unspecified (H)     Acute respiratory distress     Bacterial pneumonia     Bell's palsy     CAP (community acquired pneumonia)     Closed bimalleolar fracture of left ankle with routine healing     Diabetic neuropathy (H)     E. coli UTI     Facial droop     Heart failure, diastolic, acute on chronic (H)     Orthostatic hypotension     TIA (transient ischemic attack)     ACP (advance care planning)     Acute CHF (H)     Type 2 diabetes mellitus (H)     Hyperlipidemia     Hypertension     Obesity (BMI 30-39.9)     Glaucoma     FLOR on CPAP     Tardive dyskinesia     Current Outpatient Medications   Medication     ACE/ARB NOT PRESCRIBED, INTENTIONAL,     acetaminophen (TYLENOL) 500 MG tablet     amLODIPine (NORVASC) 5 MG tablet     amoxicillin (AMOXIL) 500 MG capsule     aspirin 325 MG EC tablet     B-D U/F 31G X 8 MM insulin pen needle     blood glucose (ONEYDA MICROLET 2) lancing device     blood glucose monitoring (ONEYDA MICROLET) lancets     calcitRIOL (ROCALTROL) 0.25 MCG capsule     carvedilol (COREG) 6.25 MG tablet     Cholecalciferol (VITAMIN D) 1000 UNITS capsule     cimetidine (TAGAMET) 400 MG tablet      "CONTOUR NEXT TEST test strip     dorzolamide-timolol (COSOPT) 2-0.5 % ophthalmic solution     fluticasone (FLONASE) 50 MCG/ACT nasal spray     Insulin Syringe-Needle U-100 28G X 1/2\" 0.3 ML MISC     ipratropium-albuterol (COMBIVENT RESPIMAT)  MCG/ACT inhaler     LANTUS SOLOSTAR 100 UNIT/ML soln     latanoprost (XALATAN) 0.005 % ophthalmic solution     MULTIVITAMIN TABS   OR     oxybutynin (DITROPAN) 5 MG tablet     polyethylene glycol-propylene glycol (SYSTANE ULTRA) 0.4-0.3 % SOLN ophthalmic solution     potassium chloride ER (KLOR-CON) 10 MEQ CR tablet     senna-docusate (SENEXON-S) 8.6-50 MG tablet     sertraline (ZOLOFT) 50 MG tablet     simvastatin (ZOCOR) 10 MG tablet     Valbenazine Tosylate (INGREZZA) 80 MG CAPS     guaiFENesin (MUCINEX) 600 MG 12 hr tablet     midodrine (PROAMATINE) 5 MG tablet     No current facility-administered medications for this visit.          Review of Systems   Constitutional, HEENT, cardiovascular, pulmonary, gi and gu systems are negative, except as otherwise noted.      Objective    /64 (BP Location: Right arm, Patient Position: Sitting, Cuff Size: Adult Regular)   Pulse 63   Temp 97.6  F (36.4  C) (Oral)   Wt 88 kg (194 lb)   SpO2 99%   BMI 34.37 kg/m    Body mass index is 34.37 kg/m .  Physical Exam   GENERAL: alert, no distress, obese and elderly  RESP: lungs clear to auscultation - no rales, rhonchi or wheezes    Her hospital discharge summary was reviewed.  Recent lab work was reviewed via Epic as well.        Assessment & Plan     .    ICD-10-CM    1. Pneumonia of left lower lobe due to infectious organism  J18.9    2. Type 2 diabetes mellitus with diabetic nephropathy, with long-term current use of insulin (H)  E11.21     Z79.4    3. Congestive heart failure, unspecified HF chronicity, unspecified heart failure type (H)  I50.9    4. CKD (chronic kidney disease) stage 3, GFR 30-59 ml/min (H)  N18.3      It appears that her pneumonia has been appropriately " treated and she has responded nicely to the treatment  Her vital signs look nice and healthy today with her oxygen saturation back into the normal range  She was advised to finish off the amoxicillin today  I do not think she will need any further antibiotics then    She was advised to continue her baseline medications as per usual and to make an appointment to see cardiology as planned    I advised to follow-up with her PCP in a couple of months or so       Return in about 2 months (around 11/28/2020) for diabetes recheck.    Jovani Newell MD  [facility]

## 2020-09-30 NOTE — PROGRESS NOTES
Patient referred for diastolic dysfunction. Most recent echo (care everywhere) from 8/20/20 shows EF >70%.  Patient appears to already have appt with Dr. Zacarias at [facility].  Will close referral.

## 2020-10-05 NOTE — TELEPHONE ENCOUNTER
I sent in replacement for simvastatin.    New  Med is pravastatin.     Please inform patient and the caller    Thanks    Raymon Hernández MD

## 2020-10-07 NOTE — TELEPHONE ENCOUNTER
[facility] now requests orders and shares plan of care/discharge summaries for some patients through [employer].  Please REPLY TO THIS MESSAGE OR ROUTE BACK TO THE AUTHOR in order to give authorization for orders when needed.  This is considered a verbal order, you will still receive a faxed copy of orders for signature.  Thank you for your assistance in improving collaboration for our patients.    ORDER    OT treatment 2w4 for functional strengthening, DME mgmt, and increasing safety/independence with ADLs.    MALIK Eisenberg/L  [facility]  [email or web address]

## 2020-10-08 NOTE — TELEPHONE ENCOUNTER
[facility] now requests orders and shares plan of care/discharge summaries for some patients through [employer].  Please REPLY TO THIS MESSAGE OR ROUTE BACK TO THE AUTHOR in order to give authorization for orders when needed.  This is considered a verbal order, you will still receive a faxed copy of orders for signature.  Thank you for your assistance in improving collaboration for our patients.    ORDER    Requesting the ok for ST visit 1week1 to target follow up on safe swallow strategies.    Thank you,    Clara Magaña MA, CCC-SLP  Speech-Language Pathologist  [facility]  [email or web address]  [phone]

## 2020-10-12 NOTE — TELEPHONE ENCOUNTER
Reason for Call:  Medication or medication refill:    Do you use a [facility]?  Name of the pharmacy and phone number for the current request:  [facility] [address] [address]    Name of the medication requested: amLODIPine (NORVASC)    Other request: Angelina Guerra, patient's emergency contact called and requested refills on amLODIPine (NORVASC).    Can we leave a detailed message on this number? YES    Phone number patient can be reached at: Home number on file [phone] (home)    Best Time: ASAP    Call taken on 10/12/2020 at 4:04 PM by Kelly Diego

## 2020-10-12 NOTE — TELEPHONE ENCOUNTER
Reason for Call:  Other / Electric raise bed    Detailed comments: Renita Guerra, patient's emergency contact, called and requested to speak to the care team to discuss patient's need for an electric raise bed because she has to keep her head up to sleep since she cannot breath.    Phone Number Patient can be reached at: [phone] (Angelina Guerra/emergency contact)    Best Time: ASAP    Can we leave a detailed message on this number? YES    Call taken on 10/12/2020 at 4:00 PM by Kelly Diego

## 2020-10-12 NOTE — TELEPHONE ENCOUNTER
Forms received from: [facility]    Phone number listed: [phone]   Fax listed: [phone]  Date received: 10/12/2020  Form description: ot 2 week 4  Once forms are completed, please return to [facility] via fax.  Form placed: in providers folder  Jania Diop

## 2020-10-12 NOTE — TELEPHONE ENCOUNTER
[facility] now requests orders and shares plan of care/discharge summaries for some patients through [email or web address].  Please REPLY TO THIS MESSAGE OR ROUTE BACK TO THE AUTHOR in order to give authorization for orders when needed.  This is considered a verbal order, you will still receive a faxed copy of orders for signature.  Thank you for your assistance in improving collaboration for our patients.    ORDER ok for LT eval and rx for ongoing LE edema.    FYI pt is going to be calling to schedule a face to face visit for a hospital bed soon. I will send over the qualifying forms to you.    Carol GAN/L  [phone]  [email or web address]

## 2020-10-12 NOTE — TELEPHONE ENCOUNTER
I see this coreg dosing in Epic as patient reported.   Cued med and routed to Dr. Hernández to address refill.    Patient was seen 9/28/20 by another provider for hospital follow up.   Pneumonia.      Deepali Srinivasan RN  [facility]

## 2020-10-12 NOTE — TELEPHONE ENCOUNTER
Routing refill request to provider for review/approval because:  Medication is reported/historical      Maria G Chung RN

## 2020-10-12 NOTE — TELEPHONE ENCOUNTER
Reason for Call:  Medication or medication refill:    Do you use a [facility]?  Name of the pharmacy and phone number for the current request:  [facility] [address] [address]    Name of the medication requested: carvedilol (COREG)    Other request: Renita, patient's emergency contact, called and stated dosage was changed at the hospital and patient is taking this medication three pills two times a day at meals, therefore, she needs a new script sent to her pharmacy.    Can we leave a detailed message on this number? YES    Phone number patient can be reached at: [phone] (Renita/patient's emergency contact)    Best Time: ASAP    Call taken on 10/12/2020 at 4:11 PM by Kelly Diego

## 2020-10-12 NOTE — TELEPHONE ENCOUNTER
Patient was seen 9/28/20 by Dr. Newell for hospital follow up, pneumonia.    Is she feeling worse?    I called and spoke to Ruth, Renita is not available right now.   Ruth says she is still short of breath, not worse but no better than when she was seen 9/28/20.   Says it is hard to breath when laying flat so they contacted a medical supply company and Ruth believes they will be sending us a request for an electric bed.    She does not remember the name of the company.      Will keep this open in box for follow up, await faxed request from supply company.    Deepali Srinivasan RN  [facility]

## 2020-10-13 NOTE — TELEPHONE ENCOUNTER
Forms received from: [facility]   Phone number listed: [phone]   Fax listed: [phone]  Date received: 10.13.20  Form description: Hospital Face to Face//OT  Once forms are completed, please return to [facility] via Fax.  Is patient requesting to be contacted when forms are completed: NA    Form placed: in providers micaela Adames

## 2020-10-15 NOTE — TELEPHONE ENCOUNTER
Form was given to provider to complete.    [facility]  Team 3 Coordinator

## 2020-10-15 NOTE — TELEPHONE ENCOUNTER
[facility] now requests orders and shares plan of care/discharge summaries for some patients through [employer].  Please REPLY TO THIS MESSAGE OR ROUTE BACK TO THE AUTHOR in order to give authorization for orders when needed.  This is considered a verbal order, you will still receive a faxed copy of orders for signature.  Thank you for your assistance in improving collaboration for our patients.    ORDER: OT lymphedema therapy 4x/month for 1 month, 6x/month for 1 month    Please respond to this message as soon as possible.  Thank you,  Laura Moise OTR/L CLT

## 2020-10-21 NOTE — TELEPHONE ENCOUNTER
Please see request for orders below  [facility]  Team 3 Coordinator

## 2020-10-21 NOTE — TELEPHONE ENCOUNTER
Called Omar ([facility]): [phone]  Left detailed message on confidential voice mail relaying okay for orders.      Adelina Verdugo RN  Triage Nurse  [facility]  Appointment line: [phone]  [facility], 24 hour nurse line, available by calling clinic at [phone] and following prompts.

## 2020-10-21 NOTE — TELEPHONE ENCOUNTER
Reason for Call: Request for an order or referral:    Order or referral being requested: home Care    Date needed: as soon as possible    Has the patient been seen by the PCP for this problem? YES    Additional comments: Omar with [facility] called to request orders below:    Physical therapy: 1 time a week for 3 weeks, and 3 visits in November.     Please call back to provide verbal.    Phone number Patient can be reached at:  Omar ([facility]): [phone]    Best Time:  anytime    Can we leave a detailed message on this number?  YES -confidential voicemail, please leave orders on voicemail    Call taken on 10/21/2020 at 2:46 PM by Connie Sherwood

## 2020-10-22 NOTE — TELEPHONE ENCOUNTER
Requested information faxed to number provided.  [facility]  Team 3 Coordinator

## 2020-10-23 NOTE — PROGRESS NOTES
"Ruth Rocha is a 80 year old female who is being evaluated via a billable telephone visit.      The patient has been notified of following:     \"This telephone visit will be conducted via a call between you and your physician/provider. We have found that certain health care needs can be provided without the need for a physical exam.  This service lets us provide the care you need with a short phone conversation.  If a prescription is necessary we can send it directly to your pharmacy.  If lab work is needed we can place an order for that and you can then stop by our lab to have the test done at a later time.    Telephone visits are billed at different rates depending on your insurance coverage. During this emergency period, for some insurers they may be billed the same as an in-person visit.  Please reach out to your insurance provider with any questions.    If during the course of the call the physician/provider feels a telephone visit is not appropriate, you will not be charged for this service.\"    Patient has given verbal consent for Telephone visit?  Yes    What phone number would you like to be contacted at? [phone]    How would you like to obtain your AVS? MyChart    Subjective     Ruth Rocha is a 80 year old female who presents via phone visit today for the following health issues:    HPI     Anxiety is acting up  Bleeding hemorrhoids  Discuss getting a hospital bed. I did advice patient face to face might be needed            Review of Systems    feeling okay     Has lymphedema wrap    Blood pressure okay     Hemorrhoids worse    Stain clothing    Uses depends    No pain from hemorrhoids    Has a regular bed now    They want a hospital bed    Usually sleep on side but switch positions sometimes    Hard to change positiono       Objective          Vitals:  No vitals were obtained today due to virtual visit.    healthy, alert and no distress  PSYCH: Alert and oriented times 3; coherent speech, " normal   rate and volume, able to articulate logical thoughts, able   to abstract reason, no tangential thoughts, no hallucinations   or delusions  Her affect is normal  RESP: No cough, no audible wheezing, able to talk in full sentences  Remainder of exam unable to be completed due to telephone visits             Assessment/Plan:  ASSESSMENT / PLAN:  (I50.9) Congestive heart failure, unspecified HF chronicity, unspecified heart failure type (H)  (primary encounter diagnosis)  Comment: patient has stable CHF, but for good breathing at night she needs head of bed up over 30 degrees  Plan: completed form today for hospital bed; will submit this along with today's note    (E66.9) Obesity, unspecified classification, unspecified obesity type, unspecified whether serious comorbidity present  Comment: chronic  Plan: keep working on healthy diet/exercise and wt loss as able.  The weight makes mobility a challenge.    (M19.90) Arthritis  Comment: patient has diffuse arthritis  Plan: also contributes to problems with regular bed    (R53.1) Weakness  Comment: patient is chronically weak. Needs help getting in and out of bed which is very challenging.  This would be much easier with an electric adjustable hospital bed.   Plan: as above     (I10) Hypertension goal BP (blood pressure) < 140/90  Comment: has some swelling and blood pressure okay so go down to just  5 mg once daily   Plan: amLODIPine (NORVASC) 5 MG tablet        Patient agreed      Of note this was a scheduled encounter with patient.  She was not able to do an in person visit  Today so we did a telephone visit.        I reviewed the patient's medications, allergies, medical history, family history, and social history.    Raymon Hernández MD           Phone call duration:  18 minutes ( 2:32 to 2:50 pm )  Raymon Hernández MD

## 2020-10-27 NOTE — PROGRESS NOTES
Clinic Care Coordination Contact    Follow Up Progress Note      Assessment: The RN CC contacted the patient by telephone for a follow up call.  The patient states that she is doing better as far as the pneumonia is going.  The patient states that she is coughing less, although she continues to deep breath and cough.  The current issue is sciatica with the pain going across her butt and down her leg.  The RN CC introduced taking the tylenol on a scheduled basis with the application of ice and/or heat.  It was suggested to the patient to use a bag of frozen peas as the ice pack.  She is very open to this process.  Goal was placed in the plan.    Medication reconciliation was completed with the patient and marked as reviewed.  Health maintenance was reviewed and questions were answered with the patient.  The assessment for CHF was completed with the patient today as well as the social determinants of health and they were marked as reviewed.      Goals addressed this encounter:   Goals Addressed                 This Visit's Progress      #1  Monitoring (pt-stated)   50%     Goal Statement: I will cough and deep breathe multiple times each hour to aide in the prevention of pneumonia or blood clots.   Date Goal set: 9/25/2020  Barriers: recent case of pneumonia  Strengths: engaged in care coordination  Date to Achieve By: 3/25/2020  Patient expressed understanding of goal: yes  Action steps to achieve this goal:  1. I will deep breathe and cough at least 4 times per hour to prevent a return of pneumonia.  2. I will deep breathe without coughing every few minutes to keep all areas of the lungs aerated.           #2  Functional (pt-stated)        Goal Statement: I will treat my sciatica pain with alternating heat and ice as well as tylenol.  I will verbalize that the pain is less and it will not be as painful to sit.  Date Goal set: 10/27/2020  Barriers: sciatic pain radiating down the leg.  Strengths: engaged in care  coordination  Date to Achieve By: 4/27/2021  Patient expressed understanding of goal: yes  Action steps to achieve this goal:  1. I will use the tylenol on a scheduled basis.  2. I will use a bag of frozen peas on the SI joint area.  3. I will use a heating pad on the SI joint area.               Intervention/Education provided during outreach: Reviewed the treatment for possible sciatica.     Outreach Frequency: monthly    Plan:   1.  The patient will deep breath and cough to keep the lungs open and prevent atelectasis.  2.  The patient will use scheduled tylenol as well as ice packs and heat to treat the sciatica.  3.  The patient will walk around the house frequently to increase her strength and stamina.      RN CC Nurse Care Coordinator will follow up in 4 weeks.          Gregory Wilder MSN, RN, PHN, [facility]   Primary Care Clinical RN Care Coordinator  [facility]  10/27/2020   3:12 PM  [email or web address]  Office: [phone]

## 2020-10-28 NOTE — TELEPHONE ENCOUNTER
Forms received from: [facility]   Phone number listed: [phone]   Fax listed: [phone]  Date received: 10.28.20  Form description: PT  Once forms are completed, please return to [facility] via Fax.  Is patient requesting to be contacted when forms are completed: NA   Form placed: in providers micaela Adames

## 2020-10-29 NOTE — TELEPHONE ENCOUNTER
Prescription approved per [facility] [place] Refill Protocol.  Isabel Wyatt RN

## 2020-10-30 NOTE — TELEPHONE ENCOUNTER
See longer notes below.    Routed to Dr. Hernández to address concerns:    1) BP elevated after amlodipine decrease, legs still same swelling, compression wraps in use and help    2)  Ongoing difficulties swallowing, maybe home OT or speech therapy eval?    3)  Increased anxiety, is on zoloft daily    Deepali Srinivasan RN  [facility]

## 2020-10-30 NOTE — TELEPHONE ENCOUNTER
"Renita called back with Ruth on speaker phone.    Says Ruth's BP has been elevated 150's to 160's over 80's since the amlodipine was decreased per virtual visit on 10/23/20 with Dr. Hernández.  Denies chest pain or headache, has CHF and chronically short of breath.   Leg edema did not change with reduction of amlodipine.    Says patient has nausea on and off for months, seems to choke on food and then she throws up, has been happening for months.  Renita says she tries to encourage soft food.   Never need to call 911 for choking.    Has seen speech therapy in the past, [facility] at [place] before covid.   Swallow study was discussed but then covid pandemic started.    She is currently receiving home care, OT/PT and some nursing from  Home Care.    Renita says patient has been more anxious and \"tenses up\" a lot.   Denies any panic attacks.        Routed to Dr. Hernández to address concerns:    1) BP elevated after amlodipine decrease, legs still same swelling, compression wraps in use and help    2)  Ongoing difficulties swallowing, maybe home OT or speech therapy eval?    3)  Increased anxiety, is on zoloft daily    Deepali Srinivasan RN  [facility]              "

## 2020-10-30 NOTE — TELEPHONE ENCOUNTER
Attempt # 1  Called patient at home number.[phone] (home)  Was call answered?  No answer, left message to call nurse line at [phone]                Adelina Verdugo RN  [facility]

## 2020-10-30 NOTE — TELEPHONE ENCOUNTER
Reason for call:  Patient reporting a symptom    Symptom or request: anxiety and nausea     Duration (how long have symptoms been present): n/a    Have you been treated for this before? Yes    Additional comments: Patient friend Renita requesting for nurse to call regarding symptoms and change in bp med. Please advise.     Phone Number patient can be reached at:  Home number on file [phone] (home)    Best Time:  Anytime     Can we leave a detailed message on this number:  YES    Call taken on 10/30/2020 at 10:07 AM by Jeanine Allen

## 2020-10-30 NOTE — TELEPHONE ENCOUNTER
I called  Home Care at [phone], spoke to Hilda, she put page out for [place], RN case manager to call back to [facility] RN line at [phone] to discuss home speech therapy evaluation.      I called and spoke to Mariam, advised of Rx's sent and plan.    Scheduled office visit in November to follow up.    Still need to speak to  Home Care so keep encounter open for that.    Deepali Srinivasan RN  [facility]

## 2020-10-30 NOTE — TELEPHONE ENCOUNTER
Keep amlodipine as is  Add hydrochlorothiazide 12.5 mg po daily ( I sent in prescription )  Increase sertraline from 50 to 100 mg daily ( I sent in prescription )  Order speech therapy eval- inform home care    See us in clinic in the next 2-3 weeks    Please inform caller of  All orders    Raymon Hernández MD

## 2020-11-02 NOTE — TELEPHONE ENCOUNTER
Called called [facility] at [phone]  states Alyssa not working today - nurse explained has orders changing medications and needs a nurse to contact clinic.  will page the team to call [phone]        Adelina Verdugo RN  Triage Nurse  [facility]  Appointment line: [phone]  [facility], 24 hour nurse line, available by calling clinic at [phone] and following prompts.

## 2020-11-03 NOTE — TELEPHONE ENCOUNTER
Deepali with  Home Care called back, Fernanda is the , advised Deepali of new med orders.   Deepali says Lyla has seen speech therapy this Fall, last was in October.    Clara Magaña at [phone] is the speech therapist.    I called number provided for Clara, no answer, left detailed message requesting Clara call back to discuss swallowing concerns expressed by patient and friend last week.    Call back to [facility] RN line at [phone] requested.    Deepali Srinivasan, RN  [facility]

## 2020-11-04 NOTE — TELEPHONE ENCOUNTER
Clara returned call. Patient has been seen 3-4 visits and a previous speech therapist seen her in July. Okay to have thin liquids and regular foods. She stated patient has a lot of dryness and anxiety that impacts her swallowing. They have given all the best practice advice and all the suggestions they could. Clara feels sometimes they practice it and other times they do not. When Clara discharged the patient a few weeks ago as she felt that was the most speech therapy could do for the patient. Advised to call her back with questions at the number below.     Maria G Chung RN

## 2020-11-09 NOTE — PROGRESS NOTES
Clinic Care Coordination Contact  Care Coordination Transition Communication         Clinical Data: Patient was hospitalized at [facility] from 11/1/2020 to 11/6/2020 with diagnosis of pneumonia and possible COVID.     Transition to Facility:              Facility Name: [facility]              Contact name and phone number/fax:     Plan: RN/SW Care Coordinator will await notification from facility staff informing RN/SW Care Coordinator of patient's discharge plans/needs. RN/SW Care Coordinator will review chart and outreach to facility staff every 4 weeks and as needed.     Gregory Wilder MSN, RN, PHN, CCM   Primary Care Clinical RN Care Coordinator  [facility]  11/9/2020   3:46 PM  [email or web address]  Office: [phone]

## 2020-11-18 NOTE — PATIENT INSTRUCTIONS
Thank you for coming to the [facility] at [place]; please note the following instructions:    1. No changes in medications    2. Follow up as needed.         If you have any questions regarding your visit please contact your care team:     Cardiology  Telephone Number   Annmarie GRAHAM, RN  Hannah GUERIN, RN   Zuleima LINDSEY, RMHARMAN NICOLE, RMHARMAN PERDOMO, LPN   [phone] (select option 1)    *After hours: [phone]   For Scheduling Appts:     [phone] (select option 1)    *After hours: [phone]     For the Device Clinic (Pacemakers and ICD's)  RN's :  Khushi López   During business hours: [phone]    *After business hours:  [phone] (select option 4)      Normal test result notifications will be released via e[employer] or mailed within 7 business days.  All other test results, will be communicated via telephone once reviewed by your cardiologist.    If you need a medication refill please contact your pharmacy.  Please allow 3 business days for your refill to be completed.    As always, thank you for trusting us with your health care needs!

## 2020-11-18 NOTE — PROGRESS NOTES
Referring provider:Karri Finn MD    HPI: Ms. Ruth Rocha is a 80 year old  female with PMH significant for type 2 diabetes, hypertension, paroxysmal atrial fibrillation ?, TIA, heart failure with preserved ejection fraction, obstructive sleep apnea, tardive dyskinesia and CKD stage IV.    Patient was recently admitted to [facility] on 11/1/2020 for difficulty in breathing.  She was noted to have progressive weight gain over the last few weeks with swelling of her abdomen, thighs along with cough and difficulty lying flat.  She was noted to have worsening dysphagia.  She was found to have acute on chronic diastolic heart failure.  Patient symptoms improved with diuresis.  For dysphagia she underwent EGD which showed esophageal stenosis which was dilated.  When she was discharged on 11/6/2020 she was recommended to start furosemide 20 mg daily which was new.  She was recommended to continue amlodipine 5 mg, carvedilol 6.25 mg 3 tablet twice daily, hydrochlorothiazide 12.5 mg, insulin, inhalers, potassium, and pravastatin 20 mg.  Patient is coming to our clinic from nursing home today for follow-up after recent hospital admission for HF exacerbation. Patient is a poor historian.  It has been also very difficult to understand her due to her severe tardive dyskinesia involving her orofacial muscles. Patient is on wheelchair today.  Denies chest pain, shortness of breath, palpitations, dizziness or lower extremity edema. Patient has seen neurology in December 2019.  Tardive dyskinesia was attributed to her prior exposure to metoclopramide.  She was started on valbenazine.     Patient was last seen in 2015 by cardiology at outside hospital.  She was noted to have no documentation of atrial fibrillation in the chart.  She was noted to have single episode of A. fib in the hospital at the time of heart failure admission in 2011 (She was admitted to hospital in 2011 for heart failure with preserved ejection  fraction). She had a event monitor back then which showed sinus rhythm without atrial fibrillation.  She was recommended to continue aspirin.  All her available EKGs in care everywhere also show sinus rhythm. She was never been on anticoagulation.    Lifetime non-smoker.  No alcohol use.  Recent echocardiogram available in care everywhere from 8/6/2020 reports normal biventricular function with no valve disease.  Patient's PA pressure was elevated at 52 mmHg plus RA pressure.  Medications, personal, family, and social history reviewed with patient and revised.    PAST MEDICAL HISTORY:  Past Medical History:   Diagnosis Date     Anemia      Arthritis      CKD (chronic kidney disease) stage 3, GFR 30-59 ml/min      Diabetic retinopathy (H)      DM type 2 (diabetes mellitus, type 2) (H)      Glaucoma (increased eye pressure)      Gout      HTN      Hypercholesterolemia      Macular degeneration      Non-compliance      Obesity      Renal insufficiency      Sciatica     2011     Tardive dyskinesia 11/17/2019       CURRENT MEDICATIONS:  Current Outpatient Medications   Medication Sig Dispense Refill     ACE/ARB NOT PRESCRIBED, INTENTIONAL, by Other route continuous prn Reported on 5/5/2017       acetaminophen (TYLENOL) 500 MG tablet Take 2 tablets (1,000 mg) by mouth 3 times daily as needed       amLODIPine (NORVASC) 5 MG tablet Advised patient to decrease from 1 bid to 1 daily on 10- 180 tablet 1     aspirin 325 MG EC tablet Take 325 mg by mouth daily        B-D U/F 31G X 8 MM insulin pen needle USE AS DIRECTED - 2 DOSES PER  each 2     blood glucose (ONEYDA MICROLET 2) lancing device Lancing device to be used with lancets. 1 each 0     blood glucose monitoring (ONEYDA MICROLET) lancets USE AS DIRECTED 3 TIMES A  each 1     calcitRIOL (ROCALTROL) 0.25 MCG capsule Take 1 capsule (0.25 mcg) by mouth Every Mon, Wed, Fri Morning       carvedilol (COREG) 6.25 MG tablet Take 3 tablets (18.75 mg) by mouth 2  "times daily 180 tablet 1     Cholecalciferol (VITAMIN D) 1000 UNITS capsule Take 1 capsule by mouth daily.       cimetidine (TAGAMET) 400 MG tablet Take 1 tablet (400 mg) by mouth 2 times daily 180 tablet 1     CONTOUR NEXT TEST test strip USE TO TEST BLOOD SUGAR 3 TIMES DAILY 300 strip 1     dorzolamide-timolol (COSOPT) 2-0.5 % ophthalmic solution Place 1 drop into both eyes 2 times daily 10 mL 12     fluticasone (FLONASE) 50 MCG/ACT nasal spray SPRAY 1-2 SPRAYS INTO BOTH NOSTRILS DAILY 48 mL 0     guaiFENesin (MUCINEX) 600 MG 12 hr tablet Take 1 tablet (600 mg) by mouth 2 times daily 28 tablet 0     hydrochlorothiazide (HYDRODIURIL) 12.5 MG tablet Take 1 tablet (12.5 mg) by mouth daily 90 tablet 0     Insulin Syringe-Needle U-100 28G X 1/2\" 0.3 ML MISC use daily for insulin injection       ipratropium-albuterol (COMBIVENT RESPIMAT)  MCG/ACT inhaler Inhale 1 puff into the lungs 4 times daily as needed for shortness of breath / dyspnea or wheezing 1 Inhaler 10     LANTUS SOLOSTAR 100 UNIT/ML soln INJECT 15-20 UNITS SUBCUTANEOUS AT BEDTIME 18 mL 0     latanoprost (XALATAN) 0.005 % ophthalmic solution Place 1 drop into both eyes At Bedtime 1 Bottle 11     MULTIVITAMIN TABS   OR 1 TABLET DAILY       oxybutynin (DITROPAN) 5 MG tablet TAKE 1 TABLET (5 MG) BY MOUTH 3 TIMES DAILY AS NEEDED 270 tablet 3     polyethylene glycol-propylene glycol (SYSTANE ULTRA) 0.4-0.3 % SOLN ophthalmic solution Apply 1-2 drops to eye 2 times daily        potassium chloride ER (KLOR-CON) 10 MEQ CR tablet Take 1 tablet (10 mEq) by mouth daily 90 tablet 1     pravastatin (PRAVACHOL) 20 MG tablet Take 1 tablet (20 mg) by mouth daily 90 tablet 1     senna-docusate (SENEXON-S) 8.6-50 MG tablet TAKE 1-4 TABLETS BY MOUTH 2 TIMES DAILY AS NEEDED CONSTIPATION 60 tablet 4     sertraline (ZOLOFT) 100 MG tablet Take 1 tablet (100 mg) by mouth daily 90 tablet 0     Valbenazine Tosylate (INGREZZA) 80 MG CAPS Take 1 capsule by mouth daily 30 capsule " 11       PAST SURGICAL HISTORY:  Past Surgical History:   Procedure Laterality Date     CATARACT IOL, RT/LT       focal laser for diabetic macular edema      both eyes     HC REMOVAL GALLBLADDER  10-5-3     LASER SELECTIVE TRABECULOPLASTY  7/2003    right eye, 360     LASER YAG CAPSULOTOMY  6/1998; 6/2007    right eye; left eye     ORTHOPEDIC SURGERY Left     left ankle surgery     PHACOEMULSIFICATION WITH STANDARD INTRAOCULAR LENS IMPLANT  11/1996; 3/1999    right eye; left eye     PHOTOTHERAPEUTIC KERACTECTOMY Right 01/18/2017     PHOTOTHERAPEUTIC KERACTECTOMY Left 08/2015     TONSILLECTOMY  1948       ALLERGIES:     Allergies   Allergen Reactions     Morphine      Other reaction(s): GI Upset     Fish Oil      Other reaction(s): Edema  Lee     Metoclopramide      Tardive dyskinesia       FAMILY HISTORY:  Family History   Problem Relation Age of Onset     C.A.D. Mother      Glaucoma Mother      Hypertension Mother      C.A.D. Father      Glaucoma Father      Hypertension Father      Other - See Comments Brother         [place], [place]     Other - See Comments Brother         [place]     Other - See Comments Brother         [place]     Diabetes No family hx of         except dad's sister     Cancer No family hx of      Macular Degeneration No family hx of      Amblyopia No family hx of      Retinal detachment No family hx of          SOCIAL HISTORY:  Social History     Tobacco Use     Smoking status: Never Smoker     Smokeless tobacco: Never Used   Substance Use Topics     Alcohol use: No     Drug use: No       ROS:   Constitutional: No fever, chills, or sweats. Weight stable.   Cardiovascular: As per HPI.   Respiratory: No cough, hemoptysis.    GI: No nausea, vomiting, hematemesis, melena, or hematochezia.   : No hematuria.   Integument: Negative.   Hematologic:  No easy bruising, no easy bleeding.  Neuro: Negative.     Exam:  /56 (BP Location: Left arm, Patient Position: Chair, Cuff Size: Adult  Large)   Pulse 69   Wt 75.4 kg (166 lb 2 oz)   SpO2 100%   BMI 29.43 kg/m    GENERAL APPEARANCE: alert and no distress  HEENT: no icterus, no central cyanosis  LYMPH/NECK: no adenopathy, no asymmetry, JVP not elevated.  RESPIRATORY: lungs clear to auscultation - no rales, rhonchi or wheezes, no use of accessory muscles, no retractions, respirations are unlabored, normal respiratory rate  CARDIOVASCULAR: regular rhythm, normal S1, S2, no S3 or S4 and no murmur, click or rub, precordium quiet with normal PMI.  GI: soft, non tender  EXTREMITIES: no edema  NEURO: alert, altered speech due to oropharyngeal tardive dyskinesia.  Very difficult to understand.   SKIN: no ecchymoses, no rashes     I have reviewed the labs and personally reviewed the imaging below and made my comment in the assessment and plan.    Labs:  CBC RESULTS:   Lab Results   Component Value Date    WBC 9.4 02/05/2020    RBC 4.14 02/05/2020    HGB 11.1 (L) 10/26/2020    HCT 38.8 02/05/2020    MCV 94 02/05/2020    MCH 29.7 02/05/2020    MCHC 31.7 02/05/2020    RDW 12.7 02/05/2020     02/05/2020       BMP RESULTS:  Lab Results   Component Value Date     10/26/2020    POTASSIUM 4.3 10/26/2020    CHLORIDE 103 10/26/2020    CO2 26 10/26/2020    ANIONGAP 7 10/26/2020     (H) 10/26/2020    BUN 28 10/26/2020    CR 1.88 (H) 10/26/2020    GFRESTIMATED 25 (L) 10/26/2020    GFRESTBLACK 29 (L) 10/26/2020    DYANA 9.4 10/26/2020        Echocardiogram 8/6/2020 [facility]  Visually Estimated EF: >70%   Normal LV size with hyperdynamic LV systolic function (estimated ejection fraction > 70%).   No regional wall motion abnormalities noted.   No significant valvular heart disease noted.   Normal RV size and systolic function.   Estimated RV systolic pressure of 52.1 mmHg + RA pressure.   When compared to the previous echocardiographic report of 11/28/2011, there probably has been  no significant change.    Endoscopy 11/3/2020  Impressions/Post-Op  Diagnosis:       - Benign-appearing esophageal stenosis. Dilated. Biopsied.       - Normal stomach.       - Normal examined duodenum.    EKG 12/13/2019          Assessment and Plan:   Ms. Ruth Rocha is a 80 year old  female with PMH significant for type 2 diabetes, hypertension, paroxysmal atrial fibrillation ? ,  TIA, failure with preserved ejection fraction, obstructive sleep apnea, tardive dyskinesia, CKD stage IV and esophageal stenosis status post recent esophageal dilation on 11/3/2020.    1.  Heart failure with preserved ejection fraction: Per chart review patient was admitted to outside hospital with exacerbation of heart failure with preserved ejection fraction first time in 2011.  She was doing well until recently.  Per chart review it appears due to worsening kidney function she was not kept on diuretics.  Patient was recently admitted to [facility] on 11/1/2020 with worsening heart failure symptoms.  She was restarted on furosemide 20 mg daily. She is euvolemic on exam today. Theferefore I did not change the dose.    2.  CKD stage IV: Follows with nephrology at [facility].    3.  Paroxysmal atrial fibrillation: I reviewed all her EKGs available from Care Everywhere I did not see any documentation of atrial fibrillation.  Prior cardiology notes from outside hospital reports one episode of A. fib when she was admitted to hospital for heart failure exacerbation in 2011.  She is in sinus rhythm in clinic today.  I recommended to continue current treatment with aspirin 325 mg daily as she has history of TIA.  Of note she declined anticoagulation in the past.    4.  Tardive dyskinesia: The patient has significant oropharyngeal tardive dyskinesia.  She has seen neurology in December 2019 at [facility].  She was started on valbenazine.  Tardive dyskinesia was attributed to her exposure to metoclopramide.    5. Hypertension: Currently well controlled with amlodipine 5, hydrochlorothiazide  and carvedilol.  Recommend to continue current treatment.    I did not make any medication changes today.  Patient currently resides at nursing home.  Will defer follow-up to primary care physician at nursing home.  I will see patient as needed.    I have spent additional 60 minutes reviewing all the prior medical records, hospital admissions and prior cardiology notes since 2011. A total of 30 minutes spent face-toface with greater than 50% of the time spent in counseling and coordinating cares of the issues above.    Please donot hesitate to contact me if you have any questions or concerns. Again, thank you for allowing me to participate in the care of your patient.    Juani MELGAR MD  [facility] Division of Cardiology  Pager [phone]

## 2020-11-18 NOTE — NURSING NOTE
"Chief Complaint   Patient presents with     Consult     new pt - see care everywhere- admission-11/1-11/6:  Acute hypoxic respiratory failure secondary to Acute on chronic diastolic heart failure. Patient reports SOB, fluttering and fatigue.        Initial /56 (BP Location: Left arm, Patient Position: Chair, Cuff Size: Adult Large)   Pulse 69   Wt 75.4 kg (166 lb 2 oz)   SpO2 100%   BMI 29.43 kg/m   Estimated body mass index is 29.43 kg/m  as calculated from the following:    Height as of 2/5/20: 1.6 m (5' 3\").    Weight as of this encounter: 75.4 kg (166 lb 2 oz)..  BP completed using cuff size: CELESTINE Suero        "

## 2020-11-18 NOTE — LETTER
11/18/2020      RE: Ruth Rocha  [address]  [facility] [address]       Dear Colleague,    Thank you for the opportunity to participate in the care of your patient, Ruth Rocha, at the [facility]. Please see a copy of my visit note below.    Referring provider:Karri Finn MD    HPI: Ms. Ruth Rocha is a 80 year old  female with PMH significant for type 2 diabetes, hypertension, paroxysmal atrial fibrillation ?, TIA, heart failure with preserved ejection fraction, obstructive sleep apnea, tardive dyskinesia and CKD stage IV.    Patient was recently admitted to [facility] on 11/1/2020 for difficulty in breathing.  She was noted to have progressive weight gain over the last few weeks with swelling of her abdomen, thighs along with cough and difficulty lying flat.  She was noted to have worsening dysphagia.  She was found to have acute on chronic diastolic heart failure.  Patient symptoms improved with diuresis.  For dysphagia she underwent EGD which showed esophageal stenosis which was dilated.  When she was discharged on 11/6/2020 she was recommended to start furosemide 20 mg daily which was new.  She was recommended to continue amlodipine 5 mg, carvedilol 6.25 mg 3 tablet twice daily, hydrochlorothiazide 12.5 mg, insulin, inhalers, potassium, and pravastatin 20 mg.  Patient is coming to our clinic from nursing home today for follow-up after recent hospital admission for HF exacerbation. Patient is a poor historian.  It has been also very difficult to understand her due to her severe tardive dyskinesia involving her orofacial muscles. Patient is on wheelchair today.  Denies chest pain, shortness of breath, palpitations, dizziness or lower extremity edema. Patient has seen neurology in December 2019.  Tardive dyskinesia was attributed to her prior exposure to metoclopramide.  She was started on  valbenazine.     Patient was last seen in 2015 by cardiology at outside hospital.  She was noted to have no documentation of atrial fibrillation in the chart.  She was noted to have single episode of A. fib in the hospital at the time of heart failure admission in 2011 (She was admitted to hospital in 2011 for heart failure with preserved ejection fraction). She had a event monitor back then which showed sinus rhythm without atrial fibrillation.  She was recommended to continue aspirin.  All her available EKGs in care everywhere also show sinus rhythm. She was never been on anticoagulation.    Lifetime non-smoker.  No alcohol use.  Recent echocardiogram available in care everywhere from 8/6/2020 reports normal biventricular function with no valve disease.  Patient's PA pressure was elevated at 52 mmHg plus RA pressure.  Medications, personal, family, and social history reviewed with patient and revised.    PAST MEDICAL HISTORY:  Past Medical History:   Diagnosis Date     Anemia      Arthritis      CKD (chronic kidney disease) stage 3, GFR 30-59 ml/min      Diabetic retinopathy (H)      DM type 2 (diabetes mellitus, type 2) (H)      Glaucoma (increased eye pressure)      Gout      HTN      Hypercholesterolemia      Macular degeneration      Non-compliance      Obesity      Renal insufficiency      Sciatica     2011     Tardive dyskinesia 11/17/2019       CURRENT MEDICATIONS:  Current Outpatient Medications   Medication Sig Dispense Refill     ACE/ARB NOT PRESCRIBED, INTENTIONAL, by Other route continuous prn Reported on 5/5/2017       acetaminophen (TYLENOL) 500 MG tablet Take 2 tablets (1,000 mg) by mouth 3 times daily as needed       amLODIPine (NORVASC) 5 MG tablet Advised patient to decrease from 1 bid to 1 daily on 10- 180 tablet 1     aspirin 325 MG EC tablet Take 325 mg by mouth daily        B-D U/F 31G X 8 MM insulin pen needle USE AS DIRECTED - 2 DOSES PER  each 2     blood glucose (ONEYDA  "MICROLET 2) lancing device Lancing device to be used with lancets. 1 each 0     blood glucose monitoring (ONEYDA MICROLET) lancets USE AS DIRECTED 3 TIMES A  each 1     calcitRIOL (ROCALTROL) 0.25 MCG capsule Take 1 capsule (0.25 mcg) by mouth Every Mon, Wed, Fri Morning       carvedilol (COREG) 6.25 MG tablet Take 3 tablets (18.75 mg) by mouth 2 times daily 180 tablet 1     Cholecalciferol (VITAMIN D) 1000 UNITS capsule Take 1 capsule by mouth daily.       cimetidine (TAGAMET) 400 MG tablet Take 1 tablet (400 mg) by mouth 2 times daily 180 tablet 1     CONTOUR NEXT TEST test strip USE TO TEST BLOOD SUGAR 3 TIMES DAILY 300 strip 1     dorzolamide-timolol (COSOPT) 2-0.5 % ophthalmic solution Place 1 drop into both eyes 2 times daily 10 mL 12     fluticasone (FLONASE) 50 MCG/ACT nasal spray SPRAY 1-2 SPRAYS INTO BOTH NOSTRILS DAILY 48 mL 0     guaiFENesin (MUCINEX) 600 MG 12 hr tablet Take 1 tablet (600 mg) by mouth 2 times daily 28 tablet 0     hydrochlorothiazide (HYDRODIURIL) 12.5 MG tablet Take 1 tablet (12.5 mg) by mouth daily 90 tablet 0     Insulin Syringe-Needle U-100 28G X 1/2\" 0.3 ML MISC use daily for insulin injection       ipratropium-albuterol (COMBIVENT RESPIMAT)  MCG/ACT inhaler Inhale 1 puff into the lungs 4 times daily as needed for shortness of breath / dyspnea or wheezing 1 Inhaler 10     LANTUS SOLOSTAR 100 UNIT/ML soln INJECT 15-20 UNITS SUBCUTANEOUS AT BEDTIME 18 mL 0     latanoprost (XALATAN) 0.005 % ophthalmic solution Place 1 drop into both eyes At Bedtime 1 Bottle 11     MULTIVITAMIN TABS   OR 1 TABLET DAILY       oxybutynin (DITROPAN) 5 MG tablet TAKE 1 TABLET (5 MG) BY MOUTH 3 TIMES DAILY AS NEEDED 270 tablet 3     polyethylene glycol-propylene glycol (SYSTANE ULTRA) 0.4-0.3 % SOLN ophthalmic solution Apply 1-2 drops to eye 2 times daily        potassium chloride ER (KLOR-CON) 10 MEQ CR tablet Take 1 tablet (10 mEq) by mouth daily 90 tablet 1     pravastatin (PRAVACHOL) 20 MG " tablet Take 1 tablet (20 mg) by mouth daily 90 tablet 1     senna-docusate (SENEXON-S) 8.6-50 MG tablet TAKE 1-4 TABLETS BY MOUTH 2 TIMES DAILY AS NEEDED CONSTIPATION 60 tablet 4     sertraline (ZOLOFT) 100 MG tablet Take 1 tablet (100 mg) by mouth daily 90 tablet 0     Valbenazine Tosylate (INGREZZA) 80 MG CAPS Take 1 capsule by mouth daily 30 capsule 11       PAST SURGICAL HISTORY:  Past Surgical History:   Procedure Laterality Date     CATARACT IOL, RT/LT       focal laser for diabetic macular edema      both eyes     HC REMOVAL GALLBLADDER  10-5-3     LASER SELECTIVE TRABECULOPLASTY  7/2003    right eye, 360     LASER YAG CAPSULOTOMY  6/1998; 6/2007    right eye; left eye     ORTHOPEDIC SURGERY Left     left ankle surgery     PHACOEMULSIFICATION WITH STANDARD INTRAOCULAR LENS IMPLANT  11/1996; 3/1999    right eye; left eye     PHOTOTHERAPEUTIC KERACTECTOMY Right 01/18/2017     PHOTOTHERAPEUTIC KERACTECTOMY Left 08/2015     TONSILLECTOMY  1948       ALLERGIES:     Allergies   Allergen Reactions     Morphine      Other reaction(s): GI Upset     Fish Oil      Other reaction(s): Edema  [place]     Metoclopramide      Tardive dyskinesia       FAMILY HISTORY:  Family History   Problem Relation Age of Onset     C.A.D. Mother      Glaucoma Mother      Hypertension Mother      C.A.D. Father      Glaucoma Father      Hypertension Father      Other - See Comments Brother         [place], [place]     Other - See Comments Brother         [place]     Other - See Comments Brother         [place]     Diabetes No family hx of         except dad's sister     Cancer No family hx of      Macular Degeneration No family hx of      Amblyopia No family hx of      Retinal detachment No family hx of          SOCIAL HISTORY:  Social History     Tobacco Use     Smoking status: Never Smoker     Smokeless tobacco: Never Used   Substance Use Topics     Alcohol use: No     Drug use: No       ROS:   Constitutional: No fever, chills, or  sweats. Weight stable.   Cardiovascular: As per HPI.   Respiratory: No cough, hemoptysis.    GI: No nausea, vomiting, hematemesis, melena, or hematochezia.   : No hematuria.   Integument: Negative.   Hematologic:  No easy bruising, no easy bleeding.  Neuro: Negative.     Exam:  /56 (BP Location: Left arm, Patient Position: Chair, Cuff Size: Adult Large)   Pulse 69   Wt 75.4 kg (166 lb 2 oz)   SpO2 100%   BMI 29.43 kg/m    GENERAL APPEARANCE: alert and no distress  HEENT: no icterus, no central cyanosis  LYMPH/NECK: no adenopathy, no asymmetry, JVP not elevated.  RESPIRATORY: lungs clear to auscultation - no rales, rhonchi or wheezes, no use of accessory muscles, no retractions, respirations are unlabored, normal respiratory rate  CARDIOVASCULAR: regular rhythm, normal S1, S2, no S3 or S4 and no murmur, click or rub, precordium quiet with normal PMI.  GI: soft, non tender  EXTREMITIES: no edema  NEURO: alert, altered speech due to oropharyngeal tardive dyskinesia.  Very difficult to understand.   SKIN: no ecchymoses, no rashes     I have reviewed the labs and personally reviewed the imaging below and made my comment in the assessment and plan.    Labs:  CBC RESULTS:   Lab Results   Component Value Date    WBC 9.4 02/05/2020    RBC 4.14 02/05/2020    HGB 11.1 (L) 10/26/2020    HCT 38.8 02/05/2020    MCV 94 02/05/2020    MCH 29.7 02/05/2020    MCHC 31.7 02/05/2020    RDW 12.7 02/05/2020     02/05/2020       BMP RESULTS:  Lab Results   Component Value Date     10/26/2020    POTASSIUM 4.3 10/26/2020    CHLORIDE 103 10/26/2020    CO2 26 10/26/2020    ANIONGAP 7 10/26/2020     (H) 10/26/2020    BUN 28 10/26/2020    CR 1.88 (H) 10/26/2020    GFRESTIMATED 25 (L) 10/26/2020    GFRESTBLACK 29 (L) 10/26/2020    DYANA 9.4 10/26/2020        Echocardiogram 8/6/2020 [facility]  Visually Estimated EF: >70%   Normal LV size with hyperdynamic LV systolic function (estimated ejection fraction > 70%).    No regional wall motion abnormalities noted.   No significant valvular heart disease noted.   Normal RV size and systolic function.   Estimated RV systolic pressure of 52.1 mmHg + RA pressure.   When compared to the previous echocardiographic report of 11/28/2011, there probably has been  no significant change.    Endoscopy 11/3/2020  Impressions/Post-Op Diagnosis:       - Benign-appearing esophageal stenosis. Dilated. Biopsied.       - Normal stomach.       - Normal examined duodenum.    EKG 12/13/2019          Assessment and Plan:   Ms. Ruth Rocha is a 80 year old  female with PMH significant for type 2 diabetes, hypertension, paroxysmal atrial fibrillation ? ,  TIA, failure with preserved ejection fraction, obstructive sleep apnea, tardive dyskinesia, CKD stage IV and esophageal stenosis status post recent esophageal dilation on 11/3/2020.    1.  Heart failure with preserved ejection fraction: Per chart review patient was admitted to outside hospital with exacerbation of heart failure with preserved ejection fraction first time in 2011.  She was doing well until recently.  Per chart review it appears due to worsening kidney function she was not kept on diuretics.  Patient was recently admitted to [facility] on 11/1/2020 with worsening heart failure symptoms.  She was restarted on furosemide 20 mg daily. She is euvolemic on exam today. Theferefore I did not change the dose.    2.  CKD stage IV: Follows with nephrology at [facility].    3.  Paroxysmal atrial fibrillation: I reviewed all her EKGs available from Care Everywhere I did not see any documentation of atrial fibrillation.  Prior cardiology notes from outside hospital reports one episode of A. fib when she was admitted to hospital for heart failure exacerbation in 2011.  She is in sinus rhythm in clinic today.  I recommended to continue current treatment with aspirin 325 mg daily as she has history of TIA.  Of note she  declined anticoagulation in the past.    4.  Tardive dyskinesia: The patient has significant oropharyngeal tardive dyskinesia.  She has seen neurology in December 2019 at [facility].  She was started on valbenazine.  Tardive dyskinesia was attributed to her exposure to metoclopramide.    5. Hypertension: Currently well controlled with amlodipine 5, hydrochlorothiazide and carvedilol.  Recommend to continue current treatment.    I did not make any medication changes today.  Patient currently resides at nursing home.  Will defer follow-up to primary care physician at nursing home.  I will see patient as needed.    I have spent additional 60 minutes reviewing all the prior medical records, hospital admissions and prior cardiology notes since 2011. A total of 30 minutes spent face-toface with greater than 50% of the time spent in counseling and coordinating cares of the issues above.    Please donot hesitate to contact me if you have any questions or concerns. Again, thank you for allowing me to participate in the care of your patient.    Juani MELGAR MD  [facility] Division of Cardiology  Pager [phone]      Please do not hesitate to contact me if you have any questions/concerns.     Sincerely,     Juani Melgar MD

## 2020-12-04 NOTE — TELEPHONE ENCOUNTER
Called [facility] - relayed orders. Verbalized understanding.      Adelina Verdugo RN  Triage Nurse  [facility]  Appointment line: [phone]  [facility], 24 hour nurse line, available by calling clinic at [phone] and following prompts.

## 2020-12-04 NOTE — TELEPHONE ENCOUNTER
Reason for Call: Request for an order or referral:    Order or referral being requested: Requesting verbal orders and an Okay for patient to take Oxybutyn.   Needs verbal orders for nursing 2 times a week for 2 weeks, after once a week for 6 weeks, GT, OT and ST. Also needs a verbal okay if patient should be taking Oxybutyn, nurse states medication is not on patient's discharge form but patient is currently taking.     Date needed: as soon as possible    Has the patient been seen by the PCP for this problem? YES    Additional comments: None    Phone number Patient can be reached at:  Other phone number:  [phone]    Best Time:  Any    Can we leave a detailed message on this number?  YES    Call taken on 12/4/2020 at 11:16 AM by Lisy Murrieta

## 2020-12-07 NOTE — TELEPHONE ENCOUNTER
I called and spoke to Renita, Lyla is in background.   They will reduce amlodipine to one daily.   I updated Epic with the hydrochlorothiazide not being given and now on lasix, also updated aspirin, Renita says patient is on 81 mg, not 325 now.    Advised they monitor and update Dr. Montalvo on Weds.    Deepali Srinivasan RN  [facility]

## 2020-12-07 NOTE — TELEPHONE ENCOUNTER
Please call patient or Renita to advise them to decrease amlodipine to just 5 mg daily    Thanks    Raymon Hernández MD

## 2020-12-07 NOTE — TELEPHONE ENCOUNTER
Patient has a hosp/rehab follow up phone visit Wednesday with Dr. Montalvo.    I called and spoke to Renita, she says Lyla is sleeping right now.   Was in TCU, has home nursing for wound care, nurse saw her Friday and is coming out today.   Says Lyla's BP was 151/76 with pulse 71 laying in bed this AM, dropped to 99/56 with pulse 67 when she sat on edge of bed.   Waited a little longer and went up to 119/62 with pulse 61.    She was dizzy when she helped her to the commode.   Renita gave Lyla some water and had her lay down.       Says she is on Amlodipine 5 mg BID, Coreg as listed in Epic, is now on lasix 20 mg daily rather than hydrochlorothiazide and Renita thinks she got all the hydrochlorothiazide out of her med box but says it is possible she had both yesterday.    Blood sugar was 199 this AM, is on 2 oral antibiotics for the wound.       I advised her to make sure Lyla changes positions slowly, encourage fluids to keep urine light yellow.    Be sure to reconcile home meds with home RN and plan to discuss issue with provider at phone call on Wednesday.    Pharmacy selected, routed to Dr. Hernández for any advice on meds or plan in the meantime.    Deepali Srinivasan RN  [facility]

## 2020-12-07 NOTE — TELEPHONE ENCOUNTER
Reason for Call:  Other     Detailed comments: talk about low blood pressure, patient has a home care nurse     Phone Number Patient can be reached at: Home number on file [phone] (home)    Best Time: any    Can we leave a detailed message on this number? YES    Call taken on 12/7/2020 at 11:16 AM by Brianna Hunter

## 2020-12-09 PROBLEM — K22.2 ESOPHAGEAL STENOSIS: Status: ACTIVE | Noted: 2020-01-01

## 2020-12-09 PROBLEM — I48.0 PAF (PAROXYSMAL ATRIAL FIBRILLATION) (H): Status: ACTIVE | Noted: 2020-01-01

## 2020-12-09 PROBLEM — J96.01 ACUTE RESPIRATORY FAILURE WITH HYPOXIA (H): Status: ACTIVE | Noted: 2020-01-01

## 2020-12-09 NOTE — PROGRESS NOTES
"Ruth Rocha is a 80 year old female who is being evaluated via a billable telephone visit.      The patient has been notified of following:     \"This telephone visit will be conducted via a call between you and your physician/provider. We have found that certain health care needs can be provided without the need for a physical exam.  This service lets us provide the care you need with a short phone conversation.  If a prescription is necessary we can send it directly to your pharmacy.  If lab work is needed we can place an order for that and you can then stop by our lab to have the test done at a later time.    Telephone visits are billed at different rates depending on your insurance coverage. During this emergency period, for some insurers they may be billed the same as an in-person visit.  Please reach out to your insurance provider with any questions.    If during the course of the call the physician/provider feels a telephone visit is not appropriate, you will not be charged for this service.\"    Patient has given verbal consent for Telephone visit?  Yes    What phone number would you like to be contacted at? [phone] Renita is joining with pt.    How would you like to obtain your AVS? Hoa   10:19 AM  -start visit    Subjective     Ruth Rocha is a 80 year old female who presents via phone visit today for the following health issues:    HPI       Hospital Follow-up Visit:    Hospital/Nursing Home/IP Rehab Facility: [facility]  Date of Admission: 11/1/20  Date of Discharge: 11/10/20  Reason(s) for Admission: pneumonia    Acute hypoxic respiratory failure secondary to Acute on chronic diastolic heart failure.   Hospital Problem List   Principal Problem:  Acute on chronic diastolic heart failure (HC)  Active Problems:  HTN (hypertension)  CKD (chronic kidney disease) stage 4, GFR 15-29 ml/min (HC)  Hyperlipidemia  Chronic diastolic heart failure (HC)  Controlled type 2 diabetes mellitus with " stage 3 chronic kidney disease, with long-term current use of insulin (HC)  Acute respiratory failure with hypoxia (HC)  Hyponatremia  Paroxysmal A-fib (HC)     Was your hospitalization related to COVID-19? No   Problems taking medications regularly:  None  Medication changes since discharge: 2 antibiotics  Problems adhering to non-medication therapy:  None    Summary of hospitalization:  [facility] hospital discharge summary reviewed  Diagnostic Tests/Treatments reviewed.  Follow up needed: yes with PMD  Other Healthcare Providers Involved in Patient s Care:         PT/OT/ Home care  Update since discharge: weak and unable to move.  Post Discharge Medication Reconciliation: see change  Plan of care communicated with patient and care Giver  Pt Lives with a Friend who is the main caregiver  Family lives in [place]  She has no sob  No PND or orthopnea  She is weak  Cough better  Weight is 98 pounds but not sure if acccurate as Pt does not stand for Long  Pedal edema is better  Not needing any Oxygen  No chest pain  Has a Bed sore  She is getting home PT and OT  On Pureed diet-had oesophageal stenosis and had endoscopy at [facility]   accuchecks are 152-232                  Review of Systems   CONSTITUTIONAL:NEGATIVE   chills and fever   INTEGUMENTARY/SKIN: has a bed sore    ENT/MOUTH: NEGATIVE for ear, mouth and throat problems  RESP: NEGATIVE for significant cough or SOB  CV: NEGATIVE for chest pain, palpitations or peripheral edema  MUSCULOSKELETAL: unable to stand without help  NEURO: weak  PSYCHIATRIC: on meds for depression  Rest of the ROS is Negative except see above and Problem list [stable]  On Pureed diet         Objective    /67 when Lying down  Goes to 90/50 on standing  Weight 98 Pounds per care giver  At present BP is 130 systolic      Vitals:  No vitals were obtained today due to virtual visit.    no distress-is weak and needs help with ADL  PSYCH: Alert and oriented  coherent speech, normal though  talks slowly  Most of the talking done by the caregiver she Lives with.   Her affect is pleasant  RESP: No cough, no audible wheezing, talks slowly  Remainder of exam unable to be completed due to telephone visits    Pending         Assessment/Plan:    Assessment & Plan      Acute on chronic diastolic heart failure (H)  Pt is on lasix   No signs of CHF at Present    CKD (chronic kidney disease) stage 4, GFR 15-29 ml/min (H)  Labs pending -will need follow up     Type 2 diabetes mellitus with diabetic nephropathy, with long-term current use of insulin (H)  Continue checking accuchecks and follow up     Acute respiratory failure with hypoxia (H)  better    Hypertension goal BP (blood pressure) < 140/90  Pt has Orthostatic Hypotension  Advised she decrease her Norvasc to 2.5 mg daily  Pt has already taken this Today  Advised Hold evening dose of Coreg today  See Provider in clinic  ASAP by tomorrow-if worse go to ER    Hyponatremia  Will check labs when she comes to clinic    PAF (paroxysmal atrial fibrillation) (H)      Esophageal stenosis  On Pureed diet              Return in about 1 day (around 12/10/2020) for recheck/Please schedule appointment.   Go to ER if not better  Zena Montalvo MD  [facility]  11:01 AM -visit completed  Phone call duration:  As above/ minutes

## 2020-12-09 NOTE — PATIENT INSTRUCTIONS
Please make appointment to be seen in clinic by [place]  If any worsening go to ER or if Blood Pressure continues to be low  Sincerely,  Zena Montalvo MD

## 2020-12-13 NOTE — PROGRESS NOTES
PROVIDER NOTIFICATION OF MISSED VISIT - No Action Required  TODAY'S DATE:  2020  PROVIDER:  Dr Raymon Hernández  NOTIFICATION METHOD (Fax #, Phone #l, EPIC, etc): EPIC  PATIENT NAME:  Ruth Rocha  PATIENT ID:  [id number]  : 1940    Medicare Home Health regulation requires [facility] to notify the physician when the plan for visits has been altered.  We have provided fewer visits than ordered.            Missed service:  Physical Therapy            Date(s) of missed service:  2020            Reason:  Patient under observation at [facility]  The patient has been notified.  Please call our office at [phone] if this is not satisfactory to you.

## 2020-12-15 NOTE — PROGRESS NOTES
Clinic Care Coordination Contact  [facility]/Voicemail       Clinical Data: Care Coordinator Outreach  Outreach attempted x 1.  Left message on patient's voicemail with call back information and requested return call.  Plan: Care Coordinator sent care coordination introduction letter on 9/25/2020 via mail. Care Coordinator will try to reach patient again in 10 business days.          Gregory Wilder MSN, RN, PHN, CCM   Primary Care Clinical RN Care Coordinator  [facility]  12/15/2020   12:28 PM  [email or web address]  Office: [phone]

## 2020-12-18 NOTE — TELEPHONE ENCOUNTER
Prescription approved per [facility] Refill Protocol.    Reji Ivey RN....12/17/2020 6:08 PM

## 2020-12-30 NOTE — PROGRESS NOTES
Clinic Care Coordination Contact  Care Coordination Transition Communication    Referral Source: [facility]    Clinical Data: Patient was hospitalized at [facility] from 12/09/2020 to 12/29/2020 with diagnosis of Enteritis, suspected covid-19.     Transition to Facility:              Facility Name: [facility]              Contact name and phone number/fax: Park Ibanez is  [phone]    Plan: RN/SW Care Coordinator will await notification from facility staff informing RN/SW Care Coordinator of patient's discharge plans/needs. RN/SW Care Coordinator will review chart and outreach to facility staff every 4 weeks and as needed.     Gloria Pineda RN, CCM - Primary Care Clinic RN Coordinator  [facility]   12/30/2020    12:43 PM  [phone]

## 2021-01-01 ENCOUNTER — TELEPHONE (OUTPATIENT)
Dept: NEUROLOGY | Facility: CLINIC | Age: 81
End: 2021-01-01

## 2021-01-01 ENCOUNTER — MEDICAL CORRESPONDENCE (OUTPATIENT)
Dept: HEALTH INFORMATION MANAGEMENT | Facility: CLINIC | Age: 81
End: 2021-01-01

## 2021-01-01 ENCOUNTER — TELEPHONE (OUTPATIENT)
Dept: FAMILY MEDICINE | Facility: CLINIC | Age: 81
End: 2021-01-01
Payer: COMMERCIAL

## 2021-01-01 ENCOUNTER — DOCUMENTATION ONLY (OUTPATIENT)
Dept: OTHER | Facility: CLINIC | Age: 81
End: 2021-01-01

## 2021-01-01 ENCOUNTER — TELEPHONE (OUTPATIENT)
Dept: FAMILY MEDICINE | Facility: CLINIC | Age: 81
End: 2021-01-01

## 2021-01-01 ENCOUNTER — PATIENT OUTREACH (OUTPATIENT)
Dept: CARE COORDINATION | Facility: CLINIC | Age: 81
End: 2021-01-01

## 2021-01-01 ENCOUNTER — RECORDS - HEALTHEAST (OUTPATIENT)
Dept: LAB | Facility: CLINIC | Age: 81
End: 2021-01-01

## 2021-01-01 ENCOUNTER — HEALTH MAINTENANCE LETTER (OUTPATIENT)
Age: 81
End: 2021-01-01

## 2021-01-01 DIAGNOSIS — I10 HYPERTENSION GOAL BP (BLOOD PRESSURE) < 140/90: ICD-10-CM

## 2021-01-01 DIAGNOSIS — L89.323 PRESSURE ULCER OF LEFT BUTTOCK, STAGE 3 (H): Primary | ICD-10-CM

## 2021-01-01 LAB
ANION GAP SERPL CALCULATED.3IONS-SCNC: 8 MMOL/L (ref 5–18)
BUN SERPL-MCNC: 18 MG/DL (ref 8–28)
CALCIUM SERPL-MCNC: 9 MG/DL (ref 8.5–10.5)
CHLORIDE BLD-SCNC: 96 MMOL/L (ref 98–107)
CO2 SERPL-SCNC: 30 MMOL/L (ref 22–31)
CREAT SERPL-MCNC: 1.32 MG/DL (ref 0.6–1.1)
ERYTHROCYTE [DISTWIDTH] IN BLOOD BY AUTOMATED COUNT: 13 % (ref 11–14.5)
FLUAV RNA RESP QL NAA+PROBE: NEGATIVE
FLUBV RNA RESP QL NAA+PROBE: NEGATIVE
GFR SERPL CREATININE-BSD FRML MDRD: 39 ML/MIN/1.73M2
GLUCOSE BLD-MCNC: 131 MG/DL (ref 70–125)
HCT VFR BLD AUTO: 30.6 % (ref 35–47)
HGB BLD-MCNC: 9.9 G/DL (ref 12–16)
LABORATORY COMMENT REPORT: NORMAL
MCH RBC QN AUTO: 28.4 PG (ref 27–34)
MCHC RBC AUTO-ENTMCNC: 32.4 G/DL (ref 32–36)
MCV RBC AUTO: 88 FL (ref 80–100)
PLATELET # BLD AUTO: 393 THOU/UL (ref 140–440)
PMV BLD AUTO: 10.6 FL (ref 8.5–12.5)
POTASSIUM BLD-SCNC: 3.7 MMOL/L (ref 3.5–5)
RBC # BLD AUTO: 3.48 MILL/UL (ref 3.8–5.4)
RSV RNA SPEC QL NAA+PROBE: NEGATIVE
SARS-COV-2 RNA RESP QL NAA+PROBE: NEGATIVE
SODIUM SERPL-SCNC: 134 MMOL/L (ref 136–145)
SPECIMEN SOURCE: NORMAL
WBC: 9.9 THOU/UL (ref 4–11)

## 2021-01-01 PROCEDURE — 87636 SARSCOV2 & INF A&B AMP PRB: CPT | Performed by: INTERNAL MEDICINE

## 2021-01-01 PROCEDURE — G0179 MD RECERTIFICATION HHA PT: HCPCS | Performed by: FAMILY MEDICINE

## 2021-01-01 RX ORDER — FUROSEMIDE 20 MG
20 TABLET ORAL DAILY
COMMUNITY
Start: 2021-01-01

## 2021-01-01 RX ORDER — AMLODIPINE BESYLATE 5 MG/1
TABLET ORAL
COMMUNITY
Start: 2021-01-01

## 2021-01-20 NOTE — TELEPHONE ENCOUNTER
Reason for Call:  Other     Detailed comments: Orders to admit to  hospice with current meds and treatment. Okay for hospice standing orders and will provider continue to follow patient.    Phone Number  [facility] ([facility]) [phone]         Best Time:     Can we leave a detailed message on this number? YES    Call taken on 1/20/2021 at 2:14 PM by Jania Diop

## 2021-01-20 NOTE — TELEPHONE ENCOUNTER
Dr. Hernández-- please advise if you are okay to continue following patient and okay with RN giving verbal order to hospice nurse for standing orders.    Called [facility] and gave verbal order to admit to hospice with current meds and treatment.    Anabelle Christensen RN  [facility]

## 2021-01-21 NOTE — TELEPHONE ENCOUNTER
Called Isreal newsome and notified her of reply from Dr. Hernández. She verbalized understanding.

## 2021-01-22 NOTE — TELEPHONE ENCOUNTER
PA Initiation    Medication: Ingrezza-Pending  Insurance Company: [facility] - Phone [phone] Fax [phone]  Pharmacy Filling the Rx: [facility] - [place], [place] - [facility] [address]  Filling Pharmacy Phone:    Filling Pharmacy Fax:    Start Date: 1/22/2021

## 2021-01-26 NOTE — TELEPHONE ENCOUNTER
Prior Authorization Approval    Authorization Effective Date: 1/1/2021  Authorization Expiration Date: 1/26/2022  Medication: Ingrezza 80mg-Approved  Approved Dose/Quantity: 30/30  Reference #: [id number]   Insurance Company: [employer] [place] - Phone [phone] Fax [phone]  Expected CoPay:       CoPay Card Available:      Foundation Assistance Needed:    Which Pharmacy is filling the prescription (Not needed for infusion/clinic administered): [facility] - [place], [place] - [address]  Pharmacy Notified:    Patient Notified:

## 2021-02-04 NOTE — PROGRESS NOTES
Clinic Care Coordination Contact  Care Team Conversations    The RN CC performed a chart review and found that the patient has entered hospice at this time.  Therefore the case has been marked accordingly and closed.      Gregory Wilder MSN, RN, PHN, CCM   Primary Care Clinical RN Care Coordinator  [facility]  2/4/2021   10:57 AM  [email or web address]  Office: [phone]

## 2021-03-19 NOTE — TELEPHONE ENCOUNTER
Called Carol home care nurse.  She stated that patient has had a cough and fatigue for 2 weeks that is gradually getting worse.    She denies:    Fever or chills  Shortness of breath or trouble breathing  New muscle or body aches  New headache  New loss of taste or smell  Sore throat  Congestion or runny nose  New and unexplained nausea or vomiting (throwing up)  Diarrhea (loose stools)  New and unexplained feeling of being tired or weak  New rash     Within the past 3 weeks, have you been exposed to someone with a known positive COVID 19 test? Not sure  Have you traveled anywhere: no      Patient is scheduled with Dr. Stevens tomorrow for a virtual visit.    Carol stated that they can do the Covid swab through home care if provider wants to do this.    Sofia SandersonRN,BSN  [facility]    
Reason for call:  Patient reporting a symptom    Symptom or request: new cough     Duration (how long have symptoms been present): n/a    Have you been treated for this before? Telephone appt with provider tomorrow    Additional comments: Carol with  Home Care requesting for nurse to call with recommendations for home care team. Please advise.     Phone Number patient can be reached at:  Other phone number:  [phone]    Best Time:  Anytime     Can we leave a detailed message on this number:  YES    Call taken on 9/15/2020 at 3:41 PM by Jeanine Allen  
x30   Calf Raises DL x30 DLx30   Bosu standing   DL [id number]   GR 15min 15min   isometrics  Next visit                                    Home Exercises Given: quad sets, bend a few times a day, drop lock sitting to 90, cont PWB    Assessment:  Improving flexion    Treatment/Activity Tolerance:  [x] Patient tolerated treatment well [] Patient limited by fatigue  [] Patient limited by pain  [] Patient limited by other medical complications  [] Other:     Plan: Follow attached protocol    Time In/Time Out:   3664-0273                   Timed Code/Total Treatment Minutes:  ES1 TE2    Electronically signed by:

## 2021-11-27 NOTE — TELEPHONE ENCOUNTER
Forms received from: [employer]   Phone number listed: [phone]   Fax listed: [phone]  Date received: 05/17/19  Form description: Medical   Once forms are completed, please return to [employer] via Fax.  Is patient requesting to be contacted when forms are completed: NA    Form placed: in providers micaela Adames    
Requested information faxed to number provided.  [facility]  Team 3 Coordinator       
no

## 2024-10-01 PROBLEM — E66.9 OBESITY, UNSPECIFIED: Status: RESOLVED | Noted: 2017-12-04 | Resolved: 2017-12-04
